# Patient Record
Sex: FEMALE | Race: WHITE | NOT HISPANIC OR LATINO | Employment: OTHER | URBAN - METROPOLITAN AREA
[De-identification: names, ages, dates, MRNs, and addresses within clinical notes are randomized per-mention and may not be internally consistent; named-entity substitution may affect disease eponyms.]

---

## 2017-01-06 ENCOUNTER — TELEPHONE (OUTPATIENT)
Dept: OBSTETRICS AND GYNECOLOGY | Facility: CLINIC | Age: 62
End: 2017-01-06

## 2017-01-06 NOTE — LETTER
January 6, 2017    Radha Rivera  [address] [place] [address]             [facility] [address]  [address] [place] [address]  [address]  [place] [address]  Phone: [phone] Dear Ms. Rivera:    The results of your most recent Pap smear are normal. This means that no cancerous or precancerous cells were seen. We recommend that you come back in 1 year for your annual exam and 1 years for your next Pap smear.    If you have any questions or concerns, please don't hesitate to call.    Sincerely,        Dequan Osborn MD

## 2017-12-29 ENCOUNTER — HOSPITAL ENCOUNTER (OUTPATIENT)
Dept: RADIOLOGY | Facility: HOSPITAL | Age: 62
Discharge: HOME | End: 2017-12-29
Attending: INTERNAL MEDICINE
Payer: COMMERCIAL

## 2017-12-29 VITALS — BODY MASS INDEX: 20.32 KG/M2 | HEIGHT: 64 IN | WEIGHT: 119 LBS

## 2017-12-29 DIAGNOSIS — Z12.39 ENCOUNTER FOR SCREENING FOR MALIGNANT NEOPLASM OF BREAST: ICD-10-CM

## 2017-12-29 PROCEDURE — 77063 BREAST TOMOSYNTHESIS BI: CPT | Mod: 26,,, | Performed by: RADIOLOGY

## 2017-12-29 PROCEDURE — 77067 SCR MAMMO BI INCL CAD: CPT | Mod: TC

## 2017-12-29 PROCEDURE — 77067 SCR MAMMO BI INCL CAD: CPT | Mod: 26,,, | Performed by: RADIOLOGY

## 2018-12-13 DIAGNOSIS — R92.8 ABNORMAL MAMMOGRAM: Primary | ICD-10-CM

## 2018-12-13 DIAGNOSIS — N63.0 BREAST LUMP: ICD-10-CM

## 2018-12-31 ENCOUNTER — HOSPITAL ENCOUNTER (OUTPATIENT)
Dept: RADIOLOGY | Facility: HOSPITAL | Age: 63
Discharge: HOME | End: 2018-12-31
Attending: INTERNAL MEDICINE
Payer: MEDICARE

## 2018-12-31 DIAGNOSIS — R92.8 ABNORMAL MAMMOGRAM: ICD-10-CM

## 2018-12-31 DIAGNOSIS — N63.20 LEFT BREAST LUMP: ICD-10-CM

## 2018-12-31 PROCEDURE — 76642 ULTRASOUND BREAST LIMITED: CPT | Mod: TC,PO,LT

## 2018-12-31 PROCEDURE — 77066 DX MAMMO INCL CAD BI: CPT | Mod: 26,,, | Performed by: RADIOLOGY

## 2018-12-31 PROCEDURE — 76642 ULTRASOUND BREAST LIMITED: CPT | Mod: 26,LT,, | Performed by: RADIOLOGY

## 2018-12-31 PROCEDURE — 77066 MAMMO DIGITAL DIAGNOSTIC BILAT WITH TOMOSYNTHESIS_CAD: ICD-10-PCS | Mod: 26,,, | Performed by: RADIOLOGY

## 2018-12-31 PROCEDURE — 76642 US BREAST LEFT LIMITED: ICD-10-PCS | Mod: 26,LT,, | Performed by: RADIOLOGY

## 2018-12-31 PROCEDURE — 77062 MAMMO DIGITAL DIAGNOSTIC BILAT WITH TOMOSYNTHESIS_CAD: ICD-10-PCS | Mod: 26,,, | Performed by: RADIOLOGY

## 2018-12-31 PROCEDURE — 77062 BREAST TOMOSYNTHESIS BI: CPT | Mod: 26,,, | Performed by: RADIOLOGY

## 2018-12-31 PROCEDURE — 77062 BREAST TOMOSYNTHESIS BI: CPT | Mod: TC,PO

## 2019-01-02 ENCOUNTER — TELEPHONE (OUTPATIENT)
Dept: RADIOLOGY | Facility: HOSPITAL | Age: 64
End: 2019-01-02

## 2019-01-02 NOTE — TELEPHONE ENCOUNTER
Spoke with patient. Reviewed breast biopsy procedure and reviewed instructions for breast biopsy. Patient expressed understanding and all questions were answered. Provided patient with my phone number to call for any further concerns or questions.   Patient scheduled breast biopsy at the [facility] on 1/10/2019

## 2019-01-03 ENCOUNTER — TELEPHONE (OUTPATIENT)
Dept: RADIOLOGY | Facility: HOSPITAL | Age: 64
End: 2019-01-03

## 2019-01-11 ENCOUNTER — HOSPITAL ENCOUNTER (OUTPATIENT)
Dept: RADIOLOGY | Facility: HOSPITAL | Age: 64
Discharge: HOME | End: 2019-01-11
Attending: INTERNAL MEDICINE
Payer: MEDICARE

## 2019-01-11 DIAGNOSIS — N63.0 BREAST MASS: ICD-10-CM

## 2019-01-11 DIAGNOSIS — R92.8 ABNORMAL MAMMOGRAM: ICD-10-CM

## 2019-01-11 PROCEDURE — 77065 MAMMO DIGITAL DIAGNOSTIC LEFT WITH CAD: ICD-10-PCS | Mod: 26,LT,, | Performed by: RADIOLOGY

## 2019-01-11 PROCEDURE — A4648 IMPLANTABLE TISSUE MARKER: HCPCS | Mod: PO

## 2019-01-11 PROCEDURE — 88305 TISSUE EXAM BY PATHOLOGIST: CPT | Mod: 26,,, | Performed by: PATHOLOGY

## 2019-01-11 PROCEDURE — 19083 US BREAST BIOPSY WITH IMAGING 1ST SITE LEFT: ICD-10-PCS | Mod: 26,LT,, | Performed by: RADIOLOGY

## 2019-01-11 PROCEDURE — 88305 TISSUE SPECIMEN TO PATHOLOGY, RADIOLOGY: ICD-10-PCS | Mod: 26,,, | Performed by: PATHOLOGY

## 2019-01-11 PROCEDURE — 88360 TUMOR IMMUNOHISTOCHEM/MANUAL: CPT | Performed by: PATHOLOGY

## 2019-01-11 PROCEDURE — 77065 DX MAMMO INCL CAD UNI: CPT | Mod: 26,LT,, | Performed by: RADIOLOGY

## 2019-01-11 PROCEDURE — 77065 DX MAMMO INCL CAD UNI: CPT | Mod: TC,PO,LT

## 2019-01-11 PROCEDURE — 27201044 US BREAST BIOPSY WITH IMAGING 1ST SITE LEFT: Mod: PO

## 2019-01-11 PROCEDURE — 88305 TISSUE EXAM BY PATHOLOGIST: CPT | Performed by: PATHOLOGY

## 2019-01-11 PROCEDURE — 19083 BX BREAST 1ST LESION US IMAG: CPT | Mod: 26,LT,, | Performed by: RADIOLOGY

## 2019-01-11 PROCEDURE — 88360 TUMOR IMMUNOHISTOCHEM/MANUAL: CPT | Mod: 26,,, | Performed by: PATHOLOGY

## 2019-01-11 PROCEDURE — 88360 TISSUE SPECIMEN TO PATHOLOGY, RADIOLOGY: ICD-10-PCS | Mod: 26,,, | Performed by: PATHOLOGY

## 2019-01-14 ENCOUNTER — TELEPHONE (OUTPATIENT)
Dept: SURGERY | Facility: CLINIC | Age: 64
End: 2019-01-14

## 2019-01-14 NOTE — TELEPHONE ENCOUNTER
Called patient with the results of her breast biopsy from 1/11/19. Left voicemail with my callback number requesting return call at earliest convenience

## 2019-01-15 ENCOUNTER — TELEPHONE (OUTPATIENT)
Dept: SURGERY | Facility: CLINIC | Age: 64
End: 2019-01-15

## 2019-01-15 NOTE — TELEPHONE ENCOUNTER
Called patient to discuss biopsy results from 1/11/19. Explained to patient that biopsy showed invasive ductal carcinoma. We discussed what this means and that the next step is to meet with a breast surgeon.    Patient was very overwhelmed to receive this news, requests that I give her some time to process and speak to her family, then she will call me back to schedule. Provided my direct number and encouraged her to call with any questions or when she's ready to schedule. Verbalized understanding     Also notified Dr. Vera's office at [phone] of patient's results and her notification of results. Spoke to Judy, who states they did get the path report and she will let Dr. Vera know that patient was informed of results.

## 2019-01-16 ENCOUNTER — TELEPHONE (OUTPATIENT)
Dept: SURGERY | Facility: CLINIC | Age: 64
End: 2019-01-16

## 2019-01-16 NOTE — TELEPHONE ENCOUNTER
Received return call from patient's daughter Thalia, who is in her chart as an emergency contact. Daughter and I discussed patient's path report and the need for surgical consult. Provided the names of our two available surgeons, Dr. Valdes and Dr. Olivier. Thalia will talk to her mother and call me back when they are ready to schedule.

## 2019-01-16 NOTE — TELEPHONE ENCOUNTER
Had voicemail from patient's daughter requesting callback regarding mother's biopsy results. Left voicemail on phone number requested [phone] with my callback number requesting return call at earliest convenience

## 2019-01-17 ENCOUNTER — TELEPHONE (OUTPATIENT)
Dept: SURGERY | Facility: CLINIC | Age: 64
End: 2019-01-17

## 2019-01-17 NOTE — TELEPHONE ENCOUNTER
Patient called me back, states that she would like to see Dr. Valdes for her consult. Scheduled for Tuesday 1/22, reviewed the location of the breast center. Patient verbalized understanding of all information

## 2019-01-22 ENCOUNTER — OFFICE VISIT (OUTPATIENT)
Dept: SURGERY | Facility: CLINIC | Age: 64
End: 2019-01-22
Payer: MEDICARE

## 2019-01-22 VITALS
RESPIRATION RATE: 18 BRPM | BODY MASS INDEX: 20.51 KG/M2 | DIASTOLIC BLOOD PRESSURE: 63 MMHG | SYSTOLIC BLOOD PRESSURE: 144 MMHG | HEART RATE: 68 BPM | WEIGHT: 120.13 LBS | HEIGHT: 64 IN | TEMPERATURE: 98 F

## 2019-01-22 DIAGNOSIS — C50.912 MALIGNANT NEOPLASM OF LEFT FEMALE BREAST, UNSPECIFIED ESTROGEN RECEPTOR STATUS, UNSPECIFIED SITE OF BREAST: Primary | ICD-10-CM

## 2019-01-22 DIAGNOSIS — C50.512 PRIMARY CANCER OF LOWER OUTER QUADRANT OF LEFT FEMALE BREAST: ICD-10-CM

## 2019-01-22 DIAGNOSIS — Z01.818 PRE-OP TESTING: ICD-10-CM

## 2019-01-22 PROCEDURE — 3008F BODY MASS INDEX DOCD: CPT | Mod: HCNC,CPTII,S$GLB, | Performed by: SURGERY

## 2019-01-22 PROCEDURE — 99205 PR OFFICE/OUTPT VISIT, NEW, LEVL V, 60-74 MIN: ICD-10-PCS | Mod: HCNC,S$GLB,, | Performed by: SURGERY

## 2019-01-22 PROCEDURE — 99999 PR PBB SHADOW E&M-EST. PATIENT-LVL IV: CPT | Mod: PBBFAC,HCNC,, | Performed by: SURGERY

## 2019-01-22 PROCEDURE — 3008F PR BODY MASS INDEX (BMI) DOCUMENTED: ICD-10-PCS | Mod: HCNC,CPTII,S$GLB, | Performed by: SURGERY

## 2019-01-22 PROCEDURE — 99205 OFFICE O/P NEW HI 60 MIN: CPT | Mod: HCNC,S$GLB,, | Performed by: SURGERY

## 2019-01-22 PROCEDURE — 1101F PT FALLS ASSESS-DOCD LE1/YR: CPT | Mod: HCNC,CPTII,S$GLB, | Performed by: SURGERY

## 2019-01-22 PROCEDURE — 99999 PR PBB SHADOW E&M-EST. PATIENT-LVL IV: ICD-10-PCS | Mod: PBBFAC,HCNC,, | Performed by: SURGERY

## 2019-01-22 PROCEDURE — 1101F PR PT FALLS ASSESS DOC 0-1 FALLS W/OUT INJ PAST YR: ICD-10-PCS | Mod: HCNC,CPTII,S$GLB, | Performed by: SURGERY

## 2019-01-22 NOTE — LETTER
[facility]  [address]  [facility] [address]  Phone: [phone]  Fax: [phone] 2019      Salo Vera MD  [address]  [address]  [place] [address]    Patient: Radha Rivera   MR Number: [id number]   YOB: 1955   Date of Visit: 2019     Dear Dr. Vera:    Thank you for referring Radha Rivera to me for evaluation. Attached you will find relevant portions of my assessment and plan of care.    The patient is a very pleasant 63-year-old  female who presents with her , Criss and her daughter, Rehana for initial consultation.  The patient noted a mass in her left lateral breast on self-breast examination best noted and palpated when she was lying on her left side feeling this 1 cm density in the left lateral breast.  She underwent diagnostic mammogram and ultrasound, which revealed a 9 x 6 x 5 mm mass in the left lateral breast.  She underwent ultrasound-guided core needle biopsy on 2019, which revealed grade 2 invasive infiltrating ductal carcinoma measuring at least 3 mm in size.  This was estrogen receptor negative.  It was progesterone receptor weakly positive and only 5% of the cells staining and it was HER-2/rachelle 2+ equivocal with FISH currently pending.  Her family history reveals that her sister was diagnosed with breast cancer at age 68 in  and subsequently dying of breast cancer in .  She had a maternal aunt with a history of breast cancer who  at age 92. There is also a history of breast cancer in a paternal grandmother at an uncertain age as well as a paternal aunt.  The patient currently has four daughters who are otherwise healthy with no history of breast cancer in her daughters.    Clinical breast exam reveals small A cup size breasts bilaterally.  I can feel approximately a 1 cm/subcentimeter density in the lateral left breast when the patient is lying towards the left side.  There is no  palpable axillary, cervical or supraclavicular lymphadenopathy.  The mass in the left lateral breast is not fixed to the chest wall or skin and it is quite distant from the nipple areolar complex with no clinical suspicious findings of the nipple areolar complex.    The patient was counseled on options of breast conservation surgery and radiotherapy versus mastectomy.  Given the size of the tumor, she would typically be a good candidate for breast conservation surgery, but given the very relatively small size breast, there would be very little residual breast tissue with mastectomy and she would subsequently need to undergo radiotherapy.     We counseled the patient on mastectomy with or without reconstruction.  The patient is comfortable with proceeding with mastectomy at this point without reconstruction and favors mastectomy because of the relatively small size breast and potential ability to avoid post-mastectomy radiotherapy given the approximately 1 cm size tumor.    We also discussed the fact that it is essentially behaving as an estrogen and progesterone receptor negative tumor with a FISH pending that this will likely either be HER-2 positive in 20% of the cases or if it is HER-2 rachelle negative, it may be essentially behaving like a triple negative cancer and therefore, she may end up receiving adjuvant chemotherapy in one form or another; no matter what the HER-2 FISH result comes back at.      The patient's  and daughter understand this process and we will also obtain an MRI of the breasts to fully assess the breast parenchyma bilaterally.  We will also refer her to [facility] for genetic counseling and possible genetic testing.  She has been tentatively scheduled for surgery on Friday, 02/08/2019 for a left breast total complete therapeutic mastectomy with left axillary sentinel lymph node biopsy, possible axillary lymph node dissection, possible contralateral right-sided Port-A-Cath placement  pending FISH results.    Approximate time spent with the patient and her family today was 60 minutes with greater than 50% of time in counseling.  Consent was obtained for surgery for left mastectomy with sentinel lymph node biopsy, possible axillary dissection for either a positive node or non-mapping on Friday 02/08/2019, the patient will be considered for Port-A-Cath placement on the contralateral right side, pending the FISH result on the HER-2/rachelle status.    If you have questions, please do not hesitate to call me. I look forward to following Radha Rivera along with you.    Sincerely,    Twan Valdes MD  Medical Director, [facility]  Staff Attending Surgeon - Department of Surgery  [facility]  Associate Professor of Surgery  [employer]  [facility] [employer]    RLC/hcr

## 2019-01-22 NOTE — LETTER
January 26, 2019      Salo Vera MD  [address]  [address]  [place] [address]           [facility]  [address]  [facility] [address]  Phone: [phone]  Fax: [phone]          Patient: Radha Rivera   MR Number: [id number]   YOB: 1955   Date of Visit: 1/22/2019       Dear Dr. Salo Vera:    Thank you for referring Radha Rivera to me for evaluation. Attached you will find relevant portions of my assessment and plan of care.    If you have questions, please do not hesitate to call me. I look forward to following Radha Rivera along with you.    Sincerely,    Twan Valdes MD    Enclosure  CC:  No Recipients    If you would like to receive this communication electronically, please contact [email or web address] or [phone] to request more information on [employer] Link access.    For providers and/or their staff who would like to refer a patient to [facility], please contact us through our one-stop-shop provider referral line, [facility], at [phone].    If you feel you have received this communication in error or would no longer like to receive these types of communications, please e-mail [email or web address]

## 2019-01-22 NOTE — MEDICAL/APP STUDENT
New Breast Cancer  History and Physical  [facility]  Department of Surgery    REFERRING PROVIDER: Salo Vera MD  [address]  [address]  [place] [address]    CHIEF COMPLAINT: left breast cancer    Subjective:      Radha Rivera is a 63 y.o. postmenopausal female referred for evaluation of recently diagnosed carcinoma of the left breast. The patient was initially referred for surgical evaluation of a breast lump on exam first noted in late . Follow-up imaging showed mass at 4 o'clock in the left breast. A ultrasound guided biopsy was performed on 18 with pathology revealing infiltrating ductal carcinoma of the breast.     Patient does routinely do self breast exams.  Patient has noted a change on breast exam.  Patient denies nipple discharge. Patient denies to previous breast biopsy. Patient denies a personal history of breast cancer.    Findings at that time were the following:   Tumor size: .9 cm   Tumor stgstrstastdstest:st st1st Estrogen Receptor: Negative   Progesterone Receptor: Weak positive 5%   Her-2 rachelle: Equivocal, awaiting FISH          GYN History:  Age of menarche was 11. Age of menopause was 53.  Patient denies hormonal therapy. Patient is .    FAMILY History:  Sister Diagnosed w/ Breast cancer age 60  at 68  Maternal aunt w/breast cancer (lived to 92)  Paternal grandmother w/ breast cancer  Paternal aunt w/breast cancer    Past Medical History:   Diagnosis Date    Mitral valve prolapse     Thyroid disease      Past Surgical History:   Procedure Laterality Date    Ear drum surgery      INNER EAR SURGERY Right     tonsillectomy      TONSILLECTOMY       Current Outpatient Medications on File Prior to Visit   Medication Sig Dispense Refill    levothyroxine (SYNTHROID) 25 MCG tablet 50 mcg.       levothyroxine (SYNTHROID) 50 MCG tablet       vitamin D 1000 units Tab Take 185 mg by mouth once daily.      aspirin 81 MG Chew Take 81 mg by mouth once daily.       "meloxicam (MOBIC) 15 MG tablet       naproxen (NAPROSYN) 500 MG tablet       tramadol (ULTRAM) 50 mg tablet        No current facility-administered medications on file prior to visit.      Social History     Socioeconomic History    Marital status:      Spouse name: Not on file    Number of children: Not on file    Years of education: Not on file    Highest education level: Not on file   Social Needs    Financial resource strain: Not on file    Food insecurity - worry: Not on file    Food insecurity - inability: Not on file    Transportation needs - medical: Not on file    Transportation needs - non-medical: Not on file   Occupational History    Not on file   Tobacco Use    Smoking status: Never Smoker   Substance and Sexual Activity    Alcohol use: Yes     Comment: Seldom    Drug use: No    Sexual activity: Yes     Partners: Male     Birth control/protection: Post-menopausal   Other Topics Concern    Not on file   Social History Narrative    Not on file     Family History   Problem Relation Age of Onset    Breast cancer Sister 68    Breast cancer Paternal Aunt     Cancer Father         prostate cancer    Thyroid disease Daughter     Breast cancer Maternal Aunt     Colon cancer Neg Hx     Ovarian cancer Neg Hx     Stroke Neg Hx     Diabetes Neg Hx         Review of Systems  Review of Systems     Objective:   PHYSICAL EXAM:  BP (!) 144/63 (BP Location: Left arm, Patient Position: Sitting, BP Method: Medium (Automatic))   Pulse 68   Temp 97.7 °F (36.5 °C) (Oral)   Resp 18   Ht 5' 4" (1.626 m)   Wt 54.5 kg (120 lb 2.4 oz)   BMI 20.62 kg/m²     Physical Exam      Radiology review: Images personally reviewed by me in the clinic.   Mammogram: 12/13/18     Impression:  Left  Mass: Left breast 9 mm x 6 mm x 5 mm mass at the 4 o'clock position. Assessment: 4 - Suspicious finding. Biopsy is recommended.     Complicated cyst versus mass in the left breast at 4 o'clock, 5 cm from the " nipple. Given its close proximity to the dominant and more suspicious mass, further recommendations will be provided following pathology results of above lesion.    Right  There is no mammographic or sonographic evidence of malignancy.    BI-RADS Category:   Overall: 4 - Suspicious

## 2019-01-23 ENCOUNTER — TELEPHONE (OUTPATIENT)
Dept: REHABILITATION | Facility: HOSPITAL | Age: 64
End: 2019-01-23

## 2019-01-23 DIAGNOSIS — C50.912 MALIGNANT NEOPLASM OF LEFT BREAST IN FEMALE, ESTROGEN RECEPTOR NEGATIVE, UNSPECIFIED SITE OF BREAST: ICD-10-CM

## 2019-01-23 DIAGNOSIS — Z17.1 MALIGNANT NEOPLASM OF LEFT BREAST IN FEMALE, ESTROGEN RECEPTOR NEGATIVE, UNSPECIFIED SITE OF BREAST: ICD-10-CM

## 2019-01-23 DIAGNOSIS — Z91.89 AT RISK FOR LYMPHEDEMA: Primary | ICD-10-CM

## 2019-01-23 NOTE — TELEPHONE ENCOUNTER
Patient was called to offer a pre-op physical therapy visit. Patient was interested in having this appointment and has been scheduled for pre-op evaluation on 1/28/19.      Radha Anaya PT

## 2019-01-25 ENCOUNTER — TELEPHONE (OUTPATIENT)
Dept: SURGERY | Facility: CLINIC | Age: 64
End: 2019-01-25

## 2019-01-25 DIAGNOSIS — C50.512 MALIGNANT NEOPLASM OF LOWER-OUTER QUADRANT OF LEFT BREAST OF FEMALE, ESTROGEN RECEPTOR NEGATIVE: ICD-10-CM

## 2019-01-25 DIAGNOSIS — M25.559 ACUTE HIP PAIN, UNSPECIFIED LATERALITY: Primary | ICD-10-CM

## 2019-01-25 DIAGNOSIS — M89.8X9 BONE PAIN: ICD-10-CM

## 2019-01-25 DIAGNOSIS — C80.1 MALIGNANT NEOPLASM: ICD-10-CM

## 2019-01-25 DIAGNOSIS — Z17.1 MALIGNANT NEOPLASM OF LOWER-OUTER QUADRANT OF LEFT BREAST OF FEMALE, ESTROGEN RECEPTOR NEGATIVE: ICD-10-CM

## 2019-01-25 NOTE — TELEPHONE ENCOUNTER
Spoke to pt and her daughter, harshad. Pt c/o bone pain in hip and leg. Dr. Valdes ordered PET scan. Scheduled for 2/1/19. Instructions given. She verbalizes understanding.

## 2019-01-26 PROBLEM — C50.512: Status: ACTIVE | Noted: 2019-01-26

## 2019-01-26 NOTE — H&P (VIEW-ONLY)
REFERRING PROVIDER: Salo Vera MD  [address]  [address]  [place] [address]     CHIEF COMPLAINT: left breast cancer     Subjective:       Radha Rivera is a 63 y.o. postmenopausal female referred for evaluation of recently diagnosed carcinoma of the left breast. The patient was initially referred for surgical evaluation of a breast lump on exam first noted in late . Follow-up imaging showed mass at 4 o'clock in the left breast. A ultrasound guided biopsy was performed on 18 with pathology revealing infiltrating ductal carcinoma of the breast.      Patient does routinely do self breast exams.  Patient has noted a change on breast exam.  Patient denies nipple discharge. Patient denies to previous breast biopsy. Patient denies a personal history of breast cancer.     Findings at that time were the following:   Invasive infiltrating ductal carcinoma, overall grade 2 (3,3,1)  Tumor stgstrstastdstest:st st1st Estrogen Receptor: Negative   Progesterone Receptor: Weak positive, 5% with weak nuclear staining in 5% of tumor cells.  Her-2 rachelle: Equivocal, awaiting FISH   Ki67: 5% positive staining           GYN History:  Age of menarche was 11. Age of menopause was 53.  Patient denies hormonal therapy. Patient is .     FAMILY History:  Sister Diagnosed w/ Breast cancer age 60  at 68  Maternal aunt w/breast cancer        (lived to 92)  Paternal grandmother w/ breast cancer  Paternal aunt w/breast cancer          Past Medical History:   Diagnosis Date    Mitral valve prolapse      Thyroid disease              Past Surgical History:   Procedure Laterality Date    Ear drum surgery        INNER EAR SURGERY Right      tonsillectomy        TONSILLECTOMY                 Current Outpatient Medications on File Prior to Visit   Medication Sig Dispense Refill    levothyroxine (SYNTHROID) 25 MCG tablet 50 mcg.         levothyroxine (SYNTHROID) 50 MCG tablet          vitamin D 1000 units Tab Take 185 mg by  "mouth once daily.        aspirin 81 MG Chew Take 81 mg by mouth once daily.        meloxicam (MOBIC) 15 MG tablet          naproxen (NAPROSYN) 500 MG tablet          tramadol (ULTRAM) 50 mg tablet            No current facility-administered medications on file prior to visit.       Social History               Socioeconomic History    Marital status:        Spouse name: Not on file    Number of children: Not on file    Years of education: Not on file    Highest education level: Not on file   Social Needs    Financial resource strain: Not on file    Food insecurity - worry: Not on file    Food insecurity - inability: Not on file    Transportation needs - medical: Not on file    Transportation needs - non-medical: Not on file   Occupational History    Not on file   Tobacco Use    Smoking status: Never Smoker   Substance and Sexual Activity    Alcohol use: Yes       Comment: Seldom    Drug use: No    Sexual activity: Yes       Partners: Male       Birth control/protection: Post-menopausal   Other Topics Concern    Not on file   Social History Narrative    Not on file               Family History   Problem Relation Age of Onset    Breast cancer Sister 68    Breast cancer Paternal Aunt      Cancer Father           prostate cancer    Thyroid disease Daughter      Breast cancer Maternal Aunt      Colon cancer Neg Hx      Ovarian cancer Neg Hx      Stroke Neg Hx      Diabetes Neg Hx           Review of Systems  Review of Systems     Objective:   PHYSICAL EXAM:  BP (!) 144/63 (BP Location: Left arm, Patient Position: Sitting, BP Method: Medium (Automatic))   Pulse 68   Temp 97.7 °F (36.5 °C) (Oral)   Resp 18   Ht 5' 4" (1.626 m)   Wt 54.5 kg (120 lb 2.4 oz)   BMI 20.62 kg/m²      Physical Exam        Radiology review: Images personally reviewed by me in the clinic.   Mammogram: 12/13/18     Impression:  Left  Mass: Left breast 9 mm x 6 mm x 5 mm mass at the 4 o'clock position. " Assessment: 4 - Suspicious finding. Biopsy is recommended.     Complicated cyst versus mass in the left breast at 4 o'clock, 5 cm from the nipple. Given its close proximity to the dominant and more suspicious mass, further recommendations will be provided following pathology results of above lesion.    Right  There is no mammographic or sonographic evidence of malignancy.    BI-RADS Category:   Overall: 4 - Suspicious     I have personally taken the history and examined this patient and agree with the resident's note as stated above.    HISTORY OF PRESENT ILLNESS:  The patient is a very pleasant 63-year-old    female who presents with her , Criss and her daughter, Rehana   for initial consultation.  The patient noted a mass in her left lateral breast   on self-breast examination best noted and palpated when she was lying on her   left side feeling this 1 cm density in the left lateral breast.  She underwent   diagnostic mammogram and ultrasound, which revealed a 9 x 6 x 5 mm mass in the   left lateral breast.  She underwent ultrasound-guided core needle biopsy on   2019, which revealed grade 2 invasive infiltrating ductal carcinoma   measuring at least 3 mm in size.  This was estrogen receptor negative.  It was   progesterone receptor weakly positive and only 5% of the cells staining and it   was HER-2/rachelle 2+ equivocal with FISH currently pending.  Her family history   reveals that her sister was diagnosed with breast cancer at age 68 in  and   subsequently dying of breast cancer in .  She had a maternal aunt with a   history of breast cancer who  at age 92.  There is also a history of breast   cancer in a paternal grandmother at an uncertain age as well as a paternal aunt.    The patient currently has four daughters who are otherwise healthy with no   history of breast cancer in her daughters.    Clinical breast exam reveals small A cup size breasts bilaterally.  I can feel    approximately a 1 cm/subcentimeter density in the lateral left breast when the   patient is lying towards the left side.  There is no palpable axillary, cervical   or supraclavicular lymphadenopathy.  The mass in the left lateral breast is not   fixed to the chest wall or skin and it is quite distant from the nipple areolar   complex with no clinical suspicious findings of the nipple areolar complex.    The patient was counseled on options of breast conservation surgery and   radiotherapy versus mastectomy.  Given the size of the tumor, she would   typically be a good candidate for breast conservation surgery, but given the   very relatively small size breast, there would be very little residual breast   tissue with mastectomy and she would subsequently need to undergo radiotherapy.    We counseled the patient on mastectomy with or without reconstruction.  The   patient is comfortable with proceeding with mastectomy at this point without   reconstruction and favors mastectomy because of the relatively small size breast   and potential ability to avoid post-mastectomy radiotherapy given the   approximately 1 cm size tumor.    We also discussed the fact that it is essentially behaving as an estrogen and   progesterone receptor negative tumor with a FISH pending that this will likely   either be HER-2 positive in 20% of the cases or if it is HER-2 rachelle negative, it   may be essentially behaving like a triple negative cancer and therefore, she may   end up receiving adjuvant chemotherapy in one form or another; no matter what   the HER-2 FISH result comes back at.  The patient's  and daughter   understand this process and we will also obtain an MRI of the breasts to fully   assess the breast parenchyma bilaterally.  We will also refer her to [facility] for genetic counseling and possible genetic testing.  She has been   tentatively scheduled for surgery on Friday, 02/08/2019 for a left breast total    complete therapeutic mastectomy with left axillary sentinel lymph node biopsy,   possible axillary lymph node dissection, possible contralateral right-sided   Port-A-Cath placement pending FISH results.    Approximate time spent with the patient and her family today was 60 minutes with   greater than 50% of time in counseling.  Consent was obtained for surgery for   left mastectomy with sentinel lymph node biopsy, possible axillary dissection   for either a positive node or non-mapping on Friday 02/08/2019, the patient will   be considered for Port-A-Cath placement on the contralateral right side,   pending the FISH result on the HER-2/rachelle status.          /juanita [id number] review            RL/HN  dd: 01/26/2019 18:02:00 (CST)  td: 01/27/2019 06:03:40 (CST)  Doc ID   #[id number]  Job ID #[id number]    CC:     Job # [id number].

## 2019-01-26 NOTE — PROGRESS NOTES
REFERRING PROVIDER: Salo Vera MD  [address] [facility] [address]  [address]  [place] [address]     CHIEF COMPLAINT: left breast cancer     Subjective:       Radha Rivera is a 63 y.o. postmenopausal female referred for evaluation of recently diagnosed carcinoma of the left breast. The patient was initially referred for surgical evaluation of a breast lump on exam first noted in late . Follow-up imaging showed mass at 4 o'clock in the left breast. A ultrasound guided biopsy was performed on 18 with pathology revealing infiltrating ductal carcinoma of the breast.      Patient does routinely do self breast exams.  Patient has noted a change on breast exam.  Patient denies nipple discharge. Patient denies to previous breast biopsy. Patient denies a personal history of breast cancer.     Findings at that time were the following:   Invasive infiltrating ductal carcinoma, overall grade 2 (3,3,1)  Tumor stgstrstastdstest:st st1st Estrogen Receptor: Negative   Progesterone Receptor: Weak positive, 5% with weak nuclear staining in 5% of tumor cells.  Her-2 rachelle: Equivocal, awaiting FISH   Ki67: 5% positive staining           GYN History:  Age of menarche was 11. Age of menopause was 53.  Patient denies hormonal therapy. Patient is .     FAMILY History:  Sister Diagnosed w/ Breast cancer age 60  at 68  Maternal aunt w/breast cancer        (lived to 92)  Paternal grandmother w/ breast cancer  Paternal aunt w/breast cancer          Past Medical History:   Diagnosis Date    Mitral valve prolapse      Thyroid disease              Past Surgical History:   Procedure Laterality Date    Ear drum surgery        INNER EAR SURGERY Right      tonsillectomy        TONSILLECTOMY                 Current Outpatient Medications on File Prior to Visit   Medication Sig Dispense Refill    levothyroxine (SYNTHROID) 25 MCG tablet 50 mcg.         levothyroxine (SYNTHROID) 50 MCG tablet          vitamin D 1000 units Tab Take 185 mg by  "mouth once daily.        aspirin 81 MG Chew Take 81 mg by mouth once daily.        meloxicam (MOBIC) 15 MG tablet          naproxen (NAPROSYN) 500 MG tablet          tramadol (ULTRAM) 50 mg tablet            No current facility-administered medications on file prior to visit.       Social History               Socioeconomic History    Marital status:        Spouse name: Not on file    Number of children: Not on file    Years of education: Not on file    Highest education level: Not on file   Social Needs    Financial resource strain: Not on file    Food insecurity - worry: Not on file    Food insecurity - inability: Not on file    Transportation needs - medical: Not on file    Transportation needs - non-medical: Not on file   Occupational History    Not on file   Tobacco Use    Smoking status: Never Smoker   Substance and Sexual Activity    Alcohol use: Yes       Comment: Seldom    Drug use: No    Sexual activity: Yes       Partners: Male       Birth control/protection: Post-menopausal   Other Topics Concern    Not on file   Social History Narrative    Not on file               Family History   Problem Relation Age of Onset    Breast cancer Sister 68    Breast cancer Paternal Aunt      Cancer Father           prostate cancer    Thyroid disease Daughter      Breast cancer Maternal Aunt      Colon cancer Neg Hx      Ovarian cancer Neg Hx      Stroke Neg Hx      Diabetes Neg Hx           Review of Systems  Review of Systems     Objective:   PHYSICAL EXAM:  BP (!) 144/63 (BP Location: Left arm, Patient Position: Sitting, BP Method: Medium (Automatic))   Pulse 68   Temp 97.7 °F (36.5 °C) (Oral)   Resp 18   Ht 5' 4" (1.626 m)   Wt 54.5 kg (120 lb 2.4 oz)   BMI 20.62 kg/m²      Physical Exam        Radiology review: Images personally reviewed by me in the clinic.   Mammogram: 12/13/18     Impression:  Left  Mass: Left breast 9 mm x 6 mm x 5 mm mass at the 4 o'clock position. " Assessment: 4 - Suspicious finding. Biopsy is recommended.     Complicated cyst versus mass in the left breast at 4 o'clock, 5 cm from the nipple. Given its close proximity to the dominant and more suspicious mass, further recommendations will be provided following pathology results of above lesion.    Right  There is no mammographic or sonographic evidence of malignancy.    BI-RADS Category:   Overall: 4 - Suspicious     I have personally taken the history and examined this patient and agree with the resident's note as stated above.    HISTORY OF PRESENT ILLNESS:  The patient is a very pleasant 63-year-old    female who presents with her , Criss and her daughter, Rehana   for initial consultation.  The patient noted a mass in her left lateral breast   on self-breast examination best noted and palpated when she was lying on her   left side feeling this 1 cm density in the left lateral breast.  She underwent   diagnostic mammogram and ultrasound, which revealed a 9 x 6 x 5 mm mass in the   left lateral breast.  She underwent ultrasound-guided core needle biopsy on   2019, which revealed grade 2 invasive infiltrating ductal carcinoma   measuring at least 3 mm in size.  This was estrogen receptor negative.  It was   progesterone receptor weakly positive and only 5% of the cells staining and it   was HER-2/rachelle 2+ equivocal with FISH currently pending.  Her family history   reveals that her sister was diagnosed with breast cancer at age 68 in  and   subsequently dying of breast cancer in .  She had a maternal aunt with a   history of breast cancer who  at age 92.  There is also a history of breast   cancer in a paternal grandmother at an uncertain age as well as a paternal aunt.    The patient currently has four daughters who are otherwise healthy with no   history of breast cancer in her daughters.    Clinical breast exam reveals small A cup size breasts bilaterally.  I can feel    approximately a 1 cm/subcentimeter density in the lateral left breast when the   patient is lying towards the left side.  There is no palpable axillary, cervical   or supraclavicular lymphadenopathy.  The mass in the left lateral breast is not   fixed to the chest wall or skin and it is quite distant from the nipple areolar   complex with no clinical suspicious findings of the nipple areolar complex.    The patient was counseled on options of breast conservation surgery and   radiotherapy versus mastectomy.  Given the size of the tumor, she would   typically be a good candidate for breast conservation surgery, but given the   very relatively small size breast, there would be very little residual breast   tissue with mastectomy and she would subsequently need to undergo radiotherapy.    We counseled the patient on mastectomy with or without reconstruction.  The   patient is comfortable with proceeding with mastectomy at this point without   reconstruction and favors mastectomy because of the relatively small size breast   and potential ability to avoid post-mastectomy radiotherapy given the   approximately 1 cm size tumor.    We also discussed the fact that it is essentially behaving as an estrogen and   progesterone receptor negative tumor with a FISH pending that this will likely   either be HER-2 positive in 20% of the cases or if it is HER-2 rachelle negative, it   may be essentially behaving like a triple negative cancer and therefore, she may   end up receiving adjuvant chemotherapy in one form or another; no matter what   the HER-2 FISH result comes back at.  The patient's  and daughter   understand this process and we will also obtain an MRI of the breasts to fully   assess the breast parenchyma bilaterally.  We will also refer her to [facility] for genetic counseling and possible genetic testing.  She has been   tentatively scheduled for surgery on Friday, 02/08/2019 for a left breast total    complete therapeutic mastectomy with left axillary sentinel lymph node biopsy,   possible axillary lymph node dissection, possible contralateral right-sided   Port-A-Cath placement pending FISH results.    Approximate time spent with the patient and her family today was 60 minutes with   greater than 50% of time in counseling.  Consent was obtained for surgery for   left mastectomy with sentinel lymph node biopsy, possible axillary dissection   for either a positive node or non-mapping on Friday 02/08/2019, the patient will   be considered for Port-A-Cath placement on the contralateral right side,   pending the FISH result on the HER-2/rachelle status.          /juanita [id number] review            RL/HN  dd: 01/26/2019 18:02:00 (CST)  td: 01/27/2019 06:03:40 (CST)  Doc ID   #[id number]  Job ID #[id number]    CC:     Job # [id number].

## 2019-01-28 ENCOUNTER — HOSPITAL ENCOUNTER (OUTPATIENT)
Dept: CARDIOLOGY | Facility: OTHER | Age: 64
Discharge: HOME | End: 2019-01-28
Attending: SURGERY
Payer: MEDICARE

## 2019-01-28 ENCOUNTER — CLINICAL SUPPORT (OUTPATIENT)
Dept: REHABILITATION | Facility: HOSPITAL | Age: 64
End: 2019-01-28
Attending: SURGERY
Payer: MEDICARE

## 2019-01-28 ENCOUNTER — HOSPITAL ENCOUNTER (OUTPATIENT)
Dept: RADIOLOGY | Facility: OTHER | Age: 64
Discharge: HOME | End: 2019-01-28
Attending: SURGERY
Payer: MEDICARE

## 2019-01-28 DIAGNOSIS — C50.912 MALIGNANT NEOPLASM OF LEFT FEMALE BREAST, UNSPECIFIED ESTROGEN RECEPTOR STATUS, UNSPECIFIED SITE OF BREAST: ICD-10-CM

## 2019-01-28 DIAGNOSIS — Z01.818 PRE-OP TESTING: ICD-10-CM

## 2019-01-28 DIAGNOSIS — C50.512 PRIMARY CANCER OF LOWER OUTER QUADRANT OF LEFT FEMALE BREAST: Primary | ICD-10-CM

## 2019-01-28 DIAGNOSIS — Z91.89 AT RISK FOR LYMPHEDEMA: ICD-10-CM

## 2019-01-28 PROCEDURE — 25500020 PHARM REV CODE 255: Mod: HCNC | Performed by: SURGERY

## 2019-01-28 PROCEDURE — 77049 MRI BREAST C-+ W/CAD BI: CPT | Mod: 26,HCNC,, | Performed by: RADIOLOGY

## 2019-01-28 PROCEDURE — A9577 INJ MULTIHANCE: HCPCS | Mod: HCNC | Performed by: SURGERY

## 2019-01-28 PROCEDURE — 77049 MRI BREAST C-+ W/CAD BI: CPT | Mod: TC,HCNC

## 2019-01-28 PROCEDURE — G8985 CARRY GOAL STATUS: HCPCS | Mod: CH,HCNC,PO | Performed by: PHYSICAL MEDICINE & REHABILITATION

## 2019-01-28 PROCEDURE — G8984 CARRY CURRENT STATUS: HCPCS | Mod: CH,HCNC,PO | Performed by: PHYSICAL MEDICINE & REHABILITATION

## 2019-01-28 PROCEDURE — 77049 MRI BREAST W/WO CONTRAST, W/CAD, BILATERAL: ICD-10-PCS | Mod: 26,HCNC,, | Performed by: RADIOLOGY

## 2019-01-28 PROCEDURE — G8986 CARRY D/C STATUS: HCPCS | Mod: CH,HCNC,PO | Performed by: PHYSICAL MEDICINE & REHABILITATION

## 2019-01-28 PROCEDURE — 97161 PT EVAL LOW COMPLEX 20 MIN: CPT | Mod: HCNC,PO | Performed by: PHYSICAL MEDICINE & REHABILITATION

## 2019-01-28 RX ORDER — GADOBUTROL 604.72 MG/ML
11 INJECTION INTRAVENOUS
Status: DISCONTINUED | OUTPATIENT
Start: 2019-01-28 | End: 2019-01-28

## 2019-01-28 RX ADMIN — GADOBENATE DIMEGLUMINE 10 ML: 529 INJECTION, SOLUTION INTRAVENOUS at 11:01

## 2019-01-28 NOTE — PATIENT INSTRUCTIONS
PRE/POST OP PATIENT EDUCATION    Post Operative Instructions     Range of Motion/lifting Precautions post surgery  The following activities should be avoided until your drain(s) have been removed  - do not lift affected arm above 90 degrees of shoulder flexion  - do not lift over 5 lbs  - do not pull or push heavy objects  - do not sleep on your stomach or surgery side       After surgery, you may begin self-care tasks including grooming, dressing, feeding and simple hygiene as soon as you feel up to it.    Schedule your post-op therapy follow-up after your drains have been removed     When to call your doctor   - if any part of your affected arm or axilla feels hot, is reddened or has increased swelling   - if you develop a temperature over 101 degrees Fahrenheit      Lymphedema - Identification and Prevention     Lymphedema - is the swelling of a body area or extremity caused by the accumulation of lymphatic fluid.  There is a risk for lymphedema with the removal of lymph nodes, trauma or radiation therapy.  Treatment of breast cancer often involves surgery: mastectomy or lumpectomy. Some of the lymph nodes in the underarm (called axillary lymph nodes) may be removed and checked to see if they contain cancer cells.     During breast surgery when axillary lymph nodes are removed (with sentinel node biopsy or axillary dissection) or are treated with radiation therapy, the lymphatic system may become impaired. This may prevent lymphatic fluid from leaving the area therefore, causing lymphedema.     Lymphatic fluid is a normal part of the circulatory system. Its function is to remove waste products and to produce cells vital to fighting infection. Swelling occurs when the vessels become restricted and the lymphatic fluid is unable to freely flow through them.  If lymphedema is left untreated, the affected limb could progressively become more swollen, which could lead to hardening  of the skin, bulkiness in the limb, infection and impaired wound healing.         There are things you can do to decrease the chance of developing lymphedema.                                          [email or web address]                                                                                                                                                  The information presented is intended for general information and educational purposes. It is not intended to replace the  advice of your health care provider. Contact your health care provider if you believe you have a health problem.                                                    POST OP EXERCISES - SAFE TO DO THE FIRST 2 WEEKS AFTER SURGERY UNTIL YOUR FOLLOW UP APPOINTMENT WITH PHYSICAL/OCCUPATIONAL THERAPY    Scapular Retraction (Standing)    With arms at sides, squeeze shoulder blades together. Do not shrug shoulders and do not hold your breath. Hold 5 seconds. Repeat 10 times 1 sessions 1-2 x day.       Exaggerated Breathing and Relaxation      Practice deep breathing frequently in the first few days following surgery even before you begin exercising. This exercise helps with tissue extensibility in the chest wall.  Inhale slowly and deeply through the nose and exhale through pursed lips. Concentrate on relaxing as you let the air out of your lungs. Repeat three (3) to four (4) times, remembering to breath in deeply and then relaxing. This exercise helps to ease the sensation of pulling and discomfort that may be experienced while exercising.      Ball Squeeze OR Hand pumps       Perform this exercise three (3) times a day for 1-3 minutes each time.    The ball squeeze or hand pumps helps to prevent or reduce temporary swelling that may occur in the affected arm. This exercise may be performed standing, sitting or while lying in bed. During this exercise the affected arm should be slightly bent and held upward. Support your arm with a  pillow if you are uncomfortable holding it up.    a. Hold a rubber ball in your hand on the affected side and lift your arm upward.  b. Alternate squeezing and relaxing the ball.              AROM: Elbow Flexion / Extension        With left hand palm up, gently bend elbow as far as possible. Then straighten arm as far as possible. Do this in standing.   Repeat 10  times per set. Do 1 sets per session. Do 1-3 sessions per day.

## 2019-01-28 NOTE — PROGRESS NOTES
OUTPATIENT PHYSICAL THERAPY  Pre-Op  EVALUATION    Name: Radha Rivera  Clinic Number: [id number]    Therapy Diagnosis:   Encounter Diagnoses   Name Primary?    Primary cancer of lower outer quadrant of left female breast Yes    At risk for lymphedema      Physician: Twan Valdes MD    Physician Orders: PT Eval and Treat   Medical Diagnosis: Left Breast Cancer  Evaluation Date: 1/28/2019  Authorization period Expiration: 1/23/20  Plan of Care Certification Period: 1/28/19  Insurance: [employer]    Visit #: 1/ Visits authorized: 1  Time In:11:30 AM  Time Out: 12:15 PM  Total Billable Time: 45 minutes    Precautions: Standard and cancer    History   History of Present Illness: Radha is a 63 y.o. female that presents to  [facility] at the [facility] secondary to dx of Left breast cancer.    Dx: Left breast IDC, ER (-), [facility] (+) HER2 Equivocal  Surgery date: 2/8/19 left mastectomy  Chemotherapy: pending post-op      Pt presents today for baseline measurements to aid in the early detection of lymphedema, UE muscle testing, postural and ROM assessment along with education of risk of lymphedema and surgical precautions post surgery. Circumferential measurements will be taken today of BL UEs for early detection of lymphedema post surgery. Pt will also be instructed in exercises to perform pre and post-surgery to insure best outcomes.     Past Medical History:   Past Medical History:   Diagnosis Date    Mitral valve prolapse     Thyroid disease        Past Surgical History:   Radha Rivera  has a past surgical history that includes tonsillectomy; Ear drum surgery; Tonsillectomy; and Inner ear surgery (Right).    Medications:  Radha has a current medication list which includes the following prescription(s): aspirin, levothyroxine, levothyroxine, meloxicam, naproxen, tramadol, and vitamin d.    Allergies:  Review of patient's allergies indicates:  No Known  "Allergies       Hand dominance: Right handed  Prior Therapy: left knee 2016  Nutrition:  Normal  Social History: Lives with   Place of Residence (Steps/Adaptations): one story home  Current functional status:  Independent with all ADL's  Exercise routine prior to onset : None  DME owned: None  Work:  Does not work                         Subjective   Pt states: Not having any pain  Pain: 0/10 on VAS.     Objective   Mental status :oriented    Posture/Alignment   Postural examination/scapula alignment: Forward head   Joint integrity: WFLs  Skin integrity: intact  Edema: none noted    Sensation: Light Touch: Intact           Proprioception: Intact  - appearance: well groomed     ROM:   UPPER EXTREMITY--AROM/PROM  (R) UE: WNLs  (L) UE: WNLs     Shoulder Range of Motion:   ACTIVE ROM LEFT RIGHT   Flexion 170 170   Abduction 180 180   Horizontal Abd 50 45   Extension 55 55   IR/90deg 80 80   ER/90deg 90 90     Strength: manual muscle test grades below   Upper Extremity Strength   (L) UE (R) UE   Shoulder flexion: 5/5 5/5   Shoulder Abduction: 5/5 5/5   Shoulder IR 5/5 5/5   Shoulder ER 5/5 5/5   Elbow flexion: 5/5 5/5   Elbow extension: 5/5 5/5   Lower Trap: 5/5 5/5   Middle Trap: 5/5 5/5    5/5 5/5       Baseline Measurements of BL UE's for early detection of Lymphedema:     LANDMARK RIGHT UE LEFT UE DIFFERENCE   E + 8" 29.5 cm 29 cm 0.5 cm   E + 6" 27 cm 27 cm 0 cm   E + 4" 25 cm 26 cm 1 cm   E + 2" 23 cm 24 cm 1 cm   Elbow 21 cm 22 cm 1 cm   W+ 8" 21 cm 21.5 cm 0.5 cm   W +  6" 19.5 cm 20.5 cm 1 cm   W + 4" 16 cm 16.5 cm 0.5 cm   Wrist 14 cm 14.5 cm 0.5 cm   DPC 18 cm 17.5 cm 0.5 cm   IP Thumb 6.5 cm 6.0 cm o.5 cm         Coordination:   - fine motor: WFL  - UE coordination: intact     - LE coordination:  Not tested     Functional Mobility (Bed mobility, transfers)  Bed mobility: I =  independent   Roll to left: I  Roll to right: I  Supine to prone: I  Scooting to edge of bed: I  Supine to sit: I  Sit to " supine: I  Transfers to bed: I  Transfers to toilet: I  Sit to stand:  I  Stand pivot:  I  Car transfers: I      ADL's:  Feeding: I = independent   Grooming: I  Hygiene: I  UB Dressing: I  LB Dressing: I  Toileting: I  Bathing: I    Gait Assessment:   - AD used: none  - Assistance: independent  - Distance: community distances       Endurance Deficit: none      Patient Education   - role of PT in multi - disciplinary team, goals for PT  - Pt was educated in lymphedema etiology and management plans.    - Pt was provided with written risk reductions and precautions for managing lymphedema.   - Reviewed PAIGE drain care instructions.     ROM/lifting Precautions post surgery discussed -  until drains have been removed:  - do not lift affected arm above 90 degrees of shoulder flexion  - do not lift over 5 lbs  - do not pull or push heavy objects  - do not sleep on your stomach or surgery side     Written Home Exercises Provided and Patient Education: Handouts given   Pt was instructed in and performed therapeutic exercise for postural correction and alignment, stretching and soft tissue mobility, and strengthening.     Exercises included: handout given    - exaggerated deep breathing and relaxation  - scapular retractions  - wrist circles  - elbow flexion/extension    Pt was able to demonstrate and report understanding and performance    Pt has no cultural, educational or language barriers to learning provided.    Functional Limitations Reporting     Category: mobility   0/% Limitation   Current/: CH = 0% limited, restricted or impaired  Goal/:      CH = 0% limited, restricted or impaired  Discharge/:  CH + 0% limited, restricted or impaired        Assessment   This is a 63 y.o. female referred to outpatient physical therapy and presents with a medical diagnosis of left breast cancer and was seen today pre-operatively to assess strength and ROM of BL UEs, to take baseline circumferential measurements of BL UEs  to aid in the early detection of lymphedema and provide pt education on exercises/precations post breast surgery. Pt does not exhibit any ROM impairments  Pt educated in lymphedema risks/precautions as well as ROM/lifting precautions post surgery - pt demonstrated/verbalized understanding. No goals established this visit as goals for PT will be established post surgery at follow up.      Anticipated barriers to physical therapy: None     Pt's spiritual, cultural and educational needs considered and pt agreeable to plan of care and goals as stated below:     Medical necessity is demonstrated by the following IMPAIRMENTS/PROMBLEM LIST:  History  Co-morbidities and personal factors that may impact the plan of care Examination  Body Structures and Functions, activity limitations and participation restrictions that may impact the plan of care    Clinical Presentation   Co-morbidities:   history of cancer   Left breast cancer        Personal Factors:   no deficits Body Regions:   upper extremities    Body Systems:   No Deficits        Participation Restrictions:   No Deficits     Activity limitations:   Mobility  no deficits    Self care  no deficits    Domestic Life  no deficits    Interactions/Relationships  no deficits    Life Areas  no deficits    Community and Social Life  no deficits         stable and uncomplicated                      low   Moderate  low Decision Making/ Complexity Score:  low       Plan   Schedule patient for follow up with Physical therapy post surgery. Goals for therapy post surgery will be established at that time.     Therapist: Radha Anaya, PT  1/28/2019

## 2019-01-30 ENCOUNTER — TELEPHONE (OUTPATIENT)
Dept: SURGERY | Facility: CLINIC | Age: 64
End: 2019-01-30

## 2019-01-30 NOTE — TELEPHONE ENCOUNTER
Called patient to let her know her FISH was negative. Reviewed what this means, verbalized understanding of all information

## 2019-01-30 NOTE — TELEPHONE ENCOUNTER
Returned patients call. She had specific questions regarding results. Transferred to nurse navigator, [place].

## 2019-01-30 NOTE — TELEPHONE ENCOUNTER
----- Message from Yamile Ray sent at 1/30/2019 11:06 AM CST -----  Patient Returning Call from [facility]    Who Left Message for Patient:  Communication Preference:[phone]  Additional Information:Pt states she missed a call from Dr. Valdes.

## 2019-02-01 ENCOUNTER — HOSPITAL ENCOUNTER (OUTPATIENT)
Dept: RADIOLOGY | Facility: HOSPITAL | Age: 64
Discharge: HOME | End: 2019-02-01
Attending: SURGERY
Payer: MEDICARE

## 2019-02-01 VITALS — BODY MASS INDEX: 20.49 KG/M2 | HEIGHT: 64 IN | WEIGHT: 120 LBS

## 2019-02-01 DIAGNOSIS — Z85.3 HISTORY OF CANCER OF LEFT BREAST: ICD-10-CM

## 2019-02-01 DIAGNOSIS — R59.1 GENERALIZED ENLARGED LYMPH NODES: ICD-10-CM

## 2019-02-01 DIAGNOSIS — N63.0 BREAST MASS: ICD-10-CM

## 2019-02-01 DIAGNOSIS — Z17.1 MALIGNANT NEOPLASM OF LOWER-OUTER QUADRANT OF LEFT BREAST OF FEMALE, ESTROGEN RECEPTOR NEGATIVE: ICD-10-CM

## 2019-02-01 DIAGNOSIS — M89.8X9 BONE PAIN: ICD-10-CM

## 2019-02-01 DIAGNOSIS — M25.559 ACUTE HIP PAIN, UNSPECIFIED LATERALITY: ICD-10-CM

## 2019-02-01 DIAGNOSIS — C50.512 MALIGNANT NEOPLASM OF LOWER-OUTER QUADRANT OF LEFT BREAST OF FEMALE, ESTROGEN RECEPTOR NEGATIVE: ICD-10-CM

## 2019-02-01 PROCEDURE — 76882 US LMTD JT/FCL EVL NVASC XTR: CPT | Mod: TC,HCNC,PO

## 2019-02-01 PROCEDURE — 78815 PET IMAGE W/CT SKULL-THIGH: CPT | Mod: 26,HCNC,PI, | Performed by: RADIOLOGY

## 2019-02-01 PROCEDURE — A9552 F18 FDG: HCPCS | Mod: HCNC

## 2019-02-01 PROCEDURE — 38505 US BIOPSY LYMPH NODE AXILLA: ICD-10-PCS | Mod: HCNC,LT,, | Performed by: RADIOLOGY

## 2019-02-01 PROCEDURE — 88305 TISSUE EXAM BY PATHOLOGIST: CPT | Mod: 26,HCNC,, | Performed by: PATHOLOGY

## 2019-02-01 PROCEDURE — 88305 TISSUE SPECIMEN TO PATHOLOGY, RADIOLOGY: ICD-10-PCS | Mod: 26,HCNC,, | Performed by: PATHOLOGY

## 2019-02-01 PROCEDURE — 38505 NEEDLE BIOPSY LYMPH NODES: CPT | Mod: HCNC,LT,, | Performed by: RADIOLOGY

## 2019-02-01 PROCEDURE — 78815 PET IMAGE W/CT SKULL-THIGH: CPT | Mod: TC,HCNC

## 2019-02-01 PROCEDURE — 76882 US SOFT TISSUE AXILLA: ICD-10-PCS | Mod: 26,HCNC,, | Performed by: RADIOLOGY

## 2019-02-01 PROCEDURE — 88305 TISSUE EXAM BY PATHOLOGIST: CPT | Mod: HCNC | Performed by: PATHOLOGY

## 2019-02-01 PROCEDURE — 78815 NM PET CT ROUTINE: ICD-10-PCS | Mod: 26,HCNC,PI, | Performed by: RADIOLOGY

## 2019-02-01 PROCEDURE — 27201068 US BIOPSY LYMPH NODE AXILLA: Mod: HCNC,PO

## 2019-02-01 PROCEDURE — 76882 US LMTD JT/FCL EVL NVASC XTR: CPT | Mod: 26,HCNC,, | Performed by: RADIOLOGY

## 2019-02-04 LAB — POCT GLUCOSE: 89 MG/DL (ref 70–110)

## 2019-02-06 ENCOUNTER — TELEPHONE (OUTPATIENT)
Dept: SURGERY | Facility: CLINIC | Age: 64
End: 2019-02-06

## 2019-02-06 DIAGNOSIS — C50.019 MALIGNANT NEOPLASM OF AREOLA OF BREAST IN FEMALE, UNSPECIFIED ESTROGEN RECEPTOR STATUS, UNSPECIFIED LATERALITY: ICD-10-CM

## 2019-02-06 DIAGNOSIS — Z51.11 ENCOUNTER FOR ANTINEOPLASTIC CHEMOTHERAPY: Primary | ICD-10-CM

## 2019-02-06 DIAGNOSIS — C50.512 PRIMARY CANCER OF LOWER OUTER QUADRANT OF LEFT FEMALE BREAST: Primary | ICD-10-CM

## 2019-02-06 NOTE — TELEPHONE ENCOUNTER
Dr. Valdes reviewed patient's biopsy results and called patient to discuss. Per Dr. Valdes, due to avni involvement and essentially triple negative status of tumor, patient will now be recommended for neoadjuvant chemo. Spoke to Dr. Paula about case, who is willing to see patient next week to start chemo planning.     Scheduled patient with Dr. Paula for Wednesday 2/13. Called patient and , discussed new plan of care. Friday surgery will consist of port placement only, and patient will come for consult with Dr. Paula on following Wednesday. Per patient's request also spoke to daughter Thalia and reviewed location of the [facility], who verbalized understanding of all information.

## 2019-02-06 NOTE — TELEPHONE ENCOUNTER
Called patient with the results of her breast biopsy from 2/1/19. Explained that biopsy of the left axillary lymph node came back positive for breast cancer. Patient and I discussed that I will try to get in touch with Dr. Valdes today to discuss if this changes anything related to her plan of care. Will call back after I have discussed with him. Verbalized understanding of all information

## 2019-02-07 ENCOUNTER — TELEPHONE (OUTPATIENT)
Dept: SURGERY | Facility: CLINIC | Age: 64
End: 2019-02-07

## 2019-02-07 RX ORDER — LEVOTHYROXINE SODIUM 75 UG/1
75 TABLET ORAL EVERY MORNING
COMMUNITY
End: 2020-02-26 | Stop reason: SDUPTHER

## 2019-02-07 NOTE — PRE-PROCEDURE INSTRUCTIONS
Preop instructions: NPO solids/ milk products after midnight or clears up to 2 hours before arrival (clear liquids are: water, apple juice, Gatorade & Jell-O, black coffee/no milk, cream or creamer), shower instructions, directions, leave all valuables at home, medication instructions for PM prior & am of procedure explained. Patient stated an understanding.   Patient denies any side effects or issues with anesthesia or sedation.

## 2019-02-07 NOTE — TELEPHONE ENCOUNTER
Spoke with Patient.  NPO, medication, and pre-op instructions reviewed.  Verbalized understanding of instructions.

## 2019-02-08 ENCOUNTER — ANESTHESIA (OUTPATIENT)
Dept: SURGERY | Facility: HOSPITAL | Age: 64
End: 2019-02-08
Payer: MEDICARE

## 2019-02-08 ENCOUNTER — HOSPITAL ENCOUNTER (OUTPATIENT)
Facility: HOSPITAL | Age: 64
Discharge: HOME | End: 2019-02-08
Attending: SURGERY | Admitting: SURGERY
Payer: MEDICARE

## 2019-02-08 ENCOUNTER — ANESTHESIA EVENT (OUTPATIENT)
Dept: SURGERY | Facility: HOSPITAL | Age: 64
End: 2019-02-08
Payer: MEDICARE

## 2019-02-08 VITALS
WEIGHT: 119 LBS | OXYGEN SATURATION: 98 % | HEART RATE: 60 BPM | HEIGHT: 64 IN | TEMPERATURE: 98 F | BODY MASS INDEX: 20.32 KG/M2 | DIASTOLIC BLOOD PRESSURE: 64 MMHG | SYSTOLIC BLOOD PRESSURE: 120 MMHG | RESPIRATION RATE: 16 BRPM

## 2019-02-08 DIAGNOSIS — C50.512 PRIMARY CANCER OF LOWER OUTER QUADRANT OF LEFT FEMALE BREAST: Primary | ICD-10-CM

## 2019-02-08 DIAGNOSIS — C50.919 BREAST CANCER: ICD-10-CM

## 2019-02-08 PROCEDURE — 25000003 PHARM REV CODE 250: Mod: HCNC | Performed by: NURSE ANESTHETIST, CERTIFIED REGISTERED

## 2019-02-08 PROCEDURE — 63600175 PHARM REV CODE 636 W HCPCS: Mod: HCNC | Performed by: SURGERY

## 2019-02-08 PROCEDURE — 36000706: Mod: HCNC | Performed by: SURGERY

## 2019-02-08 PROCEDURE — S0020 INJECTION, BUPIVICAINE HYDRO: HCPCS | Mod: HCNC | Performed by: SURGERY

## 2019-02-08 PROCEDURE — D9220A PRA ANESTHESIA: Mod: HCNC,ANES,, | Performed by: ANESTHESIOLOGY

## 2019-02-08 PROCEDURE — 71000016 HC POSTOP RECOV ADDL HR: Mod: HCNC | Performed by: SURGERY

## 2019-02-08 PROCEDURE — 77001 CHG FLUOROGUIDE CNTRL VEN ACCESS,PLACE,REPLACE,REMOVE: ICD-10-PCS | Mod: 26,HCNC,, | Performed by: SURGERY

## 2019-02-08 PROCEDURE — 37000008 HC ANESTHESIA 1ST 15 MINUTES: Mod: HCNC | Performed by: SURGERY

## 2019-02-08 PROCEDURE — 63600175 PHARM REV CODE 636 W HCPCS: Mod: HCNC | Performed by: STUDENT IN AN ORGANIZED HEALTH CARE EDUCATION/TRAINING PROGRAM

## 2019-02-08 PROCEDURE — C1788 PORT, INDWELLING, IMP: HCPCS | Mod: HCNC | Performed by: SURGERY

## 2019-02-08 PROCEDURE — 37000009 HC ANESTHESIA EA ADD 15 MINS: Mod: HCNC | Performed by: SURGERY

## 2019-02-08 PROCEDURE — 36561 INSERT TUNNELED CV CATH: CPT | Mod: HCNC,RT,, | Performed by: SURGERY

## 2019-02-08 PROCEDURE — 25000003 PHARM REV CODE 250: Mod: HCNC | Performed by: SURGERY

## 2019-02-08 PROCEDURE — 77001 FLUOROGUIDE FOR VEIN DEVICE: CPT | Mod: 26,HCNC,, | Performed by: SURGERY

## 2019-02-08 PROCEDURE — 63600175 PHARM REV CODE 636 W HCPCS: Mod: HCNC | Performed by: NURSE ANESTHETIST, CERTIFIED REGISTERED

## 2019-02-08 PROCEDURE — D9220A PRA ANESTHESIA: ICD-10-PCS | Mod: HCNC,ANES,, | Performed by: ANESTHESIOLOGY

## 2019-02-08 PROCEDURE — 63600175 PHARM REV CODE 636 W HCPCS: Mod: HCNC | Performed by: ANESTHESIOLOGY

## 2019-02-08 PROCEDURE — 36561 PR INSERT TUNNELED CV CATH WITH PORT: ICD-10-PCS | Mod: HCNC,RT,, | Performed by: SURGERY

## 2019-02-08 PROCEDURE — 71000044 HC DOSC ROUTINE RECOVERY FIRST HOUR: Mod: HCNC | Performed by: SURGERY

## 2019-02-08 PROCEDURE — 71000015 HC POSTOP RECOV 1ST HR: Mod: HCNC | Performed by: SURGERY

## 2019-02-08 PROCEDURE — 36000707: Mod: HCNC | Performed by: SURGERY

## 2019-02-08 DEVICE — KIT POWERPORT SINGLE 8FR: Type: IMPLANTABLE DEVICE | Site: CHEST | Status: FUNCTIONAL

## 2019-02-08 RX ORDER — CEFAZOLIN SODIUM 1 G/3ML
2 INJECTION, POWDER, FOR SOLUTION INTRAMUSCULAR; INTRAVENOUS
Status: COMPLETED | OUTPATIENT
Start: 2019-02-08 | End: 2019-02-08

## 2019-02-08 RX ORDER — HEPARIN SODIUM (PORCINE) LOCK FLUSH IV SOLN 100 UNIT/ML 100 UNIT/ML
SOLUTION INTRAVENOUS
Status: DISCONTINUED | OUTPATIENT
Start: 2019-02-08 | End: 2019-02-08 | Stop reason: HOSPADM

## 2019-02-08 RX ORDER — PROPOFOL 10 MG/ML
VIAL (ML) INTRAVENOUS
Status: DISCONTINUED | OUTPATIENT
Start: 2019-02-08 | End: 2019-02-08

## 2019-02-08 RX ORDER — MIDAZOLAM HYDROCHLORIDE 1 MG/ML
INJECTION, SOLUTION INTRAMUSCULAR; INTRAVENOUS
Status: DISCONTINUED | OUTPATIENT
Start: 2019-02-08 | End: 2019-02-08

## 2019-02-08 RX ORDER — HYDROCODONE BITARTRATE AND ACETAMINOPHEN 5; 325 MG/1; MG/1
1 TABLET ORAL EVERY 6 HOURS PRN
Qty: 10 TABLET | Refills: 0 | Status: SHIPPED | OUTPATIENT
Start: 2019-02-08 | End: 2019-02-11

## 2019-02-08 RX ORDER — BUPIVACAINE HYDROCHLORIDE 5 MG/ML
INJECTION, SOLUTION EPIDURAL; INTRACAUDAL
Status: DISCONTINUED | OUTPATIENT
Start: 2019-02-08 | End: 2019-02-08 | Stop reason: HOSPADM

## 2019-02-08 RX ORDER — DIPHENHYDRAMINE HYDROCHLORIDE 50 MG/ML
25 INJECTION INTRAMUSCULAR; INTRAVENOUS ONCE
Status: COMPLETED | OUTPATIENT
Start: 2019-02-08 | End: 2019-02-08

## 2019-02-08 RX ORDER — HYDROMORPHONE HYDROCHLORIDE 1 MG/ML
0.2 INJECTION, SOLUTION INTRAMUSCULAR; INTRAVENOUS; SUBCUTANEOUS EVERY 5 MIN PRN
Status: DISCONTINUED | OUTPATIENT
Start: 2019-02-08 | End: 2019-02-08 | Stop reason: HOSPADM

## 2019-02-08 RX ORDER — ONDANSETRON 2 MG/ML
INJECTION INTRAMUSCULAR; INTRAVENOUS
Status: DISCONTINUED | OUTPATIENT
Start: 2019-02-08 | End: 2019-02-08

## 2019-02-08 RX ORDER — LIDOCAINE HYDROCHLORIDE 10 MG/ML
1 INJECTION, SOLUTION EPIDURAL; INFILTRATION; INTRACAUDAL; PERINEURAL ONCE
Status: DISCONTINUED | OUTPATIENT
Start: 2019-02-08 | End: 2019-02-08 | Stop reason: HOSPADM

## 2019-02-08 RX ORDER — SODIUM CHLORIDE 9 MG/ML
INJECTION, SOLUTION INTRAVENOUS CONTINUOUS PRN
Status: DISCONTINUED | OUTPATIENT
Start: 2019-02-08 | End: 2019-02-08

## 2019-02-08 RX ORDER — SODIUM CHLORIDE 9 MG/ML
INJECTION, SOLUTION INTRAVENOUS CONTINUOUS
Status: DISCONTINUED | OUTPATIENT
Start: 2019-02-08 | End: 2019-02-08

## 2019-02-08 RX ORDER — PROPOFOL 10 MG/ML
VIAL (ML) INTRAVENOUS CONTINUOUS PRN
Status: DISCONTINUED | OUTPATIENT
Start: 2019-02-08 | End: 2019-02-08

## 2019-02-08 RX ORDER — LIDOCAINE HCL/PF 100 MG/5ML
SYRINGE (ML) INTRAVENOUS
Status: DISCONTINUED | OUTPATIENT
Start: 2019-02-08 | End: 2019-02-08

## 2019-02-08 RX ORDER — ONDANSETRON 4 MG/1
4 TABLET, ORALLY DISINTEGRATING ORAL EVERY 8 HOURS PRN
Qty: 8 TABLET | Refills: 0 | Status: SHIPPED | OUTPATIENT
Start: 2019-02-08 | End: 2019-02-11

## 2019-02-08 RX ORDER — FENTANYL CITRATE 50 UG/ML
INJECTION, SOLUTION INTRAMUSCULAR; INTRAVENOUS
Status: DISCONTINUED | OUTPATIENT
Start: 2019-02-08 | End: 2019-02-08

## 2019-02-08 RX ORDER — ACETAMINOPHEN 10 MG/ML
INJECTION, SOLUTION INTRAVENOUS
Status: DISCONTINUED | OUTPATIENT
Start: 2019-02-08 | End: 2019-02-08

## 2019-02-08 RX ORDER — DEXAMETHASONE SODIUM PHOSPHATE 4 MG/ML
4 INJECTION, SOLUTION INTRA-ARTICULAR; INTRALESIONAL; INTRAMUSCULAR; INTRAVENOUS; SOFT TISSUE ONCE
Status: COMPLETED | OUTPATIENT
Start: 2019-02-08 | End: 2019-02-08

## 2019-02-08 RX ORDER — SODIUM CHLORIDE 0.9 % (FLUSH) 0.9 %
3 SYRINGE (ML) INJECTION
Status: DISCONTINUED | OUTPATIENT
Start: 2019-02-08 | End: 2019-02-08 | Stop reason: HOSPADM

## 2019-02-08 RX ADMIN — PROPOFOL 30 MG: 10 INJECTION, EMULSION INTRAVENOUS at 07:02

## 2019-02-08 RX ADMIN — HYDROMORPHONE HYDROCHLORIDE 0.2 MG: 1 INJECTION, SOLUTION INTRAMUSCULAR; INTRAVENOUS; SUBCUTANEOUS at 09:02

## 2019-02-08 RX ADMIN — CEFAZOLIN 2 G: 330 INJECTION, POWDER, FOR SOLUTION INTRAMUSCULAR; INTRAVENOUS at 07:02

## 2019-02-08 RX ADMIN — SODIUM CHLORIDE, SODIUM GLUCONATE, SODIUM ACETATE, POTASSIUM CHLORIDE, MAGNESIUM CHLORIDE, SODIUM PHOSPHATE, DIBASIC, AND POTASSIUM PHOSPHATE: .53; .5; .37; .037; .03; .012; .00082 INJECTION, SOLUTION INTRAVENOUS at 07:02

## 2019-02-08 RX ADMIN — SODIUM CHLORIDE: 0.9 INJECTION, SOLUTION INTRAVENOUS at 06:02

## 2019-02-08 RX ADMIN — ONDANSETRON 4 MG: 2 INJECTION INTRAMUSCULAR; INTRAVENOUS at 07:02

## 2019-02-08 RX ADMIN — LIDOCAINE HYDROCHLORIDE 80 MG: 20 INJECTION, SOLUTION INTRAVENOUS at 07:02

## 2019-02-08 RX ADMIN — PROPOFOL 50 MCG/KG/MIN: 10 INJECTION, EMULSION INTRAVENOUS at 07:02

## 2019-02-08 RX ADMIN — ACETAMINOPHEN 1000 MG: 10 INJECTION, SOLUTION INTRAVENOUS at 07:02

## 2019-02-08 RX ADMIN — DEXAMETHASONE SODIUM PHOSPHATE 4 MG: 4 INJECTION, SOLUTION INTRA-ARTICULAR; INTRALESIONAL; INTRAMUSCULAR; INTRAVENOUS; SOFT TISSUE at 10:02

## 2019-02-08 RX ADMIN — DIPHENHYDRAMINE HYDROCHLORIDE 25 MG: 50 INJECTION, SOLUTION INTRAMUSCULAR; INTRAVENOUS at 10:02

## 2019-02-08 RX ADMIN — HYDROMORPHONE HYDROCHLORIDE 0.2 MG: 1 INJECTION, SOLUTION INTRAMUSCULAR; INTRAVENOUS; SUBCUTANEOUS at 08:02

## 2019-02-08 RX ADMIN — FENTANYL CITRATE 50 MCG: 50 INJECTION, SOLUTION INTRAMUSCULAR; INTRAVENOUS at 07:02

## 2019-02-08 RX ADMIN — MIDAZOLAM HYDROCHLORIDE 2 MG: 1 INJECTION, SOLUTION INTRAMUSCULAR; INTRAVENOUS at 06:02

## 2019-02-08 NOTE — INTERVAL H&P NOTE
The patient has been examined and the H&P has been reviewed:    In the interim, will proceed with neoadjuvant chemo rather than mastectomy as previously planned in clinic, thus consented for a RIGHT possible left port placement at bedside with patient marked on the RIGHT      Anesthesia/Surgery risks, benefits and alternative options discussed and understood by patient/family.          There are no hospital problems to display for this patient.

## 2019-02-08 NOTE — OP NOTE
DATE OF PROCEDURE:  02/08/2019.    PRIMARY SURGEON:  Twan Valdes M.D.    ASSISTANT SURGEON:  Damián Reza M.D. (RES).    PREOPERATIVE DIAGNOSIS:  Estrogen receptor negative, progesterone receptor   weakly 5% positive, HER-2/rachelle negative lymph node positive left breast cancer,   essentially clinically behaving as a triple-negative breast cancer with need for   central venous access for preoperative neoadjuvant chemotherapy.    POSTOPERATIVE DIAGNOSIS:  Estrogen receptor negative, progesterone receptor   weakly 5% positive, HER-2/rachelle negative lymph node positive left breast cancer,   essentially clinically behaving as a triple-negative breast cancer with need for   central venous access for preoperative neoadjuvant chemotherapy.    PROCEDURE PERFORMED:  Insertion of right internal jugular vein 8-Danish   MRI-compatible PowerPort Port-A-Cath with intraoperative fluoroscopy and   intraoperative ultrasound by Seldinger technique.    PROCEDURE IN DETAIL:  The patient underwent informed consent.  The history and   physical examination was reviewed and updated.  The right chest and neck were   marked preoperatively with the patient.  She was brought to the Operating Room.    She was in the supine position.  A vertical roll was placed parallel to her   spine between her shoulder blades.  Both arms were tucked.  The right anterior   neck and chest were prepped and draped in a sterile fashion.  Ioban drape was   placed.  We initially attempted a right subclavian vein access.  We initially   achieved good venous blood return, but we could not cannulate the wire.  A   subsequent attempted re-aspiration revealed arterial stick and the subclavian   approach was then aborted.  Pressure was held.  We then turned our attention to   the right internal jugular vein.  Using standard landmarks and ultrasound, we   identified the right internal jugular vein.  The right internal jugular vein was   cannulated percutaneously with  good venous blood return.  The percutaneous   access needle was advanced into the right internal jugular vein to allow   placement of the guidewire under fluoroscopic guidance.  The guidewire was   advanced into the central venous system without difficulty.  We then created a   subcutaneous pocket at the infraclavicular site on the right side at the   midclavicular line.  The subcutaneous pocket was created.  The port was anchored   to the catheter with the locking hub and flushed with injectable saline.  It   was tunneled from the pocket site superficial and anterior to the clavicle up to   the percutaneous access site over the right internal jugular vein, which had   been enlarged with an 11-blade stab incision.  The port was anchored into the   pocket with interrupted 2-0 Vicryl sutures x2.  The catheter tip was cut to the   appropriate length at approximately 20 cm so that its tip would be in the   superior vena cava near the right atrial junction.  The dilator and peel-away   sheath were then advanced over the guidewire without difficulty under   fluoroscopic guidance.  The dilator and guidewire were removed.  The catheter   was advanced through the peel-away sheath without difficulty.  The peel-away   sheath was withdrawn.  Fluoroscopy confirmed the catheter tip in the appropriate   position without kinks in the port catheter system.  The port was accessed with   the Unger needle.  It flushed and aspirated easily.  It was flushed with a   final solution of 100 units of heparin per milliliter of saline.  The deep   dermal and subcutaneous layers were closed with 3-0 Vicryl suture at the port   pocket site.  The skin was closed with a running 4-0 Monocryl subcuticular skin   closure.  The percutaneous access site was closed with a 4-0 Monocryl horizontal   mattress U-stitch.  A sterile skin dressing with Dermabond was placed.  She   tolerated the procedure well without complication.  Estimated blood loss was    minimal.  All needle, instrument and sponge counts were correct.  Postop chest   x-ray would be pending.  She was turned over to Anesthesia for transport to the   Recovery area in a satisfactory condition.  The procedure was performed under   monitored anesthesia care, IV sedation, and local anesthesia.      ALYCIA/REVA  dd: 02/08/2019 08:37:17 (CST)  td: 02/08/2019 10:40:55 (CST)  Doc ID   #[id number]  Job ID #[id number]    CC:

## 2019-02-08 NOTE — DISCHARGE INSTRUCTIONS
Vascular Access Port Implantation   Port implantation is surgery to place (implant) a port under the skin. For vascular access, it is placed into a vein. The port allows medicines or nutrition to be sent right into your bloodstream. Blood can also be taken or given through the port. During the procedure, a long, thin tube called a catheter is threaded into one of your large veins. The tube is then attached to the port. This usually sits under the skin of your chest and causes a small bump. To use the port, a special needle is passed through your skin and into the port. The needle can stay in your skin for up to 7 days, if needed. A port can stay in place for weeks or months or longer.    Why is a vascular access port needed?  A vascular access port may allow healthcare providers to give you:  · Chemotherapy or other cancer-fighting drugs  · IV treatments, such as antibiotics or nutrition  · Hemodialysis (for kidney failure)  The port may also be used to draw blood.  Before the procedure  Follow any instructions you are given on how to prepare.  Tell your provider about any medicines you are taking. This includes:  · All prescription medicines  · Over-the-counter medicines such as aspirin or ibuprofen  · Herbs, vitamins, and other supplements  Also be sure your provider knows:  · If you are pregnant or think you may be pregnant  · If you are allergic to any medicines or substances, especially local anesthetics or iodine  · Your full medical history, including why you will need the port  · If you plan on doing any contact sports  During the procedure  · Before the procedure, an IV may be put into a vein in your arm or hand. This gives you fluids and medicines. You may be given medicine through the IV to help you relax during the procedure. This is called sedation. But some surgeons place ports using general anesthesia.  · The chest is used most often for the port. In some cases, your belly (abdomen) or arm will be  used instead.  · The skin over the insertion area is numbed with local anesthetic.  · Ultrasound or X-rays are used to help the healthcare provider guide the catheter into the proper location during the procedure.  · A cut (incision) is made in the skin where the port will be placed. A small pocket for the port is formed under the skin.  · A second small incision is made in the skin near the first incision. A tunnel under the skin is created. The catheter is put through the tunnel and into the blood vessel.  · The skin is closed over the port. It is held shut with stitches (sutures) or surgical glue or tape. The second small incision is also closed.  · A chest X-ray may be done to make sure the port is placed properly.  After the procedure  You may be taken to a recovery room where youll recover from the sedation. Nurses will check on you as you rest. If you have pain, nurses can give you medicine. If you are not staying in the hospital overnight, you will be sent home a few hours after the procedure is done. A healthcare provider will tell you when you can go home. An adult family member or friend will need to drive you home.  Recovering at home  · Take pain medicine as directed by your healthcare provider.  · Take it easy for 24 hours after the procedure. Avoid physical activity and heavy lifting until your healthcare provider says its OK.  · Keep the port clean and dry. Ask when you can shower again. You will need to keep the port dry by covering it when you shower.  · Care for the insertion site as you are directed.  · Dont swim, bathe, or do other activities that cause water to cover the insertion site.  · To keep the port from getting blocked with blood clots, flush it as often as directed. You should be shown the proper way to flush the port before you go home. It is important to follow these directions.     Risks and possible complications of implantation  · Bleeding  · Infection of the insertion  site  · Damage to a blood vessel  · Nerve injury or irritation  · Collapsed lung (for chest port placements)  · Skin breakdown over the port  Risks and possible complications of having a port  · Blocked  port or catheter  · Leakage or breakage of the port or catheter  · The port moves out of position  · Blood clot  · Skin or bloodstream infection  · Skin breakdown over the port      When to seek medical care  Call your healthcare provider right away if you have any of the following:  · A fever of 100.4°F (38.0°C) or higher  · You can't access or use the port properly  · You can't flush the port or get a blood return  · The skin near the port is red, warm, swollen, or broken  · You have shoulder pain on the side where the port is located  · You feel a heart flutter or racing heart   · Swollen arm, if the port is placed in your arm         Anesthesia: General Anesthesia     You are watched continuously during your procedure by your anesthesia provider.     Youre due to have surgery. During surgery, youll be given medicine called anesthesia or anesthetic. This will keep you comfortable and pain-free. Your anesthesia provider will use general anesthesia.  What is general anesthesia?  General anesthesia puts you into a state like deep sleep. It goes into the bloodstream (IV anesthetics), into the lungs (gas anesthetics), or both. You feel nothing during the procedure. You will not remember it. During the procedure, the anesthesia provider monitors you continuously. He or she checks your heart rate and rhythm, blood pressure, breathing, and blood oxygen.  · IV anesthetics. IV anesthetics are given through an IV line in your arm. Theyre often given first. This is so you are asleep before a gas anesthetic is started. Some kinds of IV anesthetics relieve pain. Others relax you. Your doctor will decide which kind is best in your case.  · Gas anesthetics. Gas anesthetics are breathed into the lungs. They are often used to  keep you asleep. They can be given through a facemask or a tube placed in your larynx or trachea (breathing tube).  ¨ If you have a facemask, your anesthesia provider will most likely place it over your nose and mouth while youre still awake. Youll breathe oxygen through the mask as your IV anesthetic is started. Gas anesthetic may be added through the mask.  ¨ If you have a tube in the larynx or trachea, it will be inserted into your throat after youre asleep.  Anesthesia tools and medicines  You will likely have:  · IV anesthetics. These are put into an IV line into your bloodstream.  · Gas anesthetics. You breathe these anesthetics into your lungs, where they pass into your bloodstream.  · Pulse oximeter. This is a small clip that is attached to the end of your finger. This measures your blood oxygen level.  · Electrocardiography leads (electrodes). These are small sticky pads that are placed on your chest. They record your heart rate and rhythm.  · Blood pressure cuff. This reads your blood pressure.  Risks and possible complications  General anesthesia has some risks. These include:  · Breathing problems  · Nausea and vomiting  · Sore throat or hoarseness (usually temporary)  · Allergic reaction to the anesthetic  · Irregular heartbeat (rare)  · Cardiac arrest (rare)   Anesthesia safety  · Follow all instructions you are given for how long not to eat or drink before your procedure.  · Be sure your doctor knows what medicines and drugs you take. This includes over-the-counter medicines, herbs, supplements, alcohol or other drugs. You will be asked when those were last taken.  · Have an adult family member or friend drive you home after the procedure.  · For the first 24 hours after your surgery:  ¨ Do not drive or use heavy equipment.  ¨ Do not make important decisions or sign legal documents. If important decisions or signing legal documents is necessary during the first 24 hours after surgery, have a  trusted family member or spouse act on your behalf.  ¨ Avoid alcohol.  ¨ Have a responsible adult stay with you. He or she can watch for problems and help keep you safe.

## 2019-02-08 NOTE — BRIEF OP NOTE
[facility]  Brief Operative Note     SUMMARY     Surgery Date: 2/8/2019     Surgeon(s) and Role:     * Twan Valdes MD - Primary     * Damián Reza MD - Resident - Assisting      Pre-op Diagnosis:  Malignant neoplasm of left female breast, unspecified estrogen receptor status, unspecified site of breast [C50.912]    Post-op Diagnosis:  Post-Op Diagnosis Codes:     * Malignant neoplasm of left female breast, unspecified estrogen receptor status, unspecified site of breast [C50.912]    Procedure(s) (LRB):  INSERTION, PORT-A-CATH (Right)    Anesthesia: General    Description of the findings of the procedure: Right IJ port placement     Findings/Key Components: Successful right IJ port placement; attempted right SCV placement     Estimated Blood Loss: * No values recorded between 2/8/2019  7:34 AM and 2/8/2019  8:22 AM *         Specimens:   Specimen (12h ago, onward)    None          Discharge Note    SUMMARY     Admit Date: 2/8/2019    Discharge Date and Time:  02/08/2019 8:23 AM    Hospital Course (synopsis of major diagnoses, care, treatment, and services provided during the course of the hospital stay): Successful right IJ port placement      Final Diagnosis: Post-Op Diagnosis Codes:     * Malignant neoplasm of left female breast, unspecified estrogen receptor status, unspecified site of breast [C50.912]    Disposition: Home or Self Care    Follow Up/Patient Instructions:     Medications:  Reconciled Home Medications:      Medication List      START taking these medications    HYDROcodone-acetaminophen 5-325 mg per tablet  Commonly known as:  NORCO  Take 1 tablet by mouth every 6 (six) hours as needed.        CONTINUE taking these medications    aspirin 81 MG Chew  Take 81 mg by mouth once daily.     levothyroxine 75 MCG tablet  Commonly known as:  SYNTHROID  Take 75 mcg by mouth every morning.     vitamin D 1000 units Tab  Commonly known as:  VITAMIN D3  Take 185 mg by mouth once  daily.          Discharge Procedure Orders   Diet Adult Regular     Notify your health care provider if you experience any of the following:  temperature >100.4     Notify your health care provider if you experience any of the following:  persistent nausea and vomiting or diarrhea     Notify your health care provider if you experience any of the following:  severe uncontrolled pain     Notify your health care provider if you experience any of the following:  redness, tenderness, or signs of infection (pain, swelling, redness, odor or green/yellow discharge around incision site)     Notify your health care provider if you experience any of the following:  difficulty breathing or increased cough     Notify your health care provider if you experience any of the following:  worsening rash     No dressing needed     Activity as tolerated     Follow-up Information     Aneesh Baker MD.    Specialties:  Hematology and Oncology, Hematology  Why:  for initiation of chemo tx  Contact information:  [address]  [facility] [address]  [phone]

## 2019-02-08 NOTE — ANESTHESIA POSTPROCEDURE EVALUATION
"Anesthesia Post Evaluation    Patient: Radha Rivera    Procedure(s) Performed: Procedure(s) (LRB):  INSERTION, PORT-A-CATH (Right)    Final Anesthesia Type: MAC  Patient location during evaluation: [facility]  Patient participation: Yes- Able to Participate  Level of consciousness: awake  Post-procedure vital signs: reviewed and stable  Pain management: adequate  Airway patency: patent  PONV status at discharge: No PONV  Anesthetic complications: no      Cardiovascular status: stable  Respiratory status: unassisted  Hydration status: euvolemic  Follow-up not needed.        Visit Vitals  /62   Pulse 65   Temp (!) 35.4 °C (95.7 °F) (Temporal)   Resp 17   Ht 5' 4" (1.626 m)   Wt 54 kg (119 lb)   SpO2 98%   Breastfeeding? No   BMI 20.43 kg/m²       Pain/Romy Score: No Data Recorded      "

## 2019-02-08 NOTE — ANESTHESIA PREPROCEDURE EVALUATION
02/08/2019  Radha Rivera is a 63 y.o., female with breast cancer.  Here for port placement.    Anesthesia Evaluation    I have reviewed the Patient Summary Reports.    I have reviewed the Nursing Notes.   I have reviewed the Medications.     Review of Systems  Cardiovascular:  Cardiovascular Normal  Echo ordered but not done as of today   Pulmonary:  Pulmonary Normal    Neurological:  Neurology Normal        Physical Exam  General:  Well nourished    Airway/Jaw/Neck:  Airway Findings: Mouth Opening: Normal Tongue: Normal  General Airway Assessment: Adult  Mallampati: II  Improves to II with phonation.  TM Distance: Normal, at least 6 cm      Dental:  Dental Findings: In tact   Chest/Lungs:  Chest/Lungs Findings: Clear to auscultation     Heart/Vascular:  Heart Findings: Rate: Normal  Rhythm: Regular Rhythm  Sounds: Normal        Mental Status:  Mental Status Findings:  Cooperative, Alert and Oriented         Anesthesia Plan  Type of Anesthesia, risks & benefits discussed:  Anesthesia Type:  general, MAC  Patient's Preference:   Intra-op Monitoring Plan: standard ASA monitors  Intra-op Monitoring Plan Comments:   Post Op Pain Control Plan: multimodal analgesia  Post Op Pain Control Plan Comments:   Induction:   IV  Beta Blocker:  Patient is not currently on a Beta-Blocker (No further documentation required).       Informed Consent: Patient understands risks and agrees with Anesthesia plan.  Questions answered. Anesthesia consent signed with patient.  ASA Score: 3     Day of Surgery Review of History & Physical:    H&P update referred to the surgeon.         Ready For Surgery From Anesthesia Perspective.

## 2019-02-08 NOTE — TRANSFER OF CARE
"Anesthesia Transfer of Care Note    Patient: Radha Rivera    Procedure(s) Performed: Procedure(s) (LRB):  INSERTION, PORT-A-CATH (Right)    Patient location: [facility]    Anesthesia Type: general    Transport from OR: Transported from OR on 6-10 L/min O2 by face mask with adequate spontaneous ventilation    Post pain: adequate analgesia    Post assessment: no apparent anesthetic complications and tolerated procedure well    Post vital signs: stable    Level of consciousness: awake    Nausea/Vomiting: no nausea/vomiting    Complications: none    Transfer of care protocol was followed      Last vitals:   Visit Vitals  /78 (BP Location: Left arm, Patient Position: Lying)   Pulse 72   Temp 36.6 °C (97.9 °F) (Oral)   Resp 18   Ht 5' 4" (1.626 m)   Wt 54 kg (119 lb)   SpO2 99%   Breastfeeding? No   BMI 20.43 kg/m²     "

## 2019-02-11 ENCOUNTER — OFFICE VISIT (OUTPATIENT)
Dept: HEMATOLOGY/ONCOLOGY | Facility: CLINIC | Age: 64
End: 2019-02-11
Payer: MEDICARE

## 2019-02-11 ENCOUNTER — HOSPITAL ENCOUNTER (OUTPATIENT)
Dept: CARDIOLOGY | Facility: CLINIC | Age: 64
Discharge: HOME | End: 2019-02-11
Attending: INTERNAL MEDICINE
Payer: MEDICARE

## 2019-02-11 VITALS
OXYGEN SATURATION: 97 % | SYSTOLIC BLOOD PRESSURE: 135 MMHG | BODY MASS INDEX: 20.4 KG/M2 | TEMPERATURE: 99 F | RESPIRATION RATE: 18 BRPM | HEIGHT: 64 IN | WEIGHT: 119.5 LBS | DIASTOLIC BLOOD PRESSURE: 64 MMHG | HEART RATE: 78 BPM

## 2019-02-11 VITALS
WEIGHT: 119 LBS | SYSTOLIC BLOOD PRESSURE: 118 MMHG | BODY MASS INDEX: 20.32 KG/M2 | HEART RATE: 60 BPM | DIASTOLIC BLOOD PRESSURE: 70 MMHG | HEIGHT: 64 IN

## 2019-02-11 DIAGNOSIS — C50.512 PRIMARY CANCER OF LOWER OUTER QUADRANT OF LEFT FEMALE BREAST: Primary | ICD-10-CM

## 2019-02-11 DIAGNOSIS — Z51.11 ENCOUNTER FOR ANTINEOPLASTIC CHEMOTHERAPY: ICD-10-CM

## 2019-02-11 LAB
AV INDEX (PROSTH): 0.74
AV MEAN GRADIENT: 3.31 MMHG
AV PEAK GRADIENT: 5.57 MMHG
AV VALVE AREA: 1.88 CM2
AV VELOCITY RATIO: 0.72
BSA FOR ECHO PROCEDURE: 1.56 M2
CV ECHO LV RWT: 0.39 CM
DOP CALC AO PEAK VEL: 1.18 M/S
DOP CALC AO VTI: 25.34 CM
DOP CALC LVOT AREA: 2.54 CM2
DOP CALC LVOT DIAMETER: 1.8 CM
DOP CALC LVOT PEAK VEL: 0.85 M/S
DOP CALC LVOT STROKE VOLUME: 47.66 CM3
DOP CALCLVOT PEAK VEL VTI: 18.74 CM
E WAVE DECELERATION TIME: 233.66 MSEC
E/A RATIO: 1.08
E/E' RATIO: 9.4
ECHO LV POSTERIOR WALL: 0.77 CM (ref 0.6–1.1)
FRACTIONAL SHORTENING: 36 % (ref 28–44)
INTERVENTRICULAR SEPTUM: 0.79 CM (ref 0.6–1.1)
LA MAJOR: 3.27 CM
LA MINOR: 3.99 CM
LA WIDTH: 2.79 CM
LEFT ATRIUM SIZE: 2.74 CM
LEFT ATRIUM VOLUME INDEX: 14.9 ML/M2
LEFT ATRIUM VOLUME: 23.36 CM3
LEFT INTERNAL DIMENSION IN SYSTOLE: 2.54 CM (ref 2.1–4)
LEFT VENTRICLE DIASTOLIC VOLUME INDEX: 44.18 ML/M2
LEFT VENTRICLE DIASTOLIC VOLUME: 69.3 ML
LEFT VENTRICLE MASS INDEX: 57.1 G/M2
LEFT VENTRICLE SYSTOLIC VOLUME INDEX: 14.8 ML/M2
LEFT VENTRICLE SYSTOLIC VOLUME: 23.27 ML
LEFT VENTRICULAR INTERNAL DIMENSION IN DIASTOLE: 3.98 CM (ref 3.5–6)
LEFT VENTRICULAR MASS: 89.61 G
LV LATERAL E/E' RATIO: 10.44
LV SEPTAL E/E' RATIO: 8.55
MV PEAK A VEL: 0.87 M/S
MV PEAK E VEL: 0.94 M/S
PISA TR MAX VEL: 2.54 M/S
PULM VEIN S/D RATIO: 1.23
PV PEAK D VEL: 0.3 M/S
PV PEAK S VEL: 0.37 M/S
RA MAJOR: 3.62 CM
RA PRESSURE: 8 MMHG
RA WIDTH: 2.37 CM
RIGHT VENTRICULAR END-DIASTOLIC DIMENSION: 2.47 CM
RV TISSUE DOPPLER FREE WALL SYSTOLIC VELOCITY 1 (APICAL 4 CHAMBER VIEW): 10.49 M/S
SINUS: 2.35 CM
STJ: 2.47 CM
TDI LATERAL: 0.09
TDI SEPTAL: 0.11
TDI: 0.1
TR MAX PG: 25.81 MMHG
TRICUSPID ANNULAR PLANE SYSTOLIC EXCURSION: 1.99 CM
TV REST PULMONARY ARTERY PRESSURE: 34 MMHG

## 2019-02-11 PROCEDURE — 3008F BODY MASS INDEX DOCD: CPT | Mod: HCNC,CPTII,S$GLB, | Performed by: INTERNAL MEDICINE

## 2019-02-11 PROCEDURE — 1101F PR PT FALLS ASSESS DOC 0-1 FALLS W/OUT INJ PAST YR: ICD-10-PCS | Mod: HCNC,CPTII,S$GLB, | Performed by: INTERNAL MEDICINE

## 2019-02-11 PROCEDURE — 3008F PR BODY MASS INDEX (BMI) DOCUMENTED: ICD-10-PCS | Mod: HCNC,CPTII,S$GLB, | Performed by: INTERNAL MEDICINE

## 2019-02-11 PROCEDURE — 99999 PR PBB SHADOW E&M-EST. PATIENT-LVL III: ICD-10-PCS | Mod: PBBFAC,HCNC,, | Performed by: INTERNAL MEDICINE

## 2019-02-11 PROCEDURE — 99205 OFFICE O/P NEW HI 60 MIN: CPT | Mod: HCNC,S$GLB,, | Performed by: INTERNAL MEDICINE

## 2019-02-11 PROCEDURE — 99205 PR OFFICE/OUTPT VISIT, NEW, LEVL V, 60-74 MIN: ICD-10-PCS | Mod: HCNC,S$GLB,, | Performed by: INTERNAL MEDICINE

## 2019-02-11 PROCEDURE — 1101F PT FALLS ASSESS-DOCD LE1/YR: CPT | Mod: HCNC,CPTII,S$GLB, | Performed by: INTERNAL MEDICINE

## 2019-02-11 PROCEDURE — 93306 TTE W/DOPPLER COMPLETE: CPT | Mod: HCNC,S$GLB,, | Performed by: INTERNAL MEDICINE

## 2019-02-11 PROCEDURE — 99999 PR PBB SHADOW E&M-EST. PATIENT-LVL III: CPT | Mod: PBBFAC,HCNC,, | Performed by: INTERNAL MEDICINE

## 2019-02-11 PROCEDURE — 93306 TRANSTHORACIC ECHO (TTE) COMPLETE (CUPID ONLY): ICD-10-PCS | Mod: HCNC,S$GLB,, | Performed by: INTERNAL MEDICINE

## 2019-02-11 NOTE — LETTER
February 12, 2019      Twan Valdes MD  [address]  [facility] [address]           [facility] Hematology Oncology  [address]  [facility] [address]  Phone: [phone]          Patient: Radha Rivera   MR Number: [id number]   YOB: 1955   Date of Visit: 2/11/2019       Dear Dr. Twan Valdes:    Thank you for referring Radha Rivera to me for evaluation. Attached you will find relevant portions of my assessment and plan of care.    If you have questions, please do not hesitate to call me. I look forward to following Radha Rivera along with you.    Sincerely,    Aneesh Baker MD    Enclosure  CC:  No Recipients    If you would like to receive this communication electronically, please contact [email or web address] or [phone] to request more information on [facility] access.    For providers and/or their staff who would like to refer a patient to [facility], please contact us through our one-stop-shop provider referral line, [facility], at [phone].    If you feel you have received this communication in error or would no longer like to receive these types of communications, please e-mail [email or web address]

## 2019-02-11 NOTE — PROGRESS NOTES
Subjective:       Patient ID: Radha Rivera is a 63 y.o. female.    Chief Complaint: No chief complaint on file.    HPI   REFERRING PHYSICIAN:  Twan Valdes M.D.    REASON FOR REFERRAL:  Recent diagnosis of breast cancer consideration for   neoadjuvant chemotherapy.    HISTORY OF PRESENT ILLNESS:  Ms. Rivera is a 63-year-old  female from   [place] who was recently diagnosed with breast cancer.  Apparently, she   initially felt a mass in late .  A mammogram at that time was read as   BI-RADS category 1.  A repeat mammogram in 2018 was read as BI-RADS   category 4 and she underwent a biopsy that showed a carcinoma that was ER   negative, SD weakly positive in 5% of the cells and HER-2 2+ by   immunohistochemistry, but negative by FISH.  Ki-67 was 5%.    The patient subsequently underwent a biopsy of a palpable lymph node on   2019, that showed evidence of lymph node metastasis.  She is now referred   for evaluation for chemotherapy.  Of note, she also had a PET scan on 2019   that showed an 8 mm right lower lobe pulmonary nodule that was not FDG avid.    PAST MEDICAL HISTORY:  As above.  She has a history of hypothyroidism.    PAST SURGICAL HISTORY:  Significant for tonsillectomy and repair of the tympanic   membrane.    SOCIAL HISTORY:  She is a homemaker and lives with her .  She does not   smoke and does not drink alcohol.    FAMILY HISTORY:  Her sister  of breast cancer.  A paternal grandmother and a   maternal aunt have also been diagnosed with breast cancer.    GYNECOLOGICAL HISTORY:  Menarche was at 11, first live birth was at 20, and   menopause was in .  She is .        Review of Systems    Overall she feels well. She is very anxious with the recent developments and somewhat sore from her port placement, but she denies any depression, easy bruising, fevers, chills, night  sweats, weight loss, nausea, vomiting, diarrhea, constipation,  diplopia,     blurred vision, headache, chest pain, palpitations, shortness of breath, breast pain, abdominal pain, extremity pain, or difficulty ambulating.  The remainder of the ten-point ROS, including general, skin, lymph, H/N, cardiorespiratory, GI, , Neuro, Endocrine, and psychiatric is negative.     Objective:      Physical Exam      She is alert, oriented to time, place, person, pleasant, well      nourished, in no acute physical distress.    She is accompanied by her daughter and her .                               VITAL SIGNS:  Reviewed                                      HEENT:  Normal.  There are no nasal, oral, lip, gingival, auricular, lid,    or conjunctival lesions.  Mucosae are moist and pink, and there is no        thrush.  Pupils are equal, reactive to light and accommodation.              Extraocular muscle movements are intact.  Dentition is good.  There is no frontal or maxillary tenderness.                                     NECK:  Supple without JVD, adenopathy, or thyromegaly.                       LUNGS:  Clear to auscultation without wheezing, rales, or rhonchi.           CARDIOVASCULAR:  Reveals an S1, S2, no murmurs, no rubs, no gallops.         ABDOMEN:  Soft, nontender, without organomegaly.  Bowel sounds are    present.                                                                     EXTREMITIES:  No cyanosis, clubbing, or edema.                               BREASTS:  there is a pea sized nodule at the 3 o' clock position in the left breast.  She does have palpable adenopathy in the left axilla.   There are no masses in the right breast.                                       LYMPHATIC:  There is no cervical, axillary, or supraclavicular adenopathy.   SKIN:  Warm and moist, without petechiae, rashes, induration, but she does have an ecchymosis from her recent port placement in the right subclavian area. .           NEUROLOGIC:  DTRs are 0-1+ bilaterally, symmetrical,  motor function is 5/5,  and cranial nerves are  within normal limits.    Assessment:       1. Primary cancer of lower outer quadrant of left female breast      2.    Lung nodule  Plan:        I had a long discussion with her.  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  I think it would be reasonable to proceed with neoadjuvant chemotherapy and restage upon completion to reevaluate the nodule.  The pros and cons of such an approach were explained to her.  She will need labs, an echocardiogram, and can probably begin chemotherapy later this week or early next week..  Her multiple questions were answered to her satisfaction.  I spent approximately 60 minutes reviewing the available records and evaluating the patient, out of which over 50% of the time was spent face to face with the patient in counseling and coordinating this patient's care.

## 2019-02-13 ENCOUNTER — TELEPHONE (OUTPATIENT)
Dept: HEMATOLOGY/ONCOLOGY | Facility: CLINIC | Age: 64
End: 2019-02-13

## 2019-02-13 NOTE — TELEPHONE ENCOUNTER
----- Message from Lisa Singh sent at 2/13/2019 12:41 PM CST -----  Contact: Pt  Pt has a question in regards to rather or not she should have a bone density test done before her chemo on 2/18, Please contact her directly at [phone]

## 2019-02-13 NOTE — TELEPHONE ENCOUNTER
Returned call to patient to let her know that bone density test is not necessary at this time, though should be completed and managed per her PCP given the chemotherapy we are given should not effect her bone density. In addition also spoke to patient about several questions she had regarding her upcoming appointments. Discussed and answered all questions. Pt spouse requesting that everything discussed be sent via a patient portal message. Will send.

## 2019-02-14 ENCOUNTER — TELEPHONE (OUTPATIENT)
Dept: HEMATOLOGY/ONCOLOGY | Facility: CLINIC | Age: 64
End: 2019-02-14

## 2019-02-14 ENCOUNTER — TELEPHONE (OUTPATIENT)
Dept: SURGERY | Facility: CLINIC | Age: 64
End: 2019-02-14

## 2019-02-14 ENCOUNTER — OFFICE VISIT (OUTPATIENT)
Dept: SURGERY | Facility: CLINIC | Age: 64
End: 2019-02-14
Payer: MEDICARE

## 2019-02-14 ENCOUNTER — PATIENT MESSAGE (OUTPATIENT)
Dept: HEMATOLOGY/ONCOLOGY | Facility: CLINIC | Age: 64
End: 2019-02-14

## 2019-02-14 VITALS
WEIGHT: 119 LBS | HEART RATE: 71 BPM | TEMPERATURE: 99 F | HEIGHT: 64 IN | SYSTOLIC BLOOD PRESSURE: 123 MMHG | BODY MASS INDEX: 20.32 KG/M2 | DIASTOLIC BLOOD PRESSURE: 59 MMHG

## 2019-02-14 DIAGNOSIS — C50.512 PRIMARY CANCER OF LOWER OUTER QUADRANT OF LEFT FEMALE BREAST: Primary | ICD-10-CM

## 2019-02-14 DIAGNOSIS — Z51.11 ENCOUNTER FOR ANTINEOPLASTIC CHEMOTHERAPY: Primary | ICD-10-CM

## 2019-02-14 PROCEDURE — 99999 PR PBB SHADOW E&M-EST. PATIENT-LVL I: ICD-10-PCS | Mod: PBBFAC,HCNC,, | Performed by: SURGERY

## 2019-02-14 PROCEDURE — 99024 POSTOP FOLLOW-UP VISIT: CPT | Mod: HCNC,S$GLB,, | Performed by: SURGERY

## 2019-02-14 PROCEDURE — 99999 PR PBB SHADOW E&M-EST. PATIENT-LVL III: ICD-10-PCS | Mod: PBBFAC,HCNC,, | Performed by: SURGERY

## 2019-02-14 PROCEDURE — 99999 PR PBB SHADOW E&M-EST. PATIENT-LVL I: CPT | Mod: PBBFAC,HCNC,, | Performed by: SURGERY

## 2019-02-14 PROCEDURE — 99024 PR POST-OP FOLLOW-UP VISIT: ICD-10-PCS | Mod: HCNC,S$GLB,, | Performed by: SURGERY

## 2019-02-14 PROCEDURE — 99999 PR PBB SHADOW E&M-EST. PATIENT-LVL III: CPT | Mod: PBBFAC,HCNC,, | Performed by: SURGERY

## 2019-02-14 NOTE — TELEPHONE ENCOUNTER
----- Message from Arianne Landon RN sent at 2/14/2019  8:25 AM CST -----  Contact: Patient  Would you mind calling this patient to tell her it's okay to have a dental cleaning prior starting her chemo?    ----- Message -----  From: Aneesh Baker MD  Sent: 2/13/2019   5:17 PM  To: Arianne Landon RN    Yes, that would be fine....    ----- Message -----  From: Arianne Landon RN  Sent: 2/13/2019   3:26 PM  To: Aneesh Baker MD    What would you like me to tell this patient?  Thanks    ----- Message -----  From: Martin Urban  Sent: 2/13/2019   3:17 PM  To: Samuel Melendrez Staff    Pt would like a call in regards to starting chemo on 02/18, wants to know if it would be okay to have a dental cleaning prior to appt.    Contact:: [phone]

## 2019-02-14 NOTE — TELEPHONE ENCOUNTER
Contacted patient to let her know it is okay for her to proceed with dental cleaning prior to chemo. Patient spouse answered and was notified. He also stated that there is concern of infection at port site, which is to be evaluated this afternoon, they made an appointment with Dr. Valdes's office. Explained that they would let us know if safe to use site for first round of chemo. He voiced understanding.

## 2019-02-14 NOTE — PROGRESS NOTES
"S: Patient is s/p port placement on 2/8. Returns prior to starting chemo 2/2 concern for port infection. Claims area is red and has some "dried pus". Denies fever, chills, or significant pain.    O:  Gen: WDWN, NAD  Right neck/chest: Port in place with no signs of infection; incision c/d/i with overlying Dermabond; catheter palpable coursing up to right IJ, there is some surrounding ecchymosis that is resolving, no redness, induration, fluctuance, or purulence.     A/P:  - Doing well s/p Right IJ vein port placement; no signs of associated infection  - OK to start Chemo   - Will give patient information regarding [place]/B51  - F/u in 4 months, prior to completion of chemo to set up pre-op MRI and surgery.  I have personally taken the history and examined this patient and agree with the resident's note as stated above.  R chest wall portacath placement site in healing appropriately without signs of infection.  No drainage or erythema. No fluid collection.  Dermabond is intact.  Pt is OK to start neoadjuvant chemotherapy on 2-18-19.  Will put pt in contact with Michael Menard, our research coordinator, to further discuss enrollment criteria for the [facility]AB[facility] B51 and [place] trials.  F/u with me in 4 months prior to last dose of neoadjuvant chemotherapy.      "

## 2019-02-18 ENCOUNTER — TELEPHONE (OUTPATIENT)
Dept: HEMATOLOGY/ONCOLOGY | Facility: CLINIC | Age: 64
End: 2019-02-18

## 2019-02-18 ENCOUNTER — INFUSION (OUTPATIENT)
Dept: INFUSION THERAPY | Facility: HOSPITAL | Age: 64
End: 2019-02-18
Attending: INTERNAL MEDICINE
Payer: MEDICARE

## 2019-02-18 ENCOUNTER — OFFICE VISIT (OUTPATIENT)
Dept: HEMATOLOGY/ONCOLOGY | Facility: CLINIC | Age: 64
End: 2019-02-18
Payer: MEDICARE

## 2019-02-18 VITALS
DIASTOLIC BLOOD PRESSURE: 65 MMHG | BODY MASS INDEX: 20.55 KG/M2 | OXYGEN SATURATION: 98 % | HEIGHT: 64 IN | HEART RATE: 76 BPM | RESPIRATION RATE: 18 BRPM | SYSTOLIC BLOOD PRESSURE: 141 MMHG | WEIGHT: 120.38 LBS | TEMPERATURE: 98 F

## 2019-02-18 VITALS
RESPIRATION RATE: 18 BRPM | SYSTOLIC BLOOD PRESSURE: 126 MMHG | BODY MASS INDEX: 20.49 KG/M2 | TEMPERATURE: 98 F | DIASTOLIC BLOOD PRESSURE: 63 MMHG | HEIGHT: 64 IN | HEART RATE: 69 BPM | WEIGHT: 120 LBS

## 2019-02-18 DIAGNOSIS — Z51.11 ENCOUNTER FOR ANTINEOPLASTIC CHEMOTHERAPY: Primary | ICD-10-CM

## 2019-02-18 DIAGNOSIS — Z51.11 ENCOUNTER FOR ANTINEOPLASTIC CHEMOTHERAPY: ICD-10-CM

## 2019-02-18 DIAGNOSIS — C50.512 PRIMARY CANCER OF LOWER OUTER QUADRANT OF LEFT FEMALE BREAST: ICD-10-CM

## 2019-02-18 DIAGNOSIS — C50.512 PRIMARY CANCER OF LOWER OUTER QUADRANT OF LEFT FEMALE BREAST: Primary | ICD-10-CM

## 2019-02-18 DIAGNOSIS — C50.919 BREAST CANCER: ICD-10-CM

## 2019-02-18 LAB
ALBUMIN SERPL BCP-MCNC: 3.8 G/DL
ALP SERPL-CCNC: 64 U/L
ALT SERPL W/O P-5'-P-CCNC: 14 U/L
ANION GAP SERPL CALC-SCNC: 6 MMOL/L
AST SERPL-CCNC: 16 U/L
BILIRUB SERPL-MCNC: 0.3 MG/DL
BUN SERPL-MCNC: 14 MG/DL
CALCIUM SERPL-MCNC: 9.8 MG/DL
CHLORIDE SERPL-SCNC: 107 MMOL/L
CO2 SERPL-SCNC: 29 MMOL/L
CREAT SERPL-MCNC: 0.7 MG/DL
ERYTHROCYTE [DISTWIDTH] IN BLOOD BY AUTOMATED COUNT: 13 %
EST. GFR  (AFRICAN AMERICAN): >60 ML/MIN/1.73 M^2
EST. GFR  (NON AFRICAN AMERICAN): >60 ML/MIN/1.73 M^2
GLUCOSE SERPL-MCNC: 117 MG/DL
HCT VFR BLD AUTO: 37.7 %
HGB BLD-MCNC: 12.3 G/DL
IMM GRANULOCYTES # BLD AUTO: 0.02 K/UL
MCH RBC QN AUTO: 30.1 PG
MCHC RBC AUTO-ENTMCNC: 32.6 G/DL
MCV RBC AUTO: 92 FL
NEUTROPHILS # BLD AUTO: 3.7 K/UL
PLATELET # BLD AUTO: 247 K/UL
PMV BLD AUTO: 9.7 FL
POTASSIUM SERPL-SCNC: 4.1 MMOL/L
PROT SERPL-MCNC: 6.9 G/DL
RBC # BLD AUTO: 4.09 M/UL
SODIUM SERPL-SCNC: 142 MMOL/L
WBC # BLD AUTO: 6.55 K/UL

## 2019-02-18 PROCEDURE — 25000003 PHARM REV CODE 250: Mod: HCNC | Performed by: INTERNAL MEDICINE

## 2019-02-18 PROCEDURE — 1101F PR PT FALLS ASSESS DOC 0-1 FALLS W/OUT INJ PAST YR: ICD-10-PCS | Mod: HCNC,CPTII,S$GLB, | Performed by: INTERNAL MEDICINE

## 2019-02-18 PROCEDURE — 1101F PT FALLS ASSESS-DOCD LE1/YR: CPT | Mod: HCNC,CPTII,S$GLB, | Performed by: INTERNAL MEDICINE

## 2019-02-18 PROCEDURE — 85027 COMPLETE CBC AUTOMATED: CPT | Mod: HCNC

## 2019-02-18 PROCEDURE — 63600175 PHARM REV CODE 636 W HCPCS: Mod: HCNC,TB | Performed by: INTERNAL MEDICINE

## 2019-02-18 PROCEDURE — 99999 PR PBB SHADOW E&M-EST. PATIENT-LVL III: CPT | Mod: PBBFAC,HCNC,, | Performed by: INTERNAL MEDICINE

## 2019-02-18 PROCEDURE — 99215 OFFICE O/P EST HI 40 MIN: CPT | Mod: HCNC,S$GLB,, | Performed by: INTERNAL MEDICINE

## 2019-02-18 PROCEDURE — 99215 PR OFFICE/OUTPT VISIT, EST, LEVL V, 40-54 MIN: ICD-10-PCS | Mod: HCNC,S$GLB,, | Performed by: INTERNAL MEDICINE

## 2019-02-18 PROCEDURE — 96413 CHEMO IV INFUSION 1 HR: CPT | Mod: HCNC

## 2019-02-18 PROCEDURE — 80053 COMPREHEN METABOLIC PANEL: CPT | Mod: HCNC

## 2019-02-18 PROCEDURE — 63600175 PHARM REV CODE 636 W HCPCS: Mod: HCNC | Performed by: INTERNAL MEDICINE

## 2019-02-18 PROCEDURE — A4216 STERILE WATER/SALINE, 10 ML: HCPCS | Mod: HCNC | Performed by: INTERNAL MEDICINE

## 2019-02-18 PROCEDURE — 96411 CHEMO IV PUSH ADDL DRUG: CPT | Mod: HCNC

## 2019-02-18 PROCEDURE — 3008F BODY MASS INDEX DOCD: CPT | Mod: HCNC,CPTII,S$GLB, | Performed by: INTERNAL MEDICINE

## 2019-02-18 PROCEDURE — 99999 PR PBB SHADOW E&M-EST. PATIENT-LVL III: ICD-10-PCS | Mod: PBBFAC,HCNC,, | Performed by: INTERNAL MEDICINE

## 2019-02-18 PROCEDURE — 96367 TX/PROPH/DG ADDL SEQ IV INF: CPT | Mod: HCNC

## 2019-02-18 PROCEDURE — 3008F PR BODY MASS INDEX (BMI) DOCUMENTED: ICD-10-PCS | Mod: HCNC,CPTII,S$GLB, | Performed by: INTERNAL MEDICINE

## 2019-02-18 RX ORDER — ONDANSETRON HYDROCHLORIDE 8 MG/1
TABLET, FILM COATED ORAL
Qty: 30 TABLET | Refills: 2 | Status: SHIPPED | OUTPATIENT
Start: 2019-02-18 | End: 2019-03-11 | Stop reason: SDUPTHER

## 2019-02-18 RX ORDER — DEXAMETHASONE 4 MG/1
TABLET ORAL
Qty: 12 TABLET | Refills: 3 | Status: SHIPPED | OUTPATIENT
Start: 2019-02-18 | End: 2019-03-11 | Stop reason: SDUPTHER

## 2019-02-18 RX ORDER — SODIUM CHLORIDE 0.9 % (FLUSH) 0.9 %
10 SYRINGE (ML) INJECTION
Status: CANCELLED | OUTPATIENT
Start: 2019-02-18

## 2019-02-18 RX ORDER — SODIUM CHLORIDE 0.9 % (FLUSH) 0.9 %
10 SYRINGE (ML) INJECTION
Status: DISCONTINUED | OUTPATIENT
Start: 2019-02-18 | End: 2019-02-18 | Stop reason: HOSPADM

## 2019-02-18 RX ORDER — HEPARIN 100 UNIT/ML
500 SYRINGE INTRAVENOUS
Status: COMPLETED | OUTPATIENT
Start: 2019-02-18 | End: 2019-02-18

## 2019-02-18 RX ORDER — DOXORUBICIN HYDROCHLORIDE 2 MG/ML
60 INJECTION, SOLUTION INTRAVENOUS
Status: COMPLETED | OUTPATIENT
Start: 2019-02-18 | End: 2019-02-18

## 2019-02-18 RX ORDER — SODIUM CHLORIDE 0.9 % (FLUSH) 0.9 %
10 SYRINGE (ML) INJECTION
Status: COMPLETED | OUTPATIENT
Start: 2019-02-18 | End: 2019-02-18

## 2019-02-18 RX ORDER — HEPARIN 100 UNIT/ML
500 SYRINGE INTRAVENOUS
Status: CANCELLED | OUTPATIENT
Start: 2019-02-18

## 2019-02-18 RX ORDER — LIDOCAINE AND PRILOCAINE 25; 25 MG/G; MG/G
CREAM TOPICAL
Qty: 5 G | Refills: 0 | Status: ON HOLD | OUTPATIENT
Start: 2019-02-18 | End: 2020-07-15

## 2019-02-18 RX ORDER — HEPARIN 100 UNIT/ML
500 SYRINGE INTRAVENOUS
Status: DISCONTINUED | OUTPATIENT
Start: 2019-02-18 | End: 2019-02-18 | Stop reason: HOSPADM

## 2019-02-18 RX ORDER — DOXORUBICIN HYDROCHLORIDE 2 MG/ML
60 INJECTION, SOLUTION INTRAVENOUS
Status: CANCELLED | OUTPATIENT
Start: 2019-02-18

## 2019-02-18 RX ORDER — DEXAMETHASONE 4 MG/1
4 TABLET ORAL EVERY 6 HOURS
Qty: 120 TABLET | Refills: 0 | Status: SHIPPED | OUTPATIENT
Start: 2019-02-18 | End: 2019-02-18

## 2019-02-18 RX ADMIN — HEPARIN SODIUM (PORCINE) LOCK FLUSH IV SOLN 100 UNIT/ML 500 UNITS: 100 SOLUTION at 12:02

## 2019-02-18 RX ADMIN — Medication 10 ML: at 04:02

## 2019-02-18 RX ADMIN — DOXORUBICIN HYDROCHLORIDE 94 MG: 2 INJECTION, SOLUTION INTRAVENOUS at 03:02

## 2019-02-18 RX ADMIN — APREPITANT 130 MG: 130 INJECTION, EMULSION INTRAVENOUS at 03:02

## 2019-02-18 RX ADMIN — SODIUM CHLORIDE: 9 INJECTION, SOLUTION INTRAVENOUS at 02:02

## 2019-02-18 RX ADMIN — DEXAMETHASONE SODIUM PHOSPHATE: 4 INJECTION, SOLUTION INTRA-ARTICULAR; INTRALESIONAL; INTRAMUSCULAR; INTRAVENOUS; SOFT TISSUE at 03:02

## 2019-02-18 RX ADMIN — CYCLOPHOSPHAMIDE 940 MG: 1 INJECTION, POWDER, FOR SOLUTION INTRAVENOUS; ORAL at 03:02

## 2019-02-18 RX ADMIN — HEPARIN SODIUM (PORCINE) LOCK FLUSH IV SOLN 100 UNIT/ML 500 UNITS: 100 SOLUTION at 04:02

## 2019-02-18 RX ADMIN — Medication 10 ML: at 12:02

## 2019-02-18 NOTE — TELEPHONE ENCOUNTER
----- Message from Martin Urban sent at 2/18/2019  1:45 PM CST -----  Contact: [facility] pharmacy  Pharmacy needs to clarify instruction on Rx dexamethasone (DECADRON) 4 MG Tab.    Contact:: [phone]

## 2019-02-18 NOTE — PLAN OF CARE
Problem: Adult Inpatient Plan of Care  Goal: Plan of Care Review  Outcome: Ongoing (interventions implemented as appropriate)  Pt here for A/C D1C1, labs, hx, meds, allergies reviewed, pt signed consents with dr. watson today, chemo binder given to pt(see education), pt reclined in chair, warm blanket provided, continue to monitor

## 2019-02-18 NOTE — PROGRESS NOTES
Subjective:       Patient ID: Radha Rivera is a 63 y.o. female.    Chief Complaint: No chief complaint on file.    HPI   Ms. Rivera presents today to initiate her neoadjuvant chemotherapy.    Briefly, she is a 63-year-old  female from [place] who was recently diagnosed with breast cancer.  Apparently, she initially felt a mass in late 2017.  A mammogram at that time was read as BI-RADS category 1.  A repeat mammogram in December 2018 was read as BI-RADS category 4 and she underwent a biopsy that showed a carcinoma that was ER negative, VA weakly positive in 5% of the cells and HER-2 2+ by immunohistochemistry, but negative by FISH.  Ki-67 was 5%.    The patient subsequently underwent a biopsy of a palpable lymph node on 02/01/2019, that showed evidence of lymph node metastasis.  She was referred for evaluation for chemotherapy.  Of note, she also had a PET scan on 02/01/2019 that showed an 8 mm right lower lobe pulmonary nodule that was not FDG avid.    Her EF is normal at 68 %.  Her CBC, LFts and renal function are all normal.      Review of Systems    Overall she feels well. She is very anxious with the recent developments and the upcoming chemotherapy, but she denies any depression, easy bruising, fevers, chills, night  sweats, weight loss, nausea, vomiting, diarrhea, constipation, diplopia, blurred vision, headache, chest pain, palpitations, shortness of breath, breast pain, abdominal pain, extremity pain, or difficulty ambulating.  The remainder of the ten-point ROS, including general, skin, lymph, H/N, cardiorespiratory, GI, , Neuro, Endocrine, and psychiatric is negative.     Objective:      Physical Exam      She is alert, oriented to time, place, person, pleasant, well      nourished, in no acute physical distress.    She is accompanied by her daughter and her .                               VITAL SIGNS:  Reviewed                                      HEENT:  Normal.  There are  no nasal, oral, lip, gingival, auricular, lid,    or conjunctival lesions.  Mucosae are moist and pink, and there is no        thrush.  Pupils are equal, reactive to light and accommodation.              Extraocular muscle movements are intact.  Dentition is good.  There is no frontal or maxillary tenderness.                                     NECK:  Supple without JVD, adenopathy, or thyromegaly.                       LUNGS:  Clear to auscultation without wheezing, rales, or rhonchi.           CARDIOVASCULAR:  Reveals an S1, S2, no murmurs, no rubs, no gallops.         ABDOMEN:  Soft, nontender, without organomegaly.  Bowel sounds are    present.                                                                     EXTREMITIES:  No cyanosis, clubbing, or edema.                               BREASTS:  there is a pea sized nodule at the 3 o' clock position in the left breast.  She does have palpable adenopathy in the left axilla.   There are no masses in the right breast.                                    LYMPHATIC:  There is no cervical, axillary, or supraclavicular adenopathy.   SKIN:  Warm and moist, without petechiae, rashes, induration, but she does have an ecchymosis from her recent port placement in the right subclavian area. .           NEUROLOGIC:  DTRs are 0-1+ bilaterally, symmetrical, motor function is 5/5,  and cranial nerves are  within normal limits.    Assessment:       1. Primary cancer of lower outer quadrant of left female breast    2. Encounter for antineoplastic chemotherapy      2.    Lung nodule  Plan:        I had a long discussion with her.  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  I think it would be reasonable to proceed with neoadjuvant chemotherapy and restage her upon completion of chemotherapy to reevaluate the nodule.    The pros and cons of such an approach were explained to her.  Consent  form was signed and neutropenic precautions were explained to her.  Her multiple questions were answered to her satisfaction.  I will see her again in three weeks.

## 2019-02-18 NOTE — PLAN OF CARE
Problem: Adult Inpatient Plan of Care  Goal: Plan of Care Review  Outcome: Ongoing (interventions implemented as appropriate)  Pt toolerated A/C infusion without issue, pt to rtc 3/11/19, no distress noted upon d/c to home

## 2019-02-18 NOTE — TELEPHONE ENCOUNTER
Contacted pharmacy to clarify dexamethasone prescription details. Explained to them that the 120 tablet script was cancelled and should only be 12 tablets dispensed with directions to take 3 days post chemotherapy every 12 hours.     Pharmacist voiced understanding. And modified prescriptions.

## 2019-02-18 NOTE — NURSING
1240-Accessed pt's right chest port using sterile technique and collected blood specimen, pt tolerated well. Blood sent to lab. Port flushed with normal saline and heparin and secured at time of discharge. Port left accessed, as pt is getting chemotherapy this afternoon. Pt discharged with no distress noted, ambulating independently, accompanied by family.

## 2019-02-19 ENCOUNTER — TELEPHONE (OUTPATIENT)
Dept: HEMATOLOGY/ONCOLOGY | Facility: CLINIC | Age: 64
End: 2019-02-19

## 2019-02-19 NOTE — TELEPHONE ENCOUNTER
----- Message from Shannon Shell sent at 2/19/2019  2:32 PM CST -----  Contact: pt   Pt called to speak with nurse Ly have some questions   Callback#[phone]  Thank You  STEVIE Shell

## 2019-02-19 NOTE — TELEPHONE ENCOUNTER
Returned call to patient who had several questions regarding her chemotherapy. Patient needed further instructions on her medications. She did not understand instructions. Reiterated instructions for her medications. Explained that Dr. Paula suggested she continue the zofran and dexamathasone for three days post chemo to prevent nausea, though pt reports that she is not feeling any right now, explained that sometimes it takes a while for symptoms to onset.   Patient reports that she only took one dexamethasone tablet, explained that this is okay, however, instructed patient to take 2 this evening at 10. Patient voiced that she did not take the zofran at all, explained that she should take this at 10 as well tonight. Explained that she should eat a small snack tonight with the steroids. Patient questioned on what foods to avoid, explained that there is no specific food she should avoid, however, she should take neutropenic precautions so that she avoid raw foods, make sure everything is cooked. Explained that she should avoid buffets, open areas and fast food to prevent food contamination. She expressed understanding. Reiterated that all the medications she takes are okay. Will consider referring patient to dietician to discuss safe options for her to eat during her chemotherapy. Plan to contact patient tomorrow morning to get her back on track with her post chemo medication regimen, she voiced understanding. Reminder set inbasket.   No further questions/concerns at this time, she expressed gratitude.

## 2019-02-20 ENCOUNTER — TELEPHONE (OUTPATIENT)
Dept: HEMATOLOGY/ONCOLOGY | Facility: CLINIC | Age: 64
End: 2019-02-20

## 2019-02-20 NOTE — TELEPHONE ENCOUNTER
Contacted patient to check on her and assist with an appropriate medication schedule. Patient already took the zofran at 8 am this morning, she last took the steroids at 1130 pm. Advised patient to take her next dose of steroids at 10 am, that way her nightly dose will be given at 10 and she could avoid having to stay awake so early. Instructed her to take the zofran tablet again at 3 pm and 10 pm. She voiced understanding. Explained that she will continue the same schedule for tomorrow.   After tomorrow, she will have completed her post chemo medications, however advised, that she can take zofran every 8 hours if nausea noted.   She expressed understanding. No further complaints at this time.

## 2019-02-22 ENCOUNTER — TELEPHONE (OUTPATIENT)
Dept: HEMATOLOGY/ONCOLOGY | Facility: CLINIC | Age: 64
End: 2019-02-22

## 2019-02-22 NOTE — TELEPHONE ENCOUNTER
----- Message from Martin Urban sent at 2/22/2019  8:30 AM CST -----  Contact: Patient  Pt has been experiencing constipation for the past 4days, used Miralax last night but it isn't helping. Wants to know if something can be called in.    Contact:: [phone]

## 2019-02-22 NOTE — TELEPHONE ENCOUNTER
Returned call to patient due to her complaints of constipation. Per patient she has been having issues for X4 days. Explained that chemo can cause constipation, but also can eventually lead to diarrhea therefore did not want to add or call in anything more aggressive at this time. Patient having mild abdominal discomfort nothing severe. Patient reports that she had one small BM yesterday and is able to pass gas, but stool is firm and small.   Explained patient that she should continue another dose of miralax (though it may not work immediately). Explained to patient that she should take sennakot tablet (stool softener) x2 a day and should take another miralax dose today. Explained that she should maintain adequate hydration, move as tolerated and increase fiber intake in diet. Patient voiced understanding. Patient advised to take miralax with apple/prune juice.     Will follow up with patient on Monday.   Explained to patient that if abdominal pain becomes severe in nature, or abdomen distended/firm without ability to pass gas or stool, nausea/vomiting occurs she should see further medical evaluation.     No further questions/concerns at this time.

## 2019-02-22 NOTE — TELEPHONE ENCOUNTER
"Returned call to patient to discuss that she has still not had a bowel movement. Patient reports that she is "typically" a constipated person. Her baseline bowel regimen is bowel movement every 2-3 days. She reported have a very small BM last night, otherwise, it's been about 4 days without a normal Bowel movement. Patient does report pain in middle abdomen, "mild in nature" and having a lot of burping. Explained to patient that the acid reflux is probably due to esophagitis and acid reflux and could be maintained with an acid reducing medication such as prilosec otc. Reiterated to patient that stool softener and laxatives may take up to 2 days to officially work and differs in individuals. Advised that she maintain adequate hydration, at least 8 cups of water daily. Last dose of miralax was yesterday.  Patient has only taken one senna dung. Advised patient to take another sennakot tablet and an additional dose of miralax this evening. If this does not work she could try milk of magnesia over the counter tomorrow, though would like to avoid overdoing to prevent diarrhea. Explained to patient that if further assistance needed over the weekend should could call our number to speak to a doctor on call.    Advised patient to report to ED if unable to pass gas, blood in stool noted, severe abdominal pain occurs, chest pain experienced, N/V, fever. She voiced understanding.   "

## 2019-02-22 NOTE — TELEPHONE ENCOUNTER
----- Message from Martin Urban sent at 2/22/2019  2:38 PM CST -----  Contact: Patient  Pt call in regards to recommendation to relieve constipation, she needs clarification on how much and when to take the medication.      Contact:: [phone]

## 2019-02-26 ENCOUNTER — TELEPHONE (OUTPATIENT)
Dept: HEMATOLOGY/ONCOLOGY | Facility: CLINIC | Age: 64
End: 2019-02-26

## 2019-02-26 NOTE — TELEPHONE ENCOUNTER
"Contacted patient to discuss her symptoms/concerns. Patient reports that she is having above the navel abdomen pain, "burning like" that is worse with eating. Patient states that this pain is about 3-4/10. Followed up on bowels and patient states that she has been about 3-4 times since stool softeners and laxatives had been taking. Patient denies using any antiacid medications OTC such as pepcid, zantac, tums. Was going to recommend to patient. Patient denies fevers/chills, n/v, abd distention, abnormal stools or SEVERE pain. Will plan to bring patient in for evaluation and examination to make sure. Mentioned patient seeing PCP, however, patient sounded uneasy. Plan to bring in tomorrow after discussing with Ira Lee NP.     Will follow up with patient first thing tomorrow morning. She expressed understanding.     "

## 2019-02-26 NOTE — TELEPHONE ENCOUNTER
Returned call to patient to discuss this issue. No answer. Voicemail left to return call to clinic at pts earliest convenience to see if she needs to be seen by a provider.     ----- Message from Jacque Mendoza sent at 2/26/2019  4:23 PM CST -----  Contact: Pt  Patient called in regards to pain right above navel. Patient stated that when she eats she experiences pain.  Patient would like to speak with Dr. Baker's nurse.    Patient can be reached at [phone]

## 2019-02-28 ENCOUNTER — TELEPHONE (OUTPATIENT)
Dept: HEMATOLOGY/ONCOLOGY | Facility: CLINIC | Age: 64
End: 2019-02-28

## 2019-02-28 ENCOUNTER — OFFICE VISIT (OUTPATIENT)
Dept: HEMATOLOGY/ONCOLOGY | Facility: CLINIC | Age: 64
End: 2019-02-28
Payer: MEDICARE

## 2019-02-28 VITALS
OXYGEN SATURATION: 99 % | HEIGHT: 64 IN | SYSTOLIC BLOOD PRESSURE: 125 MMHG | HEART RATE: 76 BPM | TEMPERATURE: 97 F | WEIGHT: 116.88 LBS | DIASTOLIC BLOOD PRESSURE: 58 MMHG | BODY MASS INDEX: 19.96 KG/M2 | RESPIRATION RATE: 18 BRPM

## 2019-02-28 DIAGNOSIS — K29.00 OTHER ACUTE GASTRITIS, PRESENCE OF BLEEDING UNSPECIFIED: ICD-10-CM

## 2019-02-28 DIAGNOSIS — C50.512 PRIMARY CANCER OF LOWER OUTER QUADRANT OF LEFT FEMALE BREAST: Primary | ICD-10-CM

## 2019-02-28 DIAGNOSIS — Z51.11 ENCOUNTER FOR ANTINEOPLASTIC CHEMOTHERAPY: ICD-10-CM

## 2019-02-28 PROCEDURE — 3008F BODY MASS INDEX DOCD: CPT | Mod: HCNC,CPTII,S$GLB, | Performed by: INTERNAL MEDICINE

## 2019-02-28 PROCEDURE — 3008F PR BODY MASS INDEX (BMI) DOCUMENTED: ICD-10-PCS | Mod: HCNC,CPTII,S$GLB, | Performed by: INTERNAL MEDICINE

## 2019-02-28 PROCEDURE — 1101F PR PT FALLS ASSESS DOC 0-1 FALLS W/OUT INJ PAST YR: ICD-10-PCS | Mod: HCNC,CPTII,S$GLB, | Performed by: INTERNAL MEDICINE

## 2019-02-28 PROCEDURE — 99999 PR PBB SHADOW E&M-EST. PATIENT-LVL III: ICD-10-PCS | Mod: PBBFAC,HCNC,, | Performed by: INTERNAL MEDICINE

## 2019-02-28 PROCEDURE — 1101F PT FALLS ASSESS-DOCD LE1/YR: CPT | Mod: HCNC,CPTII,S$GLB, | Performed by: INTERNAL MEDICINE

## 2019-02-28 PROCEDURE — 99214 PR OFFICE/OUTPT VISIT, EST, LEVL IV, 30-39 MIN: ICD-10-PCS | Mod: HCNC,S$GLB,, | Performed by: INTERNAL MEDICINE

## 2019-02-28 PROCEDURE — 99999 PR PBB SHADOW E&M-EST. PATIENT-LVL III: CPT | Mod: PBBFAC,HCNC,, | Performed by: INTERNAL MEDICINE

## 2019-02-28 PROCEDURE — 99214 OFFICE O/P EST MOD 30 MIN: CPT | Mod: HCNC,S$GLB,, | Performed by: INTERNAL MEDICINE

## 2019-02-28 RX ORDER — HYDROGEN PEROXIDE 3 %
20 SOLUTION, NON-ORAL MISCELLANEOUS DAILY
Qty: 30 CAPSULE | Refills: 1 | Status: SHIPPED | OUTPATIENT
Start: 2019-02-28 | End: 2019-03-30

## 2019-02-28 NOTE — PROGRESS NOTES
Subjective:       Patient ID: Radha Rivera is a 63 y.o. female.    Chief Complaint: Primary cancer of lower outer quadrant of left female breast    HPI   Ms. Rivera presents today requesting to be seen.  Two days after her first cycle of AC she started experiencing intense epigastric pain.  She also experienced constipation.  Her appetite has been poor but she did not experience any nausea.    She states that over the last 24 hours her symptoms have improved somewhat, but she is still experiencing some epigastric pain.    Briefly, she is a 63-year-old  female from [place] who was recently diagnosed with breast cancer.  Apparently, she initially felt a mass in late 2017.  A mammogram at that time was read as BI-RADS category 1.  A repeat mammogram in December 2018 was read as BI-RADS category 4 and she underwent a biopsy that showed a carcinoma that was ER negative, AZ weakly positive in 5% of the cells and HER-2 2+ by immunohistochemistry, but negative by FISH.  Ki-67 was 5%.    The patient subsequently underwent a biopsy of a palpable lymph node on 02/01/2019, that showed evidence of lymph node metastasis.  She was referred for evaluation for chemotherapy.  Of note, she also had a PET scan on 02/01/2019 that showed an 8 mm right lower lobe pulmonary nodule that was not FDG avid.  She stated AC last week.  This was followed by dexamethasone for three days for emesis prevention.     Review of Systems    Overall she feels fair.  She is still experiencing some abdominal pain but states that her symptoms have improved somewhat.  She is very anxious with the recent developments but she denies any depression, easy bruising, fevers, chills, night  sweats, weight loss, nausea, vomiting, diarrhea, diplopia, blurred vision, headache, chest pain, palpitations, shortness of breath, breast pain,  extremity pain, or difficulty ambulating.  The remainder of the ten-point ROS, including general, skin, lymph,  H/N, cardiorespiratory, GI, , Neuro, Endocrine, and psychiatric is negative.     Objective:      Physical Exam      She is alert, oriented to time, place, person, pleasant, well      nourished, in no acute physical distress.    She is accompanied by her .                               VITAL SIGNS:  Reviewed                                      HEENT:  Normal.  There are no nasal, oral, lip, gingival, auricular, lid,    or conjunctival lesions.  Mucosae are moist and pink, and there is no        thrush.  Pupils are equal, reactive to light and accommodation.              Extraocular muscle movements are intact.  Dentition is good.  There is no frontal or maxillary tenderness.                                     NECK:  Supple without JVD, adenopathy, or thyromegaly.                       LUNGS:  Clear to auscultation without wheezing, rales, or rhonchi.           CARDIOVASCULAR:  Reveals an S1, S2, no murmurs, no rubs, no gallops.         ABDOMEN:  Soft, with epigastric tenderness on palpation.  No rebound.  She has no organomegaly.  Bowel sounds are    present.                                                                     EXTREMITIES:  No cyanosis, clubbing, or edema.                               BREASTS:  there is a pea sized nodule at the 3 o' clock position in the left breast.  She does have palpable adenopathy in the left axilla.   There are no masses in the right breast.                                    LYMPHATIC:  There is no cervical, axillary, or supraclavicular adenopathy.   SKIN:  Warm and moist, without petechiae, rashes, induration, but she does have an ecchymosis from her recent port placement in the right subclavian area. .           NEUROLOGIC:  DTRs are 0-1+ bilaterally, symmetrical, motor function is 5/5,  and cranial nerves are  within normal limits.    Assessment:       1. Primary cancer of lower outer quadrant of left female breast    2. Encounter for antineoplastic chemotherapy       2.    Lung nodule  4.      Probable steroid induced gastritis.  Plan:        I had a long discussion with her.  I recommended she start nexium, and during cycle 2 we will most likely withhold the dexamethasone.  She will call me in 6 days for a follow up or sooner if symptoms increase.  Her multiple questions were answered to her satisfaction.  I will see her again in three weeks.

## 2019-02-28 NOTE — TELEPHONE ENCOUNTER
Contacted patient to discuss her appointment and need for fasting. Explained that at this time labs held off, will determine if labs are needed after examination. If labs necessary, explained to patient most labs we receive do not require fasting.

## 2019-02-28 NOTE — TELEPHONE ENCOUNTER
----- Message from Lisa Singh sent at 2/28/2019  8:14 AM CST -----  Contact: Pt   Has questions about the appt today if she will have lab work done and rather she should eat or not. Please call [phone]

## 2019-03-11 ENCOUNTER — INFUSION (OUTPATIENT)
Dept: INFUSION THERAPY | Facility: HOSPITAL | Age: 64
End: 2019-03-11
Attending: INTERNAL MEDICINE
Payer: MEDICARE

## 2019-03-11 ENCOUNTER — OFFICE VISIT (OUTPATIENT)
Dept: HEMATOLOGY/ONCOLOGY | Facility: CLINIC | Age: 64
End: 2019-03-11
Payer: MEDICARE

## 2019-03-11 VITALS
OXYGEN SATURATION: 99 % | HEART RATE: 79 BPM | DIASTOLIC BLOOD PRESSURE: 64 MMHG | SYSTOLIC BLOOD PRESSURE: 134 MMHG | WEIGHT: 119.94 LBS | BODY MASS INDEX: 20.47 KG/M2 | TEMPERATURE: 99 F | RESPIRATION RATE: 18 BRPM | HEIGHT: 64 IN

## 2019-03-11 VITALS — DIASTOLIC BLOOD PRESSURE: 58 MMHG | HEART RATE: 70 BPM | SYSTOLIC BLOOD PRESSURE: 105 MMHG

## 2019-03-11 DIAGNOSIS — Z51.11 ENCOUNTER FOR ANTINEOPLASTIC CHEMOTHERAPY: Primary | ICD-10-CM

## 2019-03-11 DIAGNOSIS — C50.919 BREAST CANCER: ICD-10-CM

## 2019-03-11 DIAGNOSIS — D64.81 ANTINEOPLASTIC CHEMOTHERAPY INDUCED ANEMIA: ICD-10-CM

## 2019-03-11 DIAGNOSIS — T45.1X5A ANTINEOPLASTIC CHEMOTHERAPY INDUCED ANEMIA: ICD-10-CM

## 2019-03-11 DIAGNOSIS — Z51.11 ENCOUNTER FOR ANTINEOPLASTIC CHEMOTHERAPY: ICD-10-CM

## 2019-03-11 DIAGNOSIS — C50.512 PRIMARY CANCER OF LOWER OUTER QUADRANT OF LEFT FEMALE BREAST: Primary | ICD-10-CM

## 2019-03-11 DIAGNOSIS — C50.512 PRIMARY CANCER OF LOWER OUTER QUADRANT OF LEFT FEMALE BREAST: ICD-10-CM

## 2019-03-11 LAB
ALBUMIN SERPL BCP-MCNC: 3.9 G/DL
ALP SERPL-CCNC: 56 U/L
ALT SERPL W/O P-5'-P-CCNC: 16 U/L
ANION GAP SERPL CALC-SCNC: 8 MMOL/L
AST SERPL-CCNC: 17 U/L
BILIRUB SERPL-MCNC: 0.2 MG/DL
BUN SERPL-MCNC: 11 MG/DL
CALCIUM SERPL-MCNC: 9.7 MG/DL
CHLORIDE SERPL-SCNC: 106 MMOL/L
CO2 SERPL-SCNC: 28 MMOL/L
CREAT SERPL-MCNC: 0.7 MG/DL
ERYTHROCYTE [DISTWIDTH] IN BLOOD BY AUTOMATED COUNT: 13.1 %
EST. GFR  (AFRICAN AMERICAN): >60 ML/MIN/1.73 M^2
EST. GFR  (NON AFRICAN AMERICAN): >60 ML/MIN/1.73 M^2
GLUCOSE SERPL-MCNC: 91 MG/DL
HCT VFR BLD AUTO: 35.7 %
HGB BLD-MCNC: 11.3 G/DL
IMM GRANULOCYTES # BLD AUTO: 0.07 K/UL
MCH RBC QN AUTO: 29.7 PG
MCHC RBC AUTO-ENTMCNC: 31.7 G/DL
MCV RBC AUTO: 94 FL
NEUTROPHILS # BLD AUTO: 2.4 K/UL
PLATELET # BLD AUTO: 413 K/UL
PMV BLD AUTO: 9.2 FL
POTASSIUM SERPL-SCNC: 4 MMOL/L
PROT SERPL-MCNC: 7 G/DL
RBC # BLD AUTO: 3.8 M/UL
SODIUM SERPL-SCNC: 142 MMOL/L
WBC # BLD AUTO: 4.69 K/UL

## 2019-03-11 PROCEDURE — 3008F PR BODY MASS INDEX (BMI) DOCUMENTED: ICD-10-PCS | Mod: HCNC,CPTII,S$GLB, | Performed by: INTERNAL MEDICINE

## 2019-03-11 PROCEDURE — 80053 COMPREHEN METABOLIC PANEL: CPT | Mod: HCNC

## 2019-03-11 PROCEDURE — 63600175 PHARM REV CODE 636 W HCPCS: Mod: HCNC | Performed by: INTERNAL MEDICINE

## 2019-03-11 PROCEDURE — 25000003 PHARM REV CODE 250: Mod: HCNC | Performed by: INTERNAL MEDICINE

## 2019-03-11 PROCEDURE — 1101F PR PT FALLS ASSESS DOC 0-1 FALLS W/OUT INJ PAST YR: ICD-10-PCS | Mod: HCNC,CPTII,S$GLB, | Performed by: INTERNAL MEDICINE

## 2019-03-11 PROCEDURE — 99999 PR PBB SHADOW E&M-EST. PATIENT-LVL III: CPT | Mod: PBBFAC,HCNC,, | Performed by: INTERNAL MEDICINE

## 2019-03-11 PROCEDURE — A4216 STERILE WATER/SALINE, 10 ML: HCPCS | Mod: HCNC | Performed by: INTERNAL MEDICINE

## 2019-03-11 PROCEDURE — 96367 TX/PROPH/DG ADDL SEQ IV INF: CPT | Mod: HCNC

## 2019-03-11 PROCEDURE — 99999 PR PBB SHADOW E&M-EST. PATIENT-LVL III: ICD-10-PCS | Mod: PBBFAC,HCNC,, | Performed by: INTERNAL MEDICINE

## 2019-03-11 PROCEDURE — 85027 COMPLETE CBC AUTOMATED: CPT | Mod: HCNC

## 2019-03-11 PROCEDURE — 3008F BODY MASS INDEX DOCD: CPT | Mod: HCNC,CPTII,S$GLB, | Performed by: INTERNAL MEDICINE

## 2019-03-11 PROCEDURE — 96411 CHEMO IV PUSH ADDL DRUG: CPT | Mod: HCNC

## 2019-03-11 PROCEDURE — 99215 PR OFFICE/OUTPT VISIT, EST, LEVL V, 40-54 MIN: ICD-10-PCS | Mod: HCNC,S$GLB,, | Performed by: INTERNAL MEDICINE

## 2019-03-11 PROCEDURE — 1101F PT FALLS ASSESS-DOCD LE1/YR: CPT | Mod: HCNC,CPTII,S$GLB, | Performed by: INTERNAL MEDICINE

## 2019-03-11 PROCEDURE — 96413 CHEMO IV INFUSION 1 HR: CPT | Mod: HCNC

## 2019-03-11 PROCEDURE — 99215 OFFICE O/P EST HI 40 MIN: CPT | Mod: HCNC,S$GLB,, | Performed by: INTERNAL MEDICINE

## 2019-03-11 RX ORDER — SODIUM CHLORIDE 0.9 % (FLUSH) 0.9 %
10 SYRINGE (ML) INJECTION
Status: CANCELLED | OUTPATIENT
Start: 2019-03-11

## 2019-03-11 RX ORDER — DEXAMETHASONE 4 MG/1
TABLET ORAL
Qty: 12 TABLET | Refills: 2 | Status: SHIPPED | OUTPATIENT
Start: 2019-03-11 | End: 2019-04-25

## 2019-03-11 RX ORDER — MELOXICAM 15 MG/1
TABLET ORAL
COMMUNITY
Start: 2014-07-08 | End: 2019-03-30

## 2019-03-11 RX ORDER — SODIUM CHLORIDE 0.9 % (FLUSH) 0.9 %
10 SYRINGE (ML) INJECTION
Status: DISCONTINUED | OUTPATIENT
Start: 2019-03-11 | End: 2019-03-11 | Stop reason: HOSPADM

## 2019-03-11 RX ORDER — HEPARIN 100 UNIT/ML
500 SYRINGE INTRAVENOUS
Status: CANCELLED | OUTPATIENT
Start: 2019-03-11

## 2019-03-11 RX ORDER — HEPARIN 100 UNIT/ML
500 SYRINGE INTRAVENOUS
Status: DISCONTINUED | OUTPATIENT
Start: 2019-03-11 | End: 2019-03-11 | Stop reason: HOSPADM

## 2019-03-11 RX ORDER — DOXORUBICIN HYDROCHLORIDE 2 MG/ML
60 INJECTION, SOLUTION INTRAVENOUS
Status: CANCELLED | OUTPATIENT
Start: 2019-03-11

## 2019-03-11 RX ORDER — DOXORUBICIN HYDROCHLORIDE 2 MG/ML
60 INJECTION, SOLUTION INTRAVENOUS
Status: COMPLETED | OUTPATIENT
Start: 2019-03-11 | End: 2019-03-11

## 2019-03-11 RX ORDER — HEPARIN 100 UNIT/ML
500 SYRINGE INTRAVENOUS
Status: COMPLETED | OUTPATIENT
Start: 2019-03-11 | End: 2019-03-11

## 2019-03-11 RX ORDER — SODIUM CHLORIDE 0.9 % (FLUSH) 0.9 %
10 SYRINGE (ML) INJECTION
Status: COMPLETED | OUTPATIENT
Start: 2019-03-11 | End: 2019-03-11

## 2019-03-11 RX ORDER — ONDANSETRON HYDROCHLORIDE 8 MG/1
TABLET, FILM COATED ORAL
Qty: 30 TABLET | Refills: 2 | Status: SHIPPED | OUTPATIENT
Start: 2019-03-11 | End: 2019-04-05

## 2019-03-11 RX ADMIN — HEPARIN SODIUM (PORCINE) LOCK FLUSH IV SOLN 100 UNIT/ML 500 UNITS: 100 SOLUTION at 05:03

## 2019-03-11 RX ADMIN — DOXORUBICIN HYDROCHLORIDE 94 MG: 2 INJECTION, SOLUTION INTRAVENOUS at 04:03

## 2019-03-11 RX ADMIN — DEXAMETHASONE SODIUM PHOSPHATE: 4 INJECTION, SOLUTION INTRA-ARTICULAR; INTRALESIONAL; INTRAMUSCULAR; INTRAVENOUS; SOFT TISSUE at 02:03

## 2019-03-11 RX ADMIN — APREPITANT 130 MG: 130 INJECTION, EMULSION INTRAVENOUS at 03:03

## 2019-03-11 RX ADMIN — CYCLOPHOSPHAMIDE 940 MG: 1 INJECTION, POWDER, FOR SOLUTION INTRAVENOUS; ORAL at 04:03

## 2019-03-11 RX ADMIN — Medication 10 ML: at 12:03

## 2019-03-11 RX ADMIN — HEPARIN SODIUM (PORCINE) LOCK FLUSH IV SOLN 100 UNIT/ML 500 UNITS: 100 SOLUTION at 12:03

## 2019-03-11 RX ADMIN — SODIUM CHLORIDE: 0.9 INJECTION, SOLUTION INTRAVENOUS at 02:03

## 2019-03-11 NOTE — PLAN OF CARE
Problem: Adult Inpatient Plan of Care  Goal: Plan of Care Review  Outcome: Ongoing (interventions implemented as appropriate)  Pt received A/C; tolerated well. VSS and NAD. Pt instructed to call MD with any concerns. Pt discharged home independently.

## 2019-03-11 NOTE — PROGRESS NOTES
Subjective:       Patient ID: Radha Rivera is a 63 y.o. female.    Chief Complaint: Primary cancer of lower outer quadrant of left female breast    HPI   Ms. Rivera presents today.   She is due for her second cycle of AC today.       Briefly, she is a 63-year-old  female from [place] who was recently diagnosed with breast cancer.  Apparently, she initially felt a mass in late 2017.  A mammogram at that time was read as BI-RADS category 1.  A repeat mammogram in December 2018 was read as BI-RADS category 4 and she underwent a biopsy that showed a carcinoma that was ER negative, AZ weakly positive in 5% of the cells and HER-2 2+ by immunohistochemistry, but negative by FISH.  Ki-67 was 5%.    The patient subsequently underwent a biopsy of a palpable lymph node on 02/01/2019, that showed evidence of lymph node metastasis.  She was referred for evaluation for chemotherapy.  Of note, she also had a PET scan on 02/01/2019 that showed an 8 mm right lower lobe pulmonary nodule that was not FDG avid.  She stated AC three weeks ago, and she is due for cycle 2.      Her CBC today shows a WBC count of 4,600 /mm3,. Hg 11.3 gr/dl, Ht 35.7 % and platelets 413,000 /mm3.  HErLFts are WNL.    Review of Systems    Overall she feels OK.  She denies any depression, easy bruising, fevers, chills, night  sweats, weight loss, nausea, vomiting, diarrhea, diplopia, blurred vision, headache, chest pain, palpitations, shortness of breath, breast pain,  extremity pain, or difficulty ambulating.  The remainder of the ten-point ROS, including general, skin, lymph, H/N, cardiorespiratory, GI, , Neuro, Endocrine, and psychiatric is negative.     Objective:      Physical Exam      She is alert, oriented to time, place, person, pleasant, well      nourished, in no acute physical distress.    She is accompanied by her  and her daughter.                               VITAL SIGNS:  Reviewed                                       HEENT:  Moderate alopecia is now noted.  There are no nasal, oral, lip, gingival, auricular, lid,    or conjunctival lesions.  Mucosae are moist and pink, and there is no        thrush.  Pupils are equal, reactive to light and accommodation.              Extraocular muscle movements are intact.  Dentition is good. There are no oral ulcerations.                                     NECK:  Supple without JVD, adenopathy, or thyromegaly.                       LUNGS:  Clear to auscultation without wheezing, rales, or rhonchi.           CARDIOVASCULAR:  Reveals an S1, S2, no murmurs, no rubs, no gallops.         ABDOMEN:  Soft, with epigastric tenderness on palpation.  No rebound.  She has no organomegaly.  Bowel sounds are    present.                                                                     EXTREMITIES:  No cyanosis, clubbing, or edema.                               BREASTS:  there is a pea sized nodule at the 3 o' clock position in the left breast.  She does have palpable adenopathy in the left axilla.   There are no masses in the right breast.                                    LYMPHATIC:  There is no cervical, axillary, or supraclavicular adenopathy.   SKIN:  Warm and moist, without petechiae, rashes, induration, or ecchymoses.           NEUROLOGIC:  DTRs are 0-1+ bilaterally, symmetrical, motor function is 5/5,  and cranial nerves are  within normal limits.    Assessment:       1. Primary cancer of lower outer quadrant of left female breast    2. Encounter for antineoplastic chemotherapy    3. Antineoplastic chemotherapy induced anemia      4.    Lung nodule    Plan:        I had a long discussion with her.  I recommended she remain on nexium, and proceed with cycle 2.  If she again experiences any gastritis type symptoms, we may attempt to hold the dexamethasone during cycle 3.  Her multiple questions were answered to her satisfaction.  RTC with labs in 3 weeks.

## 2019-03-15 ENCOUNTER — TELEPHONE (OUTPATIENT)
Dept: HEMATOLOGY/ONCOLOGY | Facility: CLINIC | Age: 64
End: 2019-03-15

## 2019-03-15 NOTE — TELEPHONE ENCOUNTER
Returned call to patient to discuss her concerns. Patient states that she incidentally had 2 steroid tablets remaining. Unsure how her cycle is messed up. Advised that she can hold off on taking as recently plan to decrease her dose of steroids due to her side effects. Patient states that she is feeling fine.  Will inform Dr. watson of incompletion. Also patient asking if she should continue the nexium if steroids no longer taken, explained to patient that okay to resume the nexium to prevent return of acid reflux/gastritis symptoms

## 2019-03-15 NOTE — TELEPHONE ENCOUNTER
----- Message from Shannon Shell sent at 3/15/2019  8:59 AM CDT -----  Contact: pt   Pt called to speak with nurse zulema have some questions about medication  Callback#[phone]  Thank You  STEVIE Sehll

## 2019-03-18 ENCOUNTER — TELEPHONE (OUTPATIENT)
Dept: HEMATOLOGY/ONCOLOGY | Facility: CLINIC | Age: 64
End: 2019-03-18

## 2019-03-18 NOTE — TELEPHONE ENCOUNTER
----- Message from Shannon Shell sent at 3/18/2019 11:52 AM CDT -----  Contact: pt   Pt called to speak with nurse zulema states that she has some questions about her blood pressure it's on the high side   Callback [phone]  Thank You  STEVIE Shell

## 2019-03-18 NOTE — TELEPHONE ENCOUNTER
----- Message from Martin Urban sent at 3/18/2019  4:00 PM CDT -----  Contact: Patient  Pt would like a call regarding blood pressure being high, would like to discuss.    Contact:: [phone]

## 2019-03-18 NOTE — TELEPHONE ENCOUNTER
Attempted to contact patient back at home number regarding her concerns. No answer. Voicemail left with clinic information provided requesting return call at earliest convenience.

## 2019-03-18 NOTE — TELEPHONE ENCOUNTER
"Contacted patient to discuss her concerns. Patient voiced that she is worried about her "high blood pressure." She has also voiced that she has been feeling weak and jittery. Her  took her blood pressure twice yesterday both readings 130s/70s. Patient also reporting occasional short duration headaches. She also stressed some leg/foot swelling. Concerned that most symptoms she is experiencing is steroid related. Advised patient at this time to monitor blood pressure, restart has aspirin (as she has been holding with all the procedures she recently underwent), restrict sodium in her diet, elevate her legs above her heart, and we will discuss her steroid dose at her follow up visit.   Patient advised to continue nexium at this time. Patient states hydration and diet have been okay.  No further questions/concerns at this time. Patient to reach out to us for any other concerns. Advised that now that the steroids post chemo have been completed, would expect her symptoms to resolve gradually over time. She expressed understanding.     "

## 2019-03-22 ENCOUNTER — TELEPHONE (OUTPATIENT)
Dept: HEMATOLOGY/ONCOLOGY | Facility: CLINIC | Age: 64
End: 2019-03-22

## 2019-03-22 NOTE — TELEPHONE ENCOUNTER
Returned call to patient after letter completed. Will fax to 's email address per request and also mail copy.   She expressed gratitude.     No further questions/concerns at this time.

## 2019-03-22 NOTE — TELEPHONE ENCOUNTER
----- Message from Martin Urban sent at 3/22/2019 11:42 AM CDT -----  Contact: Criss Rivera (Spouse)  Pt's  needs a certificate from the doctor that states that he is the primary care taker of the pt so that he can be excuse for jury duty to make her appts.      Contact:: [phone]

## 2019-03-29 ENCOUNTER — TELEPHONE (OUTPATIENT)
Dept: HEMATOLOGY/ONCOLOGY | Facility: CLINIC | Age: 64
End: 2019-03-29

## 2019-03-29 ENCOUNTER — OFFICE VISIT (OUTPATIENT)
Dept: HEMATOLOGY/ONCOLOGY | Facility: CLINIC | Age: 64
End: 2019-03-29
Payer: MEDICARE

## 2019-03-29 ENCOUNTER — INFUSION (OUTPATIENT)
Dept: INFUSION THERAPY | Facility: HOSPITAL | Age: 64
End: 2019-03-29
Attending: INTERNAL MEDICINE
Payer: MEDICARE

## 2019-03-29 VITALS
HEIGHT: 64 IN | HEART RATE: 74 BPM | DIASTOLIC BLOOD PRESSURE: 76 MMHG | SYSTOLIC BLOOD PRESSURE: 143 MMHG | RESPIRATION RATE: 16 BRPM | OXYGEN SATURATION: 100 % | TEMPERATURE: 99 F | WEIGHT: 115.94 LBS | BODY MASS INDEX: 19.79 KG/M2

## 2019-03-29 DIAGNOSIS — L03.011 CELLULITIS OF FINGER OF RIGHT HAND: Primary | ICD-10-CM

## 2019-03-29 DIAGNOSIS — L03.011 CELLULITIS OF FINGER OF RIGHT HAND: ICD-10-CM

## 2019-03-29 DIAGNOSIS — L03.019 CELLULITIS OF FINGER, UNSPECIFIED LATERALITY: ICD-10-CM

## 2019-03-29 DIAGNOSIS — C50.512 PRIMARY CANCER OF LOWER OUTER QUADRANT OF LEFT FEMALE BREAST: Primary | ICD-10-CM

## 2019-03-29 PROBLEM — L03.90 CELLULITIS: Status: ACTIVE | Noted: 2019-03-29

## 2019-03-29 PROCEDURE — 99999 PR PBB SHADOW E&M-EST. PATIENT-LVL III: CPT | Mod: PBBFAC,HCNC,, | Performed by: INTERNAL MEDICINE

## 2019-03-29 PROCEDURE — 3008F PR BODY MASS INDEX (BMI) DOCUMENTED: ICD-10-PCS | Mod: HCNC,CPTII,S$GLB, | Performed by: INTERNAL MEDICINE

## 2019-03-29 PROCEDURE — 36591 DRAW BLOOD OFF VENOUS DEVICE: CPT | Mod: HCNC

## 2019-03-29 PROCEDURE — 1101F PR PT FALLS ASSESS DOC 0-1 FALLS W/OUT INJ PAST YR: ICD-10-PCS | Mod: HCNC,CPTII,S$GLB, | Performed by: INTERNAL MEDICINE

## 2019-03-29 PROCEDURE — 1101F PT FALLS ASSESS-DOCD LE1/YR: CPT | Mod: HCNC,CPTII,S$GLB, | Performed by: INTERNAL MEDICINE

## 2019-03-29 PROCEDURE — 99214 OFFICE O/P EST MOD 30 MIN: CPT | Mod: HCNC,S$GLB,, | Performed by: INTERNAL MEDICINE

## 2019-03-29 PROCEDURE — 87040 BLOOD CULTURE FOR BACTERIA: CPT | Mod: HCNC

## 2019-03-29 PROCEDURE — 99214 PR OFFICE/OUTPT VISIT, EST, LEVL IV, 30-39 MIN: ICD-10-PCS | Mod: HCNC,S$GLB,, | Performed by: INTERNAL MEDICINE

## 2019-03-29 PROCEDURE — 3008F BODY MASS INDEX DOCD: CPT | Mod: HCNC,CPTII,S$GLB, | Performed by: INTERNAL MEDICINE

## 2019-03-29 PROCEDURE — 99999 PR PBB SHADOW E&M-EST. PATIENT-LVL III: ICD-10-PCS | Mod: PBBFAC,HCNC,, | Performed by: INTERNAL MEDICINE

## 2019-03-29 RX ORDER — AMOXICILLIN AND CLAVULANATE POTASSIUM 875; 125 MG/1; MG/1
1 TABLET, FILM COATED ORAL 2 TIMES DAILY
Qty: 14 TABLET | Refills: 0 | Status: SHIPPED | OUTPATIENT
Start: 2019-03-29 | End: 2019-03-30

## 2019-03-29 NOTE — TELEPHONE ENCOUNTER
Contacted patient at her mobile number. Patient states that her fingernail had broke on her right thumb a couple days back. She then made hamburgers and used her bare hands to knead the meat. She is now reporting her finger is swollen, pink,  tender, warm to touch. Denies drainage. Concerned for infection. Will notify Dr. Paula of symptoms in case he would like to start on abx as precautionary. Advised patient to send a picture via portal, however, she is unable to do so until her  is home due to not knowing how to use the computer. She will do when he arrives home.   Will contact patient back on further instructions. She voiced understanding.

## 2019-03-29 NOTE — TELEPHONE ENCOUNTER
Contacted patient to discuss her coming in today for Urgent Care with labs. Advised patient to head here as soon as possible. She is working on contacting her daughters to be brought in now.

## 2019-03-29 NOTE — TELEPHONE ENCOUNTER
Contacted patient to check on status of position and she was coming to her appointment. Noted that had not checked in. She is on her way... She will be here shortly.

## 2019-03-29 NOTE — TELEPHONE ENCOUNTER
----- Message from Shannon Shell sent at 3/29/2019 12:19 PM CDT -----  Contact: pt   Pt called to speak with nurse zulema have some questions about her finger in pain   Callback#[phone]  Thank You  STEVIE Shell

## 2019-03-29 NOTE — TELEPHONE ENCOUNTER
Attempted to reach patient to discuss her symptoms and gather more information. No answer. Phone rang >10 times, no answer, and no voicemail available.

## 2019-03-29 NOTE — PROGRESS NOTES
Subjective:       Patient ID: Radha Rivera is a 63 y.o. female.    Chief Complaint: No chief complaint on file.    HPI   Ms. Rivera presents today at my request to be seen.  She had called earlier today that she had injured one finger and thought it may be infected.  She is due for her third cycle of AC trhee days from now       Briefly, she is a 63-year-old  female from [place] who was recently diagnosed with breast cancer.  Apparently, she initially felt a mass in late 2017.  A mammogram at that time was read as BI-RADS category 1.  A repeat mammogram in December 2018 was read as BI-RADS category 4 and she underwent a biopsy that showed a carcinoma that was ER negative, AZ weakly positive in 5% of the cells and HER-2 2+ by immunohistochemistry, but negative by FISH.  Ki-67 was 5%.    The patient subsequently underwent a biopsy of a palpable lymph node on 02/01/2019, that showed evidence of lymph node metastasis.  She was referred for evaluation for chemotherapy.  Of note, she also had a PET scan on 02/01/2019 that showed an 8 mm right lower lobe pulmonary nodule that was not FDG avid.  She stated AC chemotherapy 5 weeks ago.     Her CBC today shows a WBC count of 2,800 /mm3,. Hg 12.2 gr/dl, Ht 38.4 % and platelets 375,000 /mm3.  Her ANC is 800 /mm3.    Review of Systems    Overall she feels OK, however, she complains of mild pain of her right thumb.  She states that the nail broke off a few days ago, and since yesterday she has noticed pain and erythema of the tip of her thumb.  She denies any fever, depression, easy bruising, chills, night  sweats, weight loss, nausea, vomiting, diarrhea, diplopia, blurred vision, headache, chest pain, palpitations, shortness of breath, breast pain,  extremity pain, or difficulty ambulating.  The remainder of the ten-point ROS, including general, skin, lymph, H/N, cardiorespiratory, GI, , Neuro, Endocrine, and psychiatric is negative.     Objective:       Physical Exam      She is alert, oriented to time, place, person, pleasant, well      nourished, in no acute physical distress.    She is accompanied by her .                               VITAL SIGNS:  Reviewed                                      HEENT:  Alopecia is again noted.  There are no nasal, oral, lip, gingival, auricular, lid,    or conjunctival lesions.  Mucosae are moist and pink, and there is no        thrush.  Pupils are equal, reactive to light and accommodation.              Extraocular muscle movements are intact.  Dentition is good. There are no oral ulcerations.                                     NECK:  Supple without JVD, adenopathy, or thyromegaly.                       LUNGS:  Clear to auscultation without wheezing, rales, or rhonchi.           CARDIOVASCULAR:  Reveals an S1, S2, no murmurs, no rubs, no gallops.         ABDOMEN:  Soft, with epigastric tenderness on palpation.  No rebound.  She has no organomegaly.  Bowel sounds are    present.                                                                     EXTREMITIES:  No cyanosis, clubbing, or edema. There is erythema on the distal phalanx of the right thumb.  There is no skin breakdown and no fluctuance.  There is no discharge.  The area is slightly tender to palpation.   There is no lympyhangitis.                            BREASTS:  Deferred.                                    LYMPHATIC:  There is no cervical, axillary, or supraclavicular adenopathy.   SKIN:  Warm and moist, without petechiae, rashes, induration, or ecchymoses.           NEUROLOGIC:  DTRs are 0-1+ bilaterally, symmetrical, motor function is 5/5,  and cranial nerves are  within normal limits.    Assessment:       1. Primary cancer of lower outer quadrant of left female breast, s.p 2 cycles of neoadjuvant AC chemotherapy    2. Cellulitis of finger, unspecified laterality      4.    Mild leukopenia, secondary to chemotherapy.  Her ANc is 800 /mm3.    Plan:         I had a long discussion with her.  I see no reason to admit her.  She will be started on Augmentin 875 mg BID;  blood cultures have already been drawn.  She will text me a picture of her finger tomorrow.  She knows to come to the ED should she develop any fever, increasing pain, swelling or discharge, or if she develops any lymphangitis.    RTC with labs in 3 days for her third cycle of neoadjuvant chemotherapy..

## 2019-03-30 ENCOUNTER — HOSPITAL ENCOUNTER (EMERGENCY)
Facility: HOSPITAL | Age: 64
Discharge: HOME | End: 2019-03-30
Attending: EMERGENCY MEDICINE
Payer: MEDICARE

## 2019-03-30 VITALS
DIASTOLIC BLOOD PRESSURE: 63 MMHG | SYSTOLIC BLOOD PRESSURE: 138 MMHG | HEART RATE: 68 BPM | BODY MASS INDEX: 19.81 KG/M2 | OXYGEN SATURATION: 98 % | TEMPERATURE: 99 F | RESPIRATION RATE: 18 BRPM | WEIGHT: 116 LBS | HEIGHT: 64 IN

## 2019-03-30 DIAGNOSIS — L02.511 ABSCESS OF FINGER OF RIGHT HAND: Primary | ICD-10-CM

## 2019-03-30 LAB
BASOPHILS # BLD AUTO: 0.11 K/UL (ref 0–0.2)
BASOPHILS NFR BLD: 3 % (ref 0–1.9)
DIFFERENTIAL METHOD: ABNORMAL
EOSINOPHIL # BLD AUTO: 0 K/UL (ref 0–0.5)
EOSINOPHIL NFR BLD: 0.5 % (ref 0–8)
ERYTHROCYTE [DISTWIDTH] IN BLOOD BY AUTOMATED COUNT: 14 % (ref 11.5–14.5)
HCT VFR BLD AUTO: 37.2 % (ref 37–48.5)
HGB BLD-MCNC: 11.9 G/DL (ref 12–16)
IMM GRANULOCYTES # BLD AUTO: 0.16 K/UL (ref 0–0.04)
IMM GRANULOCYTES NFR BLD AUTO: 4.4 % (ref 0–0.5)
LYMPHOCYTES # BLD AUTO: 0.9 K/UL (ref 1–4.8)
LYMPHOCYTES NFR BLD: 25.3 % (ref 18–48)
MCH RBC QN AUTO: 29.8 PG (ref 27–31)
MCHC RBC AUTO-ENTMCNC: 32 G/DL (ref 32–36)
MCV RBC AUTO: 93 FL (ref 82–98)
MONOCYTES # BLD AUTO: 0.9 K/UL (ref 0.3–1)
MONOCYTES NFR BLD: 23.6 % (ref 4–15)
NEUTROPHILS # BLD AUTO: 1.6 K/UL (ref 1.8–7.7)
NEUTROPHILS NFR BLD: 43.2 % (ref 38–73)
NRBC BLD-RTO: 0 /100 WBC
PLATELET # BLD AUTO: 440 K/UL (ref 150–350)
PMV BLD AUTO: 9.9 FL (ref 9.2–12.9)
RBC # BLD AUTO: 3.99 M/UL (ref 4–5.4)
WBC # BLD AUTO: 3.64 K/UL (ref 3.9–12.7)

## 2019-03-30 PROCEDURE — 10060 PR DRAIN SKIN ABSCESS SIMPLE: ICD-10-PCS | Mod: F5,,, | Performed by: PHYSICIAN ASSISTANT

## 2019-03-30 PROCEDURE — 99284 PR EMERGENCY DEPT VISIT,LEVEL IV: ICD-10-PCS | Mod: 25,HCNC,, | Performed by: PHYSICIAN ASSISTANT

## 2019-03-30 PROCEDURE — 99283 EMERGENCY DEPT VISIT LOW MDM: CPT | Mod: 25,HCNC

## 2019-03-30 PROCEDURE — 10060 I&D ABSCESS SIMPLE/SINGLE: CPT | Mod: HCNC

## 2019-03-30 PROCEDURE — 10060 I&D ABSCESS SIMPLE/SINGLE: CPT | Mod: F5,,, | Performed by: PHYSICIAN ASSISTANT

## 2019-03-30 PROCEDURE — 85025 COMPLETE CBC W/AUTO DIFF WBC: CPT | Mod: HCNC

## 2019-03-30 PROCEDURE — 25000003 PHARM REV CODE 250: Mod: HCNC | Performed by: PHYSICIAN ASSISTANT

## 2019-03-30 PROCEDURE — 99284 EMERGENCY DEPT VISIT MOD MDM: CPT | Mod: 25,HCNC,, | Performed by: PHYSICIAN ASSISTANT

## 2019-03-30 RX ORDER — ASPIRIN 81 MG/1
81 TABLET ORAL DAILY
Status: ON HOLD | COMMUNITY
End: 2020-07-15 | Stop reason: HOSPADM

## 2019-03-30 RX ORDER — LIDOCAINE HYDROCHLORIDE 10 MG/ML
10 INJECTION INFILTRATION; PERINEURAL
Status: COMPLETED | OUTPATIENT
Start: 2019-03-30 | End: 2019-03-30

## 2019-03-30 RX ORDER — SULFAMETHOXAZOLE AND TRIMETHOPRIM 800; 160 MG/1; MG/1
2 TABLET ORAL
Status: COMPLETED | OUTPATIENT
Start: 2019-03-30 | End: 2019-03-30

## 2019-03-30 RX ORDER — SULFAMETHOXAZOLE AND TRIMETHOPRIM 800; 160 MG/1; MG/1
2 TABLET ORAL 2 TIMES DAILY
Qty: 28 TABLET | Refills: 0 | Status: SHIPPED | OUTPATIENT
Start: 2019-03-30 | End: 2019-04-06

## 2019-03-30 RX ADMIN — LIDOCAINE HYDROCHLORIDE 10 ML: 10 INJECTION, SOLUTION INFILTRATION; PERINEURAL at 02:03

## 2019-03-30 RX ADMIN — SULFAMETHOXAZOLE AND TRIMETHOPRIM 2 TABLET: 800; 160 TABLET ORAL at 02:03

## 2019-03-30 NOTE — ED PROVIDER NOTES
Encounter Date: 3/30/2019    SCRIBE #1 NOTE: I, Quyen Gamboa, am scribing for, and in the presence of,  Dr. Novoa. I have scribed the following portions of the note - the APC attestation.       History     Chief Complaint   Patient presents with    Wound Infection     r thumb infection, on chemo hemonc told me to come in     63-year-old female with breast cancer on chemotherapy presents for pain and swelling to the right thumb x several days.  Patient was seen by her oncologist who placed her on Augmentin, she has taken 2 doses but reports that swelling has increased.  Denies any known trauma to the finger.  Denies spreading erythema, pain with flexion, numbness/tingling/weakness, fevers or chills.    The history is provided by the patient and medical records.     Review of patient's allergies indicates:  No Known Allergies  Past Medical History:   Diagnosis Date    Breast cancer 01/2019    left    Mitral valve prolapse     Thyroid disease      Past Surgical History:   Procedure Laterality Date    BREAST BIOPSY Left 01/2019    Ear drum surgery      INNER EAR SURGERY Right     INSERTION, PORT-A-CATH Right 2/8/2019    Performed by Twan Valdes MD at [facility] OR [address] [place] [address]    tonsillectomy      TONSILLECTOMY       Family History   Problem Relation Age of Onset    Breast cancer Sister 68    Breast cancer Paternal Aunt     Cancer Father         prostate cancer    Thyroid disease Daughter     Breast cancer Maternal Aunt     Colon cancer Neg Hx     Ovarian cancer Neg Hx     Stroke Neg Hx     Diabetes Neg Hx      Social History     Tobacco Use    Smoking status: Never Smoker    Smokeless tobacco: Never Used   Substance Use Topics    Alcohol use: Yes     Comment: Seldom    Drug use: No     Review of Systems   Constitutional: Negative for chills, fatigue and fever.   Respiratory: Negative for cough and shortness of breath.    Cardiovascular: Negative for chest pain and palpitations.   Gastrointestinal:  Negative for abdominal pain, diarrhea, nausea and vomiting.   Genitourinary: Negative for dysuria and hematuria.   Musculoskeletal: Negative for arthralgias and joint swelling.   Skin: Positive for color change. Negative for wound.   Allergic/Immunologic: Positive for immunocompromised state. Negative for environmental allergies.   Neurological: Negative for weakness and numbness.   Hematological: Negative for adenopathy. Does not bruise/bleed easily.       Physical Exam     Initial Vitals [03/30/19 1314]   BP Pulse Resp Temp SpO2   (!) 196/78 77 18 98.8 °F (37.1 °C) 98 %      MAP       --         Vitals:    03/30/19 1509   BP: 138/63   Pulse: 68   Resp: 18   Temp:        Physical Exam    Nursing note and vitals reviewed.  Constitutional: She appears well-developed and well-nourished. She is not diaphoretic. No distress.   Family member at bedside   HENT:   Head: Normocephalic and atraumatic.   Eyes: EOM are normal. Pupils are equal, round, and reactive to light.   Neck: Normal range of motion. Neck supple.   Cardiovascular: Normal rate, regular rhythm, normal heart sounds and intact distal pulses. Exam reveals no gallop and no friction rub.    No murmur heard.  Pulmonary/Chest: Breath sounds normal.   Musculoskeletal: Normal range of motion.        Right hand: Right thumb: Right thumb - injuries: Erythema of the thumb tip with abscess.  No discharge or bleeding.  Pain is not increased with extension against pressure.        Hands:  Neurological: She is alert and oriented to person, place, and time.   Skin: Skin is warm.   Psychiatric: She has a normal mood and affect.         ED Course   I & D - Incision and Drainage  Date/Time: 3/30/2019 5:09 PM  Performed by: Josefina Nolasco PA-C  Authorized by: Salo Novoa MD   Consent Done: Not Needed  Type: abscess  Body area: upper extremity  Location details: right thumb  Anesthesia: digital block    Anesthesia:  Local Anesthetic: lidocaine 1% without  epinephrine  Anesthetic total: 7 mL  Patient sedated: no  Scalpel size: 11  Incision type: single straight  Complexity: simple  Drainage: pus  Drainage amount: moderate  Wound treatment: incision,  wound left open and  expression of material  Packing material: none        Labs Reviewed   CBC W/ AUTO DIFFERENTIAL - Abnormal; Notable for the following components:       Result Value    WBC 3.64 (*)     RBC 3.99 (*)     Hemoglobin 11.9 (*)     Platelets 440 (*)     Immature Granulocytes 4.4 (*)     Gran # (ANC) 1.6 (*)     Immature Grans (Abs) 0.16 (*)     Lymph # 0.9 (*)     Mono% 23.6 (*)     Basophil% 3.0 (*)     All other components within normal limits          Imaging Results    None          Medical Decision Making:   History:   Old Medical Records: I decided to obtain old medical records.  Clinical Tests:   Lab Tests: Ordered and Reviewed       APC / Resident Notes:   63-year-old female presenting for pain and swelling of the tip of the right thumb.  Swelling has not improved after 2 doses Augmentin.  On ED arrival, the patient is hypertensive with otherwise normal vitals.  The tip thumb is cellulitic with an abscess at the finger tip.  Will check a CBC given immunosuppression and mild leukopenia yesterday.  Will give prophylactic Bactrim and I&D abscess.    Abscess lanced with depression of purulent material.  Patient's leukopenia has improved somewhat since yesterday WBC count of 3.64 and ANC of 1.6k.  Given reassuring workup, will discharge with Bactrim and instruct patient strength the ED for any worsening symptoms. Stressed the importance of follow-up, strict ED return precautions given.  Patient voiced understanding and is comfortable with discharge. I discussed this patient with my supervising physician.    GURINDER Hager Attestation:   Scribe #1: I performed the above scribed service and the documentation accurately describes the services I performed. I attest to the accuracy of  the note.    Attending Attestation:     Physician Attestation Statement for NP/PA:   I have conducted a face to face encounter with this patient in addition to the NP/PA, due to Medical Complexity    Other NP/PA Attestation Additions:     Physical Exam: Patient with small thumb abscess at the tip.     Medical Decision Making: We will do I&D, check CBC given the patient is on chemotherapy.  Will discharge with bactrim and have her follow up with her oncologist.                      Clinical Impression:       ICD-10-CM ICD-9-CM   1. Abscess of finger of right hand L02.511 681.00         Disposition:   Disposition: Discharged  Condition: Stable                        Josefina Nolasco PA-C  03/30/19 2679

## 2019-03-30 NOTE — ED TRIAGE NOTES
Pt with what appears to be a skin infection to the right thumb.  Pt states the nail of that thumb broken a few days ago and states she notice pain, swelling, and redness to that thumb two days ago.  Pt states she was seen by her Oncologist yesterday who examine the thumb and started her on Augmentin.  Pt states thumb appears to be getting worse and was directed to the ER by her oncologist.

## 2019-03-30 NOTE — DISCHARGE INSTRUCTIONS
Please schedule an appointment with your primary care doctor for follow-up.  You should keep the finger covered for at least 2 days and make sure to keep it dry and clean after that.  It is okay to removed the bandages while taking a shower, washing hands except for a.  Make sure to change the bandages at least once a day.  If the pain becomes worse, the finger becomes more red or swollen, starts to drain pus, or you start to have fever or chills, please return to the emergency department.

## 2019-04-01 ENCOUNTER — INFUSION (OUTPATIENT)
Dept: INFUSION THERAPY | Facility: HOSPITAL | Age: 64
End: 2019-04-01
Attending: INTERNAL MEDICINE
Payer: MEDICARE

## 2019-04-01 ENCOUNTER — OFFICE VISIT (OUTPATIENT)
Dept: HEMATOLOGY/ONCOLOGY | Facility: CLINIC | Age: 64
End: 2019-04-01
Payer: MEDICARE

## 2019-04-01 VITALS
SYSTOLIC BLOOD PRESSURE: 135 MMHG | OXYGEN SATURATION: 100 % | TEMPERATURE: 98 F | HEART RATE: 84 BPM | BODY MASS INDEX: 19.96 KG/M2 | WEIGHT: 116.88 LBS | DIASTOLIC BLOOD PRESSURE: 64 MMHG | RESPIRATION RATE: 18 BRPM | HEIGHT: 64 IN

## 2019-04-01 DIAGNOSIS — C50.512 PRIMARY CANCER OF LOWER OUTER QUADRANT OF LEFT FEMALE BREAST: Primary | ICD-10-CM

## 2019-04-01 DIAGNOSIS — C50.919 BREAST CANCER: ICD-10-CM

## 2019-04-01 DIAGNOSIS — Z51.11 ENCOUNTER FOR ANTINEOPLASTIC CHEMOTHERAPY: Primary | ICD-10-CM

## 2019-04-01 DIAGNOSIS — Z51.11 ENCOUNTER FOR ANTINEOPLASTIC CHEMOTHERAPY: ICD-10-CM

## 2019-04-01 PROBLEM — L03.90 CELLULITIS: Status: ACTIVE | Noted: 2019-04-01

## 2019-04-01 LAB
ALBUMIN SERPL BCP-MCNC: 4 G/DL (ref 3.5–5.2)
ALP SERPL-CCNC: 61 U/L (ref 55–135)
ALT SERPL W/O P-5'-P-CCNC: 19 U/L (ref 10–44)
ANION GAP SERPL CALC-SCNC: 10 MMOL/L (ref 8–16)
AST SERPL-CCNC: 19 U/L (ref 10–40)
BILIRUB SERPL-MCNC: 0.2 MG/DL (ref 0.1–1)
BUN SERPL-MCNC: 10 MG/DL (ref 8–23)
CALCIUM SERPL-MCNC: 9.8 MG/DL (ref 8.7–10.5)
CHLORIDE SERPL-SCNC: 105 MMOL/L (ref 95–110)
CO2 SERPL-SCNC: 24 MMOL/L (ref 23–29)
CREAT SERPL-MCNC: 0.9 MG/DL (ref 0.5–1.4)
ERYTHROCYTE [DISTWIDTH] IN BLOOD BY AUTOMATED COUNT: 14.3 % (ref 11.5–14.5)
EST. GFR  (AFRICAN AMERICAN): >60 ML/MIN/1.73 M^2
EST. GFR  (NON AFRICAN AMERICAN): >60 ML/MIN/1.73 M^2
GLUCOSE SERPL-MCNC: 105 MG/DL (ref 70–110)
HCT VFR BLD AUTO: 34 % (ref 37–48.5)
HGB BLD-MCNC: 10.9 G/DL (ref 12–16)
IMM GRANULOCYTES # BLD AUTO: 0.22 K/UL (ref 0–0.04)
MCH RBC QN AUTO: 29.8 PG (ref 27–31)
MCHC RBC AUTO-ENTMCNC: 32.1 G/DL (ref 32–36)
MCV RBC AUTO: 93 FL (ref 82–98)
NEUTROPHILS # BLD AUTO: 2.5 K/UL (ref 1.8–7.7)
PLATELET # BLD AUTO: 429 K/UL (ref 150–350)
PMV BLD AUTO: 9.4 FL (ref 9.2–12.9)
POTASSIUM SERPL-SCNC: 4.2 MMOL/L (ref 3.5–5.1)
PROT SERPL-MCNC: 7.1 G/DL (ref 6–8.4)
RBC # BLD AUTO: 3.66 M/UL (ref 4–5.4)
SODIUM SERPL-SCNC: 139 MMOL/L (ref 136–145)
WBC # BLD AUTO: 5.19 K/UL (ref 3.9–12.7)

## 2019-04-01 PROCEDURE — 99214 PR OFFICE/OUTPT VISIT, EST, LEVL IV, 30-39 MIN: ICD-10-PCS | Mod: HCNC,S$GLB,, | Performed by: INTERNAL MEDICINE

## 2019-04-01 PROCEDURE — 3008F BODY MASS INDEX DOCD: CPT | Mod: HCNC,CPTII,S$GLB, | Performed by: INTERNAL MEDICINE

## 2019-04-01 PROCEDURE — 1101F PT FALLS ASSESS-DOCD LE1/YR: CPT | Mod: HCNC,CPTII,S$GLB, | Performed by: INTERNAL MEDICINE

## 2019-04-01 PROCEDURE — 85027 COMPLETE CBC AUTOMATED: CPT | Mod: HCNC

## 2019-04-01 PROCEDURE — 25000003 PHARM REV CODE 250: Mod: HCNC | Performed by: INTERNAL MEDICINE

## 2019-04-01 PROCEDURE — 99214 OFFICE O/P EST MOD 30 MIN: CPT | Mod: HCNC,S$GLB,, | Performed by: INTERNAL MEDICINE

## 2019-04-01 PROCEDURE — 36591 DRAW BLOOD OFF VENOUS DEVICE: CPT | Mod: HCNC

## 2019-04-01 PROCEDURE — A4216 STERILE WATER/SALINE, 10 ML: HCPCS | Mod: HCNC | Performed by: INTERNAL MEDICINE

## 2019-04-01 PROCEDURE — 63600175 PHARM REV CODE 636 W HCPCS: Mod: HCNC | Performed by: INTERNAL MEDICINE

## 2019-04-01 PROCEDURE — 99999 PR PBB SHADOW E&M-EST. PATIENT-LVL III: CPT | Mod: PBBFAC,HCNC,, | Performed by: INTERNAL MEDICINE

## 2019-04-01 PROCEDURE — 3008F PR BODY MASS INDEX (BMI) DOCUMENTED: ICD-10-PCS | Mod: HCNC,CPTII,S$GLB, | Performed by: INTERNAL MEDICINE

## 2019-04-01 PROCEDURE — 80053 COMPREHEN METABOLIC PANEL: CPT | Mod: HCNC

## 2019-04-01 PROCEDURE — 99999 PR PBB SHADOW E&M-EST. PATIENT-LVL III: ICD-10-PCS | Mod: PBBFAC,HCNC,, | Performed by: INTERNAL MEDICINE

## 2019-04-01 PROCEDURE — 1101F PR PT FALLS ASSESS DOC 0-1 FALLS W/OUT INJ PAST YR: ICD-10-PCS | Mod: HCNC,CPTII,S$GLB, | Performed by: INTERNAL MEDICINE

## 2019-04-01 RX ORDER — HEPARIN 100 UNIT/ML
500 SYRINGE INTRAVENOUS
Status: CANCELLED | OUTPATIENT
Start: 2019-04-01

## 2019-04-01 RX ORDER — HEPARIN 100 UNIT/ML
500 SYRINGE INTRAVENOUS
Status: COMPLETED | OUTPATIENT
Start: 2019-04-01 | End: 2019-04-01

## 2019-04-01 RX ORDER — SODIUM CHLORIDE 0.9 % (FLUSH) 0.9 %
10 SYRINGE (ML) INJECTION
Status: CANCELLED | OUTPATIENT
Start: 2019-04-01

## 2019-04-01 RX ORDER — SODIUM CHLORIDE 0.9 % (FLUSH) 0.9 %
10 SYRINGE (ML) INJECTION
Status: COMPLETED | OUTPATIENT
Start: 2019-04-01 | End: 2019-04-01

## 2019-04-01 RX ADMIN — HEPARIN 500 UNITS: 100 SYRINGE at 09:04

## 2019-04-01 RX ADMIN — Medication 10 ML: at 09:04

## 2019-04-01 NOTE — PROGRESS NOTES
Subjective:       Patient ID: Radha Rivera is a 63 y.o. female.    Chief Complaint: No chief complaint on file.    HPI   Ms. Rivera presents today at my request to be seen. twod ays ago she ahd sent me a picture of her finger, which clearly looked worse compared to ehr alst visit.  I advised her to go to the ED. Where she underwent an I7D, and was started on Bactrim instead of augmentin.    She is due for her third cycle of AC today       Briefly, she is a 63-year-old  female from [place] who was recently diagnosed with breast cancer.  Apparently, she initially felt a mass in late 2017.  A mammogram at that time was read as BI-RADS category 1.  A repeat mammogram in December 2018 was read as BI-RADS category 4 and she underwent a biopsy that showed a carcinoma that was ER negative, AL weakly positive in 5% of the cells and HER-2 2+ by immunohistochemistry, but negative by FISH.  Ki-67 was 5%.    The patient subsequently underwent a biopsy of a palpable lymph node on 02/01/2019, that showed evidence of lymph node metastasis.  She was referred for evaluation for chemotherapy.  Of note, she also had a PET scan on 02/01/2019 that showed an 8 mm right lower lobe pulmonary nodule that was not FDG avid.  She stated AC chemotherapy 5 weeks ago.     Her CBC today shows a WBC count of 5,100 /mm3,. Hg 10.9 gr/dl, Ht 34.0 % and platelets 429,000 /mm3.  Her ANC is 2,500 /mm3.  LFTs are normal.    Review of Systems    Overall she feels OK, however, she again complains of mild pain of her right thumb.  She denies any fever, depression, easy bruising, chills, night  sweats, weight loss, nausea, vomiting, diarrhea, diplopia, blurred vision, headache, chest pain, palpitations, shortness of breath, breast pain,  extremity pain, or difficulty ambulating.  The remainder of the ten-point ROS, including general, skin, lymph, H/N, cardiorespiratory, GI, , Neuro, Endocrine, and psychiatric is negative.     Objective:       Physical Exam      She is alert, oriented to time, place, person, pleasant, well      nourished, in no acute physical distress.    She is accompanied by her  and her daughter.                               VITAL SIGNS:  Reviewed                                      HEENT:  Alopecia is again noted.  There are no nasal, oral, lip, gingival, auricular, lid,    or conjunctival lesions.  Mucosae are moist and pink, and there is no        thrush.  Pupils are equal, reactive to light and accommodation.              Extraocular muscle movements are intact.  Dentition is good. There are no oral ulcerations.                                     NECK:  Supple without JVD, adenopathy, or thyromegaly.                       LUNGS:  Clear to auscultation without wheezing, rales, or rhonchi.           CARDIOVASCULAR:  Reveals an S1, S2, no murmurs, no rubs, no gallops.         ABDOMEN:  Soft, with epigastric tenderness on palpation.  No rebound.  She has no organomegaly.  Bowel sounds are    present.                                                                     EXTREMITIES:  No cyanosis, clubbing, or edema. There right thumb is no longer erythematous, and her incision is healing nicely.  There is no lympyhangitis on her right arm.                            BREASTS:  there is a pea sized nodule at the 3 o' clock position in the left breast.  She does have palpable adenopathy in the left axilla.   There are no masses in the right breast..                                    LYMPHATIC:  There is no cervical, right axillary, or supraclavicular adenopathy.   SKIN:  Warm and moist, without petechiae, rashes, induration, or ecchymoses.           NEUROLOGIC:  DTRs are 0-1+ bilaterally, symmetrical, motor function is 5/5,  and cranial nerves are  within normal limits.    Assessment:       1. Primary cancer of lower outer quadrant of left female breast, s.p 2 cycles of neoadjuvant AC chemotherapy    2. Cellulitis of finger,  s/p I&D, clinically improving     4.    Mild anemia, secondary to chemotherapy.     Plan:        I had a long discussion with her. Out of abundance of caution we will hold her chemotherapy for 4 days; I will see her on 4/5/2019 and hopefully proceed with her next cycle of AC on that day.  She will remain on bacrtrim for now.  RTC with labs in 3 days for her third cycle of neoadjuvant chemotherapy..

## 2019-04-02 ENCOUNTER — TELEPHONE (OUTPATIENT)
Dept: HEMATOLOGY/ONCOLOGY | Facility: CLINIC | Age: 64
End: 2019-04-02

## 2019-04-02 NOTE — TELEPHONE ENCOUNTER
----- Message from Martin Urban sent at 4/2/2019  2:48 PM CDT -----  Contact: Patient  Pt has pain between chest and navel, and she's been burping a lot. States that she's been feeling anxious, would like a call to advise.       Contact:: [phone]

## 2019-04-02 NOTE — TELEPHONE ENCOUNTER
Contacted patient at home number, no answer, no voicemail. Then attempted to reach cell, no answer, no voicemail.    Attempted to reach patient home phone x1 more time and successfully reached patient. Discussed her symptoms. Patient reports that she is having pain extending from her navel region to her chest. She is also belching a lot. Patient has had complaints of this several occasions in the past. Assessed patient. She denies radiation with pain, left arm weakness, jaw pain, diaphoresis, lightheadedness/dizziness and or chest heaviness.   She states that the burping resolves some of the pain. She previously was taking nexium as recommended per us, but discontinued about 2 weeks ago due to concern that it was causing leg swelling, though today she feels some of the leg swelling is still there. Explained to patient that this is likely from her chemotherapy and educated on was to prevent leg edema such as elevation, compression socks and decreasing sodium intake.   Patient did take her blood pressure today and reports systolic of 135, though she can not recall the diastolic or her HR. Patient denies palpitations. Patient also reports that she feels constipated. Patient last BM was yesterday though it was hard and small. Advised patient to implement a daily tablet for bowel regimen such as senna dung and take miralax as needed for laxative. Also advised increase in water take. Discussed diet modifications such as avoiding citrus foods, red meats, heavy cream/red based sauces and caffeine (she reports drinking one cup of coffee today.) advised patient to take 1 nexium tablet now to see if that helps. Encouraged patient to make sure she reports her symptoms to Dr. Paula upon return to clinic in few days this upcoming Friday.     Advised patient that if any of her symptoms progress and the pain worsens, she becomes dizzy/lightheaded, chest heaviness, pain in left arm, jaw pain to report to the ED for cardiac  workup.    Will also request to see if patient could benefit from GI Cocktail.     No further questions/concerns at this time.

## 2019-04-03 ENCOUNTER — TELEPHONE (OUTPATIENT)
Dept: HEMATOLOGY/ONCOLOGY | Facility: CLINIC | Age: 64
End: 2019-04-03

## 2019-04-03 LAB — BACTERIA BLD CULT: NORMAL

## 2019-04-03 NOTE — TELEPHONE ENCOUNTER
"Returned call to patient to discuss her symptoms. Patient is still experiencing the navel/chest pain with belching. She stated that she slept comfortably and woke up okay. She stated that later in the day the pain is now returning.   Patient concerned that it could be the antibiotic. Asked patient if she took the medication with food. She expressed that she ate a small meal and then a little bit later took the antibiotic. Asked if what she was feeling could be considered nausea or queasiness. Patient replied, "possibly." advised patient that she could take zofran with the antibiotic to help decrease the nausea. Once again reiterated foods to avoid. Discussed other symptoms to watch out for such as: radiating pain, chest heaviness, dizziness, faint feeling, diaphoresis, radiating to back, left arm, or jaw. Explained that if any of these to be noted to report immediately to the ED for cardiac work up. Also offered patient a visit to the clinic tomorrow for evaluation rather than waiting until Friday. Appointment expedited to tomorrow. Advised patient to come in for 1120, she voiced understanding. Discussed her finger situation. She reported a burning pain earlier that has since resolved. Asked if the swelling, discoloration or pain is worse than before antibiotic initiation, she denied. All concerns to be addressed in clinic visit tomorrow.   She expressed understanding.   No further questions/concerns at this time.     "

## 2019-04-03 NOTE — PROGRESS NOTES
Subjective:       Patient ID: Radha Rivera is a 63 y.o. female.    Chief Complaint: No chief complaint on file.    HPI   Ms. Rivera presents today at my request to be seen.  She was due for her third cycle of AC three days ago, however, her treatment was held due to an infection of her right thumb requiring I&D.  She is currently on bactrim.     Briefly, she is a 63-year-old  female from [place] who was recently diagnosed with breast cancer.  Apparently, she initially felt a mass in late 2017.  A mammogram at that time was read as BI-RADS category 1.  A repeat mammogram in December 2018 was read as BI-RADS category 4 and she underwent a biopsy that showed a carcinoma that was ER negative, IA weakly positive in 5% of the cells and HER-2 2+ by immunohistochemistry, but negative by FISH.  Ki-67 was 5%. She subsequently underwent a biopsy of a palpable lymph node on 02/01/2019, that showed evidence of lymph node metastasis.  She was referred for evaluation for chemotherapy.  Of note, she also had a PET scan on 02/01/2019 that showed an 8 mm right lower lobe pulmonary nodule that was not FDG avid.  She stated AC chemotherapy 5 weeks ago. She is due for cycle 3 of AC tomorrow.     Her CBC three days ago had shown a WBC count of 5,100 /mm3,. Hg 10.9 gr/dl, Ht 34.0 % and platelets 429,000 /mm3.  Her ANC was 2,500 /mm3.      Review of Systems    Overall she feels fair, and has several complaints.  She complains of epigastric crampy intermittent pain, and poor appetite.   She started taking nexium two days ago.  She is also complaining of vaginal itching, erythema, and discharge for the last 3 days. The pain of her right thumb has improved.  She denies any fever, depression, easy bruising, chills, night  sweats, weight loss, nausea, vomiting, diarrhea, diplopia, blurred vision, headache, chest pain, palpitations, shortness of breath, breast pain, extremity pain, or difficulty ambulating.  The remainder  of the ten-point ROS, including general, skin, lymph, H/N, cardiorespiratory, GI, , Neuro, Endocrine, and psychiatric is negative.     Objective:      Physical Exam      She is alert, oriented to time, place, person, pleasant, well      nourished, in no acute physical distress.    She is accompanied by her  and her daughter.                               VITAL SIGNS:  Reviewed                                      HEENT:  Alopecia is again noted.  There are no nasal, oral, lip, gingival, auricular, lid,    or conjunctival lesions.  Mucosae are moist and pink, and there is no        thrush.  Pupils are equal, reactive to light and accommodation.              Extraocular muscle movements are intact.  Dentition is good. There are no oral ulcerations.                                     NECK:  Supple without JVD, adenopathy, or thyromegaly.                       LUNGS:  Clear to auscultation without wheezing, rales, or rhonchi.           CARDIOVASCULAR:  Reveals an S1, S2, no murmurs, no rubs, no gallops.         ABDOMEN:  Soft, with minimal epigastric tenderness on palpation.  No rebound.  She has no organomegaly.  Bowel sounds are    present.                                                                     EXTREMITIES:  No cyanosis, clubbing, or edema. There right thumb is no longer erythematous, and her incision has healed nicely.  There is no lympyhangitis on her right arm.                            BREASTS:  there is a pea sized nodule at the 3 o' clock position in the left breast.  She does have palpable adenopathy in the left axilla.   There are no masses in the right breast..                                    LYMPHATIC:  There is no cervical, right axillary, or supraclavicular adenopathy.   SKIN:  Warm and moist, without petechiae, rashes, induration, or ecchymoses.           NEUROLOGIC:  DTRs are 0-1+ bilaterally, symmetrical, motor function is 5/5,  and cranial nerves are  within normal  limits.    Assessment:       1. Primary cancer of lower outer quadrant of left female breast, s.p 2 cycles of neoadjuvant AC chemotherapy    2. Cellulitis of finger, s/p I&D, clinically improving     3.    Mild anemia, secondary to chemotherapy.   4.      Encounter for chemotherapy  5.      Probable vaginal infection  Plan:        I had a long discussion with her. I think it is safe for her to be treated tomorrow.  She will continue the bactrim to complete her 7 day course, and I asked her to increase her nexium to 40 mg daily.  In regards to her vaginal itching and discharge, we will empirically treat with fluconazole and I will ask Dr. Bermudez to evaluate her at [facility] tomorrow,when she goes there for chemotherapy.  She will call me in three days for follow up and see me in three weeks with labs for her fourth cycle of AC.  Her multiple questions were answered to her satisfaction.

## 2019-04-03 NOTE — TELEPHONE ENCOUNTER
----- Message from Martin Urban sent at 4/3/2019  2:05 PM CDT -----  Contact: Patient  Pt is still experiencing pain in her stomach She took acid reflux medication like advised, but it isn't doing anything for the pain.      Contact:: [phone]

## 2019-04-04 ENCOUNTER — PATIENT MESSAGE (OUTPATIENT)
Dept: OBSTETRICS AND GYNECOLOGY | Facility: CLINIC | Age: 64
End: 2019-04-04

## 2019-04-04 ENCOUNTER — OFFICE VISIT (OUTPATIENT)
Dept: HEMATOLOGY/ONCOLOGY | Facility: CLINIC | Age: 64
End: 2019-04-04
Payer: MEDICARE

## 2019-04-04 ENCOUNTER — TELEPHONE (OUTPATIENT)
Dept: OBSTETRICS AND GYNECOLOGY | Facility: CLINIC | Age: 64
End: 2019-04-04

## 2019-04-04 VITALS
DIASTOLIC BLOOD PRESSURE: 68 MMHG | HEIGHT: 64 IN | HEART RATE: 83 BPM | OXYGEN SATURATION: 97 % | TEMPERATURE: 98 F | BODY MASS INDEX: 19.87 KG/M2 | SYSTOLIC BLOOD PRESSURE: 150 MMHG | WEIGHT: 116.38 LBS | RESPIRATION RATE: 18 BRPM

## 2019-04-04 DIAGNOSIS — N76.0 ACUTE VAGINITIS: ICD-10-CM

## 2019-04-04 DIAGNOSIS — Z51.11 ENCOUNTER FOR ANTINEOPLASTIC CHEMOTHERAPY: ICD-10-CM

## 2019-04-04 DIAGNOSIS — C50.512 PRIMARY CANCER OF LOWER OUTER QUADRANT OF LEFT FEMALE BREAST: Primary | ICD-10-CM

## 2019-04-04 DIAGNOSIS — T45.1X5A ANTINEOPLASTIC CHEMOTHERAPY INDUCED ANEMIA: ICD-10-CM

## 2019-04-04 DIAGNOSIS — D64.81 ANTINEOPLASTIC CHEMOTHERAPY INDUCED ANEMIA: ICD-10-CM

## 2019-04-04 PROCEDURE — 3008F BODY MASS INDEX DOCD: CPT | Mod: HCNC,CPTII,S$GLB, | Performed by: INTERNAL MEDICINE

## 2019-04-04 PROCEDURE — 1101F PT FALLS ASSESS-DOCD LE1/YR: CPT | Mod: HCNC,CPTII,S$GLB, | Performed by: INTERNAL MEDICINE

## 2019-04-04 PROCEDURE — 99215 OFFICE O/P EST HI 40 MIN: CPT | Mod: HCNC,S$GLB,, | Performed by: INTERNAL MEDICINE

## 2019-04-04 PROCEDURE — 99999 PR PBB SHADOW E&M-EST. PATIENT-LVL III: CPT | Mod: PBBFAC,HCNC,, | Performed by: INTERNAL MEDICINE

## 2019-04-04 PROCEDURE — 99999 PR PBB SHADOW E&M-EST. PATIENT-LVL III: ICD-10-PCS | Mod: PBBFAC,HCNC,, | Performed by: INTERNAL MEDICINE

## 2019-04-04 PROCEDURE — 1101F PR PT FALLS ASSESS DOC 0-1 FALLS W/OUT INJ PAST YR: ICD-10-PCS | Mod: HCNC,CPTII,S$GLB, | Performed by: INTERNAL MEDICINE

## 2019-04-04 PROCEDURE — 3008F PR BODY MASS INDEX (BMI) DOCUMENTED: ICD-10-PCS | Mod: HCNC,CPTII,S$GLB, | Performed by: INTERNAL MEDICINE

## 2019-04-04 PROCEDURE — 99215 PR OFFICE/OUTPT VISIT, EST, LEVL V, 40-54 MIN: ICD-10-PCS | Mod: HCNC,S$GLB,, | Performed by: INTERNAL MEDICINE

## 2019-04-04 RX ORDER — ESOMEPRAZOLE MAGNESIUM 40 MG/1
40 CAPSULE, DELAYED RELEASE ORAL DAILY
Qty: 30 CAPSULE | Refills: 1 | Status: SHIPPED | OUTPATIENT
Start: 2019-04-04 | End: 2019-06-05 | Stop reason: SDUPTHER

## 2019-04-04 RX ORDER — FLUCONAZOLE 150 MG/1
150 TABLET ORAL ONCE
Qty: 1 TABLET | Refills: 0 | Status: SHIPPED | OUTPATIENT
Start: 2019-04-04 | End: 2019-04-04

## 2019-04-04 RX ORDER — SODIUM CHLORIDE 0.9 % (FLUSH) 0.9 %
10 SYRINGE (ML) INJECTION
Status: CANCELLED | OUTPATIENT
Start: 2019-04-05

## 2019-04-04 RX ORDER — HEPARIN 100 UNIT/ML
500 SYRINGE INTRAVENOUS
Status: CANCELLED | OUTPATIENT
Start: 2019-04-05

## 2019-04-04 RX ORDER — DOXORUBICIN HYDROCHLORIDE 2 MG/ML
60 INJECTION, SOLUTION INTRAVENOUS
Status: CANCELLED | OUTPATIENT
Start: 2019-04-05

## 2019-04-04 NOTE — TELEPHONE ENCOUNTER
----- Message from Aneesh Baker MD sent at 4/4/2019  1:25 PM CDT -----  Mary,  This is a patient of mine who is receiving neoadjuvant chemotherapy.  Today she saw me at the Clinic prior to tomorrow's chemotherapy and she is complaining of vaginal itching, erythema, and discharge.  I empiricaly gave her fluconazole 150 mg to take x 1 but I was wondering if there is any way she can be seen tomorrow when she comes to [place] for her chemo...  Please let me know.  Thanks,    Aneesh

## 2019-04-05 ENCOUNTER — OFFICE VISIT (OUTPATIENT)
Dept: OBSTETRICS AND GYNECOLOGY | Facility: CLINIC | Age: 64
End: 2019-04-05
Payer: MEDICARE

## 2019-04-05 ENCOUNTER — INFUSION (OUTPATIENT)
Dept: INFUSION THERAPY | Facility: OTHER | Age: 64
End: 2019-04-05
Attending: INTERNAL MEDICINE
Payer: MEDICARE

## 2019-04-05 ENCOUNTER — DOCUMENTATION ONLY (OUTPATIENT)
Dept: HEMATOLOGY/ONCOLOGY | Facility: CLINIC | Age: 64
End: 2019-04-05

## 2019-04-05 ENCOUNTER — TELEPHONE (OUTPATIENT)
Dept: HEMATOLOGY/ONCOLOGY | Facility: CLINIC | Age: 64
End: 2019-04-05

## 2019-04-05 VITALS
BODY MASS INDEX: 19.79 KG/M2 | HEIGHT: 64 IN | WEIGHT: 115.94 LBS | DIASTOLIC BLOOD PRESSURE: 70 MMHG | SYSTOLIC BLOOD PRESSURE: 120 MMHG

## 2019-04-05 VITALS
DIASTOLIC BLOOD PRESSURE: 59 MMHG | RESPIRATION RATE: 18 BRPM | HEART RATE: 69 BPM | SYSTOLIC BLOOD PRESSURE: 120 MMHG | WEIGHT: 115.94 LBS | HEIGHT: 64 IN | TEMPERATURE: 98 F | BODY MASS INDEX: 19.79 KG/M2 | OXYGEN SATURATION: 98 %

## 2019-04-05 DIAGNOSIS — F06.4 ANXIETY DISORDER DUE TO GENERAL MEDICAL CONDITION: ICD-10-CM

## 2019-04-05 DIAGNOSIS — Z51.11 ENCOUNTER FOR ANTINEOPLASTIC CHEMOTHERAPY: Primary | ICD-10-CM

## 2019-04-05 DIAGNOSIS — N76.1 SUBACUTE VAGINITIS: Primary | ICD-10-CM

## 2019-04-05 DIAGNOSIS — Z91.138 PATIENT'S UNINTENTIONAL UNDERDOSING OF MEDICATION REGIMEN FOR OTHER REASON: Primary | ICD-10-CM

## 2019-04-05 DIAGNOSIS — C50.512 PRIMARY CANCER OF LOWER OUTER QUADRANT OF LEFT FEMALE BREAST: ICD-10-CM

## 2019-04-05 PROCEDURE — 25000003 PHARM REV CODE 250: Mod: HCNC | Performed by: INTERNAL MEDICINE

## 2019-04-05 PROCEDURE — 3008F PR BODY MASS INDEX (BMI) DOCUMENTED: ICD-10-PCS | Mod: HCNC,CPTII,S$GLB, | Performed by: ADVANCED PRACTICE MIDWIFE

## 2019-04-05 PROCEDURE — 99999 PR PBB SHADOW E&M-EST. PATIENT-LVL III: CPT | Mod: PBBFAC,HCNC,, | Performed by: ADVANCED PRACTICE MIDWIFE

## 2019-04-05 PROCEDURE — 63600175 PHARM REV CODE 636 W HCPCS: Mod: HCNC,JG | Performed by: INTERNAL MEDICINE

## 2019-04-05 PROCEDURE — 87510 GARDNER VAG DNA DIR PROBE: CPT | Mod: HCNC

## 2019-04-05 PROCEDURE — 1101F PR PT FALLS ASSESS DOC 0-1 FALLS W/OUT INJ PAST YR: ICD-10-PCS | Mod: HCNC,CPTII,S$GLB, | Performed by: ADVANCED PRACTICE MIDWIFE

## 2019-04-05 PROCEDURE — 99999 PR PBB SHADOW E&M-EST. PATIENT-LVL III: ICD-10-PCS | Mod: PBBFAC,HCNC,, | Performed by: ADVANCED PRACTICE MIDWIFE

## 2019-04-05 PROCEDURE — 96367 TX/PROPH/DG ADDL SEQ IV INF: CPT | Mod: HCNC

## 2019-04-05 PROCEDURE — 96413 CHEMO IV INFUSION 1 HR: CPT | Mod: HCNC

## 2019-04-05 PROCEDURE — 87480 CANDIDA DNA DIR PROBE: CPT | Mod: HCNC

## 2019-04-05 PROCEDURE — 99213 OFFICE O/P EST LOW 20 MIN: CPT | Mod: HCNC,S$GLB,, | Performed by: ADVANCED PRACTICE MIDWIFE

## 2019-04-05 PROCEDURE — 96411 CHEMO IV PUSH ADDL DRUG: CPT | Mod: HCNC

## 2019-04-05 PROCEDURE — 3008F BODY MASS INDEX DOCD: CPT | Mod: HCNC,CPTII,S$GLB, | Performed by: ADVANCED PRACTICE MIDWIFE

## 2019-04-05 PROCEDURE — 99213 PR OFFICE/OUTPT VISIT, EST, LEVL III, 20-29 MIN: ICD-10-PCS | Mod: HCNC,S$GLB,, | Performed by: ADVANCED PRACTICE MIDWIFE

## 2019-04-05 PROCEDURE — 1101F PT FALLS ASSESS-DOCD LE1/YR: CPT | Mod: HCNC,CPTII,S$GLB, | Performed by: ADVANCED PRACTICE MIDWIFE

## 2019-04-05 RX ORDER — SODIUM CHLORIDE 0.9 % (FLUSH) 0.9 %
10 SYRINGE (ML) INJECTION
Status: DISCONTINUED | OUTPATIENT
Start: 2019-04-05 | End: 2019-04-05 | Stop reason: HOSPADM

## 2019-04-05 RX ORDER — HEPARIN 100 UNIT/ML
500 SYRINGE INTRAVENOUS
Status: DISCONTINUED | OUTPATIENT
Start: 2019-04-05 | End: 2019-04-05 | Stop reason: HOSPADM

## 2019-04-05 RX ORDER — DOXORUBICIN HYDROCHLORIDE 2 MG/ML
60 INJECTION, SOLUTION INTRAVENOUS
Status: COMPLETED | OUTPATIENT
Start: 2019-04-05 | End: 2019-04-05

## 2019-04-05 RX ADMIN — PALONOSETRON: 0.25 INJECTION, SOLUTION INTRAVENOUS at 01:04

## 2019-04-05 RX ADMIN — DOXORUBICIN HYDROCHLORIDE 94 MG: 2 INJECTION, SOLUTION INTRAVENOUS at 02:04

## 2019-04-05 RX ADMIN — APREPITANT 130 MG: 130 INJECTION, EMULSION INTRAVENOUS at 01:04

## 2019-04-05 RX ADMIN — CYCLOPHOSPHAMIDE 940 MG: 1 INJECTION, POWDER, FOR SOLUTION INTRAVENOUS; ORAL at 02:04

## 2019-04-05 RX ADMIN — SODIUM CHLORIDE: 0.9 INJECTION, SOLUTION INTRAVENOUS at 01:04

## 2019-04-05 RX ADMIN — HEPARIN SODIUM (PORCINE) LOCK FLUSH IV SOLN 100 UNIT/ML 500 UNITS: 100 SOLUTION at 03:04

## 2019-04-05 NOTE — PLAN OF CARE
Problem: Adult Inpatient Plan of Care  Goal: Patient-Specific Goal (Individualization)  Outcome: Ongoing (interventions implemented as appropriate)  Cytoxan and Doxorubicin infusion  to chest port complete. Pt tolerated well. VSS. NAD. Port to chest de-accessed after heparinized per protocol.  AVS provided. Pt verbalized understanding of discharge instructions before leaving with family.

## 2019-04-05 NOTE — PROGRESS NOTES
Received referral from the clinic that patient is in need of a home health referral as well as expressed financial concerns. Attempted to contact the patient to discuss preferences in home [facility]anes providers and address financial concerns. There was no answer so I left a detailed message and my direct contact information.

## 2019-04-05 NOTE — TELEPHONE ENCOUNTER
Contacted patient to discuss her concerns.   Patient once again concerned of her medications and voices confusion on what to take and how to take.   Discussed each medicine in detail.  Explained to her to hold off on diflucan per gynecology recommendation. Discussed monistat if needed alternatively if symptoms continued. Explained this could be purchased OTC.   Discussed the continuing of Bactrim which should be taken twice a day (q12 hours) two tablets at a time. Explained that with this medication she could take zofran for nausea prevention with the medication.   Reiterated esmemprazole should be taken on an empty stomach one hour prior to eating with 8 oz water.   She did not have her medication bottles handy with her. Explained that this is a difficult conversation to have over phone and highly advised that she bring her medication bottles with her to chemo infusion this afternoon and see if the nurse could go over everything in further detail with her.     Also discussed arranging home health for patient to go out and reconcile meds and evaluate patient throughout the week.   She expressed concern of finances. Explained to her would work up with  to see what her insurance qualifies for and if this would be an option. She expressed understanding.   Discussed updated plans with MD Paula.

## 2019-04-05 NOTE — PROGRESS NOTES
Radha Rivera is a 63 y.o. female  presents [facility] with complaint of small amount of vaginal discharge..  She denies itching, and denies odor.  She states the discharge is white.  Pt reports noticing a small amount of redness on the mons pubis 2 days ago- no longer present. Pt was prescribed Diflucan prophylactically but does not desire to take any meds if she does not need them. Scheduled for chemotherapy today and usually with GI symptoms p chemo and would prefer to limit po meds if possible.    Pt sees Dr. Bermudez for her OB/GYN care.    ROS:  GENERAL: No fever, chills, fatigability or weight loss.  VULVAR: No pain, no lesions and no itching.Possible swelling 2 days ago.  VAGINAL: No relaxation, no abnormal bleeding and no lesions.Reports small amount of discharge  ABDOMEN: No abdominal pain. Denies nausea. Denies vomiting. No diarrhea. No constipation  BREAST: Denies pain. No lumps. No discharge.  URINARY: No incontinence, no nocturia, no frequency and no dysuria.  CARDIOVASCULAR: No chest pain. No shortness of breath. No leg cramps.  NEUROLOGICAL: No headaches. No vision changes.      Review of patient's allergies indicates:  No Known Allergies    Current Outpatient Medications:     aspirin (ECOTRIN) 81 MG EC tablet, Take 81 mg by mouth once daily., Disp: , Rfl:     aspirin-calcium carbonate 81 mg-300 mg calcium(777 mg) Tab, Take by mouth., Disp: , Rfl:     dexamethasone (DECADRON) 4 MG Tab, Take 2 every 12 hours for trhee days post chemotherapy, Disp: 12 tablet, Rfl: 2    esomeprazole (NEXIUM) 40 MG capsule, Take 1 capsule (40 mg total) by mouth once daily. (Patient taking differently: Take 40 mg by mouth once daily. Take one 20 mg capsule twice a day or every 12 hours), Disp: 30 capsule, Rfl: 1    levothyroxine (SYNTHROID) 75 MCG tablet, Take 75 mcg by mouth every morning., Disp: , Rfl:     lidocaine-prilocaine (EMLA) cream, Apply to affected area once, Disp: 5 g, Rfl: 0     "ondansetron (ZOFRAN) 8 MG tablet, Take 1 every 8 hours x 3 days and then 1 every 8 hours as needed., Disp: 30 tablet, Rfl: 2    sulfamethoxazole-trimethoprim 800-160mg (BACTRIM DS) 800-160 mg Tab, Take 2 tablets by mouth 2 (two) times daily. for 7 days, Disp: 28 tablet, Rfl: 0    vitamin D 1000 units Tab, Take 185 mg by mouth once daily., Disp: , Rfl:     Past Medical History:   Diagnosis Date    Breast cancer 2019    left    Mitral valve prolapse     Thyroid disease      Past Surgical History:   Procedure Laterality Date    BREAST BIOPSY Left 2019    Ear drum surgery      INNER EAR SURGERY Right     INSERTION, PORT-A-CATH Right 2019    Performed by Twan Valdes MD at [facility] OR [address] [place] [address]    tonsillectomy      TONSILLECTOMY       Social History     Tobacco Use    Smoking status: Never Smoker    Smokeless tobacco: Never Used   Substance Use Topics    Alcohol use: Yes     Comment: Seldom    Drug use: No     OB History    Para Term  AB Living   8 8 4     4   SAB TAB Ectopic Multiple Live Births           4      # Outcome Date GA Lbr Ramsey/2nd Weight Sex Delivery Anes PTL Lv   8 Term            7 Term            6 Term            5 Term            4 Para      Vag-Spont   FERNIE   3 Para      Vag-Spont   FERNIE   2 Para      Vag-Spont   FERNIE   1 Para      Vag-Spont   FERNIE       /70   Ht 5' 4" (1.626 m)   Wt 52.6 kg (115 lb 15.4 oz)   BMI 19.90 kg/m²     PHYSICAL EXAM:  GENERAL: Calm and appropriate affect, alert, oriented x4  SKIN: Color appropriate for race, warm and dry, clean and intact with no rashes.  RESP: Even, unlabored breathing  ABDOMEN: Soft, nontender, no masses.  :   Normal external female genitalia without lesions. Normal hair distribution. Adequate perineal body, normal urethral meatus.No areas of concern on mons- no redness  Vagina pink and well rugated, no lesions,vulva, labia normal, no redness, no erythema. Vaginal discharge - minimal, no odor  No significant " cystocele or rectocele.      ASSESSMENT / PLAN:  Vaginitis    Discussed etiology of BV and candida- discussed pending culture, discussed in absence of symptoms feel it is OK to haold off on the diflucan. Discussed if develops any symptoms over the wknd can use OTC 7 day Monistat as an alternative to PO meds. Pt and  agree.    Patient was counseled today on vaginitis prevention including :  a. avoiding feminine products such as deoderant soaps, body wash, bubble bath, douches, scented toilet paper, deoderant tampons or pads, feminine wipes, chronic pad use, etc.  b. avoiding other vulvovaginal irritants such as long hot baths, humidity, tight, synthetic clothing, chlorine and sitting around in wet bathing suits  c. wearing cotton underwear, avoiding thong underwear and no underwear to bed  d. taking showers instead of baths and use a hair dryer on cool setting afterwards to dry  e. wearing cotton to exercise and shower immediately after exercise and change clothes      FOLLOW UP:   Pending lab results, PRN lack of improvement.

## 2019-04-05 NOTE — TELEPHONE ENCOUNTER
----- Message from Shannon Shell sent at 4/5/2019 11:30 AM CDT -----  Contact: pt   Pt called to speak with nurse zulema have some questions about her medication   Callback#[phone]  Thank You  STEVIE Shell

## 2019-04-06 ENCOUNTER — PATIENT MESSAGE (OUTPATIENT)
Dept: HEMATOLOGY/ONCOLOGY | Facility: CLINIC | Age: 64
End: 2019-04-06

## 2019-04-07 LAB
BACTERIAL VAGINOSIS DNA: NEGATIVE
CANDIDA GLABRATA DNA: NEGATIVE
CANDIDA KRUSEI DNA: NEGATIVE
CANDIDA RRNA VAG QL PROBE: NEGATIVE
T VAGINALIS RRNA GENITAL QL PROBE: NEGATIVE

## 2019-04-08 ENCOUNTER — DOCUMENTATION ONLY (OUTPATIENT)
Dept: HEMATOLOGY/ONCOLOGY | Facility: CLINIC | Age: 64
End: 2019-04-08

## 2019-04-08 ENCOUNTER — TELEPHONE (OUTPATIENT)
Dept: HEMATOLOGY/ONCOLOGY | Facility: CLINIC | Age: 64
End: 2019-04-08

## 2019-04-08 NOTE — TELEPHONE ENCOUNTER
----- Message from Cathryn Campuzano sent at 4/8/2019 10:03 AM CDT -----  Contact: self  Type:  Patient has questions regarding Medication    Who Called: Patient   Symptoms (please be specific): Patient has question regarding medication Dexamethasone, Ondansetron need to speak with nurse.  How long has patient had these symptoms:  NA  Pharmacy name and phone  Would the patient rather a call back or a response via [facility]? call  Best Call Back Number: [phone]  Additional Information:

## 2019-04-08 NOTE — PROGRESS NOTES
Reached out to patient again about home health services. Spoke with her and her  about this. They currently would like to decline this service as of now. She stated that her daughters came to help with her medications. Dicussed role of oncology social worker. Provided them with my direct number for future assistance.

## 2019-04-08 NOTE — TELEPHONE ENCOUNTER
Received return call from MsBrian Radha who had questions about her medications. She wanted to know if dexamethasone and zofran could be taken together.   Explained to her that if she preferred to hold off on the zofran and wait until after her breakfast if needed.   She has already taken her acid reflux medicine. Patient was instructed to take every 8 hours scheduled for three days post chemo. Explained to her to prevent nausea from occurring she could just take them both now.   She expressed understanding.

## 2019-04-08 NOTE — TELEPHONE ENCOUNTER
Attempted to return call to patient on home and mobile number, however, no answer and voicemail not available. Will re-attempt at later time.

## 2019-04-09 ENCOUNTER — PATIENT MESSAGE (OUTPATIENT)
Dept: OBSTETRICS AND GYNECOLOGY | Facility: CLINIC | Age: 64
End: 2019-04-09

## 2019-04-25 ENCOUNTER — INFUSION (OUTPATIENT)
Dept: INFUSION THERAPY | Facility: HOSPITAL | Age: 64
End: 2019-04-25
Attending: INTERNAL MEDICINE
Payer: MEDICARE

## 2019-04-25 ENCOUNTER — OFFICE VISIT (OUTPATIENT)
Dept: HEMATOLOGY/ONCOLOGY | Facility: CLINIC | Age: 64
End: 2019-04-25
Payer: MEDICARE

## 2019-04-25 VITALS
OXYGEN SATURATION: 99 % | RESPIRATION RATE: 18 BRPM | BODY MASS INDEX: 19.68 KG/M2 | WEIGHT: 115.31 LBS | HEART RATE: 80 BPM | DIASTOLIC BLOOD PRESSURE: 31 MMHG | TEMPERATURE: 98 F | SYSTOLIC BLOOD PRESSURE: 130 MMHG | HEIGHT: 64 IN

## 2019-04-25 VITALS
HEART RATE: 84 BPM | BODY MASS INDEX: 19.68 KG/M2 | WEIGHT: 115.31 LBS | TEMPERATURE: 98 F | HEIGHT: 64 IN | RESPIRATION RATE: 18 BRPM | SYSTOLIC BLOOD PRESSURE: 141 MMHG | DIASTOLIC BLOOD PRESSURE: 72 MMHG

## 2019-04-25 DIAGNOSIS — C50.919 BREAST CANCER: ICD-10-CM

## 2019-04-25 DIAGNOSIS — Z51.11 ENCOUNTER FOR ANTINEOPLASTIC CHEMOTHERAPY: Primary | ICD-10-CM

## 2019-04-25 DIAGNOSIS — Z51.11 ENCOUNTER FOR ANTINEOPLASTIC CHEMOTHERAPY: ICD-10-CM

## 2019-04-25 DIAGNOSIS — C50.512 PRIMARY CANCER OF LOWER OUTER QUADRANT OF LEFT FEMALE BREAST: ICD-10-CM

## 2019-04-25 LAB
ALBUMIN SERPL BCP-MCNC: 3.5 G/DL (ref 3.5–5.2)
ALP SERPL-CCNC: 53 U/L (ref 55–135)
ALT SERPL W/O P-5'-P-CCNC: 13 U/L (ref 10–44)
ANION GAP SERPL CALC-SCNC: 8 MMOL/L (ref 8–16)
AST SERPL-CCNC: 15 U/L (ref 10–40)
BILIRUB SERPL-MCNC: 0.2 MG/DL (ref 0.1–1)
BUN SERPL-MCNC: 12 MG/DL (ref 8–23)
CALCIUM SERPL-MCNC: 9.4 MG/DL (ref 8.7–10.5)
CHLORIDE SERPL-SCNC: 109 MMOL/L (ref 95–110)
CO2 SERPL-SCNC: 26 MMOL/L (ref 23–29)
CREAT SERPL-MCNC: 0.9 MG/DL (ref 0.5–1.4)
ERYTHROCYTE [DISTWIDTH] IN BLOOD BY AUTOMATED COUNT: 15.6 % (ref 11.5–14.5)
EST. GFR  (AFRICAN AMERICAN): >60 ML/MIN/1.73 M^2
EST. GFR  (NON AFRICAN AMERICAN): >60 ML/MIN/1.73 M^2
GLUCOSE SERPL-MCNC: 86 MG/DL (ref 70–110)
HCT VFR BLD AUTO: 29.2 % (ref 37–48.5)
HGB BLD-MCNC: 9.2 G/DL (ref 12–16)
IMM GRANULOCYTES # BLD AUTO: 0.08 K/UL (ref 0–0.04)
MCH RBC QN AUTO: 29.6 PG (ref 27–31)
MCHC RBC AUTO-ENTMCNC: 31.5 G/DL (ref 32–36)
MCV RBC AUTO: 94 FL (ref 82–98)
NEUTROPHILS # BLD AUTO: 2.3 K/UL (ref 1.8–7.7)
PLATELET # BLD AUTO: 379 K/UL (ref 150–350)
PMV BLD AUTO: 9.2 FL (ref 9.2–12.9)
POTASSIUM SERPL-SCNC: 4 MMOL/L (ref 3.5–5.1)
PROT SERPL-MCNC: 6.6 G/DL (ref 6–8.4)
RBC # BLD AUTO: 3.11 M/UL (ref 4–5.4)
SODIUM SERPL-SCNC: 143 MMOL/L (ref 136–145)
WBC # BLD AUTO: 4.03 K/UL (ref 3.9–12.7)

## 2019-04-25 PROCEDURE — 25000003 PHARM REV CODE 250: Mod: HCNC | Performed by: INTERNAL MEDICINE

## 2019-04-25 PROCEDURE — 63600175 PHARM REV CODE 636 W HCPCS: Mod: HCNC,TB | Performed by: INTERNAL MEDICINE

## 2019-04-25 PROCEDURE — 1101F PT FALLS ASSESS-DOCD LE1/YR: CPT | Mod: HCNC,CPTII,S$GLB, | Performed by: INTERNAL MEDICINE

## 2019-04-25 PROCEDURE — 1101F PR PT FALLS ASSESS DOC 0-1 FALLS W/OUT INJ PAST YR: ICD-10-PCS | Mod: HCNC,CPTII,S$GLB, | Performed by: INTERNAL MEDICINE

## 2019-04-25 PROCEDURE — 80053 COMPREHEN METABOLIC PANEL: CPT | Mod: HCNC

## 2019-04-25 PROCEDURE — 99215 OFFICE O/P EST HI 40 MIN: CPT | Mod: HCNC,S$GLB,, | Performed by: INTERNAL MEDICINE

## 2019-04-25 PROCEDURE — 96411 CHEMO IV PUSH ADDL DRUG: CPT | Mod: HCNC

## 2019-04-25 PROCEDURE — 99999 PR PBB SHADOW E&M-EST. PATIENT-LVL III: CPT | Mod: PBBFAC,HCNC,, | Performed by: INTERNAL MEDICINE

## 2019-04-25 PROCEDURE — 99215 PR OFFICE/OUTPT VISIT, EST, LEVL V, 40-54 MIN: ICD-10-PCS | Mod: HCNC,S$GLB,, | Performed by: INTERNAL MEDICINE

## 2019-04-25 PROCEDURE — 85027 COMPLETE CBC AUTOMATED: CPT | Mod: HCNC

## 2019-04-25 PROCEDURE — A4216 STERILE WATER/SALINE, 10 ML: HCPCS | Mod: HCNC | Performed by: INTERNAL MEDICINE

## 2019-04-25 PROCEDURE — 99999 PR PBB SHADOW E&M-EST. PATIENT-LVL III: ICD-10-PCS | Mod: PBBFAC,HCNC,, | Performed by: INTERNAL MEDICINE

## 2019-04-25 PROCEDURE — 63600175 PHARM REV CODE 636 W HCPCS: Mod: HCNC | Performed by: INTERNAL MEDICINE

## 2019-04-25 PROCEDURE — 96367 TX/PROPH/DG ADDL SEQ IV INF: CPT | Mod: HCNC

## 2019-04-25 PROCEDURE — 96413 CHEMO IV INFUSION 1 HR: CPT | Mod: HCNC

## 2019-04-25 PROCEDURE — 3008F PR BODY MASS INDEX (BMI) DOCUMENTED: ICD-10-PCS | Mod: HCNC,CPTII,S$GLB, | Performed by: INTERNAL MEDICINE

## 2019-04-25 PROCEDURE — 3008F BODY MASS INDEX DOCD: CPT | Mod: HCNC,CPTII,S$GLB, | Performed by: INTERNAL MEDICINE

## 2019-04-25 RX ORDER — SODIUM CHLORIDE 0.9 % (FLUSH) 0.9 %
10 SYRINGE (ML) INJECTION
Status: CANCELLED | OUTPATIENT
Start: 2019-04-25

## 2019-04-25 RX ORDER — HEPARIN 100 UNIT/ML
500 SYRINGE INTRAVENOUS
Status: DISCONTINUED | OUTPATIENT
Start: 2019-04-25 | End: 2019-04-25 | Stop reason: HOSPADM

## 2019-04-25 RX ORDER — SODIUM CHLORIDE 0.9 % (FLUSH) 0.9 %
10 SYRINGE (ML) INJECTION
Status: COMPLETED | OUTPATIENT
Start: 2019-04-25 | End: 2019-04-25

## 2019-04-25 RX ORDER — DOXORUBICIN HYDROCHLORIDE 2 MG/ML
60 INJECTION, SOLUTION INTRAVENOUS
Status: COMPLETED | OUTPATIENT
Start: 2019-04-25 | End: 2019-04-25

## 2019-04-25 RX ORDER — DOXORUBICIN HYDROCHLORIDE 2 MG/ML
60 INJECTION, SOLUTION INTRAVENOUS
Status: CANCELLED | OUTPATIENT
Start: 2019-04-25

## 2019-04-25 RX ORDER — HEPARIN 100 UNIT/ML
500 SYRINGE INTRAVENOUS
Status: CANCELLED | OUTPATIENT
Start: 2019-04-25

## 2019-04-25 RX ORDER — SODIUM CHLORIDE 0.9 % (FLUSH) 0.9 %
10 SYRINGE (ML) INJECTION
Status: DISCONTINUED | OUTPATIENT
Start: 2019-04-25 | End: 2019-04-25 | Stop reason: HOSPADM

## 2019-04-25 RX ORDER — FLUCONAZOLE 150 MG/1
TABLET ORAL
Refills: 0 | COMMUNITY
Start: 2019-04-04 | End: 2019-06-25 | Stop reason: ALTCHOICE

## 2019-04-25 RX ORDER — DEXAMETHASONE 4 MG/1
TABLET ORAL
Qty: 40 TABLET | Refills: 2 | Status: SHIPPED | OUTPATIENT
Start: 2019-04-25 | End: 2019-09-19 | Stop reason: ALTCHOICE

## 2019-04-25 RX ORDER — HEPARIN 100 UNIT/ML
500 SYRINGE INTRAVENOUS
Status: COMPLETED | OUTPATIENT
Start: 2019-04-25 | End: 2019-04-25

## 2019-04-25 RX ADMIN — Medication 10 ML: at 04:04

## 2019-04-25 RX ADMIN — HEPARIN 500 UNITS: 100 SYRINGE at 12:04

## 2019-04-25 RX ADMIN — APREPITANT 130 MG: 130 INJECTION, EMULSION INTRAVENOUS at 02:04

## 2019-04-25 RX ADMIN — HEPARIN 500 UNITS: 100 SYRINGE at 04:04

## 2019-04-25 RX ADMIN — DOXORUBICIN HYDROCHLORIDE 94 MG: 2 INJECTION, SOLUTION INTRAVENOUS at 03:04

## 2019-04-25 RX ADMIN — Medication 10 ML: at 12:04

## 2019-04-25 RX ADMIN — DEXAMETHASONE SODIUM PHOSPHATE: 4 INJECTION, SOLUTION INTRAMUSCULAR; INTRAVENOUS at 03:04

## 2019-04-25 RX ADMIN — CYCLOPHOSPHAMIDE 940 MG: 1 INJECTION, POWDER, FOR SOLUTION INTRAVENOUS; ORAL at 03:04

## 2019-04-25 RX ADMIN — SODIUM CHLORIDE: 0.9 INJECTION, SOLUTION INTRAVENOUS at 02:04

## 2019-04-25 NOTE — PROGRESS NOTES
Chief Complaint: No chief complaint on file.     HPI   Ms. Rivera presents today for follow up.  She is due for her fourth cycle of AC .      Briefly, she is a 63-year-old  female from [place] who was recently diagnosed with breast cancer.  Apparently, she initially felt a mass in late 2017.  A mammogram at that time was read as BI-RADS category 1.  A repeat mammogram in December 2018 was read as BI-RADS category 4 and she underwent a biopsy that showed a carcinoma that was ER negative, [place] weakly positive in 5% of the cells and HER-2 2+ by immunohistochemistry, but negative by FISH.  Ki-67 was 5%. She subsequently underwent a biopsy of a palpable lymph node on 02/01/2019, that showed evidence of lymph node metastasis.  She was referred for evaluation for chemotherapy.  Of note, she also had a PET scan on 02/01/2019 that showed an 8 mm right lower lobe pulmonary nodule that was not FDG avid.  She stated AC chemotherapy 5 weeks ago. She is due for cycle 3 of AC tomorrow.      Her CBC today shows a WBC count of 4,000 /mm3,. Hg 9.2 gr/dl, Ht 29.2 % and platelets 379,000 /mm3.  Her ANC was 2,300 /mm3.  LFTs are normal.        Review of Systems    Overall she feels fair, and has several complaints.  She complains of epigastric crampy intermittent pain, and poor appetite.   She started taking nexium two days ago.  She is also complaining of vaginal itching, erythema, and discharge for the last 3 days. The pain of her right thumb has improved.  She denies any fever, depression, easy bruising, chills, night  sweats, weight loss, nausea, vomiting, diarrhea, diplopia, blurred vision, headache, chest pain, palpitations, shortness of breath, breast pain, extremity pain, or difficulty ambulating.  The remainder of the ten-point ROS, including general, skin, lymph, H/N, cardiorespiratory, GI, , Neuro, Endocrine, and psychiatric is negative.      Objective:   Physical Exam       She is alert, oriented to time,  place, person, pleasant, well      nourished, in no acute physical distress.    She is accompanied by her  and her daughter.                               VITAL SIGNS:  Reviewed                                      HEENT:  Alopecia is again noted.  There are no nasal, oral, lip, gingival, auricular, lid,    or conjunctival lesions.  Mucosae are moist and pink, and there is no        thrush.  Pupils are equal, reactive to light and accommodation.              Extraocular muscle movements are intact.  Dentition is good. There are no oral ulcerations.                                     NECK:  Supple without JVD, adenopathy, or thyromegaly.                       LUNGS:  Clear to auscultation without wheezing, rales, or rhonchi.           CARDIOVASCULAR:  Reveals an S1, S2, no murmurs, no rubs, no gallops.         ABDOMEN:  Soft, with minimal epigastric tenderness on palpation.  No rebound.  She has no organomegaly.  Bowel sounds are    present.                                                                     EXTREMITIES:  No cyanosis, clubbing, or edema.                          BREASTS:  there are no masses in the left breast.  She does have a barely palpable in the left axilla.   There are no masses in the right breast..                                    LYMPHATIC:  There is no cervical, right axillary, or supraclavicular adenopathy.   SKIN:  Warm and moist, without petechiae, rashes, induration, or ecchymoses.           NEUROLOGIC:  DTRs are 0-1+ bilaterally, symmetrical, motor function is 5/5,  and cranial nerves are  within normal limits.     Assessment:       1. Primary cancer of lower outer quadrant of left female breast, s.p 2 cycles of neoadjuvant AC chemotherapy    2. Encounter for chemotherapy     3.    Mild anemia, secondary to chemotherapy.             Plan:        I had a long discussion with her.  She will proceed with the fourth cycle of AC and will return in 3 weeks with labs for the first  cycle of neoadjuvant taxol.  She will be premedicated with dexamethasone.

## 2019-04-25 NOTE — NURSING
1240- Patient arrived ambulatory to the unit to have labs drawn from port.  Patient has no complaints and shows no signs of distress. Port accessed, samples collected and sent to lab.  Patients port left accessed for treatment, she was discharged to next appointments.

## 2019-04-25 NOTE — PLAN OF CARE
Problem: Adult Inpatient Plan of Care  Goal: Plan of Care Review  Outcome: Ongoing (interventions implemented as appropriate)  Talked to walter about being on asa.Pt does have memory problems.Did well with last a/c today. Went over taxol and how to take dex for next time 6 and 12 hours before.

## 2019-04-25 NOTE — PROGRESS NOTES
Subjective:       Patient ID: Radha Rivera is a 63 y.o. female.    Chief Complaint: No chief complaint on file.    HPI   Ms. Rivera presents today for follow up.  She is due for her fourth cycle of AC .     Briefly, she is a 63-year-old  female from [place] who was recently diagnosed with breast cancer.  Apparently, she initially felt a mass in late 2017.  A mammogram at that time was read as BI-RADS category 1.  A repeat mammogram in December 2018 was read as BI-RADS category 4 and she underwent a biopsy that showed a carcinoma that was ER negative, [place] weakly positive in 5% of the cells and HER-2 2+ by immunohistochemistry, but negative by FISH.  Ki-67 was 5%. She subsequently underwent a biopsy of a palpable lymph node on 02/01/2019, that showed evidence of lymph node metastasis.  She was referred for evaluation for chemotherapy.  Of note, she also had a PET scan on 02/01/2019 that showed an 8 mm right lower lobe pulmonary nodule that was not FDG avid.  She stated AC chemotherapy 5 weeks ago. She is due for cycle 3 of AC tomorrow.     Her CBC today shows a WBC count of 4,000 /mm3,. Hg 9.2 gr/dl, Ht 29.2 % and platelets 379,000 /mm3.  Her ANC was 2,300 /mm3.  LFTs are normal.      Review of Systems    Overall she feels fair, and has several complaints.  She complains of epigastric crampy intermittent pain, and poor appetite.   She started taking nexium two days ago.  She is also complaining of vaginal itching, erythema, and discharge for the last 3 days. The pain of her right thumb has improved.  She denies any fever, depression, easy bruising, chills, night  sweats, weight loss, nausea, vomiting, diarrhea, diplopia, blurred vision, headache, chest pain, palpitations, shortness of breath, breast pain, extremity pain, or difficulty ambulating.  The remainder of the ten-point ROS, including general, skin, lymph, H/N, cardiorespiratory, GI, , Neuro, Endocrine, and psychiatric is negative.      Objective:      Physical Exam      She is alert, oriented to time, place, person, pleasant, well      nourished, in no acute physical distress.    She is accompanied by her  and her daughter.                               VITAL SIGNS:  Reviewed                                      HEENT:  Alopecia is again noted.  There are no nasal, oral, lip, gingival, auricular, lid,    or conjunctival lesions.  Mucosae are moist and pink, and there is no        thrush.  Pupils are equal, reactive to light and accommodation.              Extraocular muscle movements are intact.  Dentition is good. There are no oral ulcerations.                                     NECK:  Supple without JVD, adenopathy, or thyromegaly.                       LUNGS:  Clear to auscultation without wheezing, rales, or rhonchi.           CARDIOVASCULAR:  Reveals an S1, S2, no murmurs, no rubs, no gallops.         ABDOMEN:  Soft, with minimal epigastric tenderness on palpation.  No rebound.  She has no organomegaly.  Bowel sounds are    present.                                                                     EXTREMITIES:  No cyanosis, clubbing, or edema.                          BREASTS:  there are no masses in the left breast.  She does have a barely palpable in the left axilla.   There are no masses in the right breast..                                    LYMPHATIC:  There is no cervical, right axillary, or supraclavicular adenopathy.   SKIN:  Warm and moist, without petechiae, rashes, induration, or ecchymoses.           NEUROLOGIC:  DTRs are 0-1+ bilaterally, symmetrical, motor function is 5/5,  and cranial nerves are  within normal limits.    Assessment:       1. Primary cancer of lower outer quadrant of left female breast, s.p 2 cycles of neoadjuvant AC chemotherapy    2. Encounter for chemotherapy     3.    Mild anemia, secondary to chemotherapy.            Plan:        I had a long discussion with her.  She will proceed with the  fourth cycle of AC and will return in 3 weeks with labs for the first cycle of neoadjuvant taxol.  She will be premedicated with dexamethasone.  Her multiple questions were answered to her satisfaction.

## 2019-04-29 ENCOUNTER — TELEPHONE (OUTPATIENT)
Dept: HEMATOLOGY/ONCOLOGY | Facility: CLINIC | Age: 64
End: 2019-04-29

## 2019-04-29 NOTE — TELEPHONE ENCOUNTER
----- Message from Ly Pena RN sent at 4/29/2019  1:53 PM CDT -----  Contact: Pt   Double book at 1    ----- Message -----  From: Elizabeth Quintero  Sent: 4/29/2019  11:55 AM  To: Ly Pena RN    's apts 5/17 are before 10am. That is the only problem    ----- Message -----  From: Ly Pena RN  Sent: 4/29/2019  10:57 AM  To: Elizabeth Quintero    Is this something we can probably adjust for her/    ----- Message -----  From: Lisa Singh  Sent: 4/29/2019  10:52 AM  To: Samuel Melendrez Staff    Will need to reschedule appts:   Instead of coming in on 5/16, she would prefer fri 5/17, If possible, 5/17 would be better for transportation concerns driving in from out of town and the earliest is 10a.m   Please contact her at [phone].     Thank you

## 2019-04-29 NOTE — TELEPHONE ENCOUNTER
Returned call, spoke with patient and gave her the new apt times on 5/17 per her request to change them.

## 2019-04-29 NOTE — TELEPHONE ENCOUNTER
Contacted patient to discuss her questions and concerns. Patient wanted to know if it would be best to move her chemo back a day or up a couple of days. She wanted to delay so that her daughter could attend her chemotherapy appointment with her. Patient daughter works Wednesday and Thursdays and unable to come. Patient hoping to keep as scheduled on Friday, but if preferred to move up would like to expedite to Tuesday, the 14th. Will clarify with Dr. Paula tomorrow when he returns to clinic and follow up with them tomorrow.

## 2019-05-10 ENCOUNTER — TELEPHONE (OUTPATIENT)
Dept: HEMATOLOGY/ONCOLOGY | Facility: CLINIC | Age: 64
End: 2019-05-10

## 2019-05-10 NOTE — TELEPHONE ENCOUNTER
Called to speak with patient about a sore on the inside of her knee. No answer. Voicemail left to please return call back to clinic at her earliest convenience to discuss this matter.     ----- Message from Thea Prabhakar sent at 5/10/2019  9:44 AM CDT -----  Contact: pt  Pt would like to be called back regarding sore inside her knee    Pt can be reached at [phone]

## 2019-05-17 ENCOUNTER — INFUSION (OUTPATIENT)
Dept: INFUSION THERAPY | Facility: HOSPITAL | Age: 64
End: 2019-05-17
Attending: INTERNAL MEDICINE
Payer: MEDICARE

## 2019-05-17 ENCOUNTER — OFFICE VISIT (OUTPATIENT)
Dept: HEMATOLOGY/ONCOLOGY | Facility: CLINIC | Age: 64
End: 2019-05-17
Payer: MEDICARE

## 2019-05-17 VITALS
SYSTOLIC BLOOD PRESSURE: 142 MMHG | HEART RATE: 97 BPM | DIASTOLIC BLOOD PRESSURE: 68 MMHG | TEMPERATURE: 98 F | RESPIRATION RATE: 18 BRPM

## 2019-05-17 VITALS
OXYGEN SATURATION: 95 % | SYSTOLIC BLOOD PRESSURE: 149 MMHG | WEIGHT: 112.63 LBS | TEMPERATURE: 99 F | RESPIRATION RATE: 16 BRPM | HEART RATE: 100 BPM | BODY MASS INDEX: 19.23 KG/M2 | DIASTOLIC BLOOD PRESSURE: 68 MMHG | HEIGHT: 64 IN

## 2019-05-17 DIAGNOSIS — D64.81 ANTINEOPLASTIC CHEMOTHERAPY INDUCED ANEMIA: ICD-10-CM

## 2019-05-17 DIAGNOSIS — Z51.11 ENCOUNTER FOR ANTINEOPLASTIC CHEMOTHERAPY: Primary | ICD-10-CM

## 2019-05-17 DIAGNOSIS — T45.1X5A ANTINEOPLASTIC CHEMOTHERAPY INDUCED ANEMIA: ICD-10-CM

## 2019-05-17 DIAGNOSIS — C50.512 PRIMARY CANCER OF LOWER OUTER QUADRANT OF LEFT FEMALE BREAST: Primary | ICD-10-CM

## 2019-05-17 DIAGNOSIS — C50.919 BREAST CANCER: ICD-10-CM

## 2019-05-17 DIAGNOSIS — C50.512 PRIMARY CANCER OF LOWER OUTER QUADRANT OF LEFT FEMALE BREAST: ICD-10-CM

## 2019-05-17 DIAGNOSIS — Z51.11 ENCOUNTER FOR ANTINEOPLASTIC CHEMOTHERAPY: ICD-10-CM

## 2019-05-17 LAB
ALBUMIN SERPL BCP-MCNC: 4.2 G/DL (ref 3.5–5.2)
ALP SERPL-CCNC: 61 U/L (ref 55–135)
ALT SERPL W/O P-5'-P-CCNC: 18 U/L (ref 10–44)
ANION GAP SERPL CALC-SCNC: 11 MMOL/L (ref 8–16)
AST SERPL-CCNC: 18 U/L (ref 10–40)
BILIRUB SERPL-MCNC: 0.2 MG/DL (ref 0.1–1)
BUN SERPL-MCNC: 10 MG/DL (ref 8–23)
CALCIUM SERPL-MCNC: 10.2 MG/DL (ref 8.7–10.5)
CHLORIDE SERPL-SCNC: 105 MMOL/L (ref 95–110)
CO2 SERPL-SCNC: 24 MMOL/L (ref 23–29)
CREAT SERPL-MCNC: 0.7 MG/DL (ref 0.5–1.4)
ERYTHROCYTE [DISTWIDTH] IN BLOOD BY AUTOMATED COUNT: 17.2 % (ref 11.5–14.5)
EST. GFR  (AFRICAN AMERICAN): >60 ML/MIN/1.73 M^2
EST. GFR  (NON AFRICAN AMERICAN): >60 ML/MIN/1.73 M^2
GLUCOSE SERPL-MCNC: 166 MG/DL (ref 70–110)
HCT VFR BLD AUTO: 30.2 % (ref 37–48.5)
HGB BLD-MCNC: 9.6 G/DL (ref 12–16)
IMM GRANULOCYTES # BLD AUTO: 0.05 K/UL (ref 0–0.04)
MCH RBC QN AUTO: 30 PG (ref 27–31)
MCHC RBC AUTO-ENTMCNC: 31.8 G/DL (ref 32–36)
MCV RBC AUTO: 94 FL (ref 82–98)
NEUTROPHILS # BLD AUTO: 3.4 K/UL (ref 1.8–7.7)
PLATELET # BLD AUTO: 397 K/UL (ref 150–350)
PMV BLD AUTO: 9.4 FL (ref 9.2–12.9)
POTASSIUM SERPL-SCNC: 3.9 MMOL/L (ref 3.5–5.1)
PROT SERPL-MCNC: 7.4 G/DL (ref 6–8.4)
RBC # BLD AUTO: 3.2 M/UL (ref 4–5.4)
SODIUM SERPL-SCNC: 140 MMOL/L (ref 136–145)
WBC # BLD AUTO: 3.84 K/UL (ref 3.9–12.7)

## 2019-05-17 PROCEDURE — 80053 COMPREHEN METABOLIC PANEL: CPT | Mod: HCNC

## 2019-05-17 PROCEDURE — 63600175 PHARM REV CODE 636 W HCPCS: Mod: HCNC | Performed by: INTERNAL MEDICINE

## 2019-05-17 PROCEDURE — 96413 CHEMO IV INFUSION 1 HR: CPT | Mod: HCNC

## 2019-05-17 PROCEDURE — 1101F PR PT FALLS ASSESS DOC 0-1 FALLS W/OUT INJ PAST YR: ICD-10-PCS | Mod: HCNC,CPTII,S$GLB, | Performed by: INTERNAL MEDICINE

## 2019-05-17 PROCEDURE — A4216 STERILE WATER/SALINE, 10 ML: HCPCS | Mod: HCNC | Performed by: INTERNAL MEDICINE

## 2019-05-17 PROCEDURE — 85027 COMPLETE CBC AUTOMATED: CPT | Mod: HCNC

## 2019-05-17 PROCEDURE — 96375 TX/PRO/DX INJ NEW DRUG ADDON: CPT | Mod: HCNC

## 2019-05-17 PROCEDURE — 25000003 PHARM REV CODE 250: Mod: HCNC | Performed by: INTERNAL MEDICINE

## 2019-05-17 PROCEDURE — S0028 INJECTION, FAMOTIDINE, 20 MG: HCPCS | Mod: HCNC | Performed by: INTERNAL MEDICINE

## 2019-05-17 PROCEDURE — 1101F PT FALLS ASSESS-DOCD LE1/YR: CPT | Mod: HCNC,CPTII,S$GLB, | Performed by: INTERNAL MEDICINE

## 2019-05-17 PROCEDURE — 96367 TX/PROPH/DG ADDL SEQ IV INF: CPT | Mod: HCNC

## 2019-05-17 PROCEDURE — 99999 PR PBB SHADOW E&M-EST. PATIENT-LVL III: ICD-10-PCS | Mod: PBBFAC,HCNC,, | Performed by: INTERNAL MEDICINE

## 2019-05-17 PROCEDURE — 99999 PR PBB SHADOW E&M-EST. PATIENT-LVL III: CPT | Mod: PBBFAC,HCNC,, | Performed by: INTERNAL MEDICINE

## 2019-05-17 PROCEDURE — 99215 PR OFFICE/OUTPT VISIT, EST, LEVL V, 40-54 MIN: ICD-10-PCS | Mod: HCNC,S$GLB,, | Performed by: INTERNAL MEDICINE

## 2019-05-17 PROCEDURE — 96415 CHEMO IV INFUSION ADDL HR: CPT | Mod: HCNC

## 2019-05-17 PROCEDURE — 3008F BODY MASS INDEX DOCD: CPT | Mod: HCNC,CPTII,S$GLB, | Performed by: INTERNAL MEDICINE

## 2019-05-17 PROCEDURE — 99215 OFFICE O/P EST HI 40 MIN: CPT | Mod: HCNC,S$GLB,, | Performed by: INTERNAL MEDICINE

## 2019-05-17 PROCEDURE — 3008F PR BODY MASS INDEX (BMI) DOCUMENTED: ICD-10-PCS | Mod: HCNC,CPTII,S$GLB, | Performed by: INTERNAL MEDICINE

## 2019-05-17 RX ORDER — HEPARIN 100 UNIT/ML
500 SYRINGE INTRAVENOUS
Status: CANCELLED | OUTPATIENT
Start: 2019-05-17

## 2019-05-17 RX ORDER — EPINEPHRINE 0.3 MG/.3ML
0.3 INJECTION SUBCUTANEOUS ONCE AS NEEDED
Status: DISCONTINUED | OUTPATIENT
Start: 2019-05-17 | End: 2019-05-17 | Stop reason: HOSPADM

## 2019-05-17 RX ORDER — HEPARIN 100 UNIT/ML
500 SYRINGE INTRAVENOUS
Status: COMPLETED | OUTPATIENT
Start: 2019-05-17 | End: 2019-05-17

## 2019-05-17 RX ORDER — SODIUM CHLORIDE 0.9 % (FLUSH) 0.9 %
10 SYRINGE (ML) INJECTION
Status: COMPLETED | OUTPATIENT
Start: 2019-05-17 | End: 2019-05-17

## 2019-05-17 RX ORDER — DIPHENHYDRAMINE HYDROCHLORIDE 50 MG/ML
50 INJECTION INTRAMUSCULAR; INTRAVENOUS ONCE AS NEEDED
Status: DISCONTINUED | OUTPATIENT
Start: 2019-05-17 | End: 2019-05-17 | Stop reason: HOSPADM

## 2019-05-17 RX ORDER — HEPARIN 100 UNIT/ML
500 SYRINGE INTRAVENOUS
Status: DISCONTINUED | OUTPATIENT
Start: 2019-05-17 | End: 2019-05-17 | Stop reason: HOSPADM

## 2019-05-17 RX ORDER — SODIUM CHLORIDE 0.9 % (FLUSH) 0.9 %
10 SYRINGE (ML) INJECTION
Status: CANCELLED | OUTPATIENT
Start: 2019-05-24

## 2019-05-17 RX ORDER — FAMOTIDINE 10 MG/ML
20 INJECTION INTRAVENOUS
Status: COMPLETED | OUTPATIENT
Start: 2019-05-17 | End: 2019-05-17

## 2019-05-17 RX ORDER — HEPARIN 100 UNIT/ML
500 SYRINGE INTRAVENOUS
Status: CANCELLED | OUTPATIENT
Start: 2019-05-24

## 2019-05-17 RX ORDER — FAMOTIDINE 10 MG/ML
20 INJECTION INTRAVENOUS
Status: CANCELLED | OUTPATIENT
Start: 2019-05-24

## 2019-05-17 RX ORDER — SODIUM CHLORIDE 0.9 % (FLUSH) 0.9 %
10 SYRINGE (ML) INJECTION
Status: DISCONTINUED | OUTPATIENT
Start: 2019-05-17 | End: 2019-05-17 | Stop reason: HOSPADM

## 2019-05-17 RX ORDER — DIPHENHYDRAMINE HYDROCHLORIDE 50 MG/ML
50 INJECTION INTRAMUSCULAR; INTRAVENOUS ONCE AS NEEDED
Status: CANCELLED | OUTPATIENT
Start: 2019-05-24

## 2019-05-17 RX ORDER — EPINEPHRINE 0.3 MG/.3ML
0.3 INJECTION SUBCUTANEOUS ONCE AS NEEDED
Status: CANCELLED | OUTPATIENT
Start: 2019-05-24

## 2019-05-17 RX ORDER — SODIUM CHLORIDE 0.9 % (FLUSH) 0.9 %
10 SYRINGE (ML) INJECTION
Status: CANCELLED | OUTPATIENT
Start: 2019-05-17

## 2019-05-17 RX ADMIN — SODIUM CHLORIDE: 0.9 INJECTION, SOLUTION INTRAVENOUS at 02:05

## 2019-05-17 RX ADMIN — Medication 10 ML: at 12:05

## 2019-05-17 RX ADMIN — HEPARIN 500 UNITS: 100 SYRINGE at 12:05

## 2019-05-17 RX ADMIN — PACLITAXEL 276 MG: 6 INJECTION, SOLUTION INTRAVENOUS at 02:05

## 2019-05-17 RX ADMIN — HEPARIN 500 UNITS: 100 SYRINGE at 06:05

## 2019-05-17 RX ADMIN — FAMOTIDINE 20 MG: 10 INJECTION, SOLUTION INTRAVENOUS at 02:05

## 2019-05-17 RX ADMIN — DIPHENHYDRAMINE HYDROCHLORIDE 50 MG: 50 INJECTION, SOLUTION INTRAMUSCULAR; INTRAVENOUS at 02:05

## 2019-05-17 RX ADMIN — Medication 10 ML: at 06:05

## 2019-05-17 NOTE — PLAN OF CARE
Problem: Adult Inpatient Plan of Care  Goal: Patient-Specific Goal (Individualization)  Outcome: Ongoing (interventions implemented as appropriate)  1439-Labs , hx, and medications reviewed, patient verbalized she took PO steroid 12 hours and 6 hours prior to treatment. Per Dr. Paula, no IV steroid premed needed if patient took PO as instructed.  Assessment completed. Discussed plan of care with patient. Patient in agreement. Chair reclined and warm blanket and snack offered.

## 2019-05-17 NOTE — PLAN OF CARE
Problem: Adult Inpatient Plan of Care  Goal: Plan of Care Review  Outcome: Ongoing (interventions implemented as appropriate)  1800-Patient tolerated treatment well. Discharged without complaints or S/S of adverse event. AVS given.  Instructed to call provider for any questions or concerns.

## 2019-05-17 NOTE — NURSING
1200 pt here for lab draw, port accessed , good blood return, specimen sent to lab, port flushed and remained accessed for possible chemo, no distress noted upon d/c to appt

## 2019-05-21 ENCOUNTER — TELEPHONE (OUTPATIENT)
Dept: HEMATOLOGY/ONCOLOGY | Facility: CLINIC | Age: 64
End: 2019-05-21

## 2019-05-21 NOTE — TELEPHONE ENCOUNTER
----- Message from Martin Urban sent at 5/21/2019 11:32 AM CDT -----  Contact: Patient  Pt wants to know if it's safe to take ibuprofen in conjunction with Rx Taxol. Please contact to advise.     Contact:: [phone]

## 2019-05-21 NOTE — TELEPHONE ENCOUNTER
Returned call to patient to discuss her pains.   Patient reports bone aches and joint pains that started yesterday. 1st cycle of taxol administered 5/17. Patient to take ibuprofen 600 mg every 6-8 hours as needed. Will follow up with patient in about 2 days. Patient instructed to notify us if stomach irritation becomes worse with the irritation. She voiced understanding. No further questions/concerns at this time.

## 2019-05-24 ENCOUNTER — TELEPHONE (OUTPATIENT)
Dept: HEMATOLOGY/ONCOLOGY | Facility: CLINIC | Age: 64
End: 2019-05-24

## 2019-05-24 DIAGNOSIS — R10.13 DYSPEPSIA: ICD-10-CM

## 2019-05-24 DIAGNOSIS — R10.13 CHRONIC EPIGASTRIC PAIN: Primary | ICD-10-CM

## 2019-05-24 DIAGNOSIS — Z51.11 ENCOUNTER FOR ANTINEOPLASTIC CHEMOTHERAPY: Primary | ICD-10-CM

## 2019-05-24 DIAGNOSIS — R10.13 DRUG-INDUCED DYSPEPSIA: ICD-10-CM

## 2019-05-24 DIAGNOSIS — G89.29 CHRONIC EPIGASTRIC PAIN: Primary | ICD-10-CM

## 2019-05-24 DIAGNOSIS — T50.905A DRUG-INDUCED DYSPEPSIA: ICD-10-CM

## 2019-05-24 DIAGNOSIS — K21.9 GASTROESOPHAGEAL REFLUX DISEASE WITHOUT ESOPHAGITIS: ICD-10-CM

## 2019-05-24 NOTE — TELEPHONE ENCOUNTER
----- Message from Shannon Shell sent at 5/24/2019  9:29 AM CDT -----  Contact: Pt   Pt called to speak with nurse zulema states that she is having some stomach pain and have some questions   Callback#[phone]  Thank You  STEVIE Shell

## 2019-05-24 NOTE — TELEPHONE ENCOUNTER
Contacted patient to discuss her symptoms.   Patient reports that she is having stomach pain.   Pain described as a generalized abdominal burning pain.   Patient states that it kept her up through the night.   Patient able to tolerate P.O. Intake and hydrating well.  She denies n/v, diarrhea, bloating, abdominal distention, or excessive burping. She stated her last BM was today and wasn't quite diarrhea but more soft loose and light brown in color with no evidence of tarry appearance or blood. She also stated that she was able to tolerate eggs/toast for breakfast, though before the burning was noted she did try some gumbo last night (3-4 table spoons0 Patient completed 1st cycle of taxol on 5/17 and requires steroid management. Patient taking nexium 40 mg daily. Also recommended taking a short acting antacid such as maalox or Tums to also aid her symptoms. Offered patient urgent care visit if she preferred, though unsure if anything would be done differently for now. GI refferal to be placed so that appointment could be coordinated for her to be evaluated in case future further testing necessary. Patient stated that she had Equate chewable tablets from [employer] and wanted to see if this was okay to take. Explained to patient that this is similar to Tums and okay to take. Advised her to try this to see if symptoms respond. Discussed continuing a bland diet to prevent further irritation: explained that her breakfast this morning was a good example in addition to non citric fruits, oatmeals, brans, yogurt, crackers. She expressed understanding. REITERATED TO HER THAT IF HER SYMPTOMS WORSENED OR PERSISTED WITH ANY FEVER SPIKES, N/V, UNCONTROLLABLE DIARRHEA, INABILITY TO TOLERATE FOOD, STOOL COLOR CHANGES, THAT SHE SHOULD SEEK EMERGENCY ROOM VISIT. Also provided our clinic number to her, and explained that if further questions MD on call would be available over the weekend.   Will follow up with her on Monday.

## 2019-05-29 ENCOUNTER — OFFICE VISIT (OUTPATIENT)
Dept: GASTROENTEROLOGY | Facility: CLINIC | Age: 64
End: 2019-05-29
Payer: MEDICARE

## 2019-05-29 ENCOUNTER — TELEPHONE (OUTPATIENT)
Dept: HEMATOLOGY/ONCOLOGY | Facility: CLINIC | Age: 64
End: 2019-05-29

## 2019-05-29 VITALS
HEIGHT: 64 IN | SYSTOLIC BLOOD PRESSURE: 123 MMHG | WEIGHT: 112.44 LBS | DIASTOLIC BLOOD PRESSURE: 70 MMHG | BODY MASS INDEX: 19.2 KG/M2 | HEART RATE: 80 BPM

## 2019-05-29 DIAGNOSIS — R10.13 EPIGASTRIC PAIN: Primary | ICD-10-CM

## 2019-05-29 DIAGNOSIS — K21.9 GASTROESOPHAGEAL REFLUX DISEASE, ESOPHAGITIS PRESENCE NOT SPECIFIED: ICD-10-CM

## 2019-05-29 DIAGNOSIS — D84.9 IMMUNOSUPPRESSED STATUS: ICD-10-CM

## 2019-05-29 DIAGNOSIS — Z91.89 AT RISK FOR INFECTION DUE TO CHEMOTHERAPY: ICD-10-CM

## 2019-05-29 PROBLEM — L03.90 CELLULITIS: Status: RESOLVED | Noted: 2019-04-01 | Resolved: 2019-05-29

## 2019-05-29 PROBLEM — L03.90 CELLULITIS: Status: RESOLVED | Noted: 2019-03-29 | Resolved: 2019-05-29

## 2019-05-29 PROCEDURE — 1101F PR PT FALLS ASSESS DOC 0-1 FALLS W/OUT INJ PAST YR: ICD-10-PCS | Mod: HCNC,CPTII,S$GLB, | Performed by: INTERNAL MEDICINE

## 2019-05-29 PROCEDURE — 99999 PR PBB SHADOW E&M-EST. PATIENT-LVL III: ICD-10-PCS | Mod: PBBFAC,HCNC,, | Performed by: INTERNAL MEDICINE

## 2019-05-29 PROCEDURE — 99205 PR OFFICE/OUTPT VISIT, NEW, LEVL V, 60-74 MIN: ICD-10-PCS | Mod: HCNC,S$GLB,, | Performed by: INTERNAL MEDICINE

## 2019-05-29 PROCEDURE — 99999 PR PBB SHADOW E&M-EST. PATIENT-LVL III: CPT | Mod: PBBFAC,HCNC,, | Performed by: INTERNAL MEDICINE

## 2019-05-29 PROCEDURE — 3008F BODY MASS INDEX DOCD: CPT | Mod: HCNC,CPTII,S$GLB, | Performed by: INTERNAL MEDICINE

## 2019-05-29 PROCEDURE — 99205 OFFICE O/P NEW HI 60 MIN: CPT | Mod: HCNC,S$GLB,, | Performed by: INTERNAL MEDICINE

## 2019-05-29 PROCEDURE — 3008F PR BODY MASS INDEX (BMI) DOCUMENTED: ICD-10-PCS | Mod: HCNC,CPTII,S$GLB, | Performed by: INTERNAL MEDICINE

## 2019-05-29 PROCEDURE — 1101F PT FALLS ASSESS-DOCD LE1/YR: CPT | Mod: HCNC,CPTII,S$GLB, | Performed by: INTERNAL MEDICINE

## 2019-05-29 NOTE — TELEPHONE ENCOUNTER
----- Message from Lisa Singh sent at 5/29/2019 12:59 PM CDT -----  Contact: Pt   Pt requesting to be called in regards to a ongoing concern , undisclosed. She can be reached at [phone]    Thank you

## 2019-05-29 NOTE — LETTER
June 4, 2019      Aneesh Baker MD  [address]  [facility] [address]           [facility] Gastroenter[facility]  [address]  [facility] [address]  Phone: [phone]  Fax: [phone]          Patient: Radha Rivera   MR Number: [id number]   YOB: 1955   Date of Visit: 5/29/2019       Dear Dr. Aneesh Baker:    Thank you for referring Radha Rivera to me for evaluation. Attached you will find relevant portions of my assessment and plan of care.    If you have questions, please do not hesitate to call me. I look forward to following Radha Rivera along with you.    Sincerely,    Boris Serna MD    Enclosure  CC:  No Recipients    If you would like to receive this communication electronically, please contact [email or web address] or [phone] to request more information on [employer] access.    For providers and/or their staff who would like to refer a patient to [facility], please contact us through our one-stop-shop provider referral line, [facility], at [phone].    If you feel you have received this communication in error or would no longer like to receive these types of communications, please e-mail [email or web address]

## 2019-05-29 NOTE — PROGRESS NOTES
[facility] Consultation Note    Reason for Consult:    Chief Complaint   Patient presents with    Gastroesophageal Reflux    Abdominal Pain       PCP:   Salo Vera    Referring MD:  Aneesh Baker Md  [id number] [place] [address]    HPI:  Radha Rivera is a 63 y.o. female here for evaluation of epigastric abdominal pain. She is new to our clinic.  She has been referred by our hematology and oncology department.  They are following her for breast cancer and is undergoing chemotherapy.  She describes epigastric burning that is 5/10 after starting on Taxol therapy.  She has received 1 treatment on May 17, 2019.  The pain started a few days after it has been worsening ever since.  She has never experienced this before.  There is no radiation.  Pain is intermittent and has awakened her at night.  She has been on Nexium since February due to heartburn which resolved the symptoms.  She denies nausea or vomiting. Antacids have helped some.  Certain foods have caused her more symptoms such as recent gumbo that she ate.  She has had some constipation following chemotherapy but did note some recent loose stools after starting Taxol.  Her stools are now back to normal. She denies blood in the stool.  She denies NSAID use other than 81 mg aspirin.    Her last colonoscopy was around 10 years ago done by Dr. Delcid at [facility].  She reports that this exam was normal with a 10 year follow-up recommended.          ROS:  Constitutional: No fevers, chills, No weight loss, normal appetite, she reports food has no taste.  ENT: No congestion, rhinorrhea, or chronic sinus problems  CV: No chest pain or palpitations  Pulm: No cough, No shortness of breath  Ophtho: No vision changes or pain  GI: see HPI  Derm: No rash or lesions  Heme: No lymphadenopathy, No bruising  MSK: No arthritis or joint swelling  : No dysuria, No frequent urination  Endo: No hot or cold  intolerance        Medical History:  has a past medical history of Breast cancer (01/2019), Mitral valve prolapse, and Thyroid disease.    Surgical History:  has a past surgical history that includes tonsillectomy; Ear drum surgery; Tonsillectomy; Inner ear surgery (Right); Breast biopsy (Left, 01/2019); and Insertion of tunneled central venous catheter (CVC) with subcutaneous port (Right, 2/8/2019).    Family History: family history includes Breast cancer in her maternal aunt and paternal aunt; Breast cancer (age of onset: 68) in her sister; Cancer in her father; Thyroid disease in her daughter.    Social History:  reports that she has never smoked. She has never used smokeless tobacco. She reports that she drinks alcohol. She reports that she does not use drugs.    Review of patient's allergies indicates:  No Known Allergies    Prior to Admission medications    Medication Sig Start Date End Date Taking? Authorizing Provider   dexamethasone (DECADRON) 4 MG Tab Take 5 tablets 12 hours prior to chemotherapy and another 5 tablets 6 hours prior to each cycle of taxol chemotherapy 4/25/19  Yes Aneesh Baker MD   esomeprazole (NEXIUM) 40 MG capsule Take 1 capsule (40 mg total) by mouth once daily.  Patient taking differently: Take 40 mg by mouth once daily. Take one 20 mg capsule twice a day or every 12 hours 4/4/19 4/3/20 Yes Aneesh Baker MD   levothyroxine (SYNTHROID) 75 MCG tablet Take 75 mcg by mouth every morning.   Yes Historical Provider, MD   lidocaine-prilocaine (EMLA) cream Apply to affected area once 2/18/19  Yes Aneesh Baker MD   vitamin D 1000 units Tab Take 185 mg by mouth once daily.   Yes Historical Provider, MD   aspirin (ECOTRIN) 81 MG EC tablet Take 81 mg by mouth once daily.    Historical Provider, MD   aspirin-calcium carbonate 81 mg-300 mg calcium(777 mg) Tab Take by mouth. 7/3/13   Historical Provider, MD   fluconazole (DIFLUCAN) 150 MG Tab  4/4/19   Historical Provider, MD    LORazepam (ATIVAN) 1 MG tablet Take 0.5 tablets (0.5 mg total) by mouth every 6 (six) hours as needed (Nausea). 4/5/19 5/5/19  Bj Strickland MD   ondansetron (ZOFRAN-ODT) 4 MG TbDL Take 1 tablet (4 mg total) by mouth every 8 (eight) hours as needed. 4/5/19   Bj Strickland MD       Objective Findings:  Vital Signs:  There were no vitals taken for this visit.  There is no height or weight on file to calculate BMI.    Physical Exam:  General Appearance:  Well appearing in no acute distress, appears stated age  Head:  Normocephalic, atraumatic  Eyes:  No scleral icterus or pallor, EOMI  ENT:  Neck supple, moist mucosa; OP clear  Lungs:  CTA bilaterally in anterior and posterior fields, no wheezes, no crackles; no dullness  Heart:  Regular rate and rhythm, S1, S2 normal, no murmurs heard  Abdomen:  Soft, non tender, non distended with positive bowel sounds in all four quadrants. No hepatosplenomegaly, ascites, or mass  Extremities:  No clubbing, cyanosis, or edema  Skin:  No rash        Labs:  Lab Results   Component Value Date    WBC 3.84 (L) 05/17/2019    HGB 9.6 (L) 05/17/2019    HCT 30.2 (L) 05/17/2019    MCV 94 05/17/2019    RDW 17.2 (H) 05/17/2019     (H) 05/17/2019    GRAN 3.4 05/17/2019    LYMPH 0.3 (L) 04/05/2019    LYMPH 5.8 (L) 04/05/2019    MONO 0.2 (L) 04/05/2019    MONO 3.7 (L) 04/05/2019    EOS 0.0 04/05/2019    BASO 0.14 04/05/2019     Lab Results   Component Value Date     05/17/2019    K 3.9 05/17/2019     05/17/2019    CO2 24 05/17/2019     (H) 05/17/2019    BUN 10 05/17/2019    CREATININE 0.7 05/17/2019    CALCIUM 10.2 05/17/2019    PROT 7.4 05/17/2019    ALBUMIN 4.2 05/17/2019    BILITOT 0.2 05/17/2019    ALKPHOS 61 05/17/2019    AST 18 05/17/2019    ALT 18 05/17/2019                 Assessment:  Radha Rivera is a 63 y.o. female with:  1. Epigastric pain    2. Immunosuppressed status    3. Gastroesophageal reflux disease, esophagitis presence not  specified    4. At risk for infection due to chemotherapy      Her epigastric pain is a new symptom.  She has a history of heartburn however this appears unrelated.  She has not responded well to Nexium.  The symptoms started after receiving Taxol this could indicate a side effect of the medication or even mucositis.  Given that she is immunosuppressed she is also at risk for opportunistic infections.  H pylori is also a consideration as is pancreatitis.    It appears she is due for a colonoscopy for screening purposes.  We discussed that she will need a colonoscopy in the near future however she prefers to defer this at this time.        Recommendations/Plan:  1.  Labs today as noted below to check for pancreatitis and H pylori.  2.  I will schedule her for an upper endoscopy with me.  3.  She may try Gaviscon for symptom relief.  4.  We will revisit colonoscopy at a future visit.    Follow-up pending above.      Order summary:  Orders Placed This Encounter    Lipase    H. PYLORI ANTIBODY, IGG    Case request GI: EGD (ESOPHAGOGASTRODUODENOSCOPY)         Thank you so much for allowing me to participate in the care of Radha MARYSOL Serna MD

## 2019-05-29 NOTE — TELEPHONE ENCOUNTER
Returned call to patient to discuss her symptoms. Patient with ongoing dyspepsia or burning belly pain with burping. Once again she denies chest pain. She denies n/v. She is able to tolerate p.o. Intake. Her bowels are normal.   She denies blood in stool or tarry colored stool. She reports soft, light brown stools.   Patient continues to take the nexium. She has tried the calcium carb tablets three times and reports improvement in her symptoms when doing so. She seems hesitant to continue trying this therapy for further work up. GI referral was placed and feel patient should be evaluated further by GI.   Reached out to GI clinic for assistance in getting consultation arranged and GI clinic number provided to patient for her to contact and set up this appointment.   Awaiting further information from GI at present.

## 2019-05-30 ENCOUNTER — TELEPHONE (OUTPATIENT)
Dept: ENDOSCOPY | Facility: HOSPITAL | Age: 64
End: 2019-05-30

## 2019-05-30 ENCOUNTER — TELEPHONE (OUTPATIENT)
Dept: HEMATOLOGY/ONCOLOGY | Facility: CLINIC | Age: 64
End: 2019-05-30

## 2019-05-30 DIAGNOSIS — K21.9 GASTROESOPHAGEAL REFLUX DISEASE, ESOPHAGITIS PRESENCE NOT SPECIFIED: Primary | ICD-10-CM

## 2019-05-30 DIAGNOSIS — R10.9 ABDOMINAL PAIN, UNSPECIFIED ABDOMINAL LOCATION: ICD-10-CM

## 2019-05-30 NOTE — TELEPHONE ENCOUNTER
As patient was calling our office, I was also reaching her to follow up on consultation with GI yesterday.  Patient discussed what the plan of therapy was. Patient unsure if at this time she feels her pain is causing enough discomfort to proceed with scope. Patient wanted to know if we felt this was necessary.   Explained to patient that this decision would be up to her, though told her she could discuss with Ira Lee NP her advisories at her upcoming clinic visit with her.   Explained that the procedure may not be medically urgent and let her know that labs are still pending to be obtained on the 7th, which were added to her port draw with us. Explained that the results of these test may provide additional information. Explained that she didn't need to make a decision immediately as everything else appears stable, but more or less should discuss the urgency with the GI Dr. Serna who recommended the scope. Discussed that maybe if she wanted to proceed through the remainder of her chemotherapy and see if her symptoms resolve any over time after completion, this may be an option.   However, did state that if her symptoms worsen or persist that it may be something she needs to revisit at a sooner time, as this may provide answers or a source of symptoms.   Patient expressed gratitude.   No further questions/concerns at this time.

## 2019-05-30 NOTE — TELEPHONE ENCOUNTER
----- Message from Shannon Shell sent at 5/30/2019 10:50 AM CDT -----  Contact: pt   Pt called to speak with nurse zulema have some questions   Callback#[phone]  Thank You  STEVIE Shell

## 2019-06-04 RX ORDER — FAMOTIDINE 10 MG/ML
20 INJECTION INTRAVENOUS
Status: CANCELLED | OUTPATIENT
Start: 2019-06-07

## 2019-06-04 RX ORDER — HEPARIN 100 UNIT/ML
500 SYRINGE INTRAVENOUS
Status: CANCELLED | OUTPATIENT
Start: 2019-06-07

## 2019-06-04 RX ORDER — DIPHENHYDRAMINE HYDROCHLORIDE 50 MG/ML
50 INJECTION INTRAMUSCULAR; INTRAVENOUS ONCE AS NEEDED
Status: CANCELLED | OUTPATIENT
Start: 2019-06-07

## 2019-06-04 RX ORDER — EPINEPHRINE 0.3 MG/.3ML
0.3 INJECTION SUBCUTANEOUS ONCE AS NEEDED
Status: CANCELLED | OUTPATIENT
Start: 2019-06-07

## 2019-06-04 RX ORDER — SODIUM CHLORIDE 0.9 % (FLUSH) 0.9 %
10 SYRINGE (ML) INJECTION
Status: CANCELLED | OUTPATIENT
Start: 2019-06-07

## 2019-06-05 DIAGNOSIS — Z51.11 ENCOUNTER FOR ANTINEOPLASTIC CHEMOTHERAPY: ICD-10-CM

## 2019-06-05 DIAGNOSIS — T45.1X5A ANTINEOPLASTIC CHEMOTHERAPY INDUCED ANEMIA: ICD-10-CM

## 2019-06-05 DIAGNOSIS — D64.81 ANTINEOPLASTIC CHEMOTHERAPY INDUCED ANEMIA: ICD-10-CM

## 2019-06-05 DIAGNOSIS — C50.512 PRIMARY CANCER OF LOWER OUTER QUADRANT OF LEFT FEMALE BREAST: ICD-10-CM

## 2019-06-05 RX ORDER — ESOMEPRAZOLE MAGNESIUM 40 MG/1
CAPSULE, DELAYED RELEASE ORAL
Qty: 30 CAPSULE | Refills: 1 | Status: SHIPPED | OUTPATIENT
Start: 2019-06-05 | End: 2019-06-25

## 2019-06-06 NOTE — TELEPHONE ENCOUNTER
----- Message from Shannon Shell sent at 6/6/2019  4:28 PM CDT -----  Contact: pt   Pt called to speak with nurse have some questions   Callback#[phone]  Thank You  STEVIE Shell

## 2019-06-06 NOTE — TELEPHONE ENCOUNTER
Returned call to patient to discuss her concerns. Patient wanted to let me know that her stomach is getting better, though still with some discomfort. She has not required as much antacid therapy as of recent. Patient still unsure if she wants to proceed with the scope though will think about it in due time and plans to discuss with provider in detail tomorrow.   Confirmed patients appointments for tomorrow.   She expressed gratitude.

## 2019-06-07 ENCOUNTER — INFUSION (OUTPATIENT)
Dept: INFUSION THERAPY | Facility: HOSPITAL | Age: 64
End: 2019-06-07
Attending: INTERNAL MEDICINE
Payer: MEDICARE

## 2019-06-07 ENCOUNTER — OFFICE VISIT (OUTPATIENT)
Dept: HEMATOLOGY/ONCOLOGY | Facility: CLINIC | Age: 64
End: 2019-06-07
Payer: MEDICARE

## 2019-06-07 VITALS
HEART RATE: 82 BPM | DIASTOLIC BLOOD PRESSURE: 63 MMHG | RESPIRATION RATE: 16 BRPM | WEIGHT: 114.19 LBS | TEMPERATURE: 99 F | OXYGEN SATURATION: 96 % | BODY MASS INDEX: 19.5 KG/M2 | SYSTOLIC BLOOD PRESSURE: 137 MMHG | HEIGHT: 64 IN

## 2019-06-07 VITALS
SYSTOLIC BLOOD PRESSURE: 132 MMHG | RESPIRATION RATE: 18 BRPM | DIASTOLIC BLOOD PRESSURE: 70 MMHG | HEART RATE: 87 BPM | TEMPERATURE: 98 F

## 2019-06-07 DIAGNOSIS — Z51.11 ENCOUNTER FOR ANTINEOPLASTIC CHEMOTHERAPY: Primary | ICD-10-CM

## 2019-06-07 DIAGNOSIS — C50.919 BREAST CANCER: ICD-10-CM

## 2019-06-07 DIAGNOSIS — C50.512 PRIMARY CANCER OF LOWER OUTER QUADRANT OF LEFT FEMALE BREAST: Primary | ICD-10-CM

## 2019-06-07 DIAGNOSIS — K21.9 GASTROESOPHAGEAL REFLUX DISEASE, ESOPHAGITIS PRESENCE NOT SPECIFIED: ICD-10-CM

## 2019-06-07 DIAGNOSIS — T45.1X5A ANTINEOPLASTIC CHEMOTHERAPY INDUCED ANEMIA: ICD-10-CM

## 2019-06-07 DIAGNOSIS — I70.0 AORTIC ATHEROSCLEROSIS: ICD-10-CM

## 2019-06-07 DIAGNOSIS — R10.9 ABDOMINAL PAIN, UNSPECIFIED ABDOMINAL LOCATION: ICD-10-CM

## 2019-06-07 DIAGNOSIS — D64.81 ANTINEOPLASTIC CHEMOTHERAPY INDUCED ANEMIA: ICD-10-CM

## 2019-06-07 LAB
ALBUMIN SERPL BCP-MCNC: 4 G/DL (ref 3.5–5.2)
ALP SERPL-CCNC: 73 U/L (ref 55–135)
ALT SERPL W/O P-5'-P-CCNC: 25 U/L (ref 10–44)
ANION GAP SERPL CALC-SCNC: 9 MMOL/L (ref 8–16)
AST SERPL-CCNC: 20 U/L (ref 10–40)
BILIRUB SERPL-MCNC: 0.3 MG/DL (ref 0.1–1)
BUN SERPL-MCNC: 16 MG/DL (ref 8–23)
CALCIUM SERPL-MCNC: 10 MG/DL (ref 8.7–10.5)
CHLORIDE SERPL-SCNC: 106 MMOL/L (ref 95–110)
CO2 SERPL-SCNC: 25 MMOL/L (ref 23–29)
CREAT SERPL-MCNC: 0.7 MG/DL (ref 0.5–1.4)
ERYTHROCYTE [DISTWIDTH] IN BLOOD BY AUTOMATED COUNT: 16.2 % (ref 11.5–14.5)
EST. GFR  (AFRICAN AMERICAN): >60 ML/MIN/1.73 M^2
EST. GFR  (NON AFRICAN AMERICAN): >60 ML/MIN/1.73 M^2
GLUCOSE SERPL-MCNC: 156 MG/DL (ref 70–110)
HCT VFR BLD AUTO: 33.5 % (ref 37–48.5)
HGB BLD-MCNC: 10.7 G/DL (ref 12–16)
IMM GRANULOCYTES # BLD AUTO: 0.05 K/UL (ref 0–0.04)
LIPASE SERPL-CCNC: 32 U/L (ref 4–60)
MCH RBC QN AUTO: 30.6 PG (ref 27–31)
MCHC RBC AUTO-ENTMCNC: 31.9 G/DL (ref 32–36)
MCV RBC AUTO: 96 FL (ref 82–98)
NEUTROPHILS # BLD AUTO: 6.2 K/UL (ref 1.8–7.7)
PLATELET # BLD AUTO: 328 K/UL (ref 150–350)
PMV BLD AUTO: 10 FL (ref 9.2–12.9)
POTASSIUM SERPL-SCNC: 4.2 MMOL/L (ref 3.5–5.1)
PROT SERPL-MCNC: 7.3 G/DL (ref 6–8.4)
RBC # BLD AUTO: 3.5 M/UL (ref 4–5.4)
SODIUM SERPL-SCNC: 140 MMOL/L (ref 136–145)
WBC # BLD AUTO: 6.81 K/UL (ref 3.9–12.7)

## 2019-06-07 PROCEDURE — 96367 TX/PROPH/DG ADDL SEQ IV INF: CPT | Mod: HCNC

## 2019-06-07 PROCEDURE — 96375 TX/PRO/DX INJ NEW DRUG ADDON: CPT | Mod: HCNC

## 2019-06-07 PROCEDURE — 25000003 PHARM REV CODE 250: Mod: HCNC | Performed by: INTERNAL MEDICINE

## 2019-06-07 PROCEDURE — 83690 ASSAY OF LIPASE: CPT | Mod: HCNC

## 2019-06-07 PROCEDURE — 3008F PR BODY MASS INDEX (BMI) DOCUMENTED: ICD-10-PCS | Mod: HCNC,CPTII,S$GLB, | Performed by: NURSE PRACTITIONER

## 2019-06-07 PROCEDURE — 96415 CHEMO IV INFUSION ADDL HR: CPT | Mod: HCNC

## 2019-06-07 PROCEDURE — 63600175 PHARM REV CODE 636 W HCPCS: Mod: HCNC | Performed by: INTERNAL MEDICINE

## 2019-06-07 PROCEDURE — 99999 PR PBB SHADOW E&M-EST. PATIENT-LVL III: ICD-10-PCS | Mod: PBBFAC,HCNC,, | Performed by: NURSE PRACTITIONER

## 2019-06-07 PROCEDURE — 80053 COMPREHEN METABOLIC PANEL: CPT | Mod: HCNC

## 2019-06-07 PROCEDURE — 99999 PR PBB SHADOW E&M-EST. PATIENT-LVL III: CPT | Mod: PBBFAC,HCNC,, | Performed by: NURSE PRACTITIONER

## 2019-06-07 PROCEDURE — 1101F PT FALLS ASSESS-DOCD LE1/YR: CPT | Mod: HCNC,CPTII,S$GLB, | Performed by: NURSE PRACTITIONER

## 2019-06-07 PROCEDURE — A4216 STERILE WATER/SALINE, 10 ML: HCPCS | Mod: HCNC | Performed by: INTERNAL MEDICINE

## 2019-06-07 PROCEDURE — 85027 COMPLETE CBC AUTOMATED: CPT | Mod: HCNC

## 2019-06-07 PROCEDURE — 3008F BODY MASS INDEX DOCD: CPT | Mod: HCNC,CPTII,S$GLB, | Performed by: NURSE PRACTITIONER

## 2019-06-07 PROCEDURE — 96413 CHEMO IV INFUSION 1 HR: CPT | Mod: HCNC

## 2019-06-07 PROCEDURE — 86677 HELICOBACTER PYLORI ANTIBODY: CPT | Mod: HCNC

## 2019-06-07 PROCEDURE — 99214 OFFICE O/P EST MOD 30 MIN: CPT | Mod: HCNC,S$GLB,, | Performed by: NURSE PRACTITIONER

## 2019-06-07 PROCEDURE — 99214 PR OFFICE/OUTPT VISIT, EST, LEVL IV, 30-39 MIN: ICD-10-PCS | Mod: HCNC,S$GLB,, | Performed by: NURSE PRACTITIONER

## 2019-06-07 PROCEDURE — S0028 INJECTION, FAMOTIDINE, 20 MG: HCPCS | Mod: HCNC | Performed by: INTERNAL MEDICINE

## 2019-06-07 PROCEDURE — 1101F PR PT FALLS ASSESS DOC 0-1 FALLS W/OUT INJ PAST YR: ICD-10-PCS | Mod: HCNC,CPTII,S$GLB, | Performed by: NURSE PRACTITIONER

## 2019-06-07 RX ORDER — HEPARIN 100 UNIT/ML
500 SYRINGE INTRAVENOUS
Status: DISCONTINUED | OUTPATIENT
Start: 2019-06-07 | End: 2019-06-07 | Stop reason: HOSPADM

## 2019-06-07 RX ORDER — SODIUM CHLORIDE 0.9 % (FLUSH) 0.9 %
10 SYRINGE (ML) INJECTION
Status: COMPLETED | OUTPATIENT
Start: 2019-06-07 | End: 2019-06-07

## 2019-06-07 RX ORDER — DIPHENHYDRAMINE HYDROCHLORIDE 50 MG/ML
50 INJECTION INTRAMUSCULAR; INTRAVENOUS ONCE AS NEEDED
Status: DISCONTINUED | OUTPATIENT
Start: 2019-06-07 | End: 2019-06-07 | Stop reason: HOSPADM

## 2019-06-07 RX ORDER — SODIUM CHLORIDE 0.9 % (FLUSH) 0.9 %
10 SYRINGE (ML) INJECTION
Status: CANCELLED | OUTPATIENT
Start: 2019-06-07

## 2019-06-07 RX ORDER — HEPARIN 100 UNIT/ML
500 SYRINGE INTRAVENOUS
Status: COMPLETED | OUTPATIENT
Start: 2019-06-07 | End: 2019-06-07

## 2019-06-07 RX ORDER — FAMOTIDINE 10 MG/ML
20 INJECTION INTRAVENOUS
Status: COMPLETED | OUTPATIENT
Start: 2019-06-07 | End: 2019-06-07

## 2019-06-07 RX ORDER — HEPARIN 100 UNIT/ML
500 SYRINGE INTRAVENOUS
Status: CANCELLED | OUTPATIENT
Start: 2019-06-07

## 2019-06-07 RX ORDER — EPINEPHRINE 0.3 MG/.3ML
0.3 INJECTION SUBCUTANEOUS ONCE AS NEEDED
Status: DISCONTINUED | OUTPATIENT
Start: 2019-06-07 | End: 2019-06-07 | Stop reason: HOSPADM

## 2019-06-07 RX ORDER — SODIUM CHLORIDE 0.9 % (FLUSH) 0.9 %
10 SYRINGE (ML) INJECTION
Status: DISCONTINUED | OUTPATIENT
Start: 2019-06-07 | End: 2019-06-07 | Stop reason: HOSPADM

## 2019-06-07 RX ADMIN — DIPHENHYDRAMINE HYDROCHLORIDE 50 MG: 50 INJECTION, SOLUTION INTRAMUSCULAR; INTRAVENOUS at 02:06

## 2019-06-07 RX ADMIN — Medication 10 ML: at 11:06

## 2019-06-07 RX ADMIN — FAMOTIDINE 20 MG: 10 INJECTION, SOLUTION INTRAVENOUS at 02:06

## 2019-06-07 RX ADMIN — PACLITAXEL 276 MG: 6 INJECTION, SOLUTION INTRAVENOUS at 02:06

## 2019-06-07 RX ADMIN — HEPARIN 500 UNITS: 100 SYRINGE at 11:06

## 2019-06-07 NOTE — PROGRESS NOTES
Chief Complaint: No chief complaint on file.     HPI   Ms. Rivera presents today for follow up.  She is due for C2 neoadjuvant taxol .      Oncology History:  Briefly, she is a 63-year-old  female from [place] who was recently diagnosed with breast cancer.  Apparently, she initially felt a mass in late 2017.  A mammogram at that time was read as BI-RADS category 1.  A repeat mammogram in December 2018 was read as BI-RADS category 4 and she underwent a biopsy that showed a carcinoma that was ER negative, IN weakly positive in 5% of the cells and HER-2 2+ by immunohistochemistry, but negative by FISH.  Ki-67 was 5%. She subsequently underwent a biopsy of a palpable lymph node on 02/01/2019, that showed evidence of lymph node metastasis.  She was referred for evaluation for chemotherapy.  Of note, she also had a PET scan on 02/01/2019 that showed an 8 mm right lower lobe pulmonary nodule that was not FDG avid.  She stated AC chemotherapy and has completed four cycles.  She is due for cycle 2 of taxol today.  She has been premedicated with oral dexamethasone.           Review of Systems    Overall she feels OK today. Had severe abdominal burning and heartburn after last treatment. Better this past week. She saw Dr. Alcantara- he wants to do upper endoscopy. She refused to schedule bc she is not sure she wants to proceed. She also did not take Gaviscon as he suggested. She would like to know if there is anything I suggest she do. Today, feels well.   She denies any fever, depression, easy bruising, chills, night  sweats, weight loss, nausea, vomiting, diarrhea, diplopia, blurred vision, headache, chest pain, palpitations, shortness of breath, breast pain, extremity pain, or difficulty ambulating.  The remainder of the ten-point ROS, including general, skin, lymph, H/N, cardiorespiratory, GI, , Neuro, Endocrine, and psychiatric is negative.      Objective:   Physical Exam       She is alert, oriented to  time, place, person, pleasant, well      nourished, in no acute physical distress.  She is accompanied by her .                            VITAL SIGNS:  Reviewed                                      HEENT:  Alopecia is again noted.  There are no nasal, oral, lip, gingival, auricular, lid,    or conjunctival lesions.  Mucosae are moist and pink, and there is no        thrush.  Pupils are equal, reactive to light and accommodation.              Extraocular muscle movements are intact.  Dentition is good. There are no oral ulcerations.                                     NECK:  Supple without JVD, adenopathy, or thyromegaly.                       LUNGS:  Clear to auscultation without wheezing, rales, or rhonchi.           CARDIOVASCULAR:  Reveals an S1, S2, no murmurs, no rubs, no gallops.         ABDOMEN:  Soft, with minimal epigastric tenderness on palpation.  No rebound.  She has no organomegaly.  Bowel sounds are    present.                                                                     EXTREMITIES:  No cyanosis, clubbing, or edema.                          BREASTS:  There are no masses in the left breast.  She no longer has any palpable adenopathy.  There are no masses in the right breast.  A right sided portacath is identified..                                    LYMPHATIC:  There is no cervical, right axillary, or supraclavicular adenopathy.   SKIN:  Warm and moist, without petechiae, rashes, induration, or ecchymoses.           NEUROLOGIC:  DTRs are 0-1+ bilaterally, symmetrical, motor function is 5/5,  and cranial nerves are  within normal limits.     WBC   Date Value Ref Range Status   06/07/2019 6.81 3.90 - 12.70 K/uL Final     Hemoglobin   Date Value Ref Range Status   06/07/2019 10.7 (L) 12.0 - 16.0 g/dL Final     Hematocrit   Date Value Ref Range Status   06/07/2019 33.5 (L) 37.0 - 48.5 % Final     Platelets   Date Value Ref Range Status   06/07/2019 328 150 - 350 K/uL Final     Gran # (ANC)    Date Value Ref Range Status   06/07/2019 6.2 1.8 - 7.7 K/uL Final     Comment:     The ANC is based on a white cell differential from an   automated cell counter. It has not been microscopically   reviewed for the presence of abnormal cells. Clinical   correlation is required.         Chemistry        Component Value Date/Time     06/07/2019 1153    K 4.2 06/07/2019 1153     06/07/2019 1153    CO2 25 06/07/2019 1153    BUN 16 06/07/2019 1153    CREATININE 0.7 06/07/2019 1153     (H) 06/07/2019 1153        Component Value Date/Time    CALCIUM 10.0 06/07/2019 1153    ALKPHOS 73 06/07/2019 1153    AST 20 06/07/2019 1153    ALT 25 06/07/2019 1153    BILITOT 0.3 06/07/2019 1153    ESTGFRAFRICA >60.0 06/07/2019 1153    EGFRNONAA >60.0 06/07/2019 1153            Assessment:             1. Primary cancer of lower outer quadrant of left female breast  CBC Oncology    Comprehensive metabolic panel   2. Antineoplastic chemotherapy induced anemia     3. Aortic atherosclerosis             Plan:       -Doing well, clinically stable.   -Labs reviewed. Anemia parameters stable. Will monitor.   -Proceed with C2 Neoadjuvant Taxol.   -RTC in 3 weeks to see Dr. Paula with a CBC, CMP, C3 Taxol.   -Encouraged to follow up with GI - they would like ot do an upper endoscopy and start Gaviscon.    -Continue medical management for AA.    Patient is in agreement with the proposed treatment plan. All questions were answered to the patient's satisfaction. Pt knows to call clinic for any new or worsening symptoms and if anything is needed before the next clinic visit.      Chasidy Rincon, FNP-C  Hematology & Oncology  [facility] [place] [address]  ph. [phone]  Fax. [phone]     I spent 30 minutes (face to face) with the patient, more than 50% was in counseling and coordination of care as detailed above.         Distress Screening Results: Psychosocial Distress screening score of Distress Score:  2 noted and reviewed. No intervention indicated.

## 2019-06-07 NOTE — NURSING
Patient present for labs drawn from port. Port accessed without difficulty; blood return present. Labs collected and sent. Patient discharged to MD appointment.

## 2019-06-08 LAB — H PYLORI IGG SERPL QL IA: NEGATIVE

## 2019-06-21 ENCOUNTER — TELEPHONE (OUTPATIENT)
Dept: HEMATOLOGY/ONCOLOGY | Facility: CLINIC | Age: 64
End: 2019-06-21

## 2019-06-21 NOTE — TELEPHONE ENCOUNTER
"Returned call to patient. No answer. Voicemail left to please call back to clinic to discuss symptoms pt is currently experiencing.      ----- Message from Lisa Singh sent at 6/21/2019 12:23 PM CDT -----  Contact: pt  Pt calling in regards to some symptoms after chemo: stomach aches, etc., redness/patch around her nose, toenail on her right food is redish/purple, it hurts.   Please contact her at [phone]/ [phone] cell.     "Thank you for all that you do for our patients'"      "

## 2019-06-24 ENCOUNTER — TELEPHONE (OUTPATIENT)
Dept: HEMATOLOGY/ONCOLOGY | Facility: CLINIC | Age: 64
End: 2019-06-24

## 2019-06-24 NOTE — TELEPHONE ENCOUNTER
"Returned call to patient to discuss her concerns. Patient reports once again stomach complaints.   Explained to patient that most likely this is chemo triggered.   She continues to take the nexium 40 mg capsule one hour before breakfast.  Discussed taking nexium 20 mg capsules, twice a day one in the morning prior to breakfast in one in the evening prior to dinner to see if this helps. Patient states she is no longer taking the antacids and that she has not tried the gaviscon as she didn't know if it was safe to take. Explained to her that the gaviscon is calcium carb just like the chewables she has been taken, and that this should replace the use of those, however, given that it is a liquid should help coat. Explained that she should be taken that as frequent as the GI doctor had advised.   Explained that she should call their clinic to discuss arranging a follow up with them, and to also reconsider arranging the scope that was recommended.     Patient also reports new issues of numbness in her fingertips (explained that this is a common symptom associated with taxol and given the severity may require dose changes/medication management.) patient denies any major issues with fine motor skills and can tolerate at this time.   In addition, patient reports possible infection to her right big toe (as there is redness, tenderness and slight swelling noted) advised patient to take a picture.   Also patient reports, "cold sore" to left nare. Patient states that it appeared after blowing her nose and possibly battling a cold. Unsure if this is a true cold sore, also advised to send picture of this site.   Patient also reports some red raised areas to her affected breast, some on her tummy, and some diffuse areas on her back. Patient denies that these sites are itchy or painful. However, they are slightly raised to touch.   Offered patient an appointment to be evaluated tomorrow prior to chemo. Patient to check with her "  to see if he can provide transportation. Will offer urgent care slot.   She plans to follow up with me. She expressed gratitude.

## 2019-06-24 NOTE — TELEPHONE ENCOUNTER
----- Message from Apple Glez sent at 6/24/2019  1:08 PM CDT -----  Contact: PT  Needs Advice    Reason for call: Returning call to Arianne Landon from Friday would like a call back. Thanks        Communication Preference: [phone]  OR  [phone]    Additional Information:

## 2019-06-25 ENCOUNTER — OFFICE VISIT (OUTPATIENT)
Dept: HEMATOLOGY/ONCOLOGY | Facility: CLINIC | Age: 64
End: 2019-06-25
Payer: MEDICARE

## 2019-06-25 VITALS
SYSTOLIC BLOOD PRESSURE: 138 MMHG | DIASTOLIC BLOOD PRESSURE: 63 MMHG | HEART RATE: 74 BPM | BODY MASS INDEX: 19.08 KG/M2 | OXYGEN SATURATION: 100 % | WEIGHT: 111.75 LBS | HEIGHT: 64 IN | TEMPERATURE: 99 F

## 2019-06-25 DIAGNOSIS — C50.512 PRIMARY CANCER OF LOWER OUTER QUADRANT OF LEFT FEMALE BREAST: Primary | ICD-10-CM

## 2019-06-25 DIAGNOSIS — K21.00 REFLUX ESOPHAGITIS: ICD-10-CM

## 2019-06-25 DIAGNOSIS — B35.1 FUNGAL TOENAIL INFECTION: ICD-10-CM

## 2019-06-25 PROCEDURE — 99999 PR PBB SHADOW E&M-EST. PATIENT-LVL III: CPT | Mod: PBBFAC,HCNC,, | Performed by: NURSE PRACTITIONER

## 2019-06-25 PROCEDURE — 99214 PR OFFICE/OUTPT VISIT, EST, LEVL IV, 30-39 MIN: ICD-10-PCS | Mod: HCNC,S$GLB,, | Performed by: NURSE PRACTITIONER

## 2019-06-25 PROCEDURE — 99214 OFFICE O/P EST MOD 30 MIN: CPT | Mod: HCNC,S$GLB,, | Performed by: NURSE PRACTITIONER

## 2019-06-25 PROCEDURE — 99999 PR PBB SHADOW E&M-EST. PATIENT-LVL III: ICD-10-PCS | Mod: PBBFAC,HCNC,, | Performed by: NURSE PRACTITIONER

## 2019-06-25 RX ORDER — HYDROGEN PEROXIDE 3 %
20 SOLUTION, NON-ORAL MISCELLANEOUS 2 TIMES DAILY
Qty: 30 CAPSULE | Refills: 1 | Status: SHIPPED | OUTPATIENT
Start: 2019-06-25 | End: 2021-10-20

## 2019-06-25 RX ORDER — DOXYCYCLINE 100 MG/1
100 CAPSULE ORAL EVERY 12 HOURS
Qty: 14 CAPSULE | Refills: 0 | Status: SHIPPED | OUTPATIENT
Start: 2019-06-25 | End: 2019-07-02

## 2019-06-25 NOTE — PROGRESS NOTES
Chief Complaint: Urgent Care visit     HPI:   Ms. Rivera presents today for an urgent care visit. She has several complaints today.  Her left nare is red without drainage, she noted itw as running a few days ago and she had been wiping it. Advised her to put neosporin on spot, as it looks to be an abrasion.   She is complaining of pain in her right great toe. It is red, with some nail bed discoloration, could be related to her taxol. Will give her some doxycyline for this as if could be an infection of the nail bed.  She notes some small red sports (3 noted) on her left chest wall, near the rib cage. These appear to be cherry hemangiomas and are non concerning.   Lastly, she is still complaining of burning in her upper abdomen, appears to be reflux, it is worse after chemotherapy and starts to resolve the further out she gets from her chemotherapy treatment. She is on nexium 40 mg daily - will divide the dose into 20 mg BID. She was given Gaviscon by Dr. Serna, but has not used it. I have assured her this is okay to use with her current treatment regimen and she states she will start using after this cycle of chemotherapy. Given she only has two more cycles, one this Friday, I advised her that she can wait to follow up with GI and have her endoscopy once she has completed chemotherapy, which is approximately 4 weeks from now. She is agree able to this.       Oncology History:  Briefly, she is a 63-year-old  female from [place] who was recently diagnosed with breast cancer.  Apparently, she initially felt a mass in late 2017.  A mammogram at that time was read as BI-RADS category 1.  A repeat mammogram in December 2018 was read as BI-RADS category 4 and she underwent a biopsy that showed a carcinoma that was ER negative, MT weakly positive in 5% of the cells and HER-2 2+ by immunohistochemistry, but negative by FISH.  Ki-67 was 5%. She subsequently underwent a biopsy of a palpable lymph node on  02/01/2019, that showed evidence of lymph node metastasis.  She was referred for evaluation for chemotherapy.  Of note, she also had a PET scan on 02/01/2019 that showed an 8 mm right lower lobe pulmonary nodule that was not FDG avid.        Review of Systems    Overall she feels well today. Again she complained of abdominal burning after her last taxol, but that has resolved. She agreed to take the recommended Gaviscon and will follow up with GI once she completes chemotherapy. She will also start taking nexium 20 mg BID instead of 40 mg in the AM. She is concerned about cherry hemangiomas on her left chest wall, reassured her this is nothing to be concerned about. She also complains of right great toe discomfort and pain. Toe is red with some nail bed discoloration.   She denies any fever, depression, easy bruising, chills, night  sweats, weight loss, nausea, vomiting, diarrhea, diplopia, blurred vision, headache, chest pain, palpitations, shortness of breath, breast pain, extremity pain, or difficulty ambulating.  The remainder of the ten-point ROS, including general, skin, lymph, H/N, cardiorespiratory, GI, , Neuro, Endocrine, and psychiatric is negative.     Objective:   Physical Exam       She is alert, oriented to time, place, person, pleasant, well      nourished, in no acute physical distress.  She is accompanied by her .                            VITAL SIGNS:  Reviewed                                      HEENT:  Alopecia is again noted.  There are no oral, lip, gingival, auricular, lid, or conjunctival lesions.  Mucosae are moist and pink, and there is no thrush.  Pupils are equal, reactive to light and accommodation. Positive for left nare redness - no drainage noted Extraocular muscle movements are intact.Dentition is good. There are no oral ulcerations.                                     NECK:  Supple without JVD, adenopathy, or thyromegaly.                       LUNGS:  Clear to  auscultation without wheezing, rales, or rhonchi. Left chest wall near rib cage noted to have 3 small cherry hemangiomas.            CARDIOVASCULAR:  Reveals an S1, S2, no murmurs, no rubs, no gallops.         ABDOMEN:  Soft, with minimal epigastric tenderness on palpation.  No rebound.  She has no organomegaly.  Bowel sounds are present.                                                                     EXTREMITIES:  No cyanosis, clubbing, or edema.                          BREASTS:  There are no masses in the left breast.  She no longer has any palpable adenopathy.  There are no masses in the right breast.  A right sided portacath is identified.                                    LYMPHATIC:  There is no cervical, right axillary, or supraclavicular adenopathy.   SKIN:  Warm and moist, without petechiae, rashes, induration, or ecchymoses. Right great toe redness noted with some toenail discoloration noted.            NEUROLOGIC:  DTRs are 0-1+ bilaterally, symmetrical, motor function is 5/5,  and cranial nerves are  within normal limits.       Assessment:          1. Primary cancer of lower outer quadrant of left female breast     2. Reflux esophagitis     3. Fungal toenail infection          Plan:       1. RTC on Friday to see Dr. Paula with a CBC, CMP, C3 Taxol.   2. Will split nexium dose to 20 mg BID and stated she will start using Gaviscon as that was recommended by GI. Encouraged to follow up with GI for an upper endscopy after completion of chemotherapy as she only has 2 cycles left.   3. Doxycyline x 7 days for possible bacterial infection of the right great toe  -Continue medical management for AA.    Patient is in agreement with the proposed treatment plan. All questions were answered to the patient's satisfaction. Pt knows to call clinic for any new or worsening symptoms and if anything is needed before the next clinic visit.        Winifred Amaya NP  Oncology     Patient dicussed with supervising  physician, Dr. Baker.       I spent 40 minutes (face to face) with the patient, more than 50% was in counseling and coordination of care as detailed above.         Distress Screening Results: Psychosocial Distress screening score of Distress Score: 5 noted and reviewed. No intervention indicated.

## 2019-06-26 ENCOUNTER — TELEPHONE (OUTPATIENT)
Dept: HEMATOLOGY/ONCOLOGY | Facility: CLINIC | Age: 64
End: 2019-06-26

## 2019-06-26 NOTE — TELEPHONE ENCOUNTER
Returned call to patient to discuss her concerns.   Patient reports that she has not started the antibiotic therapy as she is concerned and hesitant due to uncertainty and possible side effects. Explained to patient the rationale of why prescribed by provider.   Asked patient how her toe was appearing today.   Per patient she states the toe looks better as in the redness has improved, though there is still some slight tenderness as this is on the inner of the big toe.   Advised patient that if she felt like holding off until Friday when she sees Dr. Paula for reevaluation she is welcomed to, as it appears the site is getting better.   However, reiterated that if sight appears to worsen at any point including increased pain, redness, swelling, any drainage this would be an indicator that the antibiotics should be initiated. Advised patient to topically apply some neosporin with pain relief cream to the site to see if it helps, as it is potentially an ingrown toenail, as patient admits to recently cutting her nails about a week ago prior to this starting. Reiterated using cream to nostril and or vaseline.   Patient voiced understanding.   Discussed avoiding narrow, tight shoes to prevent toe friction and to keep site open to air, though using proper protection with walking.   She voiced understanding.   Discussed this in detail with YULISSA Vitale prescribing provider and she agrees with instructions provided to patient.

## 2019-06-26 NOTE — TELEPHONE ENCOUNTER
----- Message from Martin Urban sent at 6/26/2019  9:26 AM CDT -----  Contact: Patient  Pt would like to speak with the nurse concerning side effects she's been experiencing from an antibiotic she was prescribed.      Contact:: [phone] or [phone]

## 2019-06-27 ENCOUNTER — TELEPHONE (OUTPATIENT)
Dept: HEMATOLOGY/ONCOLOGY | Facility: CLINIC | Age: 64
End: 2019-06-27

## 2019-06-27 ENCOUNTER — TELEPHONE (OUTPATIENT)
Dept: GASTROENTEROLOGY | Facility: CLINIC | Age: 64
End: 2019-06-27

## 2019-06-27 NOTE — TELEPHONE ENCOUNTER
----- Message from Boris Serna MD sent at 6/25/2019  9:50 PM CDT -----  Her H pylori antibody test was negative and her lipase was normal.  She has not yet scheduled her endoscopy.  Will have MA follow-up with the patient.

## 2019-06-27 NOTE — TELEPHONE ENCOUNTER
Results given to patient per Dr. Serna. Patient verbalized understanding.    Mrs. Rivera will speak with her oncologist provider before she scheduled her EGD.     She has an appointment to see him tomorrow and will let Dr. Serna know when she will proceed with the procedure.

## 2019-06-27 NOTE — TELEPHONE ENCOUNTER
Contacted patient to discuss plan.  Patient wanted to assure nurse practitioner agreed with plan.  Followed up again on her toe.   Patient reports toe continues to improve on redness, though , the pain is not as severe when walking.   She is avoiding tight narrow fitting shoes. She is wearing slippers upon ambulation. Patient was able to go to the mall yesterday with daughter and tolerated fine.  Patient agreed to hold off on the doxy at this time, as she comes in tomorrow for chemo last cycle and will once again have Dr. Paula review and get his suggestion.   Advised patient apply neosporin to the toe site as well for pain, suggested the neosporin with pain relief cream added not just the ointment. Patient verbalized understanding.   Patient also mentioned Dr. Serna, gastro provider did reach out to her yesterday as well, to let her know her labs were fine and that there was no suspicion of an ulcer. The plan to scope is once again to be discussed with the suggesting provider if necesarry. Patient to revisit subject tomorrow with Dr. Paula at appointment.     Patient expressed gratitude.   No further questions/concerns at this time.

## 2019-06-27 NOTE — TELEPHONE ENCOUNTER
----- Message from Martin Urban sent at 6/27/2019  2:42 PM CDT -----  Contact: Patient  Pt would like to speak with the nurse concerning side effects she's been experiencing from an antibiotic she was prescribed. Also wants to know what information did you get from the NP regarding her issue.        Contact:: [phone] or [phone]

## 2019-06-28 ENCOUNTER — OFFICE VISIT (OUTPATIENT)
Dept: HEMATOLOGY/ONCOLOGY | Facility: CLINIC | Age: 64
End: 2019-06-28
Payer: MEDICARE

## 2019-06-28 ENCOUNTER — INFUSION (OUTPATIENT)
Dept: INFUSION THERAPY | Facility: HOSPITAL | Age: 64
End: 2019-06-28
Attending: INTERNAL MEDICINE
Payer: MEDICARE

## 2019-06-28 ENCOUNTER — TELEPHONE (OUTPATIENT)
Dept: SURGERY | Facility: CLINIC | Age: 64
End: 2019-06-28

## 2019-06-28 VITALS
RESPIRATION RATE: 18 BRPM | WEIGHT: 112.63 LBS | HEART RATE: 89 BPM | OXYGEN SATURATION: 98 % | DIASTOLIC BLOOD PRESSURE: 66 MMHG | TEMPERATURE: 98 F | SYSTOLIC BLOOD PRESSURE: 139 MMHG | BODY MASS INDEX: 19.23 KG/M2 | HEIGHT: 64 IN

## 2019-06-28 VITALS
HEART RATE: 76 BPM | TEMPERATURE: 98 F | BODY MASS INDEX: 19.23 KG/M2 | HEIGHT: 64 IN | WEIGHT: 112.63 LBS | DIASTOLIC BLOOD PRESSURE: 65 MMHG | RESPIRATION RATE: 18 BRPM | SYSTOLIC BLOOD PRESSURE: 134 MMHG

## 2019-06-28 DIAGNOSIS — C50.512 PRIMARY CANCER OF LOWER OUTER QUADRANT OF LEFT FEMALE BREAST: ICD-10-CM

## 2019-06-28 DIAGNOSIS — D64.81 ANTINEOPLASTIC CHEMOTHERAPY INDUCED ANEMIA: ICD-10-CM

## 2019-06-28 DIAGNOSIS — C50.512 PRIMARY CANCER OF LOWER OUTER QUADRANT OF LEFT FEMALE BREAST: Primary | ICD-10-CM

## 2019-06-28 DIAGNOSIS — C50.919 BREAST CANCER: ICD-10-CM

## 2019-06-28 DIAGNOSIS — T45.1X5A ANTINEOPLASTIC CHEMOTHERAPY INDUCED ANEMIA: ICD-10-CM

## 2019-06-28 DIAGNOSIS — Z51.11 ENCOUNTER FOR ANTINEOPLASTIC CHEMOTHERAPY: Primary | ICD-10-CM

## 2019-06-28 LAB
ALBUMIN SERPL BCP-MCNC: 4.2 G/DL (ref 3.5–5.2)
ALP SERPL-CCNC: 73 U/L (ref 55–135)
ALT SERPL W/O P-5'-P-CCNC: 21 U/L (ref 10–44)
ANION GAP SERPL CALC-SCNC: 8 MMOL/L (ref 8–16)
AST SERPL-CCNC: 19 U/L (ref 10–40)
BILIRUB SERPL-MCNC: 0.2 MG/DL (ref 0.1–1)
BUN SERPL-MCNC: 13 MG/DL (ref 8–23)
CALCIUM SERPL-MCNC: 10.2 MG/DL (ref 8.7–10.5)
CHLORIDE SERPL-SCNC: 106 MMOL/L (ref 95–110)
CO2 SERPL-SCNC: 25 MMOL/L (ref 23–29)
CREAT SERPL-MCNC: 0.7 MG/DL (ref 0.5–1.4)
ERYTHROCYTE [DISTWIDTH] IN BLOOD BY AUTOMATED COUNT: 14.8 % (ref 11.5–14.5)
EST. GFR  (AFRICAN AMERICAN): >60 ML/MIN/1.73 M^2
EST. GFR  (NON AFRICAN AMERICAN): >60 ML/MIN/1.73 M^2
GLUCOSE SERPL-MCNC: 171 MG/DL (ref 70–110)
HCT VFR BLD AUTO: 33.4 % (ref 37–48.5)
HGB BLD-MCNC: 10.7 G/DL (ref 12–16)
IMM GRANULOCYTES # BLD AUTO: 0.05 K/UL (ref 0–0.04)
MCH RBC QN AUTO: 29.8 PG (ref 27–31)
MCHC RBC AUTO-ENTMCNC: 32 G/DL (ref 32–36)
MCV RBC AUTO: 93 FL (ref 82–98)
NEUTROPHILS # BLD AUTO: 5.2 K/UL (ref 1.8–7.7)
PLATELET # BLD AUTO: 291 K/UL (ref 150–350)
PMV BLD AUTO: 9.9 FL (ref 9.2–12.9)
POTASSIUM SERPL-SCNC: 4 MMOL/L (ref 3.5–5.1)
PROT SERPL-MCNC: 7.5 G/DL (ref 6–8.4)
RBC # BLD AUTO: 3.59 M/UL (ref 4–5.4)
SODIUM SERPL-SCNC: 139 MMOL/L (ref 136–145)
WBC # BLD AUTO: 5.81 K/UL (ref 3.9–12.7)

## 2019-06-28 PROCEDURE — 99215 OFFICE O/P EST HI 40 MIN: CPT | Mod: HCNC,S$GLB,, | Performed by: INTERNAL MEDICINE

## 2019-06-28 PROCEDURE — 1101F PR PT FALLS ASSESS DOC 0-1 FALLS W/OUT INJ PAST YR: ICD-10-PCS | Mod: HCNC,CPTII,S$GLB, | Performed by: INTERNAL MEDICINE

## 2019-06-28 PROCEDURE — 80053 COMPREHEN METABOLIC PANEL: CPT | Mod: HCNC

## 2019-06-28 PROCEDURE — 96375 TX/PRO/DX INJ NEW DRUG ADDON: CPT | Mod: HCNC

## 2019-06-28 PROCEDURE — 36591 DRAW BLOOD OFF VENOUS DEVICE: CPT | Mod: HCNC

## 2019-06-28 PROCEDURE — 99999 PR PBB SHADOW E&M-EST. PATIENT-LVL IV: ICD-10-PCS | Mod: PBBFAC,HCNC,, | Performed by: INTERNAL MEDICINE

## 2019-06-28 PROCEDURE — 96415 CHEMO IV INFUSION ADDL HR: CPT | Mod: HCNC

## 2019-06-28 PROCEDURE — 25000003 PHARM REV CODE 250: Mod: HCNC | Performed by: INTERNAL MEDICINE

## 2019-06-28 PROCEDURE — S0028 INJECTION, FAMOTIDINE, 20 MG: HCPCS | Mod: HCNC | Performed by: INTERNAL MEDICINE

## 2019-06-28 PROCEDURE — A4216 STERILE WATER/SALINE, 10 ML: HCPCS | Mod: HCNC | Performed by: INTERNAL MEDICINE

## 2019-06-28 PROCEDURE — 1101F PT FALLS ASSESS-DOCD LE1/YR: CPT | Mod: HCNC,CPTII,S$GLB, | Performed by: INTERNAL MEDICINE

## 2019-06-28 PROCEDURE — 99999 PR PBB SHADOW E&M-EST. PATIENT-LVL IV: CPT | Mod: PBBFAC,HCNC,, | Performed by: INTERNAL MEDICINE

## 2019-06-28 PROCEDURE — 63600175 PHARM REV CODE 636 W HCPCS: Mod: HCNC | Performed by: INTERNAL MEDICINE

## 2019-06-28 PROCEDURE — 96367 TX/PROPH/DG ADDL SEQ IV INF: CPT | Mod: HCNC

## 2019-06-28 PROCEDURE — 96413 CHEMO IV INFUSION 1 HR: CPT | Mod: HCNC

## 2019-06-28 PROCEDURE — 3008F BODY MASS INDEX DOCD: CPT | Mod: HCNC,CPTII,S$GLB, | Performed by: INTERNAL MEDICINE

## 2019-06-28 PROCEDURE — 99215 PR OFFICE/OUTPT VISIT, EST, LEVL V, 40-54 MIN: ICD-10-PCS | Mod: HCNC,S$GLB,, | Performed by: INTERNAL MEDICINE

## 2019-06-28 PROCEDURE — 85027 COMPLETE CBC AUTOMATED: CPT | Mod: HCNC

## 2019-06-28 PROCEDURE — 3008F PR BODY MASS INDEX (BMI) DOCUMENTED: ICD-10-PCS | Mod: HCNC,CPTII,S$GLB, | Performed by: INTERNAL MEDICINE

## 2019-06-28 RX ORDER — EPINEPHRINE 0.3 MG/.3ML
0.3 INJECTION SUBCUTANEOUS ONCE AS NEEDED
Status: CANCELLED | OUTPATIENT
Start: 2019-06-28

## 2019-06-28 RX ORDER — DIPHENHYDRAMINE HYDROCHLORIDE 50 MG/ML
50 INJECTION INTRAMUSCULAR; INTRAVENOUS ONCE AS NEEDED
Status: CANCELLED | OUTPATIENT
Start: 2019-06-28

## 2019-06-28 RX ORDER — DIPHENHYDRAMINE HYDROCHLORIDE 50 MG/ML
50 INJECTION INTRAMUSCULAR; INTRAVENOUS ONCE AS NEEDED
Status: DISCONTINUED | OUTPATIENT
Start: 2019-06-28 | End: 2019-06-28 | Stop reason: HOSPADM

## 2019-06-28 RX ORDER — FAMOTIDINE 10 MG/ML
20 INJECTION INTRAVENOUS
Status: CANCELLED | OUTPATIENT
Start: 2019-06-28

## 2019-06-28 RX ORDER — HEPARIN 100 UNIT/ML
500 SYRINGE INTRAVENOUS
Status: CANCELLED | OUTPATIENT
Start: 2019-06-28

## 2019-06-28 RX ORDER — SODIUM CHLORIDE 0.9 % (FLUSH) 0.9 %
10 SYRINGE (ML) INJECTION
Status: COMPLETED | OUTPATIENT
Start: 2019-06-28 | End: 2019-06-28

## 2019-06-28 RX ORDER — SODIUM CHLORIDE 0.9 % (FLUSH) 0.9 %
10 SYRINGE (ML) INJECTION
Status: CANCELLED | OUTPATIENT
Start: 2019-06-28

## 2019-06-28 RX ORDER — FAMOTIDINE 10 MG/ML
20 INJECTION INTRAVENOUS
Status: COMPLETED | OUTPATIENT
Start: 2019-06-28 | End: 2019-06-28

## 2019-06-28 RX ORDER — SODIUM CHLORIDE 0.9 % (FLUSH) 0.9 %
10 SYRINGE (ML) INJECTION
Status: DISCONTINUED | OUTPATIENT
Start: 2019-06-28 | End: 2019-06-28 | Stop reason: HOSPADM

## 2019-06-28 RX ORDER — HEPARIN 100 UNIT/ML
500 SYRINGE INTRAVENOUS
Status: COMPLETED | OUTPATIENT
Start: 2019-06-28 | End: 2019-06-28

## 2019-06-28 RX ORDER — HEPARIN 100 UNIT/ML
500 SYRINGE INTRAVENOUS
Status: DISCONTINUED | OUTPATIENT
Start: 2019-06-28 | End: 2019-06-28 | Stop reason: HOSPADM

## 2019-06-28 RX ORDER — EPINEPHRINE 0.3 MG/.3ML
0.3 INJECTION SUBCUTANEOUS ONCE AS NEEDED
Status: DISCONTINUED | OUTPATIENT
Start: 2019-06-28 | End: 2019-06-28 | Stop reason: HOSPADM

## 2019-06-28 RX ADMIN — FAMOTIDINE 20 MG: 10 INJECTION, SOLUTION INTRAVENOUS at 10:06

## 2019-06-28 RX ADMIN — HEPARIN 500 UNITS: 100 SYRINGE at 02:06

## 2019-06-28 RX ADMIN — SODIUM CHLORIDE: 0.9 INJECTION, SOLUTION INTRAVENOUS at 10:06

## 2019-06-28 RX ADMIN — HEPARIN 500 UNITS: 100 SYRINGE at 08:06

## 2019-06-28 RX ADMIN — PACLITAXEL 276 MG: 6 INJECTION, SOLUTION INTRAVENOUS at 11:06

## 2019-06-28 RX ADMIN — Medication 10 ML: at 08:06

## 2019-06-28 RX ADMIN — DIPHENHYDRAMINE HYDROCHLORIDE 50 MG: 50 INJECTION, SOLUTION INTRAMUSCULAR; INTRAVENOUS at 10:06

## 2019-06-28 RX ADMIN — Medication 10 ML: at 02:06

## 2019-06-28 NOTE — PLAN OF CARE
Problem: Adult Inpatient Plan of Care  Goal: Plan of Care Review  Outcome: Ongoing (interventions implemented as appropriate)  Pt tolerated C3 taxol without adverse effects. VSS. Provided AVS & verbalized understanding of RTC date. DC with family via wheelchair.

## 2019-06-28 NOTE — PROGRESS NOTES
Chief Complaint: No chief complaint on file.     HPI   Ms. Rivera presents today for follow up.  She is due for her third cycle of neoadjuvant taxol.   She had been seen earlier this week at the urgent care with a paronychia and had been started on doxyxycline.  Symptoms have improved.     Briefly, she is a 63-year-old  female from [place] who was recently diagnosed with breast cancer.  Apparently, she initially felt a mass in late 2017.  A mammogram at that time was read as BI-RADS category 1.  A repeat mammogram in December 2018 was read as BI-RADS category 4 and she underwent a biopsy that showed a carcinoma that was ER negative, MO weakly positive in 5% of the cells and HER-2 2+ by immunohistochemistry, but negative by FISH.  Ki-67 was 5%. She subsequently underwent a biopsy of a palpable lymph node on 02/01/2019, that showed evidence of lymph node metastasis.  She was referred for evaluation for chemotherapy.  Of note, she also had a PET scan on 02/01/2019 that showed an 8 mm right lower lobe pulmonary nodule that was not FDG avid.  She stated AC chemotherapy 3 months ago. She has completed four cycles so far, and is due for cycle 3 of taxol today.  She has been premedicated with oral dexamethasone.     Her CBC today shows a WBC count of 5,800 /mm3, Hg 10.7 gr/dl, Ht 33.2 % and platelets 291,000 /mm3.    LFTs are normal.        Review of Systems    Overall she feels OK.   She denies any fever, depression, easy bruising, chills, night  sweats, weight loss, nausea, vomiting, diarrhea, diplopia, blurred vision, headache, chest pain, palpitations, shortness of breath, breast pain, extremity pain, or difficulty ambulating.  The remainder of the ten-point ROS, including general, skin, lymph, H/N, cardiorespiratory, GI, , Neuro, Endocrine, and psychiatric is negative.      Objective:   Physical Exam       She is alert, oriented to time, place, person, pleasant, well      nourished, in no acute  physical distress.    She is accompanied by her .                               VITAL SIGNS:  Reviewed                                      HEENT:  Alopecia is again noted.  There are no nasal, oral, lip, gingival, auricular, lid,    or conjunctival lesions.  Mucosae are moist and pink, and there is no        thrush.  Pupils are equal, reactive to light and accommodation.              Extraocular muscle movements are intact.  Dentition is good. There are no oral ulcerations.                                     NECK:  Supple without JVD, adenopathy, or thyromegaly.                       LUNGS:  Clear to auscultation without wheezing, rales, or rhonchi.           CARDIOVASCULAR:  Reveals an S1, S2, no murmurs, no rubs, no gallops.         ABDOMEN:  Soft, with minimal epigastric tenderness on palpation.  No rebound.  She has no organomegaly.  Bowel sounds are    present.                                                                     EXTREMITIES:  No cyanosis, clubbing, or edema.                          BREASTS:  there are no masses in the left breast.  She no longer has any palpable adenopathy.  There are no masses in the right breast A right sided portacath is identified..                                    LYMPHATIC:  There is no cervical, right axillary, or supraclavicular adenopathy.   SKIN:  Warm and moist, without petechiae, rashes, induration, or ecchymoses.           NEUROLOGIC:  DTRs are 0-1+ bilaterally, symmetrical, motor function is 5/5,  and cranial nerves are  within normal limits.     Assessment:       1. Primary cancer of lower outer quadrant of left female breast, s.p 4 cycles of neoadjuvant AC chemotherapy    2. Encounter for chemotherapy     3.    Mild anemia, secondary to chemotherapy.             Plan:        I had a long discussion with her.  She will proceed with the third cycle of neoadjuvant taxol, and will return in 3 weeks with labs to see me prior to her final cycle.    She will  call me in 3 days for an update, and will observe neutropenic precautions.  RTC 3 weeks.  Her multiple questions were answered to her satisfaction.

## 2019-06-28 NOTE — NURSING
0823 pt here for port access and lab draw, port accessed without issue, good blood return, flushed per policy and remained accessed for possible chemo, specimen to lab, no distress noted upon d/c to drs appt

## 2019-06-28 NOTE — TELEPHONE ENCOUNTER
----- Message from Twan Valdes MD sent at 6/28/2019  1:33 PM CDT -----  Elizabeth, could you please bring her back into clinic after the Fourth of July.  [facility]  ----- Message -----  From: Aneesh Baker MD  Sent: 6/28/2019   9:58 AM  To: MD Twan Givens,  7th cycle of neoadjuvant chemotherapy is today.  She will finish on July 19th...    CT

## 2019-07-02 ENCOUNTER — PATIENT MESSAGE (OUTPATIENT)
Dept: SURGERY | Facility: CLINIC | Age: 64
End: 2019-07-02

## 2019-07-02 NOTE — TELEPHONE ENCOUNTER
Patient not available, unable to leave a message for patient, no voicemail set up at this time, patient portal message sent

## 2019-07-03 ENCOUNTER — TELEPHONE (OUTPATIENT)
Dept: SURGERY | Facility: CLINIC | Age: 64
End: 2019-07-03

## 2019-07-03 NOTE — TELEPHONE ENCOUNTER
Returned call to patient, per MD schedule patient for F/U appointment for surgical discussion for completion of chemo prior to last infusion on 7-19-19, appointment scheduled and confirmed

## 2019-07-08 ENCOUNTER — TELEPHONE (OUTPATIENT)
Dept: SURGERY | Facility: CLINIC | Age: 64
End: 2019-07-08

## 2019-07-08 NOTE — TELEPHONE ENCOUNTER
Spoke with the patient, she has been rescheduled for 7/23 at 10:30 with Dr. Valdes. All questions answered at this time. Appt letter sent.    ----- Message from Glendy Rankin sent at 7/8/2019  1:10 PM CDT -----  Contact: pt  Needs Advice    Reason for call: pt calling to speak with Elizabeth. Pt states she cannot make the appt on 07/18 and needs to get another appt date and time. Pt states her daughter which is her ride has to work on 07/18.        Communication Preference: [phone]     Additional Information:

## 2019-07-15 ENCOUNTER — TELEPHONE (OUTPATIENT)
Dept: HEMATOLOGY/ONCOLOGY | Facility: CLINIC | Age: 64
End: 2019-07-15

## 2019-07-15 NOTE — TELEPHONE ENCOUNTER
Returned call to patient to discuss her concerns and symptoms. Patient reports that her toe nail that was once reddened/irritated in her previous visits now appears to be black.   Unsure of what was evaluated at previous visit per note. Patient previously prescribed abx which were held. Patient advised to send provider picture for evaluation.   She plans to text the image to Dr. Paula. Explained will evaluate and contact her with the plan of care.

## 2019-07-15 NOTE — TELEPHONE ENCOUNTER
----- Message from Martin Urban sent at 7/15/2019 11:34 AM CDT -----  Contact: Patient  Pt would like to speak with the nurse to discuss side effects she's experiencing due to chemo. Please contact to assist.        Contact:: [phone]

## 2019-07-19 ENCOUNTER — INFUSION (OUTPATIENT)
Dept: INFUSION THERAPY | Facility: HOSPITAL | Age: 64
End: 2019-07-19
Attending: INTERNAL MEDICINE
Payer: MEDICARE

## 2019-07-19 ENCOUNTER — OFFICE VISIT (OUTPATIENT)
Dept: HEMATOLOGY/ONCOLOGY | Facility: CLINIC | Age: 64
End: 2019-07-19
Payer: MEDICARE

## 2019-07-19 VITALS
SYSTOLIC BLOOD PRESSURE: 156 MMHG | TEMPERATURE: 98 F | BODY MASS INDEX: 18.95 KG/M2 | WEIGHT: 111 LBS | DIASTOLIC BLOOD PRESSURE: 68 MMHG | HEIGHT: 64 IN | HEART RATE: 83 BPM | RESPIRATION RATE: 16 BRPM

## 2019-07-19 DIAGNOSIS — D64.81 ANTINEOPLASTIC CHEMOTHERAPY INDUCED ANEMIA: ICD-10-CM

## 2019-07-19 DIAGNOSIS — Z51.11 ENCOUNTER FOR ANTINEOPLASTIC CHEMOTHERAPY: ICD-10-CM

## 2019-07-19 DIAGNOSIS — C50.919 BREAST CANCER: ICD-10-CM

## 2019-07-19 DIAGNOSIS — C50.512 PRIMARY CANCER OF LOWER OUTER QUADRANT OF LEFT FEMALE BREAST: ICD-10-CM

## 2019-07-19 DIAGNOSIS — Z51.11 ENCOUNTER FOR ANTINEOPLASTIC CHEMOTHERAPY: Primary | ICD-10-CM

## 2019-07-19 DIAGNOSIS — T45.1X5A ANTINEOPLASTIC CHEMOTHERAPY INDUCED ANEMIA: ICD-10-CM

## 2019-07-19 DIAGNOSIS — C50.512 PRIMARY CANCER OF LOWER OUTER QUADRANT OF LEFT FEMALE BREAST: Primary | ICD-10-CM

## 2019-07-19 LAB
ALBUMIN SERPL BCP-MCNC: 4 G/DL (ref 3.5–5.2)
ALP SERPL-CCNC: 76 U/L (ref 55–135)
ALT SERPL W/O P-5'-P-CCNC: 21 U/L (ref 10–44)
ANION GAP SERPL CALC-SCNC: 9 MMOL/L (ref 8–16)
AST SERPL-CCNC: 18 U/L (ref 10–40)
BILIRUB SERPL-MCNC: 0.2 MG/DL (ref 0.1–1)
BUN SERPL-MCNC: 18 MG/DL (ref 8–23)
CALCIUM SERPL-MCNC: 10.1 MG/DL (ref 8.7–10.5)
CHLORIDE SERPL-SCNC: 105 MMOL/L (ref 95–110)
CO2 SERPL-SCNC: 25 MMOL/L (ref 23–29)
CREAT SERPL-MCNC: 0.7 MG/DL (ref 0.5–1.4)
ERYTHROCYTE [DISTWIDTH] IN BLOOD BY AUTOMATED COUNT: 14.7 % (ref 11.5–14.5)
EST. GFR  (AFRICAN AMERICAN): >60 ML/MIN/1.73 M^2
EST. GFR  (NON AFRICAN AMERICAN): >60 ML/MIN/1.73 M^2
GLUCOSE SERPL-MCNC: 199 MG/DL (ref 70–110)
HCT VFR BLD AUTO: 36.1 % (ref 37–48.5)
HGB BLD-MCNC: 11.4 G/DL (ref 12–16)
IMM GRANULOCYTES # BLD AUTO: 0.05 K/UL (ref 0–0.04)
MCH RBC QN AUTO: 29.2 PG (ref 27–31)
MCHC RBC AUTO-ENTMCNC: 31.6 G/DL (ref 32–36)
MCV RBC AUTO: 93 FL (ref 82–98)
NEUTROPHILS # BLD AUTO: 7.6 K/UL (ref 1.8–7.7)
PLATELET # BLD AUTO: 286 K/UL (ref 150–350)
PMV BLD AUTO: 9.5 FL (ref 9.2–12.9)
POTASSIUM SERPL-SCNC: 4 MMOL/L (ref 3.5–5.1)
PROT SERPL-MCNC: 7.4 G/DL (ref 6–8.4)
RBC # BLD AUTO: 3.9 M/UL (ref 4–5.4)
SODIUM SERPL-SCNC: 139 MMOL/L (ref 136–145)
WBC # BLD AUTO: 8.23 K/UL (ref 3.9–12.7)

## 2019-07-19 PROCEDURE — 85027 COMPLETE CBC AUTOMATED: CPT

## 2019-07-19 PROCEDURE — 63600175 PHARM REV CODE 636 W HCPCS: Performed by: INTERNAL MEDICINE

## 2019-07-19 PROCEDURE — 96375 TX/PRO/DX INJ NEW DRUG ADDON: CPT

## 2019-07-19 PROCEDURE — 96415 CHEMO IV INFUSION ADDL HR: CPT

## 2019-07-19 PROCEDURE — 99999 PR PBB SHADOW E&M-EST. PATIENT-LVL II: ICD-10-PCS | Mod: PBBFAC,,, | Performed by: INTERNAL MEDICINE

## 2019-07-19 PROCEDURE — 25000003 PHARM REV CODE 250: Performed by: INTERNAL MEDICINE

## 2019-07-19 PROCEDURE — S0028 INJECTION, FAMOTIDINE, 20 MG: HCPCS | Performed by: INTERNAL MEDICINE

## 2019-07-19 PROCEDURE — 99999 PR PBB SHADOW E&M-EST. PATIENT-LVL II: CPT | Mod: PBBFAC,,, | Performed by: INTERNAL MEDICINE

## 2019-07-19 PROCEDURE — A4216 STERILE WATER/SALINE, 10 ML: HCPCS | Performed by: INTERNAL MEDICINE

## 2019-07-19 PROCEDURE — 99215 PR OFFICE/OUTPT VISIT, EST, LEVL V, 40-54 MIN: ICD-10-PCS | Mod: S$GLB,,, | Performed by: INTERNAL MEDICINE

## 2019-07-19 PROCEDURE — 1101F PR PT FALLS ASSESS DOC 0-1 FALLS W/OUT INJ PAST YR: ICD-10-PCS | Mod: CPTII,S$GLB,, | Performed by: INTERNAL MEDICINE

## 2019-07-19 PROCEDURE — 96367 TX/PROPH/DG ADDL SEQ IV INF: CPT

## 2019-07-19 PROCEDURE — 96413 CHEMO IV INFUSION 1 HR: CPT

## 2019-07-19 PROCEDURE — 99215 OFFICE O/P EST HI 40 MIN: CPT | Mod: S$GLB,,, | Performed by: INTERNAL MEDICINE

## 2019-07-19 PROCEDURE — 1101F PT FALLS ASSESS-DOCD LE1/YR: CPT | Mod: CPTII,S$GLB,, | Performed by: INTERNAL MEDICINE

## 2019-07-19 PROCEDURE — 80053 COMPREHEN METABOLIC PANEL: CPT

## 2019-07-19 RX ORDER — SODIUM CHLORIDE 0.9 % (FLUSH) 0.9 %
10 SYRINGE (ML) INJECTION
Status: CANCELLED | OUTPATIENT
Start: 2019-07-19

## 2019-07-19 RX ORDER — SODIUM CHLORIDE 0.9 % (FLUSH) 0.9 %
10 SYRINGE (ML) INJECTION
Status: DISCONTINUED | OUTPATIENT
Start: 2019-07-19 | End: 2019-07-19 | Stop reason: HOSPADM

## 2019-07-19 RX ORDER — HEPARIN 100 UNIT/ML
500 SYRINGE INTRAVENOUS
Status: CANCELLED | OUTPATIENT
Start: 2019-07-19

## 2019-07-19 RX ORDER — HEPARIN 100 UNIT/ML
500 SYRINGE INTRAVENOUS
Status: DISCONTINUED | OUTPATIENT
Start: 2019-07-19 | End: 2019-07-19 | Stop reason: HOSPADM

## 2019-07-19 RX ORDER — FAMOTIDINE 10 MG/ML
20 INJECTION INTRAVENOUS
Status: COMPLETED | OUTPATIENT
Start: 2019-07-19 | End: 2019-07-19

## 2019-07-19 RX ORDER — DIPHENHYDRAMINE HYDROCHLORIDE 50 MG/ML
50 INJECTION INTRAMUSCULAR; INTRAVENOUS ONCE AS NEEDED
Status: CANCELLED | OUTPATIENT
Start: 2019-07-19

## 2019-07-19 RX ORDER — SODIUM CHLORIDE 0.9 % (FLUSH) 0.9 %
10 SYRINGE (ML) INJECTION
Status: COMPLETED | OUTPATIENT
Start: 2019-07-19 | End: 2019-07-19

## 2019-07-19 RX ORDER — HEPARIN 100 UNIT/ML
500 SYRINGE INTRAVENOUS
Status: COMPLETED | OUTPATIENT
Start: 2019-07-19 | End: 2019-07-19

## 2019-07-19 RX ORDER — EPINEPHRINE 0.3 MG/.3ML
0.3 INJECTION SUBCUTANEOUS ONCE AS NEEDED
Status: DISCONTINUED | OUTPATIENT
Start: 2019-07-19 | End: 2019-07-19 | Stop reason: HOSPADM

## 2019-07-19 RX ORDER — DIPHENHYDRAMINE HYDROCHLORIDE 50 MG/ML
50 INJECTION INTRAMUSCULAR; INTRAVENOUS ONCE AS NEEDED
Status: DISCONTINUED | OUTPATIENT
Start: 2019-07-19 | End: 2019-07-19 | Stop reason: HOSPADM

## 2019-07-19 RX ORDER — EPINEPHRINE 0.3 MG/.3ML
0.3 INJECTION SUBCUTANEOUS ONCE AS NEEDED
Status: CANCELLED | OUTPATIENT
Start: 2019-07-19

## 2019-07-19 RX ORDER — FAMOTIDINE 10 MG/ML
20 INJECTION INTRAVENOUS
Status: CANCELLED | OUTPATIENT
Start: 2019-07-19

## 2019-07-19 RX ADMIN — HEPARIN 500 UNITS: 100 SYRINGE at 01:07

## 2019-07-19 RX ADMIN — DIPHENHYDRAMINE HYDROCHLORIDE 50 MG: 50 INJECTION, SOLUTION INTRAMUSCULAR; INTRAVENOUS at 02:07

## 2019-07-19 RX ADMIN — SODIUM CHLORIDE: 0.9 INJECTION, SOLUTION INTRAVENOUS at 02:07

## 2019-07-19 RX ADMIN — Medication 10 ML: at 01:07

## 2019-07-19 RX ADMIN — HEPARIN 500 UNITS: 100 SYRINGE at 06:07

## 2019-07-19 RX ADMIN — FAMOTIDINE 20 MG: 10 INJECTION, SOLUTION INTRAVENOUS at 02:07

## 2019-07-19 RX ADMIN — PACLITAXEL 276 MG: 6 INJECTION, SOLUTION INTRAVENOUS at 03:07

## 2019-07-19 NOTE — NURSING
Pt here today for lab from port. Port accessed easily, blood return present, not enough to draw back labs. Flushes easily, pt denies pain, no redness or swelling. When pulled back, the waste syringe would fill quickly for about 5 cc then the flow would slow and stop. Port flushed several times. Pt repositioned. Initial blood return would come out strong and the slow to a stop. Labs obtained from R. . Port flushed, hep-locked and secured for chemo treatment.

## 2019-07-19 NOTE — PROGRESS NOTES
Chief Complaint: No chief complaint on file.     HPI   Ms. Rivera presents today for follow up.  She is due for her fourth cycle of neoadjuvant taxol.        Briefly, she is a 63-year-old  female from [place] who was recently diagnosed with breast cancer.  Apparently, she initially felt a mass in late 2017.  A mammogram at that time was read as BI-RADS category 1.  A repeat mammogram in December 2018 was read as BI-RADS category 4 and she underwent a biopsy that showed a carcinoma that was ER negative, AL weakly positive in 5% of the cells and HER-2 2+ by immunohistochemistry, but negative by FISH.  Ki-67 was 5%. She subsequently underwent a biopsy of a palpable lymph node on 02/01/2019, that showed evidence of lymph node metastasis.  She was referred for evaluation for chemotherapy.  Of note, she also had a PET scan on 02/01/2019 that showed an 8 mm right lower lobe pulmonary nodule that was not FDG avid.  She stated AC chemotherapy 3 months ago. She has completed four cycles so far, and is due for cycle 3 of taxol today.  She has been premedicated with oral dexamethasone.     Her CBC today shows a WBC count of 8,200 /mm3, Hg 11.4 gr/dl, Ht 36.4 % and platelets 286,000 /mm3.    LFTs are normal.        Review of Systems    Overall she feels OK.   She denies any fever, depression, easy bruising, chills, night  sweats, weight loss, nausea, vomiting, diarrhea, diplopia, blurred vision, headache, chest pain, palpitations, shortness of breath, breast pain, extremity pain, or difficulty ambulating.  The remainder of the ten-point ROS, including general, skin, lymph, H/N, cardiorespiratory, GI, , Neuro, Endocrine, and psychiatric is negative.      Objective:   Physical Exam       She is alert, oriented to time, place, person, pleasant, well      nourished, in no acute physical distress.    She is accompanied by her .                               VITAL SIGNS:  Reviewed                                       HEENT:  Alopecia is again noted.  There are no nasal, oral, lip, gingival, auricular, lid,    or conjunctival lesions.  Mucosae are moist and pink, and there is no        thrush.  Pupils are equal, reactive to light and accommodation.              Extraocular muscle movements are intact.  Dentition is good. There are no oral ulcerations.                                     NECK:  Supple without JVD, adenopathy, or thyromegaly.                       LUNGS:  Clear to auscultation without wheezing, rales, or rhonchi.           CARDIOVASCULAR:  Reveals an S1, S2, no murmurs, no rubs, no gallops.         ABDOMEN:  Soft, with minimal epigastric tenderness on palpation.  No rebound.  She has no organomegaly.  Bowel sounds are    present.                                                                     EXTREMITIES:  No cyanosis, clubbing, or edema.                          BREASTS:  there are no masses in the left breast.  She no longer has any palpable adenopathy.  There are no masses in the right breast A right sided portacath is identified..                                    LYMPHATIC:  There is no cervical, right axillary, or supraclavicular adenopathy.   SKIN:  Warm and moist, without petechiae, rashes, induration, or ecchymoses.           NEUROLOGIC:  DTRs are 0-1+ bilaterally, symmetrical, motor function is 5/5,  and cranial nerves are  within normal limits.     Assessment:       1. Primary cancer of lower outer quadrant of left female breast, s.p 4 cycles of neoadjuvant AC chemotherapy    2. Encounter for chemotherapy     3.    Mild anemia, secondary to chemotherapy.             Plan:        I had a long discussion with her.  She will proceed with the final cycle of neoadjuvant taxol.  Assuming she will have surgery in 4 weeks, I will see her in 6 weeks for follow up.  Her multiple questions were answered to her satisfaction.

## 2019-07-20 ENCOUNTER — HOSPITAL ENCOUNTER (EMERGENCY)
Facility: HOSPITAL | Age: 64
Discharge: HOME | End: 2019-07-22
Attending: EMERGENCY MEDICINE
Payer: MEDICARE

## 2019-07-20 ENCOUNTER — TELEPHONE (OUTPATIENT)
Dept: INFUSION THERAPY | Facility: HOSPITAL | Age: 64
End: 2019-07-20

## 2019-07-20 VITALS
HEART RATE: 79 BPM | SYSTOLIC BLOOD PRESSURE: 149 MMHG | HEIGHT: 64 IN | DIASTOLIC BLOOD PRESSURE: 74 MMHG | WEIGHT: 111 LBS | TEMPERATURE: 98 F | RESPIRATION RATE: 18 BRPM | OXYGEN SATURATION: 100 % | BODY MASS INDEX: 18.95 KG/M2

## 2019-07-20 DIAGNOSIS — Z95.828 PORT-A-CATH IN PLACE: Primary | ICD-10-CM

## 2019-07-20 DIAGNOSIS — L53.9 ERYTHEMA: ICD-10-CM

## 2019-07-20 DIAGNOSIS — C50.919 MALIGNANT NEOPLASM OF FEMALE BREAST, UNSPECIFIED ESTROGEN RECEPTOR STATUS, UNSPECIFIED LATERALITY, UNSPECIFIED SITE OF BREAST: ICD-10-CM

## 2019-07-20 PROCEDURE — 99283 EMERGENCY DEPT VISIT LOW MDM: CPT | Mod: 25

## 2019-07-20 PROCEDURE — 99284 PR EMERGENCY DEPT VISIT,LEVEL IV: ICD-10-PCS | Mod: ,,, | Performed by: EMERGENCY MEDICINE

## 2019-07-20 PROCEDURE — 99284 EMERGENCY DEPT VISIT MOD MDM: CPT | Mod: ,,, | Performed by: EMERGENCY MEDICINE

## 2019-07-20 NOTE — TELEPHONE ENCOUNTER
Received a call from Dr. Funez to contact the patient regarding c/o port redness. Patient reports redness at the site of need placement only. Patient denies swelling, tenderness, drainage, odor, or fevers. Patient's  sent picture for clarity - picture showed irritation at injection site (dark pink). Patient advised to apply cold compresses per [facility] literature. Patient advised to seek medical attention at ER if patient were to note swelling, pain, drainage at port site or if she develops a fever. Patient &  verbalized understanding of information and will contact clinic on Monday if redness not improved.

## 2019-07-21 NOTE — ED PROVIDER NOTES
Encounter Date: 7/20/2019    SCRIBE #1 NOTE: I, Blank Garcia, am scribing for, and in the presence of, Regan Khan MD. Other sections scribed: HPI, ROS.       History     Chief Complaint   Patient presents with    Vascular Access Problem     Patient has port-o-cath that is red and mildly swollen since this am. No streaking noted around site. Last chemo treatment was yesterday. Patient has hx of breast cancer. Denies fever at home.      Time patient was seen by the provider: 11:05 PM      Ms. Rivera is a 64 y.o. female with breast cancer on chemo, mitral valve prolapse, and thyroid disease, who presents to the ED with a complaint of erythema, edema, and discoloration of her port-o-cath that started prior to arrival. Patient states she was receiving a chemo treatment when she was experiencing difficulties draining. Patient's  states that the port is usually back to her natural skin tone the next day accessing the port. Patient has had this port since February 2019. Patient denies fevers, chills, chest pain, SOB, numbness, tingling, weakness, abd pain.      The history is provided by the patient.     Review of patient's allergies indicates:  No Known Allergies  Past Medical History:   Diagnosis Date    Breast cancer 01/2019    left    Mitral valve prolapse     Thyroid disease      Past Surgical History:   Procedure Laterality Date    BREAST BIOPSY Left 01/2019    Ear drum surgery      INNER EAR SURGERY Right     INSERTION, PORT-A-CATH Right 2/8/2019    Performed by Twan Valdes MD at [facility] OR [address] [place] [address]    tonsillectomy      TONSILLECTOMY       Family History   Problem Relation Age of Onset    Breast cancer Sister 68    Breast cancer Paternal Aunt     Cancer Father         prostate cancer    Thyroid disease Daughter     Breast cancer Maternal Aunt     Colon cancer Neg Hx     Ovarian cancer Neg Hx     Stroke Neg Hx     Diabetes Neg Hx      Social History     Tobacco Use     Smoking status: Never Smoker    Smokeless tobacco: Never Used   Substance Use Topics    Alcohol use: Yes     Comment: Seldom    Drug use: No     Review of Systems   Constitutional: Negative for fever.   HENT: Negative for sore throat.    Respiratory: Negative for shortness of breath.    Cardiovascular: Negative for chest pain.   Gastrointestinal: Negative for nausea.   Genitourinary: Negative for dysuria.   Musculoskeletal: Negative for back pain.        Negative for edema of her arm.    Skin: Negative for rash.        Positive for edema of her port. Positive for erythema of her port. Positive for discoloration of her port.    Neurological: Negative for weakness and numbness.        Negative for tingling.    Hematological: Does not bruise/bleed easily.       Physical Exam     Initial Vitals [07/20/19 2148]   BP Pulse Resp Temp SpO2   (!) 149/74 79 18 98.4 °F (36.9 °C) 100 %      MAP       --         Physical Exam    Nursing note and vitals reviewed.  Constitutional: She appears well-developed. She is not diaphoretic. No distress.   Cachectic in appearance.   HENT:   Head: Normocephalic and atraumatic.   Right Ear: External ear normal.   Left Ear: External ear normal.   Facemask in place.   Neck: Neck supple.   Cardiovascular: Normal rate, regular rhythm, normal heart sounds and intact distal pulses.   Pulmonary/Chest: Breath sounds normal. No respiratory distress. She has no wheezes. She has no rhonchi. She has no rales. She exhibits no tenderness.   Abdominal: Soft. She exhibits no distension. There is no tenderness.   Lymphadenopathy:     She has no cervical adenopathy.   Neurological: She is alert and oriented to person, place, and time. GCS score is 15. GCS eye subscore is 4. GCS verbal subscore is 5. GCS motor subscore is 6.   Skin: Skin is warm. Capillary refill takes less than 2 seconds. No rash noted.   Right sided port-a-cath in place. Mild erythema overlying access sight without edema, drainage or  tenderness. See image below.   Psychiatric: She has a normal mood and affect.             ED Course   Procedures  Labs Reviewed - No data to display       Imaging Results          X-Ray Chest PA And Lateral (In process)                  Medical Decision Making:   History:   Old Medical Records: I decided to obtain old medical records.  Old Records Summarized: records from clinic visits.  Independently Interpreted Test(s):   I have ordered and independently interpreted X-rays - see summary below.       <> Summary of X-Ray Reading(s): CXR viewed. No acute infiltrate, effusion or PTX on my read. Right sided port-a-cath in place without obvious discontinuity.  Clinical Tests:   Radiological Study: Ordered and Reviewed  ED Management:  Vitals normal. Afebrile. Well appearing. Here w/ concerns for erythema. Had more attempts to access port yesterday than usual, and it was slightly more painful on removal than usual. No fevers, SOB, purulent drainage. While she is immunosuppressed, clinically does not appear infected. CXR normal. Will treat w/ supportive care for now. Advised to f/u w/ PCP as need. Strict return precautions provided and discussed. Stable for discharge.            Scribe Attestation:   Scribe #1: I performed the above scribed service and the documentation accurately describes the services I performed. I attest to the accuracy of the note.               Clinical Impression:       ICD-10-CM ICD-9-CM   1. Port-A-Cath in place Z95.828 V45.89   2. Erythema L53.9 695.9   3. Malignant neoplasm of female breast, unspecified estrogen receptor status, unspecified laterality, unspecified site of breast C50.919 174.9         Disposition:   Disposition: Discharged  Condition: Stable                        Regan Khan MD  07/22/19 0211

## 2019-07-21 NOTE — DISCHARGE INSTRUCTIONS
Your chest Xray was normal today. As discussed, it appears that your erythema is likely associated with having been accessed multiple times yesterday. It does not appear to be infected currently. If you start to have fevers > 100.4, worsening redness or pain, thick white drainage, worsening swelling, etc return to ED for repeat evaluation.      Our goal in the emergency department is to always give you outstanding care and exceptional service. You may receive a survey by mail or e-mail in the next week regarding your experience in our ED. We would greatly appreciate your completing and returning the survey. Your feedback provides us with a way to recognize our staff who give very good care and it helps us learn how to improve when your experience was below our aspiration of excellence.

## 2019-07-21 NOTE — ED NOTES
"Pt presents to ED with suspected vascular access problem. Pt states that she received her last round of chemo yesterday and when she got home she noticed that the skin over her port o cath was red and appeared to be "puffy". Pt states that usually after chemo she can see a small red dot on her skin where the port o cath was accessed, but has never had inflammation to the skin after site has been accessed. Pt denied pain when I touched the skin over her port o cath.   "

## 2019-07-23 ENCOUNTER — OFFICE VISIT (OUTPATIENT)
Dept: SURGERY | Facility: CLINIC | Age: 64
End: 2019-07-23
Payer: MEDICARE

## 2019-07-23 ENCOUNTER — LAB VISIT (OUTPATIENT)
Dept: LAB | Facility: HOSPITAL | Age: 64
End: 2019-07-23
Attending: SURGERY
Payer: MEDICARE

## 2019-07-23 VITALS
SYSTOLIC BLOOD PRESSURE: 126 MMHG | HEIGHT: 64 IN | WEIGHT: 109.44 LBS | BODY MASS INDEX: 18.68 KG/M2 | DIASTOLIC BLOOD PRESSURE: 72 MMHG | TEMPERATURE: 98 F | HEART RATE: 76 BPM

## 2019-07-23 DIAGNOSIS — C50.512 MALIGNANT NEOPLASM OF LOWER-OUTER QUADRANT OF LEFT BREAST OF FEMALE, ESTROGEN RECEPTOR NEGATIVE: Primary | ICD-10-CM

## 2019-07-23 DIAGNOSIS — C50.512 MALIGNANT NEOPLASM OF LOWER-OUTER QUADRANT OF LEFT BREAST OF FEMALE, ESTROGEN RECEPTOR NEGATIVE: ICD-10-CM

## 2019-07-23 DIAGNOSIS — Z17.1 MALIGNANT NEOPLASM OF LOWER-OUTER QUADRANT OF LEFT BREAST OF FEMALE, ESTROGEN RECEPTOR NEGATIVE: ICD-10-CM

## 2019-07-23 DIAGNOSIS — C50.512 PRIMARY CANCER OF LOWER OUTER QUADRANT OF LEFT FEMALE BREAST: ICD-10-CM

## 2019-07-23 DIAGNOSIS — Z17.1 MALIGNANT NEOPLASM OF LOWER-OUTER QUADRANT OF LEFT BREAST OF FEMALE, ESTROGEN RECEPTOR NEGATIVE: Primary | ICD-10-CM

## 2019-07-23 DIAGNOSIS — Z01.818 PRE-OP TESTING: ICD-10-CM

## 2019-07-23 PROCEDURE — 1101F PR PT FALLS ASSESS DOC 0-1 FALLS W/OUT INJ PAST YR: ICD-10-PCS | Mod: CPTII,S$GLB,, | Performed by: SURGERY

## 2019-07-23 PROCEDURE — 99215 PR OFFICE/OUTPT VISIT, EST, LEVL V, 40-54 MIN: ICD-10-PCS | Mod: S$GLB,,, | Performed by: SURGERY

## 2019-07-23 PROCEDURE — 99215 OFFICE O/P EST HI 40 MIN: CPT | Mod: S$GLB,,, | Performed by: SURGERY

## 2019-07-23 PROCEDURE — 99999 PR PBB SHADOW E&M-EST. PATIENT-LVL IV: CPT | Mod: PBBFAC,,, | Performed by: SURGERY

## 2019-07-23 PROCEDURE — 1101F PT FALLS ASSESS-DOCD LE1/YR: CPT | Mod: CPTII,S$GLB,, | Performed by: SURGERY

## 2019-07-23 PROCEDURE — 36415 COLL VENOUS BLD VENIPUNCTURE: CPT

## 2019-07-23 PROCEDURE — 99999 PR PBB SHADOW E&M-EST. PATIENT-LVL IV: ICD-10-PCS | Mod: PBBFAC,,, | Performed by: SURGERY

## 2019-07-23 NOTE — PROGRESS NOTES
REFERRING PROVIDER: Salo Vera MD  [address] [facility] [address] [facility] [address]  [address]  [place] [address]     CHIEF COMPLAINT: left breast cancer     Subjective:      Radha Rivera is a 63 y.o. 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 A port a cath was placed on 02/08/2019.    Interval history  The patient has now completed neoadjuvant chemotherapy with her last treatment of Taxol on 07/19/2019. She states she has had significant side effects from Taxol including gastrointestinal cramping as well as neuropathy. She is yet to undergo genetic testing.          Past Medical History:   Diagnosis Date    Mitral valve prolapse      Thyroid disease              Past Surgical History:   Procedure Laterality Date    Ear drum surgery        INNER EAR SURGERY Right      tonsillectomy        TONSILLECTOMY                 Current Outpatient Medications on File Prior to Visit   Medication Sig Dispense Refill    levothyroxine (SYNTHROID) 25 MCG tablet 50 mcg.         levothyroxine (SYNTHROID) 50 MCG tablet          vitamin D 1000 units Tab Take 185 mg by mouth once daily.        aspirin 81 MG Chew Take 81 mg by mouth once daily.        meloxicam (MOBIC) 15 MG tablet          naproxen (NAPROSYN) 500 MG tablet          tramadol (ULTRAM) 50 mg tablet            No current facility-administered medications on file prior to visit.       Social History               Socioeconomic History    Marital status:        Spouse name: Not on file    Number of children: Not on file    Years of education: Not on file    Highest education level: Not on file   Social Needs    Financial resource strain: Not on file    Food insecurity - worry: Not on file    Food insecurity - inability: Not on file    Transportation needs - medical: Not on file    Transportation needs - non-medical: Not on file  "  Occupational History    Not on file   Tobacco Use    Smoking status: Never Smoker   Substance and Sexual Activity    Alcohol use: Yes       Comment: Seldom    Drug use: No    Sexual activity: Yes       Partners: Male       Birth control/protection: Post-menopausal   Other Topics Concern    Not on file   Social History Narrative    Not on file               Family History   Problem Relation Age of Onset    Breast cancer Sister 68    Breast cancer Paternal Aunt      Cancer Father           prostate cancer    Thyroid disease Daughter      Breast cancer Maternal Aunt      Colon cancer Neg Hx      Ovarian cancer Neg Hx      Stroke Neg Hx      Diabetes Neg Hx           Review of Systems  Review of Systems  General: no fevers or chills.  Cardio: No palpitations.  Resp: No SOB or wheezing.  GIT: Positive for cramping.  Neuro: Positive for neuropathy. Negative for lightheadedness.    Objective:   PHYSICAL EXAM:  BP (!) 144/63 (BP Location: Left arm, Patient Position: Sitting, BP Method: Medium (Automatic))   Pulse 68   Temp 97.7 °F (36.5 °C) (Oral)   Resp 18   Ht 5' 4" (1.626 m)   Wt 54.5 kg (120 lb 2.4 oz)   BMI 20.62 kg/m²      Physical Exam  General: alert and cooperative  Skin: No significant mass felt in either breast. No redness or swelling. No discharge. Skin is warm and dry.  Resp: Normal breath sounds. No wheeze or stridor.  Cardio: Normal S1/S2. No murmurs or rubs. No added sounds.  GIT: No abdominal tenderness or guarding.  MSK: normal ROM  Neuro: alert and oriented    Assessment:  63 yo female has now completed neoadjuvant chemotherapy for L invasive ductal carcinoma with axillary node involvement.    Plan:  - Genetic testing ordered. Patient will complete testing today.  - MRI to assess response to chemo in the beginning of August  - Lumpectomy with SLNB scheduled for 08/21/2019. Seed will be placed around 08/14/2019 in breast and axilla. Risks and benefits discussed. Consent signed in " the office today.    I have personally taken the history and examined this patient and agree with the resident's note as stated above.  She presents again with her  Criss and her daughter Thalia for a follow-up consultation after completing her neoadjuvant chemotherapy with Dr. Baker on 07/19/2019.  The patient was treated with 4 cycles of Adriamycin and Cytoxan followed by 4 cycles of taxane therapy every 3 weeks.  She completed her chemotherapy on 07/19/2019.  Her tumor was estrogen receptor negative, progesterone receptor weakly positive with 5% staining, and HER2 Antoinette negative, thus making this essentially a triple negative cancer behaviorally.  After initial presentation and workup she was found to have a positive left axillary node on needle biopsy of the axilla which thus prompted the neoadjuvant chemotherapy approach.    She has had an excellent response and I feel that her clinical breast exam today is within normal limits with no suspicious masses nodules or densities or lymphadenopathy palpable on exam.  She has achieved an excellent clinical response and hopefully will have achieved a complete radiographic and pathologic response as well.    I will order a breast MRI for early August and we will also order her genetic testing today.    Since she has had a complete clinical response she would certainly like to try and attempt breast conservation surgery and sentinel lymph node biopsy.  We will have a Magseed placed in both the primary tumor site in the lateral left breast as well as in the left axillary lymph node for anticipated targeted axillary dissection/sentinel lymph node biopsy.  She understands the role for postoperative radiotherapy with primary breast treatment being breast conservation.  We discussed the NSABP B51 trial,and if her sentinel node has been rendered negative with preoperative chemotherapy, he perhaps she could avoid complete axillary dissection and axillary  radiotherapy with breast conservation.    She is been tentatively scheduled for surgery on Wednesday 08/21/2019.    We will lasjamel has for 2 seeds to be placed, again 1 in the breast and 1 in the axilla, for the targeted axillary dissection/sentinel lymph node biopsy.  Hopefully she will be a candidate for the NSABP B51 trial.  The [place] trial still currently remains closed for accrual.    We will review her MRI & genetic test results prior to surgery, and depending on those results, surgical plan & recommend recommendations may be altered.    Time spent with the patient and her family today was 60 min with greater than 50% of the time counseling.

## 2019-07-23 NOTE — H&P (VIEW-ONLY)
REFERRING PROVIDER: Salo Vera MD  [address]  [address]  [place] [address]     CHIEF COMPLAINT: left breast cancer     Subjective:      Radha Rivera is a 63 y.o. 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 A port a cath was placed on 02/08/2019.    Interval history  The patient has now completed neoadjuvant chemotherapy with her last treatment of Taxol on 07/19/2019. She states she has had significant side effects from Taxol including gastrointestinal cramping as well as neuropathy. She is yet to undergo genetic testing.          Past Medical History:   Diagnosis Date    Mitral valve prolapse      Thyroid disease              Past Surgical History:   Procedure Laterality Date    Ear drum surgery        INNER EAR SURGERY Right      tonsillectomy        TONSILLECTOMY                 Current Outpatient Medications on File Prior to Visit   Medication Sig Dispense Refill    levothyroxine (SYNTHROID) 25 MCG tablet 50 mcg.         levothyroxine (SYNTHROID) 50 MCG tablet          vitamin D 1000 units Tab Take 185 mg by mouth once daily.        aspirin 81 MG Chew Take 81 mg by mouth once daily.        meloxicam (MOBIC) 15 MG tablet          naproxen (NAPROSYN) 500 MG tablet          tramadol (ULTRAM) 50 mg tablet            No current facility-administered medications on file prior to visit.       Social History               Socioeconomic History    Marital status:        Spouse name: Not on file    Number of children: Not on file    Years of education: Not on file    Highest education level: Not on file   Social Needs    Financial resource strain: Not on file    Food insecurity - worry: Not on file    Food insecurity - inability: Not on file    Transportation needs - medical: Not on file    Transportation needs - non-medical: Not on file  "  Occupational History    Not on file   Tobacco Use    Smoking status: Never Smoker   Substance and Sexual Activity    Alcohol use: Yes       Comment: Seldom    Drug use: No    Sexual activity: Yes       Partners: Male       Birth control/protection: Post-menopausal   Other Topics Concern    Not on file   Social History Narrative    Not on file               Family History   Problem Relation Age of Onset    Breast cancer Sister 68    Breast cancer Paternal Aunt      Cancer Father           prostate cancer    Thyroid disease Daughter      Breast cancer Maternal Aunt      Colon cancer Neg Hx      Ovarian cancer Neg Hx      Stroke Neg Hx      Diabetes Neg Hx           Review of Systems  Review of Systems  General: no fevers or chills.  Cardio: No palpitations.  Resp: No SOB or wheezing.  GIT: Positive for cramping.  Neuro: Positive for neuropathy. Negative for lightheadedness.    Objective:   PHYSICAL EXAM:  BP (!) 144/63 (BP Location: Left arm, Patient Position: Sitting, BP Method: Medium (Automatic))   Pulse 68   Temp 97.7 °F (36.5 °C) (Oral)   Resp 18   Ht 5' 4" (1.626 m)   Wt 54.5 kg (120 lb 2.4 oz)   BMI 20.62 kg/m²      Physical Exam  General: alert and cooperative  Skin: No significant mass felt in either breast. No redness or swelling. No discharge. Skin is warm and dry.  Resp: Normal breath sounds. No wheeze or stridor.  Cardio: Normal S1/S2. No murmurs or rubs. No added sounds.  GIT: No abdominal tenderness or guarding.  MSK: normal ROM  Neuro: alert and oriented    Assessment:  65 yo female has now completed neoadjuvant chemotherapy for L invasive ductal carcinoma with axillary node involvement.    Plan:  - Genetic testing ordered. Patient will complete testing today.  - MRI to assess response to chemo in the beginning of August  - Lumpectomy with SLNB scheduled for 08/21/2019. Seed will be placed around 08/14/2019 in breast and axilla. Risks and benefits discussed. Consent signed in " the office today.    I have personally taken the history and examined this patient and agree with the resident's note as stated above.  She presents again with her  Criss and her daughter Thalia for a follow-up consultation after completing her neoadjuvant chemotherapy with Dr. Baker on 07/19/2019.  The patient was treated with 4 cycles of Adriamycin and Cytoxan followed by 4 cycles of taxane therapy every 3 weeks.  She completed her chemotherapy on 07/19/2019.  Her tumor was estrogen receptor negative, progesterone receptor weakly positive with 5% staining, and HER2 Antoinette negative, thus making this essentially a triple negative cancer behaviorally.  After initial presentation and workup she was found to have a positive left axillary node on needle biopsy of the axilla which thus prompted the neoadjuvant chemotherapy approach.    She has had an excellent response and I feel that her clinical breast exam today is within normal limits with no suspicious masses nodules or densities or lymphadenopathy palpable on exam.  She has achieved an excellent clinical response and hopefully will have achieved a complete radiographic and pathologic response as well.    I will order a breast MRI for early August and we will also order her genetic testing today.    Since she has had a complete clinical response she would certainly like to try and attempt breast conservation surgery and sentinel lymph node biopsy.  We will have a Magseed placed in both the primary tumor site in the lateral left breast as well as in the left axillary lymph node for anticipated targeted axillary dissection/sentinel lymph node biopsy.  She understands the role for postoperative radiotherapy with primary breast treatment being breast conservation.  We discussed the NSABP B51 trial,and if her sentinel node has been rendered negative with preoperative chemotherapy, he perhaps she could avoid complete axillary dissection and axillary  radiotherapy with breast conservation.    She is been tentatively scheduled for surgery on Wednesday 08/21/2019.    We will lasjamel has for 2 seeds to be placed, again 1 in the breast and 1 in the axilla, for the targeted axillary dissection/sentinel lymph node biopsy.  Hopefully she will be a candidate for the NSABP B51 trial.  The [place] trial still currently remains closed for accrual.    We will review her MRI & genetic test results prior to surgery, and depending on those results, surgical plan & recommend recommendations may be altered.    Time spent with the patient and her family today was 60 min with greater than 50% of the time counseling.

## 2019-08-05 ENCOUNTER — HOSPITAL ENCOUNTER (OUTPATIENT)
Dept: RADIOLOGY | Facility: HOSPITAL | Age: 64
Discharge: HOME | End: 2019-08-05
Attending: SURGERY
Payer: MEDICARE

## 2019-08-05 DIAGNOSIS — Z17.1 MALIGNANT NEOPLASM OF LOWER-OUTER QUADRANT OF LEFT BREAST OF FEMALE, ESTROGEN RECEPTOR NEGATIVE: ICD-10-CM

## 2019-08-05 DIAGNOSIS — C50.512 MALIGNANT NEOPLASM OF LOWER-OUTER QUADRANT OF LEFT BREAST OF FEMALE, ESTROGEN RECEPTOR NEGATIVE: ICD-10-CM

## 2019-08-05 PROCEDURE — 77049 MRI BREAST C-+ W/CAD BI: CPT | Mod: 26,,, | Performed by: RADIOLOGY

## 2019-08-05 PROCEDURE — 77049 MRI BREAST C-+ W/CAD BI: CPT | Mod: TC

## 2019-08-05 PROCEDURE — 25500020 PHARM REV CODE 255: Performed by: SURGERY

## 2019-08-05 PROCEDURE — A9577 INJ MULTIHANCE: HCPCS | Performed by: SURGERY

## 2019-08-05 PROCEDURE — 77049 MRI BREAST W/WO CONTRAST, W/CAD, BILATERAL: ICD-10-PCS | Mod: 26,,, | Performed by: RADIOLOGY

## 2019-08-05 RX ADMIN — GADOBENATE DIMEGLUMINE 10 ML: 529 INJECTION, SOLUTION INTRAVENOUS at 09:08

## 2019-08-13 ENCOUNTER — TELEPHONE (OUTPATIENT)
Dept: SURGERY | Facility: CLINIC | Age: 64
End: 2019-08-13

## 2019-08-13 ENCOUNTER — TELEPHONE (OUTPATIENT)
Dept: HEMATOLOGY/ONCOLOGY | Facility: CLINIC | Age: 64
End: 2019-08-13

## 2019-08-13 NOTE — TELEPHONE ENCOUNTER
Spoke with patient on phone regarding genetic testing results per Dr. Valdes's request.  Discussed that her genetic testing came back negative, meaning that no clinically significant mutation was identified in any gene tested; additionally, no variant of uncertain significance was identified.  Discussed that this does not completely rule out the possibility of a hereditary cancer.  Patient was advised to keep any appointments related to her breast cancer.  Patient was informed that a copy of her genetic testing results will be mailed to her and to contact us if not received.  Provided patient with my contact information.  Questions were encouraged and answered to patient's satisfaction, and patient verbalized understanding of all information.

## 2019-08-13 NOTE — TELEPHONE ENCOUNTER
----- Message from Shannon Shell sent at 8/13/2019  2:28 PM CDT -----  Contact: Pt  Pt called to speak with nurse about test results of MRI   Callback#[phone]  Thank You  STEVIE Shell

## 2019-08-13 NOTE — TELEPHONE ENCOUNTER
Contacted patient to discuss her imaging findings.   She wanted to be sure that the imaging studies were reviewed.   She wanted to be sure okay to proceed with the left breast mastectomy.   Explained that this is ideal to decrease the risk of recurrence. She is to come back to be examined at the end of the month with Dr. Paula s/p partial mastectomy of left breast.   Explained that we will continue to follow and order appropriate imaging and complete exams to assure no new issues.     She expressed gratitude.

## 2019-08-14 ENCOUNTER — HOSPITAL ENCOUNTER (OUTPATIENT)
Dept: CARDIOLOGY | Facility: CLINIC | Age: 64
Discharge: HOME | End: 2019-08-14
Payer: MEDICARE

## 2019-08-14 ENCOUNTER — HOSPITAL ENCOUNTER (OUTPATIENT)
Dept: RADIOLOGY | Facility: HOSPITAL | Age: 64
Discharge: HOME | End: 2019-08-14
Attending: SURGERY
Payer: MEDICARE

## 2019-08-14 DIAGNOSIS — Z17.1 MALIGNANT NEOPLASM OF LOWER-OUTER QUADRANT OF LEFT BREAST OF FEMALE, ESTROGEN RECEPTOR NEGATIVE: ICD-10-CM

## 2019-08-14 DIAGNOSIS — C50.512 MALIGNANT NEOPLASM OF LOWER-OUTER QUADRANT OF LEFT BREAST OF FEMALE, ESTROGEN RECEPTOR NEGATIVE: ICD-10-CM

## 2019-08-14 DIAGNOSIS — C50.919 INVASIVE DUCTAL CARCINOMA OF BREAST: ICD-10-CM

## 2019-08-14 DIAGNOSIS — C50.912 INVASIVE DUCTAL CARCINOMA OF BREAST, LEFT: ICD-10-CM

## 2019-08-14 DIAGNOSIS — Z01.818 PRE-OP TESTING: ICD-10-CM

## 2019-08-14 PROCEDURE — 19285 US LEFT MAGSEED LOCALIZATION: ICD-10-PCS | Mod: LT,,, | Performed by: RADIOLOGY

## 2019-08-14 PROCEDURE — 19286 PERQ DEV BREAST ADD US IMAG: CPT | Mod: LT,,, | Performed by: RADIOLOGY

## 2019-08-14 PROCEDURE — 19286 US LEFT MAGSEED EA ADDL: ICD-10-PCS | Mod: LT,,, | Performed by: RADIOLOGY

## 2019-08-14 PROCEDURE — A4648 IMPLANTABLE TISSUE MARKER: HCPCS | Mod: PO

## 2019-08-14 PROCEDURE — 93010 ELECTROCARDIOGRAM REPORT: CPT | Mod: S$GLB,,, | Performed by: INTERNAL MEDICINE

## 2019-08-14 PROCEDURE — 93005 EKG 12-LEAD: ICD-10-PCS | Mod: S$GLB,,, | Performed by: SURGERY

## 2019-08-14 PROCEDURE — 93005 ELECTROCARDIOGRAM TRACING: CPT | Mod: S$GLB,,, | Performed by: SURGERY

## 2019-08-14 PROCEDURE — 19285 PERQ DEV BREAST 1ST US IMAG: CPT | Mod: LT,,, | Performed by: RADIOLOGY

## 2019-08-14 PROCEDURE — 77065 MAMMO DIGITAL DIAGNOSTIC LEFT WITH CAD: ICD-10-PCS | Mod: 26,LT,, | Performed by: RADIOLOGY

## 2019-08-14 PROCEDURE — 77065 DX MAMMO INCL CAD UNI: CPT | Mod: 26,LT,, | Performed by: RADIOLOGY

## 2019-08-14 PROCEDURE — 25000003 PHARM REV CODE 250: Mod: PO | Performed by: SURGERY

## 2019-08-14 PROCEDURE — 77065 DX MAMMO INCL CAD UNI: CPT | Mod: TC,PO,LT

## 2019-08-14 PROCEDURE — 93010 EKG 12-LEAD: ICD-10-PCS | Mod: S$GLB,,, | Performed by: INTERNAL MEDICINE

## 2019-08-14 RX ORDER — LIDOCAINE HYDROCHLORIDE 20 MG/ML
2 INJECTION, SOLUTION INFILTRATION; PERINEURAL ONCE
Status: COMPLETED | OUTPATIENT
Start: 2019-08-14 | End: 2019-08-14

## 2019-08-14 RX ADMIN — LIDOCAINE HYDROCHLORIDE 2 ML: 20 INJECTION, SOLUTION INFILTRATION; PERINEURAL at 09:08

## 2019-08-15 ENCOUNTER — TELEPHONE (OUTPATIENT)
Dept: SURGERY | Facility: CLINIC | Age: 64
End: 2019-08-15

## 2019-08-15 ENCOUNTER — PATIENT MESSAGE (OUTPATIENT)
Dept: SURGERY | Facility: CLINIC | Age: 64
End: 2019-08-15

## 2019-08-15 NOTE — TELEPHONE ENCOUNTER
Called pt's daughter Thalia Sharp per pt's request at the number provided by pt.  Discussed pt's results of genetic testing with her daughter as well as the implications of these results.  Questions were encouraged and answered to this relative's satisfaction, and this relative verbalized understanding of information.

## 2019-08-15 NOTE — TELEPHONE ENCOUNTER
Called pt regarding below message, to see if pt is OK with her genetic results and their implications being discussed with her daughter Thalia.  No answer on pt's 2 phone numbers; unable to leave VM on either; [facility].    ----- Message from Paola Lino MA sent at 8/15/2019  9:30 AM CDT -----  Contact: Pt.'s Daughter  [phone]   Brian please call patients daughter regarding results.  ----- Message -----  From: Yessy Trujillo  Sent: 8/15/2019   8:21 AM  To: Jonathan Torres Staff    Test Results    Type of Test:  Genetic Testing     Date of Test:  07/23/19    Communication Preference:  Pt.'s Daughter  [phone]     Additional Information:   states she would like to speak with nurse to go over results with her due to mother not understanding much of when explained to her a few days ago by Ms.Alexis Castillo     Thank You

## 2019-08-20 ENCOUNTER — ANESTHESIA EVENT (OUTPATIENT)
Dept: SURGERY | Facility: HOSPITAL | Age: 64
End: 2019-08-20
Payer: MEDICARE

## 2019-08-20 ENCOUNTER — TELEPHONE (OUTPATIENT)
Dept: SURGERY | Facility: CLINIC | Age: 64
End: 2019-08-20

## 2019-08-20 LAB — INTEGRATED BRAC ANALYSIS: NORMAL

## 2019-08-20 NOTE — TELEPHONE ENCOUNTER
Spoke to Patient.  Arrival time of 1100 given to check in at the [facility] on the [facility]nd [facility] the [facility] on [facility].  Instructed that she can eat and drink until 0300, have clear liquids between 0300 - 1000, and then to remain NPO after 1000, to wash up tonight and in the morning with an antibacterial soap, not to wear anything metal or to apply any lotions, powders, or deodorant, and to wear something easy to remove.  Had many general questions - length of surgery, how many visitors she can have, can her Port be removed.... Questions answered and instructed to write down any further questions and [facility] phone number given for additional questions.  Patient verbalized understanding of instructions.

## 2019-08-21 ENCOUNTER — HOSPITAL ENCOUNTER (OUTPATIENT)
Facility: HOSPITAL | Age: 64
Discharge: HOME | End: 2019-08-21
Attending: SURGERY | Admitting: SURGERY
Payer: MEDICARE

## 2019-08-21 ENCOUNTER — HOSPITAL ENCOUNTER (OUTPATIENT)
Dept: RADIOLOGY | Facility: HOSPITAL | Age: 64
Discharge: HOME | End: 2019-08-21
Attending: SURGERY | Admitting: SURGERY
Payer: MEDICARE

## 2019-08-21 ENCOUNTER — TELEPHONE (OUTPATIENT)
Dept: SURGERY | Facility: CLINIC | Age: 64
End: 2019-08-21

## 2019-08-21 ENCOUNTER — ANESTHESIA (OUTPATIENT)
Dept: SURGERY | Facility: HOSPITAL | Age: 64
End: 2019-08-21
Payer: MEDICARE

## 2019-08-21 ENCOUNTER — HOSPITAL ENCOUNTER (OUTPATIENT)
Dept: RADIOLOGY | Facility: HOSPITAL | Age: 64
Discharge: HOME | End: 2019-08-21
Attending: SURGERY
Payer: MEDICARE

## 2019-08-21 VITALS
SYSTOLIC BLOOD PRESSURE: 131 MMHG | TEMPERATURE: 98 F | HEART RATE: 76 BPM | BODY MASS INDEX: 18.44 KG/M2 | DIASTOLIC BLOOD PRESSURE: 70 MMHG | WEIGHT: 108 LBS | RESPIRATION RATE: 18 BRPM | OXYGEN SATURATION: 95 % | HEIGHT: 64 IN

## 2019-08-21 DIAGNOSIS — C50.912 INVASIVE DUCTAL CARCINOMA OF BREAST, LEFT: ICD-10-CM

## 2019-08-21 DIAGNOSIS — C50.512 MALIGNANT NEOPLASM OF LOWER-OUTER QUADRANT OF LEFT BREAST OF FEMALE, ESTROGEN RECEPTOR NEGATIVE: ICD-10-CM

## 2019-08-21 DIAGNOSIS — C50.919 INVASIVE DUCTAL CARCINOMA OF BREAST, FEMALE: ICD-10-CM

## 2019-08-21 DIAGNOSIS — C50.919 INVASIVE DUCTAL CARCINOMA OF BREAST: ICD-10-CM

## 2019-08-21 DIAGNOSIS — Z17.1 MALIGNANT NEOPLASM OF LOWER-OUTER QUADRANT OF LEFT BREAST OF FEMALE, ESTROGEN RECEPTOR NEGATIVE: ICD-10-CM

## 2019-08-21 PROCEDURE — 76098 X-RAY EXAM SURGICAL SPECIMEN: CPT | Mod: 26,HCNC,, | Performed by: RADIOLOGY

## 2019-08-21 PROCEDURE — 88307 TISSUE EXAM BY PATHOLOGIST: CPT | Mod: HCNC | Performed by: PATHOLOGY

## 2019-08-21 PROCEDURE — 64461 PARAVERTEBRAL SINGLE SHOT: ICD-10-PCS | Mod: 59,HCNC,LT, | Performed by: ANESTHESIOLOGY

## 2019-08-21 PROCEDURE — 38900 IO MAP OF SENT LYMPH NODE: CPT | Mod: HCNC,LT,, | Performed by: SURGERY

## 2019-08-21 PROCEDURE — 64462 PARAVERTEBRAL SINGLE SHOT: ICD-10-PCS | Mod: 59,HCNC,LT, | Performed by: ANESTHESIOLOGY

## 2019-08-21 PROCEDURE — 25000003 PHARM REV CODE 250: Mod: HCNC | Performed by: STUDENT IN AN ORGANIZED HEALTH CARE EDUCATION/TRAINING PROGRAM

## 2019-08-21 PROCEDURE — 64461 PVB THORACIC SINGLE INJ SITE: CPT | Mod: HCNC | Performed by: STUDENT IN AN ORGANIZED HEALTH CARE EDUCATION/TRAINING PROGRAM

## 2019-08-21 PROCEDURE — 94761 N-INVAS EAR/PLS OXIMETRY MLT: CPT | Mod: HCNC

## 2019-08-21 PROCEDURE — 88342 IMHCHEM/IMCYTCHM 1ST ANTB: CPT | Mod: HCNC,59 | Performed by: PATHOLOGY

## 2019-08-21 PROCEDURE — 38792 RA TRACER ID OF SENTINL NODE: CPT | Mod: 59,HCNC,LT, | Performed by: SURGERY

## 2019-08-21 PROCEDURE — 63600175 PHARM REV CODE 636 W HCPCS: Mod: HCNC | Performed by: STUDENT IN AN ORGANIZED HEALTH CARE EDUCATION/TRAINING PROGRAM

## 2019-08-21 PROCEDURE — 88342 TISSUE SPECIMEN TO PATHOLOGY - SURGERY: ICD-10-PCS | Mod: 26,HCNC,, | Performed by: PATHOLOGY

## 2019-08-21 PROCEDURE — 76098 X-RAY EXAM SURGICAL SPECIMEN: CPT | Mod: TC,HCNC,PO

## 2019-08-21 PROCEDURE — 88342 IMHCHEM/IMCYTCHM 1ST ANTB: CPT | Mod: 26,HCNC,, | Performed by: PATHOLOGY

## 2019-08-21 PROCEDURE — D9220A PRA ANESTHESIA: Mod: HCNC,,, | Performed by: ANESTHESIOLOGY

## 2019-08-21 PROCEDURE — 38900 PR INTRAOPERATIVE SENTINEL LYMPH NODE ID W DYE INJECTION: ICD-10-PCS | Mod: HCNC,LT,, | Performed by: SURGERY

## 2019-08-21 PROCEDURE — 19301 PARTIAL MASTECTOMY: CPT | Mod: HCNC,LT,, | Performed by: SURGERY

## 2019-08-21 PROCEDURE — 71000033 HC RECOVERY, INTIAL HOUR: Mod: HCNC | Performed by: SURGERY

## 2019-08-21 PROCEDURE — 88331 TISSUE SPECIMEN TO PATHOLOGY - SURGERY: ICD-10-PCS | Mod: 26,HCNC,, | Performed by: PATHOLOGY

## 2019-08-21 PROCEDURE — 88341 PR IHC OR ICC EACH ADD'L SINGLE ANTIBODY  STAINPR: ICD-10-PCS | Mod: 26,HCNC,, | Performed by: PATHOLOGY

## 2019-08-21 PROCEDURE — A9520 TC99 TILMANOCEPT DIAG 0.5MCI: HCPCS | Mod: HCNC

## 2019-08-21 PROCEDURE — 88307 TISSUE SPECIMEN TO PATHOLOGY - SURGERY: ICD-10-PCS | Mod: 26,HCNC,, | Performed by: PATHOLOGY

## 2019-08-21 PROCEDURE — 88341 IMHCHEM/IMCYTCHM EA ADD ANTB: CPT | Mod: 26,HCNC,, | Performed by: PATHOLOGY

## 2019-08-21 PROCEDURE — 36000706: Mod: HCNC | Performed by: SURGERY

## 2019-08-21 PROCEDURE — 63600175 PHARM REV CODE 636 W HCPCS: Mod: JW,JG,HCNC | Performed by: SURGERY

## 2019-08-21 PROCEDURE — 25000003 PHARM REV CODE 250: Mod: HCNC | Performed by: ANESTHESIOLOGY

## 2019-08-21 PROCEDURE — 76098 MAMMO BREAST SPECIMEN: ICD-10-PCS | Mod: 26,HCNC,, | Performed by: RADIOLOGY

## 2019-08-21 PROCEDURE — D9220A PRA ANESTHESIA: ICD-10-PCS | Mod: HCNC,,, | Performed by: ANESTHESIOLOGY

## 2019-08-21 PROCEDURE — 38792 PR IDENTIFY SENTINEL 2DE: ICD-10-PCS | Mod: 59,HCNC,LT, | Performed by: SURGERY

## 2019-08-21 PROCEDURE — 36000707: Mod: HCNC | Performed by: SURGERY

## 2019-08-21 PROCEDURE — 71000015 HC POSTOP RECOV 1ST HR: Mod: HCNC | Performed by: SURGERY

## 2019-08-21 PROCEDURE — 63600175 PHARM REV CODE 636 W HCPCS: Mod: HCNC | Performed by: SURGERY

## 2019-08-21 PROCEDURE — 88307 TISSUE EXAM BY PATHOLOGIST: CPT | Mod: 26,HCNC,, | Performed by: PATHOLOGY

## 2019-08-21 PROCEDURE — 37000009 HC ANESTHESIA EA ADD 15 MINS: Mod: HCNC | Performed by: SURGERY

## 2019-08-21 PROCEDURE — 19301 PR MASTECTOMY, PARTIAL: ICD-10-PCS | Mod: HCNC,LT,, | Performed by: SURGERY

## 2019-08-21 PROCEDURE — 88331 PATH CONSLTJ SURG 1 BLK 1SPC: CPT | Mod: 26,HCNC,, | Performed by: PATHOLOGY

## 2019-08-21 PROCEDURE — 38525 BIOPSY/REMOVAL LYMPH NODES: CPT | Mod: 51,HCNC,LT, | Performed by: SURGERY

## 2019-08-21 PROCEDURE — 37000008 HC ANESTHESIA 1ST 15 MINUTES: Mod: HCNC | Performed by: SURGERY

## 2019-08-21 PROCEDURE — 64462 PVB THORACIC 2ND+ INJ SITE: CPT | Mod: 59,HCNC,LT, | Performed by: ANESTHESIOLOGY

## 2019-08-21 PROCEDURE — 38525 PR BIOPSY/REM LYMPH NODES, AXILLARY: ICD-10-PCS | Mod: 51,HCNC,LT, | Performed by: SURGERY

## 2019-08-21 PROCEDURE — 64461 PVB THORACIC SINGLE INJ SITE: CPT | Mod: 59,HCNC,LT, | Performed by: ANESTHESIOLOGY

## 2019-08-21 RX ORDER — SODIUM CHLORIDE 9 MG/ML
INJECTION, SOLUTION INTRAVENOUS CONTINUOUS
Status: DISCONTINUED | OUTPATIENT
Start: 2019-08-21 | End: 2019-08-21 | Stop reason: HOSPADM

## 2019-08-21 RX ORDER — LIDOCAINE HYDROCHLORIDE 10 MG/ML
1 INJECTION, SOLUTION EPIDURAL; INFILTRATION; INTRACAUDAL; PERINEURAL ONCE
Status: COMPLETED | OUTPATIENT
Start: 2019-08-21 | End: 2019-08-21

## 2019-08-21 RX ORDER — KETOROLAC TROMETHAMINE 30 MG/ML
INJECTION, SOLUTION INTRAMUSCULAR; INTRAVENOUS
Status: DISCONTINUED | OUTPATIENT
Start: 2019-08-21 | End: 2019-08-22

## 2019-08-21 RX ORDER — LIDOCAINE HCL/PF 100 MG/5ML
SYRINGE (ML) INTRAVENOUS
Status: DISCONTINUED | OUTPATIENT
Start: 2019-08-21 | End: 2019-08-22

## 2019-08-21 RX ORDER — FENTANYL CITRATE 50 UG/ML
25 INJECTION, SOLUTION INTRAMUSCULAR; INTRAVENOUS EVERY 5 MIN PRN
Status: DISCONTINUED | OUTPATIENT
Start: 2019-08-21 | End: 2019-08-21 | Stop reason: HOSPADM

## 2019-08-21 RX ORDER — ONDANSETRON 2 MG/ML
INJECTION INTRAMUSCULAR; INTRAVENOUS
Status: DISCONTINUED | OUTPATIENT
Start: 2019-08-21 | End: 2019-08-22

## 2019-08-21 RX ORDER — DEXAMETHASONE SODIUM PHOSPHATE 4 MG/ML
INJECTION, SOLUTION INTRA-ARTICULAR; INTRALESIONAL; INTRAMUSCULAR; INTRAVENOUS; SOFT TISSUE
Status: DISCONTINUED | OUTPATIENT
Start: 2019-08-21 | End: 2019-08-22

## 2019-08-21 RX ORDER — OXYCODONE AND ACETAMINOPHEN 5; 325 MG/1; MG/1
1 TABLET ORAL EVERY 6 HOURS PRN
Qty: 20 TABLET | Refills: 0 | Status: SHIPPED | OUTPATIENT
Start: 2019-08-21 | End: 2019-09-05

## 2019-08-21 RX ORDER — ISOSULFAN BLUE 50 MG/5ML
INJECTION, SOLUTION SUBCUTANEOUS
Status: DISCONTINUED | OUTPATIENT
Start: 2019-08-21 | End: 2019-08-21 | Stop reason: HOSPADM

## 2019-08-21 RX ORDER — CEFAZOLIN SODIUM 1 G/3ML
2 INJECTION, POWDER, FOR SOLUTION INTRAMUSCULAR; INTRAVENOUS
Status: COMPLETED | OUTPATIENT
Start: 2019-08-21 | End: 2019-08-21

## 2019-08-21 RX ORDER — PROPOFOL 10 MG/ML
VIAL (ML) INTRAVENOUS
Status: DISCONTINUED | OUTPATIENT
Start: 2019-08-21 | End: 2019-08-22

## 2019-08-21 RX ORDER — OXYCODONE AND ACETAMINOPHEN 5; 325 MG/1; MG/1
1 TABLET ORAL EVERY 6 HOURS PRN
Qty: 20 TABLET | Refills: 0 | Status: SHIPPED | OUTPATIENT
Start: 2019-08-21 | End: 2019-08-21 | Stop reason: SDUPTHER

## 2019-08-21 RX ORDER — ACETAMINOPHEN 10 MG/ML
INJECTION, SOLUTION INTRAVENOUS
Status: DISCONTINUED | OUTPATIENT
Start: 2019-08-21 | End: 2019-08-22

## 2019-08-21 RX ORDER — MIDAZOLAM HYDROCHLORIDE 1 MG/ML
0.5 INJECTION INTRAMUSCULAR; INTRAVENOUS
Status: DISCONTINUED | OUTPATIENT
Start: 2019-08-21 | End: 2019-08-21 | Stop reason: HOSPADM

## 2019-08-21 RX ORDER — HYDROCODONE BITARTRATE AND ACETAMINOPHEN 5; 325 MG/1; MG/1
1 TABLET ORAL EVERY 4 HOURS PRN
Status: DISCONTINUED | OUTPATIENT
Start: 2019-08-21 | End: 2019-08-21 | Stop reason: HOSPADM

## 2019-08-21 RX ORDER — SODIUM CHLORIDE 0.9 % (FLUSH) 0.9 %
10 SYRINGE (ML) INJECTION
Status: DISCONTINUED | OUTPATIENT
Start: 2019-08-21 | End: 2019-08-21 | Stop reason: HOSPADM

## 2019-08-21 RX ORDER — KETAMINE HCL IN 0.9 % NACL 50 MG/5 ML
SYRINGE (ML) INTRAVENOUS
Status: DISCONTINUED | OUTPATIENT
Start: 2019-08-21 | End: 2019-08-22

## 2019-08-21 RX ORDER — BUPIVACAINE HYDROCHLORIDE AND EPINEPHRINE 5; 5 MG/ML; UG/ML
INJECTION, SOLUTION EPIDURAL; INTRACAUDAL; PERINEURAL
Status: COMPLETED | OUTPATIENT
Start: 2019-08-21 | End: 2019-08-21

## 2019-08-21 RX ORDER — PHENYLEPHRINE HYDROCHLORIDE 10 MG/ML
INJECTION INTRAVENOUS
Status: DISCONTINUED | OUTPATIENT
Start: 2019-08-21 | End: 2019-08-22

## 2019-08-21 RX ORDER — EPHEDRINE SULFATE 50 MG/ML
INJECTION, SOLUTION INTRAVENOUS
Status: DISCONTINUED | OUTPATIENT
Start: 2019-08-21 | End: 2019-08-22

## 2019-08-21 RX ADMIN — MIDAZOLAM HYDROCHLORIDE 1 MG: 1 INJECTION, SOLUTION INTRAMUSCULAR; INTRAVENOUS at 12:08

## 2019-08-21 RX ADMIN — PROPOFOL 150 MG: 10 INJECTION, EMULSION INTRAVENOUS at 12:08

## 2019-08-21 RX ADMIN — PHENYLEPHRINE HYDROCHLORIDE 100 MCG: 10 INJECTION INTRAVENOUS at 01:08

## 2019-08-21 RX ADMIN — LIDOCAINE HYDROCHLORIDE 40 MG: 20 INJECTION, SOLUTION INTRAVENOUS at 12:08

## 2019-08-21 RX ADMIN — SODIUM CHLORIDE, SODIUM GLUCONATE, SODIUM ACETATE, POTASSIUM CHLORIDE, MAGNESIUM CHLORIDE, SODIUM PHOSPHATE, DIBASIC, AND POTASSIUM PHOSPHATE: .53; .5; .37; .037; .03; .012; .00082 INJECTION, SOLUTION INTRAVENOUS at 01:08

## 2019-08-21 RX ADMIN — ONDANSETRON 4 MG: 2 INJECTION INTRAMUSCULAR; INTRAVENOUS at 01:08

## 2019-08-21 RX ADMIN — HYDROCODONE BITARTRATE AND ACETAMINOPHEN 1 TABLET: 5; 325 TABLET ORAL at 03:08

## 2019-08-21 RX ADMIN — PHENYLEPHRINE HYDROCHLORIDE 200 MCG: 10 INJECTION INTRAVENOUS at 12:08

## 2019-08-21 RX ADMIN — PHENYLEPHRINE HYDROCHLORIDE 100 MCG: 10 INJECTION INTRAVENOUS at 02:08

## 2019-08-21 RX ADMIN — CEFAZOLIN 2 G: 330 INJECTION, POWDER, FOR SOLUTION INTRAMUSCULAR; INTRAVENOUS at 12:08

## 2019-08-21 RX ADMIN — EPHEDRINE SULFATE 10 MG: 50 INJECTION, SOLUTION INTRAMUSCULAR; INTRAVENOUS; SUBCUTANEOUS at 01:08

## 2019-08-21 RX ADMIN — BUPIVACAINE HYDROCHLORIDE AND EPINEPHRINE BITARTRATE 25 ML: 5; .005 INJECTION, SOLUTION EPIDURAL; INTRACAUDAL; PERINEURAL at 12:08

## 2019-08-21 RX ADMIN — LIDOCAINE HYDROCHLORIDE 10 MG: 10 INJECTION, SOLUTION EPIDURAL; INFILTRATION; INTRACAUDAL; PERINEURAL at 11:08

## 2019-08-21 RX ADMIN — ACETAMINOPHEN 1000 MG: 10 INJECTION, SOLUTION INTRAVENOUS at 01:08

## 2019-08-21 RX ADMIN — PHENYLEPHRINE HYDROCHLORIDE 200 MCG: 10 INJECTION INTRAVENOUS at 01:08

## 2019-08-21 RX ADMIN — DEXAMETHASONE SODIUM PHOSPHATE 8 MG: 4 INJECTION, SOLUTION INTRAMUSCULAR; INTRAVENOUS at 12:08

## 2019-08-21 RX ADMIN — FENTANYL CITRATE 50 MCG: 50 INJECTION INTRAMUSCULAR; INTRAVENOUS at 12:08

## 2019-08-21 RX ADMIN — KETOROLAC TROMETHAMINE 30 MG: 30 INJECTION, SOLUTION INTRAMUSCULAR; INTRAVENOUS at 01:08

## 2019-08-21 RX ADMIN — Medication 20 MG: at 12:08

## 2019-08-21 RX ADMIN — SODIUM CHLORIDE: 0.9 INJECTION, SOLUTION INTRAVENOUS at 11:08

## 2019-08-21 NOTE — PROGRESS NOTES
"Patient reports "had some numbness to right arm yesterday that completely resolved. Dr. Valdes aware."  "

## 2019-08-21 NOTE — ANESTHESIA PREPROCEDURE EVALUATION
[facility]  Anesthesia Pre-Operative Evaluation         Patient Name: Radha Rivera  YOB: 1955  MRN: [id number]         Pre-operative evaluation for Procedure(s) (LRB):  MASTECTOMY, PARTIAL LEFT with SEED (Left)  BIOPSY, LYMPH NODE, SENTINEL LEFT with SEED (Left)     08/21/2019    Radha Rivera is a 64 y.o. female w/ a significant PMHx of breast cancer, possible mitral valve prolapse.    Patient now presents for the above procedure(s).      LDA       Port A Cath Single Lumen 02/08/19 0753 right internal jugular (Active)   Number of days: 193       Prev airway: None documented.    Drips: None documented.      Patient Active Problem List   Diagnosis    Primary cancer of lower outer quadrant of left female breast    At risk for lymphedema    Encounter for antineoplastic chemotherapy    Breast cancer    Antineoplastic chemotherapy induced anemia    Immunosuppressed status    Aortic atherosclerosis    Reflux esophagitis    Fungal toenail infection       Review of patient's allergies indicates:  No Known Allergies    Current Inpatient Medications:      No current facility-administered medications on file prior to encounter.      Current Outpatient Medications on File Prior to Encounter   Medication Sig Dispense Refill    esomeprazole (NEXIUM) 20 MG capsule Take 1 capsule (20 mg total) by mouth 2 (two) times daily. 30 capsule 1    levothyroxine (SYNTHROID) 75 MCG tablet Take 75 mcg by mouth every morning.      vit A/vit C/vit E/zinc/copper (OCUVITE PRESERVISION ORAL) Take 1 tablet by mouth 2 (two) times daily.      vitamin D 1000 units Tab Take 2,000 Units by mouth once daily.       aluminum hydrox-magnesium carb (GAVISCON EXTRA STRENGTH) 254-237.5 mg/5 mL Susp Take by mouth every 8 (eight) hours as needed.      aspirin (ECOTRIN) 81 MG EC tablet Take 81 mg by mouth once daily.      dexamethasone (DECADRON) 4 MG Tab Take 5 tablets 12 hours prior to chemotherapy and  another 5 tablets 6 hours prior to each cycle of taxol chemotherapy 40 tablet 2    lidocaine-prilocaine (EMLA) cream Apply to affected area once 5 g 0    ondansetron (ZOFRAN-ODT) 4 MG TbDL Take 1 tablet (4 mg total) by mouth every 8 (eight) hours as needed. 20 tablet 0       Past Surgical History:   Procedure Laterality Date    BREAST BIOPSY Left 01/2019    Ear drum surgery      INNER EAR SURGERY Right     INSERTION, PORT-A-CATH Right 2/8/2019    Performed by Twan Valdes MD [facility] OR [address] [place] [address]    tonsillectomy      TONSILLECTOMY         Social History     Socioeconomic History    Marital status:      Spouse name: Not on file    Number of children: Not on file    Years of education: Not on file    Highest education level: Not on file   Occupational History    Not on file   Social Needs    Financial resource strain: Not on file    Food insecurity:     Worry: Not on file     Inability: Not on file    Transportation needs:     Medical: Not on file     Non-medical: Not on file   Tobacco Use    Smoking status: Never Smoker    Smokeless tobacco: Never Used   Substance and Sexual Activity    Alcohol use: Yes     Comment: Seldom    Drug use: No    Sexual activity: Yes     Partners: Male     Birth control/protection: Post-menopausal   Lifestyle    Physical activity:     Days per week: Not on file     Minutes per session: Not on file    Stress: Not on file   Relationships    Social connections:     Talks on phone: Not on file     Gets together: Not on file     Attends Hindu service: Not on file     Active member of club or organization: Not on file     Attends meetings of clubs or organizations: Not on file     Relationship status: Not on file   Other Topics Concern    Not on file   Social History Narrative    Not on file       OBJECTIVE:     Vital Signs Range (Last 24H):         Significant Labs:  Lab Results   Component Value Date    WBC 6.91 08/14/2019    HGB 11.9 (L) 08/14/2019     HCT 39.3 08/14/2019     08/14/2019    ALT 20 08/14/2019    AST 19 08/14/2019     08/14/2019    K 4.1 08/14/2019     08/14/2019    CREATININE 0.7 08/14/2019    BUN 12 08/14/2019    CO2 27 08/14/2019    TSH 5.892 (H) 01/15/2013    INR 1.0 04/05/2019       Diagnostic Studies: No relevant studies.    EKG:   Results for orders placed or performed during the hospital encounter of 08/14/19   EKG 12-lead    Collection Time: 08/14/19 11:08 AM    Narrative    Test Reason : C50.512,Z17.1,Z01.818,    Vent. Rate : 077 BPM     Atrial Rate : 077 BPM     P-R Int : 150 ms          QRS Dur : 076 ms      QT Int : 380 ms       P-R-T Axes : 086 083 059 degrees     QTc Int : 430 ms    Normal sinus rhythm  Normal ECG  When compared with ECG of 28-JAN-2019 09:14,  Non-specific change in ST segment in Anterior leads  Confirmed by Natalie Girard MD ([id number]) on 8/14/2019 2:29:56 PM    Referred By: PEMA MARIA           Confirmed By:Natalie Girard MD       ECHOCARDIOGRAM:  TTE:  Results for orders placed or performed during the hospital encounter of 02/11/19   Transthoracic echo (TTE) complete (Cupid Only)   Result Value Ref Range    STJ 2.47 cm    AV mean gradient 3.31 mmHg    Ao peak timur 1.18 m/s    Ao VTI 25.34 cm    IVS 0.79 0.6 - 1.1 cm    LA size 2.74 cm    Left Atrium Major Axis 3.27 cm    Left Atrium Minor Axis 3.99 cm    LVIDD 3.98 3.5 - 6.0 cm    LVIDS 2.54 2.1 - 4.0 cm    LVOT diameter 1.80 cm    LVOT peak VTI 18.74 cm    PW 0.77 0.6 - 1.1 cm    MV Peak A Timur 0.87 m/s    E wave decelartion time 233.66 msec    MV Peak E Timur 0.94 m/s    PV Peak D Timur 0.30 m/s    PV Peak S Timur 0.37 m/s    RA Major Axis 3.62 cm    RA Width 2.37 cm    RVDD 2.47 cm    Sinus 2.35 cm    TAPSE 1.99 cm    TR Max Timur 2.54 m/s    TDI LATERAL 0.09     TDI SEPTAL 0.11     LA WIDTH 2.79 cm    LV Diastolic Volume 69.30 mL    LV Systolic Volume 23.27 mL    RV S' 10.49 m/s    LVOT peak timur 0.697518849624140 m/s    LV LATERAL E/E' RATIO 10.44      LV SEPTAL E/E' RATIO 8.55     FS 36 %    LA volume 23.36 cm3    LV mass 89.61 g    Left Ventricle Relative Wall Thickness 0.39 cm    AV valve area 1.88 cm2    AV Velocity Ratio 0.72     AV index (prosthetic) 0.74     E/A ratio 1.08     Mean e' 0.10     Pulm vein S/D ratio 1.23     LVOT area 2.54 cm2    LVOT stroke volume 47.66 cm3    AV peak gradient 5.57 mmHg    E/E' ratio 9.40     Triscuspid Valve Regurgitation Peak Gradient 25.81 mmHg    BSA 1.56 m2    LV Systolic Volume Index 14.8 mL/m2    LV Diastolic Volume Index 44.18 mL/m2    LA Volume Index 14.9 mL/m2    LV Mass Index 57.1 g/m2    Right Atrial Pressure (from IVC) 8 mmHg    TV rest pulmonary artery pressure 34 mmHg    Narrative    · Normal left ventricular systolic function. The estimated ejection   fraction is 65%  · Strain imaging performed in left ventricle. Global longitudinal strain   is -21%.  · Normal LV diastolic function.  · Normal right ventricular systolic function.  · Mild mitral regurgitation.  · Intermediate central venous pressure (8 mm Hg).  · The estimated PA systolic pressure is 34 mm Hg        No results found for this or any previous visit.    SUBHA:2/2019  · Normal left ventricular systolic function. The estimated ejection fraction is 65%  · Strain imaging performed in left ventricle. Global longitudinal strain is -21%.  · Normal LV diastolic function.  · Normal right ventricular systolic function.  · Mild mitral regurgitation.  · Intermediate central venous pressure (8 mm Hg).  · The estimated PA systolic pressure is 34 mm Hg           ASSESSMENT/PLAN:         Anesthesia Evaluation    I have reviewed the Patient Summary Reports.    I have reviewed the Nursing Notes.   I have reviewed the Medications.     Review of Systems  Hematology/Oncology:        Current/Recent Cancer. Breast   Cardiovascular:  Cardiovascular Normal     Pulmonary:  Pulmonary Normal    Hepatic/GI:   GERD, well controlled    Neurological:  Neurology Normal         Physical Exam  General:  Well nourished    Airway/Jaw/Neck:  Airway Findings: Mouth Opening: Normal Tongue: Normal  General Airway Assessment: Adult  Mallampati: II  Improves to II with phonation.  TM Distance: Normal, at least 6 cm      Dental:  Dental Findings: In tact   Chest/Lungs:  Chest/Lungs Findings: Clear to auscultation     Heart/Vascular:  Heart Findings: Rate: Normal  Rhythm: Regular Rhythm  Sounds: Normal        Mental Status:  Mental Status Findings:  Cooperative, Alert and Oriented         Anesthesia Plan  Type of Anesthesia, risks & benefits discussed:  Anesthesia Type:  general, regional  Patient's Preference:   Intra-op Monitoring Plan: standard ASA monitors  Intra-op Monitoring Plan Comments:   Post Op Pain Control Plan: multimodal analgesia  Post Op Pain Control Plan Comments:   Induction:   IV  Beta Blocker:  Patient is not currently on a Beta-Blocker (No further documentation required).       Informed Consent: Patient understands risks and agrees with Anesthesia plan.  Questions answered. Anesthesia consent signed with patient.  ASA Score: 2     Day of Surgery Review of History & Physical:    H&P update referred to the surgeon.         Ready For Surgery From Anesthesia Perspective.

## 2019-08-21 NOTE — PROGRESS NOTES
Pt discharged to home . Pt IV removed. Pt has all discharge instructions and personal belongings. Prescriptions delivered to BS. Tolerating clear liquids well. No further questions. Adequate for discharge.

## 2019-08-21 NOTE — INTERVAL H&P NOTE
The patient has been examined and the H&P has been reviewed:    No acute interval changes.    Anesthesia/Surgery risks, benefits and alternative options discussed and understood by patient/family.          Active Hospital Problems    Diagnosis  POA    Invasive ductal carcinoma of breast, female [C50.919]  Yes      Resolved Hospital Problems   No resolved problems to display.

## 2019-08-21 NOTE — BRIEF OP NOTE
[facility]  Brief Operative Note     SUMMARY     Surgery Date: 8/21/2019     Surgeon(s) and Role:     * Twan Valdes MD - Primary    Assisting Surgeon: None    Pre-op Diagnosis:  Malignant neoplasm of lower-outer quadrant of left breast of female, estrogen receptor negative [C50.512, Z17.1]    Post-op Diagnosis:  Post-Op Diagnosis Codes:     * Malignant neoplasm of lower-outer quadrant of left breast of female, estrogen receptor negative [C50.512, Z17.1]    Procedure(s) (LRB):  MASTECTOMY, PARTIAL LEFT with SEED (Left)  BIOPSY, LYMPH NODE, SENTINEL LEFT with SEED (Left)    Anesthesia: Local MAC    Description of the findings of the procedure: Left lumpectomy and SLNB, SLN was hot and blue count 200    Findings/Key Components: Lump and SLN specimens both had radiologically confirmed magseed and clips in specimens, and SLN were 0/2 negative on frozen for malignancy.    Estimated Blood Loss: 10 mL         Specimens:   Specimen (12h ago, onward)    Start     Ordered    08/21/19 1336  Specimen to Pathology - Surgery  Once     Comments:  Pre-op Diagnosis: Malignant neoplasm of lower-outer quadrant of left breast of female, estrogen receptor negative [C50.512, Z17.1]Procedure(s):MASTECTOMY, PARTIAL LEFT with SEEDBIOPSY, LYMPH NODE, SENTINEL LEFT with SEED Number of specimens: 1Name of specimens: 1-left breast seed localized partial mastectomy, short stitch superior, long stitch lateral     Start Status     08/21/19 1336 Collected (08/21/19 1342) Order ID: [id number]       08/21/19 1342    08/21/19 1331  Specimen to Pathology - Surgery  Once     Comments:  Pre-op Diagnosis: Malignant neoplasm of lower-outer quadrant of left breast of female, estrogen receptor negative [C50.512, Z17.1]Procedure(s):MASTECTOMY, PARTIAL LEFT with SEEDBIOPSY, LYMPH NODE, SENTINEL LEFT with SEED Number of specimens: 1Name of specimens: 1-left axillary sentinel lymph node hot and blue 200- to mammography then to frozen      Start Status     08/21/19 1331 Collected (08/21/19 1332) Order ID: [id number]       08/21/19 1332          Discharge Note    SUMMARY     Admit Date: 8/21/2019    Discharge Date and Time:  08/21/2019 2:45 PM    Hospital Course (synopsis of major diagnoses, care, treatment, and services provided during the course of the hospital stay): Patient arrived for scheduled procedure. Patient tolerated the procedure well. Patient was discharged after recovery.        Final Diagnosis: Post-Op Diagnosis Codes:     * Malignant neoplasm of lower-outer quadrant of left breast of female, estrogen receptor negative [C50.512, Z17.1]    Disposition: Home or Self Care    Follow Up/Patient Instructions:     Medications:  Reconciled Home Medications:      Medication List      START taking these medications    oxyCODONE-acetaminophen 5-325 mg per tablet  Commonly known as:  PERCOCET  Take 1 tablet by mouth every 6 (six) hours as needed for Pain.        CONTINUE taking these medications    aspirin 81 MG EC tablet  Commonly known as:  ECOTRIN  Take 81 mg by mouth once daily.     dexAMETHasone 4 MG Tab  Commonly known as:  DECADRON  Take 5 tablets 12 hours prior to chemotherapy and another 5 tablets 6 hours prior to each cycle of taxol chemotherapy     esomeprazole 20 MG capsule  Commonly known as:  NexIUM  Take 1 capsule (20 mg total) by mouth 2 (two) times daily.     GAVISCON EXTRA STRENGTH 254-237.5 mg/5 mL Susp  Generic drug:  aluminum hydrox-magnesium carb  Take by mouth every 8 (eight) hours as needed.     levothyroxine 75 MCG tablet  Commonly known as:  SYNTHROID  Take 75 mcg by mouth every morning.     lidocaine-prilocaine cream  Commonly known as:  EMLA  Apply to affected area once     OCUVITE PRESERVISION ORAL  Take 1 tablet by mouth 2 (two) times daily.     ondansetron 4 MG Tbdl  Commonly known as:  ZOFRAN-ODT  Take 1 tablet (4 mg total) by mouth every 8 (eight) hours as needed.     vitamin D 1000 units Tab  Commonly known as:   VITAMIN D3  Take 2,000 Units by mouth once daily.          Discharge Procedure Orders   Diet general     Lifting restrictions   Order Comments: Do not lift more than 10 lbs for next 2 weeks     Call MD for:  temperature >100.4     Call MD for:  persistent nausea and vomiting     Call MD for:  severe uncontrolled pain     Call MD for:  redness, tenderness, or signs of infection (pain, swelling, redness, odor or green/yellow discharge around incision site)     No dressing needed   Order Comments: No dressing needed  Ok to shower with warm soap and water in 2 days  Do not submerge in bath, pool, lake etc for 2 weeks  Must wear tight fitting bra at all times for 1 week, unless in shower  Ok to switch to sports bra     Follow-up Information     Twan Valdes MD In 2 weeks.    Specialty:  General Surgery  Contact information:  [address]  [facility] [address] [phone]

## 2019-08-21 NOTE — DISCHARGE INSTRUCTIONS
Mastectomy: After Surgery       Discharge Instructions for Mastectomy or Breast Lumpectomy  You are being treated for breast cancer or precancer. The cancer or precancerous tissue was removed with surgery. This may have been done with a lumpectomy. Or it may have been with a total mastectomy. A lumpectomy means that the tumor and a bit of tissue around it were removed. Lymph nodes in your armpit may also have been removed. A mastectomy means that all of the breast tissue and maybe nearby lymph nodes have been removed.  Activity  · Be sure you understand what you can and cannot do as you recover from surgery:  · Ask for help with chores and errands while you recover.  · Do not lift anything heavy until your healthcare provider says it's OK.  · Do not vacuum or do active or strenuous housework until your healthcare provider says it's OK.  · Do the range-of-motion exercises that you learned in the hospital.   Home care  Here are suggestions for taking care of yourself at home:  · Take pain medicine as directed.  · Keep your incisions clean and dry.  · Check your incisions daily for signs of infection. These include redness, swelling, and drainage. They also include the edges of an incision opening up.  · Follow your healthcare provider's instructions about bathing or showering.  · If your healthcare provider says it's OK,wash your incisions gently. Use mild soap and warm water. Pat dry.  · Don't soak in a tub, hot tub, or pool until your healthcare provider says it's OK.   · Take your temperature each day for 7 days after the surgery.  · Eat normal meals as soon as you feel able. Stick to a healthy, well-balanced diet.  Follow-up  Make a follow-up appointment as directed by your healthcare provider. If you had a mastectomy, you may have choices for reconstructive breast surgery or a prosthesis.Ask to talk to someone who can tell you more about your choices.  When to call your healthcare provider  Call your healthcare  provider right away if you have any of the following:  · Fever of 100.4°F (38°C) or higher  · Chills  · Drainage from your incisions  · Swelling around your incisions  · Increasing pain in or around your incisions  · Swelling in your arm or hand on the surgery side  Know what problems to watch for and when you need to call your healthcare providers. Also be sure you know how to get help after office hours and on weekends and holidays, too.    Date Last Reviewed: 10/31/2015  © 3557-7698 [employer]. [address] [place] [address]. All rights reserved. This information is not intended as a substitute for professional medical care. Always follow your healthcare professional's instructions.        A    PATIENT INSTRUCTIONS  POST-ANESTHESIA    IMMEDIATELY FOLLOWING SURGERY:  Do not drive or operate machinery for the first twenty four hours after surgery.  Do not make any important decisions for twenty four hours after surgery or while taking narcotic pain medications or sedatives.  If you develop intractable nausea and vomiting or a severe headache please notify your doctor immediately.    FOLLOW-UP:  Please make an appointment with your surgeon as instructed. You do not need to follow up with anesthesia unless specifically instructed to do so.    WOUND CARE INSTRUCTIONS (if applicable):  Keep a dry clean dressing on the anesthesia/puncture wound site if there is drainage.  Once the wound has quit draining you may leave it open to air.  Generally you should leave the bandage intact for twenty four hours unless there is drainage.  If the epidural site drains for more than 36-48 hours please call the anesthesia department.    QUESTIONS?:  Please feel free to call your physician or the hospital  if you have any questions, and they will be happy to assist you.       [facility] Anesthesia Department  [address] [facility]  [phone]

## 2019-08-21 NOTE — ANESTHESIA PROCEDURE NOTES
Paravertebral Single Shot    Patient location during procedure: pre-op   Block not for primary anesthetic.  Reason for block: at surgeon's request and post-op pain management   Post-op Pain Location: Left Chest  Start time: 8/21/2019 12:01 PM  Timeout: 8/21/2019 12:00 PM   End time: 8/21/2019 12:13 PM    Staffing  Authorizing Provider: Luis Eduardo Mack MD  Performing Provider: Samson Grove MD    Preanesthetic Checklist  Completed: patient identified, site marked, surgical consent, pre-op evaluation, timeout performed, IV checked, risks and benefits discussed and monitors and equipment checked  Peripheral Block  Patient position: sitting  Prep: ChloraPrep  Patient monitoring: heart rate, cardiac monitor, continuous pulse ox, continuous capnometry and frequent blood pressure checks  Block type: paravertebral - thoracic  Laterality: left  Injection technique: single shot  Location: T2-3 and T4-5  Needle  Needle type: Tuohy   Needle gauge: 17 G  Needle length: 3.5 in  Needle localization: anatomical landmarks     Assessment  Injection assessment: negative aspiration and negative parasthesia  Paresthesia pain: none  Heart rate change: no  Slow fractionated injection: yes  Additional Notes  T2 os at 3 cm  T4 os at 2 cm  VSS.  [facility] RN monitoring vitals throughout procedure.  Patient tolerated procedure well.

## 2019-08-22 NOTE — OP NOTE
DATE OF PROCEDURE:  08/21/2019    PRIMARY SURGEON:  Twan Valdes M.D.    ASSISTANT SURGEON:  Colten Arias M.D. (RES), Surgery resident.    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    PROCEDURES PERFORMED:  1.  Left breast Magseed seed localization partial mastectomy (seed localization   lumpectomy for margins).  2.  Injection of left breast with isosulfan Lymphazurin blue dye and   technetium-labeled radiocolloid for sentinel lymph node mapping and   identification.  3.  Identification and mapping of left deep axillary sentinel lymph node.  4.  Targeted axillary dissection using Magseed localization of the previously   biopsied and biopsy proven lymph node prior to chemotherapy, lymph node that had   a titanium marker within it.  5.  Left deep axillary targeted axillary dissection of the sentinel lymph node   and previously biopsied and known positive node.    PROCEDURE IN DETAIL:  The patient underwent informed consent.  The history and   physical examination was reviewed and updated.  The left breast was marked.  The   localization films were reviewed.  The MRI revealed a complete radiographic   response to the neoadjuvant chemotherapy.  Magseeds had been placed in the left   breast lower outer quadrant at the primary tumor site as well as within the left   axillary node that had been previously biopsy proven positive.  She was brought   to the Operating Room after regional anesthesia block under general anesthesia.    She was in a supine position.  The left breast was  injected in the anterior   subareolar region with both the technetium-labeled radiocolloid and isosulfan   Lymphazurin blue dye for sentinel lymph node mapping and identification.  The   left breast, chest, left arm and axilla were prepped and draped in a sterile   fashion.  A hot spot was noted in the axilla as was the hot spot with the   Magseed probe.  An axillary incision was made.  The subcutaneous tissue was   dissected with cautery through the subcutaneous tissue.  We opened the   clavipectoral fascia.  We could see a blue afferent lymphatic channel leading to   a blue radioactive lymph node that was in the vicinity of the targeted axillary   dissection node using the Magseed.  The radioactivity was present.  We placed a   silk suture around the area where the Magseed activity was and this was   circumferentially dissected with the Harmonic scalpel Focus device.  The   sentinel lymph node tissue was removed and labeled as targeted axillary   dissection with a hot blue radioactive node with an ex vivo count of 200,   adjacent or consistent with immediate proximity to the Magseed activity.    Specimen radiograph was performed, which revealed the Magseed marker and the   previous titanium biopsy marker within the specimen.  It was then sent for   frozen section, which was negative for any evidence of metastatic carcinoma.    Once this targeted axillary dissection sentinel node biopsy had been performed,   the background residual radioactivity counts were essentially absent and   negligible.  No further blue dye staining nodes were noted and no further   palpable nodes were noted in the axilla indicating adequate and appropriate   targeted axillary dissection and sentinel lymph node biopsy sampling.  The wound   was made hemostatic and temporarily packed with a lap pad.  We then turned our   attention to the left breast lower outer quadrant.  An oblique para-areolar   incision was marked in the periphery of the  left breast lower outer quadrant   over the greatest Magseed activity at the prior primary tumor biopsy site.    There had been no residual disease on post-chemotherapy MRI.  A skin ellipse was   made.  Full-thickness lumpectomy was taken all the way down to and including   the pectoralis fascia off of the pectoralis muscle, making this a full-thickness   wide local excision lumpectomy, partial mastectomy of the left lower outer   quadrant, intermittently using the Magseed detection to make sure we were   peripherally around the prior tumor site superiorly, inferiorly, medially and   laterally as well.  The specimen was oriented with a short stitch superiorly and   a long stitch laterally and submitted to pathology for permanent sectioning   after specimen radiograph confirmed the Magseed and prior biopsy marker within   the specimen.  The 2 surgical sites did not communicate.  Hemostasis was   achieved at both sites.  The deep dermal and subcutaneous layers were closed at   the lumpectomy site with Vicryl suture and the skin closed with a running 4-0   Monocryl subcuticular skin closure.  In the left axillary wound, the   clavipectoral fascia was reapproximated with Vicryl sutures followed by deep   dermal and subcutaneous Vicryl suture followed by 4-0 Monocryl subcuticular skin   closure.  Sterile skin Dermabond followed by sterile fluff gauze and   post-procedure bra were placed.  Estimated blood loss was minimal.  All needle,   instrument and sponge counts were correct.  She was turned over to Anesthesia   for extubation and transported to the Recovery Area in a satisfactory condition.    Frozen section was negative on the sentinel lymph node targeted axillary   dissection issue.      ALYCIA/IN  dd: 08/21/2019 15:26:10 (CDT)  td: 08/21/2019 20:15:46 (CDT)  Doc ID   #[id number]  Job ID #[id number]    CC:

## 2019-08-22 NOTE — TRANSFER OF CARE
"Anesthesia Transfer of Care Note    Patient: Radha Rivera    Procedure(s) Performed: Procedure(s) (LRB):  MASTECTOMY, PARTIAL LEFT with SEED (Left)  BIOPSY, LYMPH NODE, SENTINEL LEFT with SEED (Left)    Patient location: PACU    Anesthesia Type: general    Transport from OR: Transported from OR on 6-10 L/min O2 by face mask with adequate spontaneous ventilation    Post pain: adequate analgesia    Post assessment: no apparent anesthetic complications    Post vital signs: stable    Level of consciousness: awake    Nausea/Vomiting: no nausea/vomiting    Complications: none    Transfer of care protocol was followed      Last vitals:   Visit Vitals  /70   Pulse 76   Temp 36.4 °C (97.5 °F) (Temporal)   Resp 18   Ht 5' 4" (1.626 m)   Wt 49 kg (108 lb)   SpO2 95%   Breastfeeding? No   BMI 18.54 kg/m²     "

## 2019-08-23 NOTE — ANESTHESIA RELEASE NOTE
"Anesthesia Release from PACU Note    Patient: Radha Rivera    Procedure(s) Performed: Procedure(s) (LRB):  MASTECTOMY, PARTIAL LEFT with SEED (Left)  BIOPSY, LYMPH NODE, SENTINEL LEFT with SEED (Left)    Anesthesia type: general    Post pain: Adequate analgesia    Post assessment: no apparent anesthetic complications and tolerated procedure well    Last Vitals:   Visit Vitals  /70   Pulse 76   Temp 36.4 °C (97.5 °F) (Temporal)   Resp 18   Ht 5' 4" (1.626 m)   Wt 49 kg (108 lb)   SpO2 95%   Breastfeeding? No   BMI 18.54 kg/m²       Post vital signs: stable    Level of consciousness: awake and alert     Nausea/Vomiting: no nausea/no vomiting    Complications: none    Airway Patency: patent    Respiratory: unassisted, spontaneous ventilation, room air    Cardiovascular: stable and blood pressure at baseline    Hydration: euvolemic  "

## 2019-08-23 NOTE — ANESTHESIA POSTPROCEDURE EVALUATION
Anesthesia Post Evaluation    Patient: Radha Rivera    Procedure(s) Performed: Procedure(s) (LRB):  MASTECTOMY, PARTIAL LEFT with SEED (Left)  BIOPSY, LYMPH NODE, SENTINEL LEFT with SEED (Left)    Final Anesthesia Type: general  Patient location during evaluation: PACU  Patient participation: Yes- Able to Participate  Level of consciousness: awake and alert  Post-procedure vital signs: reviewed and stable  Pain management: adequate  Airway patency: patent  PONV status at discharge: No PONV  Anesthetic complications: no      Cardiovascular status: hemodynamically stable  Respiratory status: unassisted, spontaneous ventilation and room air  Hydration status: euvolemic  Follow-up not needed.          Vitals Value Taken Time   /70 8/21/2019  4:32 PM   Temp 36.4 °C (97.5 °F) 8/21/2019  4:40 PM   Pulse 74 8/21/2019  4:40 PM   Resp 18 8/21/2019  4:40 PM   SpO2 95 % 8/21/2019  4:40 PM   Vitals shown include unvalidated device data.      Event Time     Out of Recovery 15:15:00          Pain/Romy Score: No data recorded

## 2019-08-26 DIAGNOSIS — Z51.11 ENCOUNTER FOR ANTINEOPLASTIC CHEMOTHERAPY: ICD-10-CM

## 2019-08-26 DIAGNOSIS — T45.1X5A ANTINEOPLASTIC CHEMOTHERAPY INDUCED ANEMIA: ICD-10-CM

## 2019-08-26 DIAGNOSIS — D64.81 ANTINEOPLASTIC CHEMOTHERAPY INDUCED ANEMIA: ICD-10-CM

## 2019-08-26 DIAGNOSIS — K21.00 REFLUX ESOPHAGITIS: ICD-10-CM

## 2019-08-26 DIAGNOSIS — C50.512 PRIMARY CANCER OF LOWER OUTER QUADRANT OF LEFT FEMALE BREAST: ICD-10-CM

## 2019-08-26 RX ORDER — ESOMEPRAZOLE MAGNESIUM 40 MG/1
CAPSULE, DELAYED RELEASE ORAL
Qty: 60 CAPSULE | Refills: 0 | OUTPATIENT
Start: 2019-08-26

## 2019-08-26 NOTE — TELEPHONE ENCOUNTER
----- Message from Demarcus Mcdonough MD sent at 8/26/2019  2:55 PM CDT -----  She needs a refill for Nexium

## 2019-08-29 ENCOUNTER — OFFICE VISIT (OUTPATIENT)
Dept: HEMATOLOGY/ONCOLOGY | Facility: CLINIC | Age: 64
End: 2019-08-29
Payer: MEDICARE

## 2019-08-29 VITALS
TEMPERATURE: 98 F | HEIGHT: 64 IN | WEIGHT: 107.56 LBS | SYSTOLIC BLOOD PRESSURE: 140 MMHG | DIASTOLIC BLOOD PRESSURE: 62 MMHG | HEART RATE: 70 BPM | OXYGEN SATURATION: 99 % | RESPIRATION RATE: 16 BRPM | BODY MASS INDEX: 18.36 KG/M2

## 2019-08-29 DIAGNOSIS — C50.512 PRIMARY CANCER OF LOWER OUTER QUADRANT OF LEFT FEMALE BREAST: Primary | ICD-10-CM

## 2019-08-29 DIAGNOSIS — T45.1X5A ANTINEOPLASTIC CHEMOTHERAPY INDUCED ANEMIA: ICD-10-CM

## 2019-08-29 DIAGNOSIS — G62.9 NEUROPATHY: ICD-10-CM

## 2019-08-29 DIAGNOSIS — D64.81 ANTINEOPLASTIC CHEMOTHERAPY INDUCED ANEMIA: ICD-10-CM

## 2019-08-29 PROCEDURE — 99999 PR PBB SHADOW E&M-EST. PATIENT-LVL IV: ICD-10-PCS | Mod: PBBFAC,HCNC,, | Performed by: INTERNAL MEDICINE

## 2019-08-29 PROCEDURE — 99214 PR OFFICE/OUTPT VISIT, EST, LEVL IV, 30-39 MIN: ICD-10-PCS | Mod: HCNC,S$GLB,, | Performed by: INTERNAL MEDICINE

## 2019-08-29 PROCEDURE — 99214 OFFICE O/P EST MOD 30 MIN: CPT | Mod: HCNC,S$GLB,, | Performed by: INTERNAL MEDICINE

## 2019-08-29 PROCEDURE — 1101F PR PT FALLS ASSESS DOC 0-1 FALLS W/OUT INJ PAST YR: ICD-10-PCS | Mod: HCNC,CPTII,S$GLB, | Performed by: INTERNAL MEDICINE

## 2019-08-29 PROCEDURE — 99999 PR PBB SHADOW E&M-EST. PATIENT-LVL IV: CPT | Mod: PBBFAC,HCNC,, | Performed by: INTERNAL MEDICINE

## 2019-08-29 PROCEDURE — 1101F PT FALLS ASSESS-DOCD LE1/YR: CPT | Mod: HCNC,CPTII,S$GLB, | Performed by: INTERNAL MEDICINE

## 2019-08-29 NOTE — PROGRESS NOTES
Chief Complaint: No chief complaint on file.     HPI   Ms. Rivera presents today for follow up. On August 21, 2019 she underwent a left lumpectomy and sentinel node biopsy.  She tiolerated the procedure well.  Unfortunately, the path report is not available yet.     Briefly, she is a 63-year-old  female from [place] who was diagnosed with breast cancer.  Apparently, she initially felt a mass in late 2017.  A mammogram at that time was read as BI-RADS category 1.  A repeat mammogram in December 2018 was read as BI-RADS category 4 and she underwent a biopsy that showed a carcinoma that was ER negative, OH weakly positive in 5% of the cells and HER-2 2+ by immunohistochemistry, but negative by FISH.  Ki-67 was 5%. She subsequently underwent a biopsy of a palpable lymph node on 02/01/2019, that showed evidence of lymph node metastasis.  She was referred for evaluation for chemotherapy.  Of note, she also had a PET scan on 02/01/2019 that showed an 8 mm right lower lobe pulmonary nodule that was not FDG avid.  She stated AC chemotherapy, completed four cycles so far, and subsequently received 4 cycles of taxol.  She had surgery on 8/21.     Review of Systems    Overall she feels OK.   She is still sore from her surgery and she complains of mild neuropathic symptoms (tingling0 of her fingers.  She denies any fever, depression, easy bruising, chills, night  sweats, weight loss, nausea, vomiting, diarrhea, diplopia, blurred vision, headache, chest pain, palpitations, shortness of breath, breast pain, extremity pain, or difficulty ambulating.  The remainder of the ten-point ROS, including general, skin, lymph, H/N, cardiorespiratory, GI, , Neuro, Endocrine, and psychiatric is negative.      Objective:   Physical Exam       She is alert, oriented to time, place, person, pleasant, well      nourished, in no acute physical distress.    She is accompanied by her .                               VITAL  SIGNS:  Reviewed                                      HEENT:  Alopecia is again noted.  There are no nasal, oral, lip, gingival, auricular, lid,    or conjunctival lesions.  Mucosae are moist and pink, and there is no        thrush.  Pupils are equal, reactive to light and accommodation.              Extraocular muscle movements are intact.  Dentition is good. There are no oral ulcerations.                                     NECK:  Supple without JVD, adenopathy, or thyromegaly.                       LUNGS:  Clear to auscultation without wheezing, rales, or rhonchi.           CARDIOVASCULAR:  Reveals an S1, S2, no murmurs, no rubs, no gallops.         ABDOMEN:  Soft, with minimal epigastric tenderness on palpation.  No rebound.  She has no organomegaly.  Bowel sounds are    present.                                                                     EXTREMITIES:  No cyanosis, clubbing, or edema.                          BREASTS:  She is s/p l;eft leumpectomy with a healing incision laterally.  A left sentinel node excision scar is seen.  It is also healing well.  There are no masses in the right breast.  A right sided portacath is identified..                                    LYMPHATIC:  There is no cervical, right axillary, or supraclavicular adenopathy.   SKIN:  Warm and moist, without petechiae, rashes, induration, or ecchymoses.           NEUROLOGIC:  DTRs are 0-1+ bilaterally, symmetrical, motor function is 5/5,  and cranial nerves are  within normal limits.     Assessment:       1. Primary cancer of lower outer quadrant of left female breast, s/o neoadjuvant chemotherapy, s/p lumpectomy    2. Neuropathy from chemotherapy.                 Plan:        I had a long discussion with her.  She will be referred to radiation oncology for her post lumpectomy XRT.  I will see her in two weeks.  If it is felt that's he would need xeloda in the adjuvant setting, this can be administered after the XRT.  Her multiple  questions were answered to her satisfaction.  RTc 2 weeks to discuss further treatment plans.

## 2019-09-03 ENCOUNTER — TELEPHONE (OUTPATIENT)
Dept: HEMATOLOGY/ONCOLOGY | Facility: CLINIC | Age: 64
End: 2019-09-03

## 2019-09-03 NOTE — TELEPHONE ENCOUNTER
Spoke with patient to let her know why Dr Paula might still need her to come in to be seen on 9/12. Informed patient he is waiting on the pathology report before he decides if he will treat her with herceptin or kadcyla. Pt verbalized understanding of this information and looks forward to hearing from someone about this once her knows.

## 2019-09-03 NOTE — TELEPHONE ENCOUNTER
Returned call to patient. She has questions about the nature of a follow up appointment with Dr Paula on 9/12. Informed that it appears the appointment was to discuss further treatment plans. Patient informed she did just see Dr Paula and they had discussed moving forward and [facility] Informed patient I would double check with Dr Paula to see if this appointment is still necessary.     ----- Message from Shannon Shell sent at 9/3/2019 10:37 AM CDT -----  Contact: Pt   Pt called to speak with nurse about a appt 9/12 that is coming up and have some questions about what is the reason of appt   Callback#[phone]  Thank You  STEVIE Shell

## 2019-09-05 ENCOUNTER — OFFICE VISIT (OUTPATIENT)
Dept: SURGERY | Facility: CLINIC | Age: 64
End: 2019-09-05
Payer: MEDICARE

## 2019-09-05 VITALS
HEIGHT: 64 IN | DIASTOLIC BLOOD PRESSURE: 64 MMHG | WEIGHT: 108.13 LBS | BODY MASS INDEX: 18.46 KG/M2 | SYSTOLIC BLOOD PRESSURE: 128 MMHG | HEART RATE: 71 BPM

## 2019-09-05 DIAGNOSIS — C50.912 INFILTRATING DUCTAL CARCINOMA OF LEFT FEMALE BREAST: Primary | ICD-10-CM

## 2019-09-05 PROCEDURE — 99999 PR PBB SHADOW E&M-EST. PATIENT-LVL III: ICD-10-PCS | Mod: PBBFAC,HCNC,, | Performed by: SURGERY

## 2019-09-05 PROCEDURE — 99999 PR PBB SHADOW E&M-EST. PATIENT-LVL III: CPT | Mod: PBBFAC,HCNC,, | Performed by: SURGERY

## 2019-09-05 PROCEDURE — 99024 POSTOP FOLLOW-UP VISIT: CPT | Mod: HCNC,S$GLB,, | Performed by: SURGERY

## 2019-09-05 PROCEDURE — 99024 PR POST-OP FOLLOW-UP VISIT: ICD-10-PCS | Mod: HCNC,S$GLB,, | Performed by: SURGERY

## 2019-09-05 NOTE — PROGRESS NOTES
REFERRING PROVIDER: Salo Vera MD  [address]  [address]  [place] [address]     CHIEF COMPLAINT: left breast cancer     Subjective:      History: Radha Rivera is a 63 y.o. 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 T1N1. A port a cath was placed on 02/08/2019.The patient has now completed neoadjuvant chemotherapy with her last treatment of Taxol on 07/19/2019.    Interval history: She underwent left lumpectomy and SLNBx (2 nodes) on 8/21/2019. Pathology reported lumpectomy specimen negative for carcinoma however does have DCIS present (<1mm from superior margin). Three lymph nodes negative.   She has done well post operatively. She reports some tenderness at incision sites and fatigue, otherwise no complaints. She saw neville Gupta/onc, on 8/29 with plan for adjuvant xeloda to start after XRT. Denies fever, chills, weight loss, weakness, nausea or vomiting.            Past Medical History:   Diagnosis Date    Mitral valve prolapse      Thyroid disease              Past Surgical History:   Procedure Laterality Date    Ear drum surgery        INNER EAR SURGERY Right      tonsillectomy        TONSILLECTOMY                 Current Outpatient Medications on File Prior to Visit   Medication Sig Dispense Refill    levothyroxine (SYNTHROID) 25 MCG tablet 50 mcg.         levothyroxine (SYNTHROID) 50 MCG tablet          vitamin D 1000 units Tab Take 185 mg by mouth once daily.        aspirin 81 MG Chew Take 81 mg by mouth once daily.        meloxicam (MOBIC) 15 MG tablet          naproxen (NAPROSYN) 500 MG tablet          tramadol (ULTRAM) 50 mg tablet            No current facility-administered medications on file prior to visit.       Social History               Socioeconomic History    Marital status:        Spouse name: Not on file    Number of  "children: Not on file    Years of education: Not on file    Highest education level: Not on file   Social Needs    Financial resource strain: Not on file    Food insecurity - worry: Not on file    Food insecurity - inability: Not on file    Transportation needs - medical: Not on file    Transportation needs - non-medical: Not on file   Occupational History    Not on file   Tobacco Use    Smoking status: Never Smoker   Substance and Sexual Activity    Alcohol use: Yes       Comment: Seldom    Drug use: No    Sexual activity: Yes       Partners: Male       Birth control/protection: Post-menopausal   Other Topics Concern    Not on file   Social History Narrative    Not on file               Family History   Problem Relation Age of Onset    Breast cancer Sister 68    Breast cancer Paternal Aunt      Cancer Father           prostate cancer    Thyroid disease Daughter      Breast cancer Maternal Aunt      Colon cancer Neg Hx      Ovarian cancer Neg Hx      Stroke Neg Hx      Diabetes Neg Hx           Review of Systems  Review of Systems  General: no fevers or chills.  Cardio: No palpitations.  Resp: No SOB or wheezing.  GIT: Positive for cramping.  Neuro: Positive for neuropathy. Negative for lightheadedness.    Objective:   PHYSICAL EXAM:  BP (!) 144/63 (BP Location: Left arm, Patient Position: Sitting, BP Method: Medium (Automatic))   Pulse 68   Temp 97.7 °F (36.5 °C) (Oral)   Resp 18   Ht 5' 4" (1.626 m)   Wt 54.5 kg (120 lb 2.4 oz)   BMI 20.62 kg/m²      Physical Exam  General: alert and cooperative. Well appearing   Breast: Left breast: breast and axillary incisions healing well. Dermabond in place. No erythema or drainage. No masses or lesion palpated bilaterally.     Assessment:  63 yo female who completed neoadjuvant chemotherapy for L invasive ductal carcinoma with axillary node involvement s/p left lumpectomy and SLNBx on 8/21/2019.     Plan:  - Follow up in 6 months.   - Will plan " to present patient in multidisciplinary conference   - Has appointment with radiation oncology on 9/19.  - Continue to follow up with hematology oncology     I have personally taken the history and examined this patient and agree with the resident's note as stated above.  The patient presents with her  Criss for her 1st postop visit status post left breast conservation surgery with a seed localization lumpectomy and left axillary targeted axillary lymph node dissection and sentinel lymph node biopsy for a previously biopsy-proven positive lymph node prior to neoadjuvant chemotherapy.  Surgery was on 08/21/2019 and revealed a complete pathologic response to the invasive cancer at the primary lumpectomy site in the left lower outer quadrant as well as in the axillary targeted lymph node on targeted axillary/sentinel lymph node dissection/biopsy.    There was some residual DCIS within a mm of the superior and posterior deep margin.    Her incision sites are all clean dry and intact healing appropriately without signs of infection hematoma or seroma.  She has excellent symmetrical cosmetic results with no evidence of wound separation drainage or infection.    The tumor was estrogen receptor negative progesterone receptor weakly positive and HER2 Antoinette negative.    Patient will be presented at multidisciplinary breast Cancer Conference.  She will see Dr. Mock for radiation oncology consultation 09/19/2019.  She will see Dr. Baker to consider adjuvant endocrine therapy therapy fopr 5% positivity of the progesterone receptor.  Will consider for portacath removal after patient meets with Dr. Baker on 09/12/2019.  She will otherwise follow up with me in 6 months.  No recommended role for any re-excision with only DCIS residually found on the lumpectomy after neoadjuvant chemotherapy with all of the invasive cancer in both the primary lower outer quadrant and axillary node completely responding  pathologically to neoadjuvant chemotherapy which was essentially a triple negative regimen although the progesterone receptor positivity was weakly positive at 5%.  She she will be considered for adjuvant endocrine therapy because of the weak positivity and residual DCIS follows adjuvant radiotherapy to the left breast and regional lymphatics.  The patient could be potentially enrolled in the and the NSABP B-51 trial as well given the negative node on final pathology after neoadjuvant chemotherapy after initially presenting with a positive node.

## 2019-09-10 ENCOUNTER — TUMOR BOARD CONFERENCE (OUTPATIENT)
Dept: SURGERY | Facility: CLINIC | Age: 64
End: 2019-09-10

## 2019-09-10 NOTE — ADDENDUM NOTE
Addendum  created 09/10/19 1000 by Luis Eduardo Mack MD    Attestation recorded in Intraprocedure, Intraprocedure Attestations filed

## 2019-09-10 NOTE — PROGRESS NOTES
Primary cancer of lower outer quadrant of left female breast    12/31/2018 Imaging Significant Findings     Mammogram and US  Impression:  Left  Mass: Left breast 9 mm x 6 mm x 5 mm mass at the 4 o'clock position. Assessment: 4 - Suspicious finding. Biopsy is recommended.      Complicated cyst versus mass in the left breast at 4 o'clock, 5 cm from the nipple. Given its close proximity to the dominant and more suspicious mass, further recommendations will be provided following pathology results of above lesion.     Right  There is no mammographic or sonographic evidence of malignancy.          Recommendation:  Biopsy is recommended.          1/11/2019 Biopsy     Left breast, mass at 4:00, core biopsy:  Invasive ductal carcinoma, grade 2 (3,3,1).  Tumor measures 3 mm (0.3 cm) in greatest linear dimension within the core biopsy specimen.         1/11/2019 Initial Diagnosis     Primary cancer of lower outer quadrant of left female breast Invasive Ductal Carcinoma         1/11/2019 Breast Tumor Markers     Estrogen Receptor: Negative  Progesterone Receptor: Positive 0-10%  HER2: Negative  Ki67: <10%         1/28/2019 Imaging Significant Findings     Breast MRI  Impression:  Left  Mass: Left breast 9 mm x 6 mm x 7 mm mass at the posterior 4 o'clock position. Assessment: 6 - Known biopsy, proven malignancy. Surgical Consult is recommended.      Prominent left axillary lymph node, although may be reactive, recommend further evaluation with ultrasound.      Right  There is no MR evidence of malignancy.        Recommendation:  Clinical management of left breast cancer. Patient is established with the breast surgery clinic.          2/1/2019 Biopsy     Left axilla core needle biopsy:  - Positive for metastatic ductal carcinoma involving lymph node (4 of 4 cores, 5mm in greatest length)         2/1/2019 Imaging Significant Findings     PET Scan  Impression       Redemonstration of left breast mass and prominent left  axillary nodes without associated hypermetabolic activity.  No evidence for distant metastasis, specifically without osseous metastatic disease to explain the etiology of this patient's hip pain.    0.8 cm right lower lobe pulmonary nodule.  No significant associated hypermetabolic activity, noting that this lesion may be below the threshold in size for PET detection.  Clinical concerns will dictate the role and schedule for continued surveillance in this patient with a history of malignancy.              2/18/2019 - 7/19/2019 Chemotherapy     Treatment Summary   Plan Name: OP BREAST AC - DOXORUBICIN CYCLOPHOSPHAMIDE Q3W  Treatment Goal: Curative  Status: Inactive  Start Date: 2/18/2019  End Date: 4/25/2019  Provider: Aneesh Baker MD  Chemotherapy: DOXOrubicin chemo injection 94 mg, 60 mg/m2 = 94 mg, Intravenous, Clinic/HOD 1 time, 4 of 4 cycles  Administration: 94 mg (2/18/2019), 94 mg (3/11/2019), 94 mg (4/5/2019), 94 mg (4/25/2019)    cyclophosphamide (CYTOXAN) 600 mg/m2 = 940 mg in sodium chloride 0.9% 250 mL chemo infusion, 600 mg/m2 = 940 mg, Intravenous, Clinic/HOD 1 time, 4 of 4 cycles  Administration: 940 mg (2/18/2019), 940 mg (3/11/2019), 940 mg (4/5/2019), 940 mg (4/25/2019)    Plan Name: OP BREAST PACLITAXEL Q3W  Treatment Goal: Curative  Status: Active  Start Date: 5/17/2019  End Date: 7/19/2019  Provider: Aneesh Baker MD  Chemotherapy: PACLitaxel (TAXOL) 175 mg/m2 = 276 mg in sodium chloride 0.9% 500 mL chemo infusion, 175 mg/m2 = 276 mg, Intravenous, Clinic/HOD 1 time, 4 of 4 cycles  Administration: 276 mg (5/17/2019), 276 mg (6/7/2019), 276 mg (6/28/2019), 276 mg (7/19/2019)             8/21/2019 Breast Surgery     Surgery: Dr Twan Valdes, [phone] performed left lumpectomy with sentinel lymph node biopsy with pathology showing grade 3 invasive ductal carcinoma, 5 mm with 0 positive lymph nodes.              8/21/2019 Cancer Staged     ypTis N0         9/10/2019 Tumor Conference         Complete response . No further chemotherapy. Discuss adjuvant endocrine therapy with low threshold for discontinuation. Radiation therapy, Consider  B51 trial to randomize +/- axillary XRT will receive left whole breast radiation.

## 2019-09-12 ENCOUNTER — OFFICE VISIT (OUTPATIENT)
Dept: HEMATOLOGY/ONCOLOGY | Facility: CLINIC | Age: 64
End: 2019-09-12
Payer: MEDICARE

## 2019-09-12 VITALS
OXYGEN SATURATION: 99 % | RESPIRATION RATE: 20 BRPM | DIASTOLIC BLOOD PRESSURE: 63 MMHG | HEART RATE: 73 BPM | WEIGHT: 107.56 LBS | BODY MASS INDEX: 18.47 KG/M2 | SYSTOLIC BLOOD PRESSURE: 137 MMHG

## 2019-09-12 DIAGNOSIS — C50.512 PRIMARY CANCER OF LOWER OUTER QUADRANT OF LEFT FEMALE BREAST: Primary | ICD-10-CM

## 2019-09-12 PROCEDURE — 99214 OFFICE O/P EST MOD 30 MIN: CPT | Mod: HCNC,S$GLB,, | Performed by: INTERNAL MEDICINE

## 2019-09-12 PROCEDURE — 99214 PR OFFICE/OUTPT VISIT, EST, LEVL IV, 30-39 MIN: ICD-10-PCS | Mod: HCNC,S$GLB,, | Performed by: INTERNAL MEDICINE

## 2019-09-12 PROCEDURE — 1101F PT FALLS ASSESS-DOCD LE1/YR: CPT | Mod: HCNC,CPTII,S$GLB, | Performed by: INTERNAL MEDICINE

## 2019-09-12 PROCEDURE — 99999 PR PBB SHADOW E&M-EST. PATIENT-LVL III: CPT | Mod: PBBFAC,HCNC,, | Performed by: INTERNAL MEDICINE

## 2019-09-12 PROCEDURE — 1101F PR PT FALLS ASSESS DOC 0-1 FALLS W/OUT INJ PAST YR: ICD-10-PCS | Mod: HCNC,CPTII,S$GLB, | Performed by: INTERNAL MEDICINE

## 2019-09-12 PROCEDURE — 99999 PR PBB SHADOW E&M-EST. PATIENT-LVL III: ICD-10-PCS | Mod: PBBFAC,HCNC,, | Performed by: INTERNAL MEDICINE

## 2019-09-18 NOTE — PROGRESS NOTES
REFERRING PHYSICIAN: Twan Valdes M.D., Aneesh Baker M.D.    DIAGNOSIS: cT1c N1 M0 (ypTis N0) invasive ductal carcinoma of the left breast    HISTORY OF PRESENT ILLNESS:   Ms. Rivera is a 64-year-old female who was recently diagnosed with left breast cancer after she presented with a palpable mass in the lower outer quadrant.  She 1st noticed it in late 2017 and underwent mammogram which was BI-RADS 1.  A repeat mammogram and ultrasound in December 2018 revealed a 9 x 6 x 5 mm suspicious lesion in the left breast at 4:00 oclock.  A core needle biopsy of this lesion on January 11, 2019 revealed invasive ductal carcinoma, grade 2.  This lesion is ER negative, NC weakly positive at 5%, and HER2 negative after FISH testing, with Ki-67 index of 5%.  An MRI of the bilateral breast on January 28, 2019 revealed a 9 x 6 x 7 mm oval heterogeneous mass in the left breast at 4:00 oclock.  This lesion is 1-2 mm from the chest wall although there is a clear fat plane posteriorly.  There was a prominent left axillary lymph nodes seen as well.  A PET scan on February 1, 2019 revealed a 1.3 cm soft tissue focus in the left breast correlating with the MRI finding with associated hypermetabolic activity.  There were multiple prominent left axillary lymph nodes noted with the largest one measuring 0.6 cm.  A 0.8 cm nodule was seen in the right lower lobe without hypermetabolic activity.  A biopsy of the left axillary lymph node on the same day revealed metastatic ductal carcinoma.    She underwent neoadjuvant chemotherapy with AC followed by Taxol.  A repeat MRI of the bilateral breasts on August 5, 2019 revealed resolution of the left breast lesion.  No enlarged axillary or internal mammary nodes were seen in either breast.  On August 21, 2019, she underwent lumpectomy and sentinel node biopsy.  The pathology revealed the left breast with no residual invasive carcinoma.  However there was grade 3 ductal carcinoma in situ  measuring approximately 5 mm noted.  The closest margin from the DCIS is less than 1 mm superiorly.  3/3 sentinel lymph nodes were negative for involvement.  She is here today for recommendations regarding further treatment.    At present, patient is healing from the surgery without any unexpected side effects.  She denies left breast pain, edema, erythema, or nipple discharge. She also denies fever, night sweats, or weight loss.    REVIEW OF SYSTEMS:  As above.  In addition, patient denies headaches, visual problems, dizziness, chest pain, shortness of breath, cough, nausea, vomiting, diarrhea, or any new bony pains. Patient also denies easy bruising, skin rashes, or numbness or tingling.    GYN HISTORY:   Menarche age 11.  .  Menopause in .    ECO    PAST MEDICAL HISTORY:  Past Medical History:   Diagnosis Date    Breast cancer 2019    left    Mitral valve prolapse     Thyroid disease        PAST SURGICAL HISTORY:  Past Surgical History:   Procedure Laterality Date    BIOPSY, LYMPH NODE, SENTINEL LEFT with SEED Left 2019    [facility]    BREAST BIOPSY Left 2019    Ear drum surgery      INNER EAR SURGERY Right     INSERTION, PORT-A-CATH Right 2019    [facility]    MASTECTOMY, PARTIAL LEFT with SEED Left 2019    [facility]    tonsillectomy      TONSILLECTOMY         ALLERGIES:   Review of patient's allergies indicates:   Allergen Reactions    Bactrim [sulfamethoxazole-trimethoprim] Other (See Comments)     Caused reflux and severe nausea       MEDICATIONS:  Current Outpatient Medications   Medication Sig    aluminum hydrox-magnesium carb (GAVISCON EXTRA STRENGTH) 254-237.5 mg/5 mL Susp Take by mouth every 8 (eight) hours as needed.    levothyroxine (SYNTHROID) 75 MCG tablet Take 75 mcg by mouth every morning.    vit A/vit C/vit E/zinc/copper  (OCUVITE PRESERVISION ORAL) Take 1 tablet by mouth 2 (two) times daily.    vitamin D 1000 units Tab Take 2,000 Units by mouth once daily.     aspirin (ECOTRIN) 81 MG EC tablet Take 81 mg by mouth once daily.    esomeprazole (NEXIUM) 20 MG capsule Take 1 capsule (20 mg total) by mouth 2 (two) times daily.    lidocaine-prilocaine (EMLA) cream Apply to affected area once    ondansetron (ZOFRAN-ODT) 4 MG TbDL Take 1 tablet (4 mg total) by mouth every 8 (eight) hours as needed.     No current facility-administered medications for this visit.        SOCIAL HISTORY:  Social History     Socioeconomic History    Marital status:      Spouse name: Not on file    Number of children: Not on file    Years of education: Not on file    Highest education level: Not on file   Occupational History    Not on file   Social Needs    Financial resource strain: Not on file    Food insecurity:     Worry: Not on file     Inability: Not on file    Transportation needs:     Medical: Not on file     Non-medical: Not on file   Tobacco Use    Smoking status: Never Smoker    Smokeless tobacco: Never Used   Substance and Sexual Activity    Alcohol use: Yes     Comment: Seldom    Drug use: No    Sexual activity: Yes     Partners: Male     Birth control/protection: Post-menopausal   Lifestyle    Physical activity:     Days per week: Not on file     Minutes per session: Not on file    Stress: Not on file   Relationships    Social connections:     Talks on phone: Not on file     Gets together: Not on file     Attends Anabaptism service: Not on file     Active member of club or organization: Not on file     Attends meetings of clubs or organizations: Not on file     Relationship status: Not on file   Other Topics Concern    Not on file   Social History Narrative    Not on file       FAMILY HISTORY:  Family History   Problem Relation Age of Onset    Breast cancer Sister 68    Breast cancer Paternal Aunt     Cancer Father   "       prostate cancer    Thyroid disease Daughter     Breast cancer Maternal Aunt     Colon cancer Neg Hx     Ovarian cancer Neg Hx     Stroke Neg Hx     Diabetes Neg Hx          PHYSICAL EXAMINATION:  Vitals:    09/19/19 0921   BP: (!) 140/63   Pulse: 64   Resp: 16   Temp: 98.1 °F (36.7 °C)   TempSrc: Oral   Weight: 49.1 kg (108 lb 3.9 oz)   Height: 5' 4" (1.626 m)   Body mass index is 18.58 kg/m².  GENERAL: Patient is alert and oriented, in no acute distress.  HEENT:Extraocular muscles are intact.  Oropharynx is clear without lesions.  There is no cervical or supraclavicular lymphadenopathy palpated.  No thyromegaly noted.  HEART: Regular rate and rhythm.  LUNGS: Clear to auscultation bilaterally.  BREAST EXAM: She has very small breasts bilaterally. The scar secondary to lumpectomy is noted in the lower outer quadrant of the left breast.  There is also a scar in the left axilla secondary to sentinel node biopsy.  No abnormal masses palpated in the left breast or left axilla.  The right breast and right axilla are also without palpable masses.  ABDOMEN:Soft, nontender, nondistended, without hepatosplenomegaly.  Normoactive bowel sounds.  EXTREMITIES: No clubbing, cyanosis, or edema.  NEUROLOGICAL: Cranial nerve II through XII grossly intact.  Sensation is intact.  Strength is 5 out of 5 in the upper and lower extremities bilaterally.     ASSESSMENT:   This is a 64-year-old female with clinical T1c N1 M0 (ypTis N0) grade 2, invasive ductal carcinoma of the left breast who underwent neoadjuvant chemotherapy followed by left breast lumpectomy and sentinel node biopsy on August 21, 2019 with residual grade 3, DCIS with the closest margin at less than 1 mm superiorly from the DCIS, ER/IL negative, Her 2-, Ki-67 5%.    PLAN:  After discussion the multidisciplinary breast Conference and review of the available pathology and radiological images, Ms. Rivera is noted to have good response to neoadjuvant " chemotherapy with residual grade 3 DCIS which was excised with close margins superiorly.  To reduce her chance of local recurrence, I recommend postoperative radiation to the left breast and draining lymph nodes for a total dose of 4500 cGy +1600 cGy boost.  I also discussed the option of participating in NSABP B 51 trial which randomizes node positive breast cancer patients to lymph avni irradiation vs. No lymph avni radiation.  After discussion with research nurse, patient declined participation in the trial.     The risks, benefits, and side effects of radiation were explained in detail to the patient and her .  All questions were answered and informed consent was signed.  I plan to see the patient back for radiation planning CT2-3 weeks.    Psychosocial Distress screening score of Distress Score: 3 noted and reviewed. No intervention indicated.    I spent approximately 60 minutes reviewing the available records and evaluating the patient, out of which over 50% of the time was spent face to face with the patient in counseling and coordinating this patient's care.

## 2019-09-19 ENCOUNTER — OFFICE VISIT (OUTPATIENT)
Dept: SURGERY | Facility: CLINIC | Age: 64
End: 2019-09-19
Payer: MEDICARE

## 2019-09-19 VITALS
HEIGHT: 64 IN | WEIGHT: 108.25 LBS | TEMPERATURE: 98 F | BODY MASS INDEX: 18.48 KG/M2 | SYSTOLIC BLOOD PRESSURE: 140 MMHG | DIASTOLIC BLOOD PRESSURE: 63 MMHG | HEART RATE: 64 BPM | RESPIRATION RATE: 16 BRPM

## 2019-09-19 DIAGNOSIS — C50.912 INFILTRATING DUCTAL CARCINOMA OF LEFT FEMALE BREAST: ICD-10-CM

## 2019-09-19 DIAGNOSIS — C50.512 PRIMARY CANCER OF LOWER OUTER QUADRANT OF LEFT FEMALE BREAST: Primary | ICD-10-CM

## 2019-09-19 PROCEDURE — 99999 PR PBB SHADOW E&M-EST. PATIENT-LVL III: CPT | Mod: PBBFAC,HCNC,, | Performed by: RADIOLOGY

## 2019-09-19 PROCEDURE — 99999 PR PBB SHADOW E&M-EST. PATIENT-LVL III: ICD-10-PCS | Mod: PBBFAC,HCNC,, | Performed by: RADIOLOGY

## 2019-09-19 PROCEDURE — 1101F PT FALLS ASSESS-DOCD LE1/YR: CPT | Mod: HCNC,CPTII,S$GLB, | Performed by: RADIOLOGY

## 2019-09-19 PROCEDURE — 99204 OFFICE O/P NEW MOD 45 MIN: CPT | Mod: HCNC,S$GLB,, | Performed by: RADIOLOGY

## 2019-09-19 PROCEDURE — 1101F PR PT FALLS ASSESS DOC 0-1 FALLS W/OUT INJ PAST YR: ICD-10-PCS | Mod: HCNC,CPTII,S$GLB, | Performed by: RADIOLOGY

## 2019-09-19 PROCEDURE — 99204 PR OFFICE/OUTPT VISIT, NEW, LEVL IV, 45-59 MIN: ICD-10-PCS | Mod: HCNC,S$GLB,, | Performed by: RADIOLOGY

## 2019-09-19 NOTE — LETTER
September 19, 2019      Aneesh Baker MD  [address]  [facility] [address]           [facility]  [address], [address]  [facility] [address]  Phone: [phone]  Fax: [phone]          Patient: Radha Rivera   MR Number: [id number]   YOB: 1955   Date of Visit: 9/19/2019       Dear Dr. Aneesh Baker:    Thank you for referring Radha Rivera to me for evaluation. Attached you will find relevant portions of my assessment and plan of care.    If you have questions, please do not hesitate to call me. I look forward to following Radha Rivera along with you.    Sincerely,    Breana Mock MD    Enclosure  CC:  No Recipients    If you would like to receive this communication electronically, please contact [email or web address] or [phone] to request more information on [employer] access.    For providers and/or their staff who would like to refer a patient to [facility], please contact us through our one-stop-shop provider referral line, [facility], at [phone].    If you feel you have received this communication in error or would no longer like to receive these types of communications, please e-mail [email or web address]

## 2019-09-19 NOTE — PATIENT INSTRUCTIONS
Radiation Therapy Treatment  Radiation therapy can help you in your fight against cancer. It begins with a session to discuss treatment with your doctor. If you and your doctor decide on radiation, you will return for a simulation. The simulation is a planning session that helps the doctor target your cancer. He or she will design a radiation plan to protect normal tissues. When the simulation and plan are completed, you will begin your daily treatments. Treatment is usually once daily Monday to Friday. It takes less than a half an hour. Sometimes you may need radiation twice a day, with usually 6 hours between treatments. After the course of radiation is complete, you will be scheduled for follow-up appointments. This is to make sure the cancer is under control. The follow-ups will also make sure that any side effects from the treatment are taken care of.  Radiation therapy uses high-energy X-rays to kill cancer cells.   Your treatment planning visit: The simulation  Your radiation therapy team uses a special machine called a simulator to map out your treatment. The simulator is usually an X-ray machine (fluoroscopy), CT scanner, MRI scanner, or PET-CT scanner machine. Laser lights act as guides to help position your body accurately. During this visit:  · The team figures out the best position for your body. They make notes in your chart so youll be placed the same way each time.  · You may use special devices to keep your body correctly positioned and still during treatment. These may include molds, masks, rests, and blocks.  · The team makes ink marks on your skin. These will help you get in the same position for each treatment. Tiny permanent tattoos may also be used.   · Markers such as metal balls or wires may be put on or in your body. Sometime these are taped to the skin to help with the imaging process. These work with the X-rays to position your body. The markers are removed when the visit is  over.  After the team has the imaging and data, the information is sent into the computer planning system. Your doctor and the team of physicists and dosimetrists design a treatment field. The field will best target your cancer and how it might spread. It will also help limit radiation to nearby normal tissues.  Your treatments  Each treatment usually takes 10 to 30 minutes. You may need to change into a hospital gown. The radiation therapist puts you in the correct position on the treatment table, then leaves the room. Sometimes you may need more imaging before each treatment. The machine may take digital X-rays or a CT scan to help make sure you are lined up correctly. During treatment, lie as still as you can and breathe normally. You will hear noises coming from the machine. You can talk to the radiation therapist, who watches you from the control room on a TV monitor. After treatment, the therapist will help you off the table. You can then get dressed and go back to your normal activities.  Date Last Reviewed: 1/14/2016 © 2000-2017 [employer]. [address] [place] [address]. All rights reserved. This information is not intended as a substitute for professional medical care. Always follow your healthcare professional's instructions.        Discharge Instructions for Radiation Therapy  Radiation therapy uses high-energy X-rays to kill cancer cells and help you in your fight against cancer. Radiation destroys cancer cells gradually, over time. The goal of therapy is to focus on and kill as many cancer cells as possible. Radiation can also damage or kill some of the normal cells that are closest to the tumor. Damaged normal cells can repair themselves, often within a few days.  Caring for your skin  You should ask your healthcare provider for specific products that he or she recommends for washing and bathing. In general, use a mild nondetergent soap and warm (not hot) water to clean the  "area receiving radiation. Pat the region dry rather than rubbing.  Your healthcare provider may give you products to moisturize the skin and prevent infection. The goal is to prevent cracks or breaks in the skin that may be sensitive from treatment:   · Dont be surprised if your treatment causes skin redness, and a type of "sunburn" over time. Some radiation treatments can cause this.   · Ask your therapy team what lotion to use. Also ask for directions about when and how to apply it.  · Avoid prolonged or direct sunlight on the treated area. Ask your therapy team about using a sunscreen. You do not have to avoid going outside altogether, but must take appropriate precautions.  · Dont remove ink marks unless your radiation therapist says its OK. Dont scrub or use soap on the marks when you wash. Let water run over them and pat them dry.  · Protect your skin from heat or cold. Avoid hot tubs, saunas, heating pads, or ice packs.  · Avoid clothing that causes friction or rubbing on the skin.  Fighting fatigue  Radiation therapy may cause you to feel tired. Your body is working hard to heal and repair itself. To feel better, try these things:  · Do light exercise each day. Take short walks.  · Plan tasks for the times when you tend to have the most energy. Ask for help when you need it.  · Relax before you go to bed. This will help you sleep better. Try reading or listening to soothing music.  Coping with appetite changes  Here are ways to cope:  · Tell your therapy team if you find it hard to eat or you have no appetite. You may be referred to a nutritionist to help you with meal planning.  · Radiation to certain internal sites can cause nausea, depending on the location of treatment. This can affect your appetite. Think of healthy eating as part of your treatment. Try these tips:  ¨ Eat slowly.  ¨ Eat small meals several times a day.  ¨ Eat more food when youre feeling better.  ¨ Ask others to keep you company " when you eat.  ¨ Stock up on easy-to-prepare foods.  ¨ Eat foods high in protein and calories. Your healthcare provider may recommend liquid meal supplements.  ¨ Drink plenty of water and other fluids.  ¨ Ask your healthcare provider before taking any vitamins or over-the-counter supplements. Such products are not regulated by the FDA and can sometimes interfere with your treatments.   Dealing with other side effects  Here are suggestions to deal with other side effects:   · Be prepared for hair loss in the area being treated. The hair loss may be permanent. Be sure to discuss this with your healthcare provider.  · Sip cool water if your mouth or throat becomes dry or sore. Ice chips may also help.  · Tell your healthcare provider if you have diarrhea or constipation. You may be given a special diet.  · If you have trouble swallowing liquids, tell your healthcare provider.  Follow-up  Make a follow-up appointment as directed by your healthcare provider.     When to call your healthcare provider  Call your healthcare provider right away if you have any of the following:  · Unexpected headaches  · Trouble concentrating  · Ongoing fatigue  · Wheezing, shortness of breath, or trouble breathing  · Pain that doesnt go away, especially if its always in the same place  · New or unusual lumps, bumps, or swelling  · Dizziness or lightheadedness  · Unusual rashes, bruises, or bleeding  · Fever of 100.4°F (38°C) or higher, or chills  · Nausea and vomiting  · Diarrhea that doesnt improve with time  · Skin breakdown; significant pain due to skin irritation   Date Last Reviewed: 1/13/2016  © 9715-9865 [employer]. [address] [place] [address], [place] [address]. All rights reserved. This information is not intended as a substitute for professional medical care. Always follow your healthcare professional's instructions.        Radiation Therapy: Managing Short-Term Side Effects     Take short walks daily.   Radiation  therapy uses high-energy X-rays or particles to kill cancer cells. Some normal cells can also be affected. This causes side effects such as dry skin, tiredness (fatigue), or changes in your appetite. Most side effects go away when your radiation therapy is over.  Having side effects of radiation therapy does not mean that your cancer is getting worse or that therapy isnt working.   Caring for your skin  Skin problems may happen where your body gets radiation. Your skin may become dry, itchy, red, and peeling. It may darken in that spot, like a tan. To care for your skin:  · Dont scrub on the treatment area. Clean that area of the skin every day. Use warm water and mild soap, or as your healthcare provider advises. Pat the skin afterward or let it air dry.  · Ask your therapy team what lotion to use and when to use it.  · Keep the treated area out of the sun. Ask your team about using a sunscreen.  · Don't remove ink marks unless your radiation therapist says you can. Dont scrub the marks when you wash. Let water run over them and pat them dry.  · Protect your skin from heat or cold. Avoid hot tubs, saunas, hot pads, and ice packs.  · Wear soft, loose clothing to avoid rubbing skin.  Fighting tiredness  The cancer itself or the radiation therapy may cause you to feel tired. Your body is working hard to heal and repair itself. To feel better:  · Try light exercise each day. Take short walks.  · Plan tasks for the times when you tend to have the most energy. Ask for help when you need it.  · Relax before you go to bed to sleep better. Try reading or listening to soothing music.  · Be sure to let your cancer care team know if you continue to have fatigue that is not getting better. They may be able to offer ways to help.   Coping with appetite changes  Tell your therapy team if you find it hard to eat or have no appetite. You may need to see a nutritionist. This is a healthcare provider with special training in meal  planning. To keep your strength up, you need to eat well and maintain your weight. Think of healthy eating as part of your treatment. Try these tips:  · Eat slowly.  · Eat small meals several times a day.  · Eat more food when youre feeling better, even if it is not mealtime.  · Ask others to keep you company when you eat.  · Stock up on easy-to-prepare foods.  · Eat foods high in protein and calories.  · Drink plenty of water and other fluids.  · Ask your healthcare provider before taking any vitamins.  Site-specific side effects  These side effects include the following:   · You may lose hair in the area being treated. The hair often grows back after treatment.  · Your mouth or throat can become dry or sore if your head or neck is being treated. Sip cool water to help ease discomfort.  · Nausea and bowel changes can happen with radiation to the pelvic region. Tell your healthcare provider if you have nausea, diarrhea, or constipation. You may need to take medicine or follow a special diet.  Talk with your healthcare team  Radiation therapy can also have other side effects, including some that might not show up until years later. Be sure to talk with your healthcare team about what to expect with the type of radiation therapy you are getting, including when you should call them with concerns.   Date Last Reviewed: 5/1/2016  © 0591-7133 [employer]. [address] [place] [address], [place] [address]. All rights reserved. This information is not intended as a substitute for professional medical care. Always follow your healthcare professional's instructions.

## 2019-09-25 ENCOUNTER — TELEPHONE (OUTPATIENT)
Dept: FAMILY MEDICINE | Facility: CLINIC | Age: 64
End: 2019-09-25

## 2019-09-25 NOTE — TELEPHONE ENCOUNTER
Lmovm, we dont have [facility] records, unsure of when the last time Dr. Vera ordered a thyroid panel.

## 2019-09-25 NOTE — TELEPHONE ENCOUNTER
----- Message from Berta lCaros sent at 9/25/2019  4:02 PM CDT -----  Contact: self  Pt called in about wanting to see when was the last time she had blood work for her thyroid done      Pt can be reached at [phone] ty

## 2019-09-26 NOTE — TELEPHONE ENCOUNTER
----- Message from Thea Prabhakar sent at 9/25/2019  4:51 PM CDT -----  Contact: pt  Pt missed a call and would the nurse to return their call.    Pt can be reached at [phone]

## 2019-09-27 ENCOUNTER — TELEPHONE (OUTPATIENT)
Dept: HEMATOLOGY/ONCOLOGY | Facility: CLINIC | Age: 64
End: 2019-09-27

## 2019-09-27 ENCOUNTER — TELEPHONE (OUTPATIENT)
Dept: FAMILY MEDICINE | Facility: CLINIC | Age: 64
End: 2019-09-27

## 2019-09-27 NOTE — TELEPHONE ENCOUNTER
----- Message from Dara Dean sent at 9/27/2019  9:36 AM CDT -----  Contact: self   [phone]  Pt missed a call and would like the nurse to return their call.        Thank you!

## 2019-09-27 NOTE — TELEPHONE ENCOUNTER
"----- Message from Lisa Singh sent at 9/27/2019  9:39 AM CDT -----  Contact: Radha   Consult/Advisory:    Name Of Caller: Radha   Provider Name: Aneesh Watt   What is the nature of the call?:  - blood work or results has questions please call and advise.   Does patient feel the need to be seen today? No   Relationship to the Pt?: self   Contact Preference?: [phone]    Additional Notes:  "Thank you for all that you do for our patients'"      "

## 2019-09-27 NOTE — TELEPHONE ENCOUNTER
Returned call to patient to discuss her questions/concerns. Patient had questions regarding who would be checking her labs on her thyroid function.   Explained to patient that she follow up with her prescribing provider of the levothyroxine, who is her PCP, to discuss follow up and annual labs necessary.   Patient voiced understanding and to follow up with their clinic.   She voiced appreciation.

## 2019-10-02 ENCOUNTER — TELEPHONE (OUTPATIENT)
Dept: FAMILY MEDICINE | Facility: CLINIC | Age: 64
End: 2019-10-02

## 2019-10-02 ENCOUNTER — TELEPHONE (OUTPATIENT)
Dept: RADIATION ONCOLOGY | Facility: CLINIC | Age: 64
End: 2019-10-02

## 2019-10-02 NOTE — TELEPHONE ENCOUNTER
Tried calling pt back, no answer, no vm we need to know if pt is staying with Dr. Herrera? If so, she needs appt. We cant order any labs, we dont have any records from  yet.

## 2019-10-02 NOTE — TELEPHONE ENCOUNTER
----- Message from Sam Vasquez sent at 10/2/2019 10:39 AM CDT -----  Contact: Criss landis)   Pt requesting lab orders and would like a call back at [phone].

## 2019-10-02 NOTE — TELEPHONE ENCOUNTER
Pt. Called and informed Lab work needs to be ordered by pcp.  Pt. Verbalized understanding of same.      ----- Message from Keila Cordero MA sent at 10/2/2019 11:35 AM CDT -----  Contact: [phone]     Pt would like to ask Dr Mock to order some lab work for her

## 2019-10-04 ENCOUNTER — TELEPHONE (OUTPATIENT)
Dept: FAMILY MEDICINE | Facility: CLINIC | Age: 64
End: 2019-10-04

## 2019-10-04 NOTE — TELEPHONE ENCOUNTER
----- Message from Berta Claros sent at 10/4/2019  4:41 PM CDT -----  Contact: self  Pt called in about wanting to speak with . Pt would like to talk to him about her blood work for thyroid. Pt would like to discuss further. PT radiation start Oct 14      Pt can be reached at [phone]        TY

## 2019-10-07 ENCOUNTER — TELEPHONE (OUTPATIENT)
Dept: FAMILY MEDICINE | Facility: CLINIC | Age: 64
End: 2019-10-07

## 2019-10-07 NOTE — TELEPHONE ENCOUNTER
----- Message from Lisa Navas sent at 10/7/2019  1:56 PM CDT -----  Contact: self/[phone]  She is returning the nurse's call.

## 2019-10-08 ENCOUNTER — HOSPITAL ENCOUNTER (OUTPATIENT)
Dept: RADIATION THERAPY | Facility: HOSPITAL | Age: 64
Discharge: HOME | End: 2019-10-08
Attending: RADIOLOGY
Payer: MEDICARE

## 2019-10-08 ENCOUNTER — OFFICE VISIT (OUTPATIENT)
Dept: FAMILY MEDICINE | Facility: CLINIC | Age: 64
End: 2019-10-08
Payer: MEDICARE

## 2019-10-08 VITALS
TEMPERATURE: 99 F | OXYGEN SATURATION: 97 % | WEIGHT: 106.13 LBS | HEIGHT: 64 IN | BODY MASS INDEX: 18.12 KG/M2 | HEART RATE: 71 BPM | DIASTOLIC BLOOD PRESSURE: 76 MMHG | SYSTOLIC BLOOD PRESSURE: 124 MMHG

## 2019-10-08 DIAGNOSIS — R73.01 IMPAIRED FASTING GLUCOSE: ICD-10-CM

## 2019-10-08 DIAGNOSIS — C50.912 INFILTRATING DUCTAL CARCINOMA OF LEFT FEMALE BREAST: Primary | ICD-10-CM

## 2019-10-08 DIAGNOSIS — E03.9 HYPOTHYROIDISM (ACQUIRED): ICD-10-CM

## 2019-10-08 PROCEDURE — 77334 PR  RADN TREATMENT AID(S) COMPLX: ICD-10-PCS | Mod: 26,HCNC,, | Performed by: RADIOLOGY

## 2019-10-08 PROCEDURE — 77290 THER RAD SIMULAJ FIELD CPLX: CPT | Mod: 26,HCNC,, | Performed by: RADIOLOGY

## 2019-10-08 PROCEDURE — 99999 PR PBB SHADOW E&M-EST. PATIENT-LVL III: ICD-10-PCS | Mod: PBBFAC,HCNC,, | Performed by: INTERNAL MEDICINE

## 2019-10-08 PROCEDURE — 77014 HC CT GUIDANCE RADIATION THERAPY FLDS PLACEMENT: CPT | Mod: TC,HCNC | Performed by: RADIOLOGY

## 2019-10-08 PROCEDURE — 77290 PR  SET RADN THERAPY FIELD COMPLEX: ICD-10-PCS | Mod: 26,HCNC,, | Performed by: RADIOLOGY

## 2019-10-08 PROCEDURE — 77263 PR  RADIATION THERAPY PLAN COMPLEX: ICD-10-PCS | Mod: HCNC,,, | Performed by: RADIOLOGY

## 2019-10-08 PROCEDURE — 99214 PR OFFICE/OUTPT VISIT, EST, LEVL IV, 30-39 MIN: ICD-10-PCS | Mod: HCNC,S$GLB,, | Performed by: INTERNAL MEDICINE

## 2019-10-08 PROCEDURE — 1101F PT FALLS ASSESS-DOCD LE1/YR: CPT | Mod: HCNC,CPTII,S$GLB, | Performed by: INTERNAL MEDICINE

## 2019-10-08 PROCEDURE — 77334 RADIATION TREATMENT AID(S): CPT | Mod: 26,HCNC,, | Performed by: RADIOLOGY

## 2019-10-08 PROCEDURE — 1101F PR PT FALLS ASSESS DOC 0-1 FALLS W/OUT INJ PAST YR: ICD-10-PCS | Mod: HCNC,CPTII,S$GLB, | Performed by: INTERNAL MEDICINE

## 2019-10-08 PROCEDURE — 99499 UNLISTED E&M SERVICE: CPT | Mod: HCNC,S$GLB,, | Performed by: INTERNAL MEDICINE

## 2019-10-08 PROCEDURE — 99499 RISK ADDL DX/OHS AUDIT: ICD-10-PCS | Mod: HCNC,S$GLB,, | Performed by: INTERNAL MEDICINE

## 2019-10-08 PROCEDURE — 99214 OFFICE O/P EST MOD 30 MIN: CPT | Mod: HCNC,S$GLB,, | Performed by: INTERNAL MEDICINE

## 2019-10-08 PROCEDURE — 77334 RADIATION TREATMENT AID(S): CPT | Mod: TC,HCNC | Performed by: RADIOLOGY

## 2019-10-08 PROCEDURE — 77290 THER RAD SIMULAJ FIELD CPLX: CPT | Mod: TC,HCNC | Performed by: RADIOLOGY

## 2019-10-08 PROCEDURE — 77263 THER RADIOLOGY TX PLNG CPLX: CPT | Mod: HCNC,,, | Performed by: RADIOLOGY

## 2019-10-08 PROCEDURE — 99999 PR PBB SHADOW E&M-EST. PATIENT-LVL III: CPT | Mod: PBBFAC,HCNC,, | Performed by: INTERNAL MEDICINE

## 2019-10-08 NOTE — PROGRESS NOTES
[facility] Note    Chief Complaint      Chief Complaint   Patient presents with    Follow-up     thyriod labs    Fatigue     after chemo, thristy    Constipation       History of Present Illness      Radha Rivera is a 64 y.o. female with chronic conditions of breast cancer, hypothyroidism who presents today for: re-establish care from  and complains of fatigue.    Breast cancer, L invasive ductal carcinoma with axillary avni involvement T1N1: Sees Dr. Baker, Dr. Mock, Dr. Valdes.  Has had chemo s/p 8 rounds.  Will start radiation 10/14/19.  Complains of fatigue, increased thirst, constipation since chemo 8 weeks ago.    Hypothyroidism: Controlled on synthroid.  Will udpate labs.      Past Medical History:  Past Medical History:   Diagnosis Date    Breast cancer 01/2019    left    Mitral valve prolapse     Thyroid disease        Past Surgical History:   has a past surgical history that includes tonsillectomy; Ear drum surgery; Tonsillectomy; Inner ear surgery (Right); Breast biopsy (Left, 01/2019); Insertion of tunneled central venous catheter (CVC) with subcutaneous port (Right, 2/8/2019); Mastectomy, partial (Left, 8/21/2019); and [place] lymph node biopsy (Left, 8/21/2019).    Family History:  family history includes Breast cancer in her maternal aunt and paternal aunt; Breast cancer (age of onset: 68) in her sister; Cancer in her father; Thyroid disease in her daughter.     Social History:  Social History     Tobacco Use    Smoking status: Never Smoker    Smokeless tobacco: Never Used   Substance Use Topics    Alcohol use: Yes     Comment: Seldom    Drug use: No       Review of Systems   Constitutional: Positive for malaise/fatigue. Negative for chills and fever.   Respiratory: Negative for shortness of breath.    Cardiovascular: Negative for chest pain.   Gastrointestinal: Positive for constipation. Negative for diarrhea, nausea and vomiting.   Skin: Negative for  rash.   Neurological: Negative for weakness.   Endo/Heme/Allergies: Positive for polydipsia.        Medications:  Outpatient Encounter Medications as of 10/8/2019   Medication Sig Note Dispense Refill    levothyroxine (SYNTHROID) 75 MCG tablet Take 75 mcg by mouth every morning. 8/20/2019: Take as prescribed am of procedure                           vit A/vit C/vit E/zinc/copper (OCUVITE PRESERVISION ORAL) Take 1 tablet by mouth 2 (two) times daily. 8/20/2019: Hold am of surgery       vitamin D 1000 units Tab Take 2,000 Units by mouth once daily.  8/20/2019: Hold am of surgery       aluminum hydrox-magnesium carb (GAVISCON EXTRA STRENGTH) 254-237.5 mg/5 mL Susp Take by mouth every 8 (eight) hours as needed. 8/20/2019: Currently not taking      aspirin (ECOTRIN) 81 MG EC tablet Take 81 mg by mouth once daily. 8/20/2019: Currently not taking      esomeprazole (NEXIUM) 20 MG capsule Take 1 capsule (20 mg total) by mouth 2 (two) times daily. (Patient not taking: Reported on 10/8/2019) 8/20/2019: Take as prescribed am of procedure                      30 capsule 1    lidocaine-prilocaine (EMLA) cream Apply to affected area once (Patient not taking: Reported on 10/8/2019) 8/20/2019: Currently not taking 5 g 0    ondansetron (ZOFRAN-ODT) 4 MG TbDL Take 1 tablet (4 mg total) by mouth every 8 (eight) hours as needed. (Patient not taking: Reported on 10/8/2019) 8/20/2019: Currently not taking 20 tablet 0     No facility-administered encounter medications on file as of 10/8/2019.        Allergies:  Review of patient's allergies indicates:   Allergen Reactions    Bactrim [sulfamethoxazole-trimethoprim] Other (See Comments)     Caused reflux and severe nausea       Health Maintenance:  Immunization History   Administered Date(s) Administered    Tdap 10/17/2010, 07/22/2016      Health Maintenance   Topic Date Due    Hepatitis C Screening  1955    Lipid Panel  1955    High Dose Statin  06/30/1974    DEXA  "SCAN  06/30/1993    Colonoscopy  06/30/2003    Pneumococcal Vaccine (65+ High/Highest Risk) (1 of 2 - PCV13) 06/30/2018    Mammogram  08/14/2021    TETANUS VACCINE  07/22/2026      Flu shot declines.  TDAP 2016.  Will get [facility] records for other vaccines.    Mammo per Dr. Baker.    Cscope 2012 approx, Dr. Delcid, no polyps, 10 yr interval.    Physical Exam      Vital Signs  Temp: 99 °F (37.2 °C)  Temp src: Oral  Pulse: 71  SpO2: 97 %  BP: 124/76  BP Location: Right arm  Patient Position: Sitting  Height and Weight  Height: 5' 4" (162.6 cm)  Weight: 48.1 kg (106 lb 2.4 oz)  BSA (Calculated - sq m): 1.47 sq meters  BMI (Calculated): 18.3  Weight in (lb) to have BMI = 25: 145.3]    Physical Exam   Constitutional: She appears well-developed and well-nourished.   HENT:   Head: Normocephalic and atraumatic.   Right Ear: External ear normal.   Left Ear: External ear normal.   Mouth/Throat: Oropharynx is clear and moist.   Eyes: Pupils are equal, round, and reactive to light. Conjunctivae and EOM are normal.   Neck: Carotid bruit is not present.   Cardiovascular: Normal rate, regular rhythm, normal heart sounds and intact distal pulses.   No murmur heard.  Pulmonary/Chest: Effort normal and breath sounds normal. She has no wheezes. She has no rales.   Abdominal: Soft. Bowel sounds are normal. She exhibits no distension. There is no hepatosplenomegaly. There is no tenderness.   Musculoskeletal: She exhibits no edema.   Vitals reviewed.       Laboratory:  CBC:  Recent Labs   Lab 06/28/19  0841 07/19/19  1257 08/14/19  1010   WBC 5.81 8.23 6.91   RBC 3.59 L 3.90 L 4.14   Hemoglobin 10.7 L 11.4 L 11.9 L   Hematocrit 33.4 L 36.1 L 39.3   Platelets 291 286 228   Mean Corpuscular Volume 93 93 95   Mean Corpuscular Hemoglobin 29.8 29.2 28.7   Mean Corpuscular Hemoglobin Conc 32.0 31.6 L 30.3 L     CMP:  Recent Labs   Lab 06/28/19  0842 07/19/19  1257 08/14/19  1010   Glucose 171 H 199 H 101   Calcium 10.2 10.1 10.0 "   Albumin 4.2 4.0 4.3   Total Protein 7.5 7.4 7.3   Sodium 139 139 144   Potassium 4.0 4.0 4.1   CO2 25 25 27   Chloride 106 105 108   BUN, Bld 13 18 12   Alkaline Phosphatase 73 76 80   ALT 21 21 20   AST 19 18 19   Total Bilirubin 0.2 0.2 0.5     URINALYSIS:  Recent Labs   Lab 04/05/19  1846   Color, UA Red A   Specific Gravity, UA 1.025   pH, UA 6.0   Protein, UA Negative   Nitrite, UA Negative   Leukocytes, UA Negative   Urobilinogen, UA Negative      LIPIDS:      TSH:      A1C:        Assessment/Plan     Radha Rivera is a 64 y.o.female with:    1. Infiltrating ductal carcinoma of left female breast  - TSH; Future  - T4, free; Future  - Lipid panel; Future  - Comprehensive metabolic panel; Future  - CBC auto differential; Future  F/U with oncology, rad onc for radiation.    2. Hypothyroidism (acquired)  - TSH; Future  - T4, free; Future  - Lipid panel; Future  - Comprehensive metabolic panel; Future  - CBC auto differential; Future  Continue current meds.    3. Impaired fasting glucose  - TSH; Future  - T4, free; Future  - Lipid panel; Future  - Comprehensive metabolic panel; Future  - Hemoglobin A1c; Future  - CBC auto differential; Future   Update labs.    Chronic conditions status updated as per HPI.  Other than changes above, cont current medications and maintain follow up with specialists.  Return to clinic in 3 months.    Salo Vera MD  [facility]

## 2019-10-10 ENCOUNTER — TELEPHONE (OUTPATIENT)
Dept: FAMILY MEDICINE | Facility: CLINIC | Age: 64
End: 2019-10-10

## 2019-10-10 LAB
ALBUMIN SERPL-MCNC: 4.7 G/DL (ref 3.6–5.1)
ALBUMIN/GLOB SERPL: 1.9 (CALC) (ref 1–2.5)
ALP SERPL-CCNC: 71 U/L (ref 33–130)
ALT SERPL-CCNC: 26 U/L (ref 6–29)
AST SERPL-CCNC: 25 U/L (ref 10–35)
BASOPHILS # BLD AUTO: 50 CELLS/UL (ref 0–200)
BASOPHILS NFR BLD AUTO: 1.2 %
BILIRUB SERPL-MCNC: 0.6 MG/DL (ref 0.2–1.2)
BUN SERPL-MCNC: 15 MG/DL (ref 7–25)
BUN/CREAT SERPL: NORMAL (CALC) (ref 6–22)
CALCIUM SERPL-MCNC: 9.7 MG/DL (ref 8.6–10.4)
CHLORIDE SERPL-SCNC: 105 MMOL/L (ref 98–110)
CHOLEST SERPL-MCNC: 214 MG/DL
CHOLEST/HDLC SERPL: 3.9 (CALC)
CO2 SERPL-SCNC: 23 MMOL/L (ref 20–32)
CREAT SERPL-MCNC: 0.63 MG/DL (ref 0.5–0.99)
EOSINOPHIL # BLD AUTO: 50 CELLS/UL (ref 15–500)
EOSINOPHIL NFR BLD AUTO: 1.2 %
ERYTHROCYTE [DISTWIDTH] IN BLOOD BY AUTOMATED COUNT: 15 % (ref 11–15)
GFRSERPLBLD MDRD-ARVRAT: 93 ML/MIN/1.73M2
GLOBULIN SER CALC-MCNC: 2.5 G/DL (CALC) (ref 1.9–3.7)
GLUCOSE SERPL-MCNC: 87 MG/DL (ref 65–99)
HBA1C MFR BLD: 5.7 % OF TOTAL HGB
HCT VFR BLD AUTO: 38.5 % (ref 35–45)
HDLC SERPL-MCNC: 55 MG/DL
HGB BLD-MCNC: 12.3 G/DL (ref 11.7–15.5)
LDLC SERPL CALC-MCNC: 139 MG/DL (CALC)
LYMPHOCYTES # BLD AUTO: 1407 CELLS/UL (ref 850–3900)
LYMPHOCYTES NFR BLD AUTO: 33.5 %
MCH RBC QN AUTO: 29.1 PG (ref 27–33)
MCHC RBC AUTO-ENTMCNC: 31.9 G/DL (ref 32–36)
MCV RBC AUTO: 91 FL (ref 80–100)
MONOCYTES # BLD AUTO: 445 CELLS/UL (ref 200–950)
MONOCYTES NFR BLD AUTO: 10.6 %
NEUTROPHILS # BLD AUTO: 2247 CELLS/UL (ref 1500–7800)
NEUTROPHILS NFR BLD AUTO: 53.5 %
NONHDLC SERPL-MCNC: 159 MG/DL (CALC)
PLATELET # BLD AUTO: 231 THOUSAND/UL (ref 140–400)
PMV BLD REES-ECKER: 10.1 FL (ref 7.5–12.5)
POTASSIUM SERPL-SCNC: 4.4 MMOL/L (ref 3.5–5.3)
PROT SERPL-MCNC: 7.2 G/DL (ref 6.1–8.1)
RBC # BLD AUTO: 4.23 MILLION/UL (ref 3.8–5.1)
SODIUM SERPL-SCNC: 141 MMOL/L (ref 135–146)
T4 FREE SERPL-MCNC: 1.6 NG/DL (ref 0.8–1.8)
TRIGL SERPL-MCNC: 94 MG/DL
TSH SERPL-ACNC: 0.5 MIU/L (ref 0.4–4.5)
WBC # BLD AUTO: 4.2 THOUSAND/UL (ref 3.8–10.8)

## 2019-10-15 PROCEDURE — 77295 3-D RADIOTHERAPY PLAN: CPT | Mod: TC,HCNC | Performed by: RADIOLOGY

## 2019-10-15 PROCEDURE — 77334 RADIATION TREATMENT AID(S): CPT | Mod: TC,HCNC | Performed by: RADIOLOGY

## 2019-10-15 PROCEDURE — 77300 PR RADIATION THERAPY,DOSIMETRY PLAN: ICD-10-PCS | Mod: 26,HCNC,, | Performed by: RADIOLOGY

## 2019-10-15 PROCEDURE — 77295 3-D RADIOTHERAPY PLAN: CPT | Mod: 26,HCNC,, | Performed by: RADIOLOGY

## 2019-10-15 PROCEDURE — 77334 RADIATION TREATMENT AID(S): CPT | Mod: 26,HCNC,, | Performed by: RADIOLOGY

## 2019-10-15 PROCEDURE — 77300 RADIATION THERAPY DOSE PLAN: CPT | Mod: TC,HCNC | Performed by: RADIOLOGY

## 2019-10-15 PROCEDURE — 77300 RADIATION THERAPY DOSE PLAN: CPT | Mod: 26,HCNC,, | Performed by: RADIOLOGY

## 2019-10-15 PROCEDURE — 77295 PR 3D RADIOTHERAPY PLAN: ICD-10-PCS | Mod: 26,HCNC,, | Performed by: RADIOLOGY

## 2019-10-15 PROCEDURE — 77334 PR  RADN TREATMENT AID(S) COMPLX: ICD-10-PCS | Mod: 26,HCNC,, | Performed by: RADIOLOGY

## 2019-10-16 PROCEDURE — 77280 THER RAD SIMULAJ FIELD SMPL: CPT | Mod: TC,HCNC | Performed by: RADIOLOGY

## 2019-10-16 PROCEDURE — 77280 THER RAD SIMULAJ FIELD SMPL: CPT | Mod: 26,HCNC,, | Performed by: RADIOLOGY

## 2019-10-16 PROCEDURE — 77280 PR  SET RADN THERAPY FIELD SIMPLE: ICD-10-PCS | Mod: 26,HCNC,, | Performed by: RADIOLOGY

## 2019-10-17 PROCEDURE — 77412 RADIATION TX DELIVERY LVL 3: CPT | Mod: HCNC | Performed by: RADIOLOGY

## 2019-10-17 PROCEDURE — G6002 PR STEREOSCOPIC XRAY GUIDE FOR RADIATION TX DELIV: ICD-10-PCS | Mod: 26,HCNC,, | Performed by: RADIOLOGY

## 2019-10-17 PROCEDURE — 77387 GUIDANCE FOR RADJ TX DLVR: CPT | Mod: TC,HCNC | Performed by: RADIOLOGY

## 2019-10-17 PROCEDURE — G6002 STEREOSCOPIC X-RAY GUIDANCE: HCPCS | Mod: 26,HCNC,, | Performed by: RADIOLOGY

## 2019-10-18 PROCEDURE — G6002 PR STEREOSCOPIC XRAY GUIDE FOR RADIATION TX DELIV: ICD-10-PCS | Mod: 26,HCNC,, | Performed by: RADIOLOGY

## 2019-10-18 PROCEDURE — 77387 GUIDANCE FOR RADJ TX DLVR: CPT | Mod: TC,HCNC | Performed by: RADIOLOGY

## 2019-10-18 PROCEDURE — G6002 STEREOSCOPIC X-RAY GUIDANCE: HCPCS | Mod: 26,HCNC,, | Performed by: RADIOLOGY

## 2019-10-18 PROCEDURE — 77412 RADIATION TX DELIVERY LVL 3: CPT | Mod: HCNC | Performed by: RADIOLOGY

## 2019-10-21 PROCEDURE — G6002 PR STEREOSCOPIC XRAY GUIDE FOR RADIATION TX DELIV: ICD-10-PCS | Mod: 26,HCNC,, | Performed by: RADIOLOGY

## 2019-10-21 PROCEDURE — G6002 STEREOSCOPIC X-RAY GUIDANCE: HCPCS | Mod: 26,HCNC,, | Performed by: RADIOLOGY

## 2019-10-21 PROCEDURE — 77387 GUIDANCE FOR RADJ TX DLVR: CPT | Mod: TC,HCNC | Performed by: RADIOLOGY

## 2019-10-21 PROCEDURE — 77412 RADIATION TX DELIVERY LVL 3: CPT | Mod: HCNC | Performed by: RADIOLOGY

## 2019-10-22 ENCOUNTER — DOCUMENTATION ONLY (OUTPATIENT)
Dept: RADIATION ONCOLOGY | Facility: CLINIC | Age: 64
End: 2019-10-22

## 2019-10-22 PROCEDURE — G6002 STEREOSCOPIC X-RAY GUIDANCE: HCPCS | Mod: 26,HCNC,, | Performed by: RADIOLOGY

## 2019-10-22 PROCEDURE — G6002 PR STEREOSCOPIC XRAY GUIDE FOR RADIATION TX DELIV: ICD-10-PCS | Mod: 26,HCNC,, | Performed by: RADIOLOGY

## 2019-10-22 PROCEDURE — 77387 GUIDANCE FOR RADJ TX DLVR: CPT | Mod: TC,HCNC | Performed by: RADIOLOGY

## 2019-10-22 PROCEDURE — 77412 RADIATION TX DELIVERY LVL 3: CPT | Mod: HCNC | Performed by: RADIOLOGY

## 2019-10-22 PROCEDURE — 77336 RADIATION PHYSICS CONSULT: CPT | Mod: HCNC | Performed by: RADIOLOGY

## 2019-10-22 NOTE — PLAN OF CARE
Day 4 of outpatient XRT to the left breast. Denies pain. Just started XRT. Miaderm used only once so far.

## 2019-10-23 PROCEDURE — G6002 PR STEREOSCOPIC XRAY GUIDE FOR RADIATION TX DELIV: ICD-10-PCS | Mod: 26,HCNC,, | Performed by: RADIOLOGY

## 2019-10-23 PROCEDURE — G6002 STEREOSCOPIC X-RAY GUIDANCE: HCPCS | Mod: 26,HCNC,, | Performed by: RADIOLOGY

## 2019-10-23 PROCEDURE — 77417 THER RADIOLOGY PORT IMAGE(S): CPT | Mod: HCNC | Performed by: RADIOLOGY

## 2019-10-23 PROCEDURE — 77387 GUIDANCE FOR RADJ TX DLVR: CPT | Mod: TC,HCNC | Performed by: RADIOLOGY

## 2019-10-23 PROCEDURE — 77412 RADIATION TX DELIVERY LVL 3: CPT | Mod: HCNC | Performed by: RADIOLOGY

## 2019-10-24 PROCEDURE — 77387 GUIDANCE FOR RADJ TX DLVR: CPT | Mod: TC,HCNC | Performed by: RADIOLOGY

## 2019-10-24 PROCEDURE — G6002 STEREOSCOPIC X-RAY GUIDANCE: HCPCS | Mod: 26,HCNC,, | Performed by: RADIOLOGY

## 2019-10-24 PROCEDURE — G6002 PR STEREOSCOPIC XRAY GUIDE FOR RADIATION TX DELIV: ICD-10-PCS | Mod: 26,HCNC,, | Performed by: RADIOLOGY

## 2019-10-24 PROCEDURE — 77412 RADIATION TX DELIVERY LVL 3: CPT | Mod: HCNC | Performed by: RADIOLOGY

## 2019-10-25 PROCEDURE — G6002 PR STEREOSCOPIC XRAY GUIDE FOR RADIATION TX DELIV: ICD-10-PCS | Mod: 26,HCNC,, | Performed by: RADIOLOGY

## 2019-10-25 PROCEDURE — 77387 GUIDANCE FOR RADJ TX DLVR: CPT | Mod: TC,HCNC | Performed by: RADIOLOGY

## 2019-10-25 PROCEDURE — 77412 RADIATION TX DELIVERY LVL 3: CPT | Mod: HCNC | Performed by: RADIOLOGY

## 2019-10-25 PROCEDURE — G6002 STEREOSCOPIC X-RAY GUIDANCE: HCPCS | Mod: 26,HCNC,, | Performed by: RADIOLOGY

## 2019-10-28 PROCEDURE — 77412 RADIATION TX DELIVERY LVL 3: CPT | Mod: HCNC | Performed by: RADIOLOGY

## 2019-10-28 PROCEDURE — G6002 PR STEREOSCOPIC XRAY GUIDE FOR RADIATION TX DELIV: ICD-10-PCS | Mod: 26,HCNC,, | Performed by: RADIOLOGY

## 2019-10-28 PROCEDURE — 77387 GUIDANCE FOR RADJ TX DLVR: CPT | Mod: TC,HCNC | Performed by: RADIOLOGY

## 2019-10-28 PROCEDURE — G6002 STEREOSCOPIC X-RAY GUIDANCE: HCPCS | Mod: 26,HCNC,, | Performed by: RADIOLOGY

## 2019-10-29 ENCOUNTER — DOCUMENTATION ONLY (OUTPATIENT)
Dept: RADIATION ONCOLOGY | Facility: CLINIC | Age: 64
End: 2019-10-29

## 2019-10-29 PROCEDURE — G6002 STEREOSCOPIC X-RAY GUIDANCE: HCPCS | Mod: 26,HCNC,, | Performed by: RADIOLOGY

## 2019-10-29 PROCEDURE — G6002 PR STEREOSCOPIC XRAY GUIDE FOR RADIATION TX DELIV: ICD-10-PCS | Mod: 26,HCNC,, | Performed by: RADIOLOGY

## 2019-10-29 PROCEDURE — 77387 GUIDANCE FOR RADJ TX DLVR: CPT | Mod: TC,HCNC | Performed by: RADIOLOGY

## 2019-10-29 PROCEDURE — 77412 RADIATION TX DELIVERY LVL 3: CPT | Mod: HCNC | Performed by: RADIOLOGY

## 2019-10-29 NOTE — PLAN OF CARE
Pt. On day9 of outpt. Xrt to breast.  Faint erythema.  Mild hyperpigmentation.  No desquamation.  Mild tenderness.  C/o lower back pain.

## 2019-10-31 PROCEDURE — G6002 STEREOSCOPIC X-RAY GUIDANCE: HCPCS | Mod: 26,HCNC,, | Performed by: RADIOLOGY

## 2019-10-31 PROCEDURE — 77336 RADIATION PHYSICS CONSULT: CPT | Mod: HCNC | Performed by: RADIOLOGY

## 2019-10-31 PROCEDURE — 77387 GUIDANCE FOR RADJ TX DLVR: CPT | Mod: TC,HCNC | Performed by: RADIOLOGY

## 2019-10-31 PROCEDURE — 77412 RADIATION TX DELIVERY LVL 3: CPT | Mod: HCNC | Performed by: RADIOLOGY

## 2019-10-31 PROCEDURE — G6002 PR STEREOSCOPIC XRAY GUIDE FOR RADIATION TX DELIV: ICD-10-PCS | Mod: 26,HCNC,, | Performed by: RADIOLOGY

## 2019-11-01 ENCOUNTER — HOSPITAL ENCOUNTER (OUTPATIENT)
Dept: RADIATION THERAPY | Facility: HOSPITAL | Age: 64
Discharge: HOME | End: 2019-11-01
Attending: RADIOLOGY
Payer: MEDICARE

## 2019-11-01 PROCEDURE — 77412 RADIATION TX DELIVERY LVL 3: CPT | Mod: HCNC | Performed by: RADIOLOGY

## 2019-11-01 PROCEDURE — 77417 THER RADIOLOGY PORT IMAGE(S): CPT | Mod: HCNC | Performed by: RADIOLOGY

## 2019-11-01 PROCEDURE — G6002 PR STEREOSCOPIC XRAY GUIDE FOR RADIATION TX DELIV: ICD-10-PCS | Mod: 26,HCNC,, | Performed by: RADIOLOGY

## 2019-11-01 PROCEDURE — 77387 GUIDANCE FOR RADJ TX DLVR: CPT | Mod: TC,HCNC | Performed by: RADIOLOGY

## 2019-11-01 PROCEDURE — G6002 STEREOSCOPIC X-RAY GUIDANCE: HCPCS | Mod: 26,HCNC,, | Performed by: RADIOLOGY

## 2019-11-04 PROCEDURE — G6002 STEREOSCOPIC X-RAY GUIDANCE: HCPCS | Mod: 26,HCNC,, | Performed by: RADIOLOGY

## 2019-11-04 PROCEDURE — 77387 GUIDANCE FOR RADJ TX DLVR: CPT | Mod: TC,HCNC | Performed by: RADIOLOGY

## 2019-11-04 PROCEDURE — G6002 PR STEREOSCOPIC XRAY GUIDE FOR RADIATION TX DELIV: ICD-10-PCS | Mod: 26,HCNC,, | Performed by: RADIOLOGY

## 2019-11-04 PROCEDURE — 77412 RADIATION TX DELIVERY LVL 3: CPT | Mod: HCNC | Performed by: RADIOLOGY

## 2019-11-05 ENCOUNTER — DOCUMENTATION ONLY (OUTPATIENT)
Dept: RADIATION ONCOLOGY | Facility: CLINIC | Age: 64
End: 2019-11-05

## 2019-11-05 PROCEDURE — 77387 GUIDANCE FOR RADJ TX DLVR: CPT | Mod: TC,HCNC | Performed by: RADIOLOGY

## 2019-11-05 PROCEDURE — G6002 STEREOSCOPIC X-RAY GUIDANCE: HCPCS | Mod: 26,HCNC,, | Performed by: RADIOLOGY

## 2019-11-05 PROCEDURE — G6002 PR STEREOSCOPIC XRAY GUIDE FOR RADIATION TX DELIV: ICD-10-PCS | Mod: 26,HCNC,, | Performed by: RADIOLOGY

## 2019-11-05 PROCEDURE — 77412 RADIATION TX DELIVERY LVL 3: CPT | Mod: HCNC | Performed by: RADIOLOGY

## 2019-11-06 PROCEDURE — G6002 PR STEREOSCOPIC XRAY GUIDE FOR RADIATION TX DELIV: ICD-10-PCS | Mod: 26,HCNC,, | Performed by: RADIOLOGY

## 2019-11-06 PROCEDURE — 77387 GUIDANCE FOR RADJ TX DLVR: CPT | Mod: TC,HCNC | Performed by: RADIOLOGY

## 2019-11-06 PROCEDURE — G6002 STEREOSCOPIC X-RAY GUIDANCE: HCPCS | Mod: 26,HCNC,, | Performed by: RADIOLOGY

## 2019-11-06 PROCEDURE — 77412 RADIATION TX DELIVERY LVL 3: CPT | Mod: HCNC | Performed by: RADIOLOGY

## 2019-11-07 PROCEDURE — 77336 RADIATION PHYSICS CONSULT: CPT | Mod: HCNC | Performed by: RADIOLOGY

## 2019-11-07 PROCEDURE — 77387 GUIDANCE FOR RADJ TX DLVR: CPT | Mod: TC,HCNC | Performed by: RADIOLOGY

## 2019-11-07 PROCEDURE — G6002 PR STEREOSCOPIC XRAY GUIDE FOR RADIATION TX DELIV: ICD-10-PCS | Mod: 26,HCNC,, | Performed by: RADIOLOGY

## 2019-11-07 PROCEDURE — 77412 RADIATION TX DELIVERY LVL 3: CPT | Mod: HCNC | Performed by: RADIOLOGY

## 2019-11-07 PROCEDURE — G6002 STEREOSCOPIC X-RAY GUIDANCE: HCPCS | Mod: 26,HCNC,, | Performed by: RADIOLOGY

## 2019-11-08 PROCEDURE — G6002 STEREOSCOPIC X-RAY GUIDANCE: HCPCS | Mod: 26,HCNC,, | Performed by: RADIOLOGY

## 2019-11-08 PROCEDURE — 77387 GUIDANCE FOR RADJ TX DLVR: CPT | Mod: TC,HCNC | Performed by: RADIOLOGY

## 2019-11-08 PROCEDURE — 77412 RADIATION TX DELIVERY LVL 3: CPT | Mod: HCNC | Performed by: RADIOLOGY

## 2019-11-08 PROCEDURE — G6002 PR STEREOSCOPIC XRAY GUIDE FOR RADIATION TX DELIV: ICD-10-PCS | Mod: 26,HCNC,, | Performed by: RADIOLOGY

## 2019-11-08 PROCEDURE — 77417 THER RADIOLOGY PORT IMAGE(S): CPT | Mod: HCNC | Performed by: RADIOLOGY

## 2019-11-11 PROCEDURE — G6002 PR STEREOSCOPIC XRAY GUIDE FOR RADIATION TX DELIV: ICD-10-PCS | Mod: 26,HCNC,, | Performed by: RADIOLOGY

## 2019-11-11 PROCEDURE — 77334 PR  RADN TREATMENT AID(S) COMPLX: ICD-10-PCS | Mod: 26,HCNC,, | Performed by: RADIOLOGY

## 2019-11-11 PROCEDURE — 77321 PR  TELETHER ISO-PORT PLAN: ICD-10-PCS | Mod: 26,HCNC,, | Performed by: RADIOLOGY

## 2019-11-11 PROCEDURE — 77334 RADIATION TREATMENT AID(S): CPT | Mod: TC,HCNC | Performed by: RADIOLOGY

## 2019-11-11 PROCEDURE — 77387 GUIDANCE FOR RADJ TX DLVR: CPT | Mod: TC,HCNC | Performed by: RADIOLOGY

## 2019-11-11 PROCEDURE — 77412 RADIATION TX DELIVERY LVL 3: CPT | Mod: HCNC | Performed by: RADIOLOGY

## 2019-11-11 PROCEDURE — G6002 STEREOSCOPIC X-RAY GUIDANCE: HCPCS | Mod: 26,HCNC,, | Performed by: RADIOLOGY

## 2019-11-11 PROCEDURE — 77321 SPECIAL TELETX PORT PLAN: CPT | Mod: TC,HCNC | Performed by: RADIOLOGY

## 2019-11-11 PROCEDURE — 77321 SPECIAL TELETX PORT PLAN: CPT | Mod: 26,HCNC,, | Performed by: RADIOLOGY

## 2019-11-11 PROCEDURE — 77334 RADIATION TREATMENT AID(S): CPT | Mod: 26,HCNC,, | Performed by: RADIOLOGY

## 2019-11-12 ENCOUNTER — PATIENT MESSAGE (OUTPATIENT)
Dept: HEMATOLOGY/ONCOLOGY | Facility: CLINIC | Age: 64
End: 2019-11-12

## 2019-11-12 ENCOUNTER — DOCUMENTATION ONLY (OUTPATIENT)
Dept: RADIATION ONCOLOGY | Facility: CLINIC | Age: 64
End: 2019-11-12

## 2019-11-12 PROCEDURE — 77412 RADIATION TX DELIVERY LVL 3: CPT | Mod: HCNC | Performed by: RADIOLOGY

## 2019-11-12 PROCEDURE — G6002 PR STEREOSCOPIC XRAY GUIDE FOR RADIATION TX DELIV: ICD-10-PCS | Mod: 26,HCNC,, | Performed by: RADIOLOGY

## 2019-11-12 PROCEDURE — 77387 GUIDANCE FOR RADJ TX DLVR: CPT | Mod: TC,HCNC | Performed by: RADIOLOGY

## 2019-11-12 PROCEDURE — G6002 STEREOSCOPIC X-RAY GUIDANCE: HCPCS | Mod: 26,HCNC,, | Performed by: RADIOLOGY

## 2019-11-12 NOTE — PLAN OF CARE
Pt. On day 18 of outpt. Xrt to breast.  Mild hyperpigmentation in axilla. Pt. Skin intact.  Given gel sheets for comfort.

## 2019-11-13 PROCEDURE — 77387 GUIDANCE FOR RADJ TX DLVR: CPT | Mod: TC,HCNC | Performed by: RADIOLOGY

## 2019-11-13 PROCEDURE — G6002 STEREOSCOPIC X-RAY GUIDANCE: HCPCS | Mod: 26,HCNC,, | Performed by: RADIOLOGY

## 2019-11-13 PROCEDURE — 77412 RADIATION TX DELIVERY LVL 3: CPT | Mod: HCNC | Performed by: RADIOLOGY

## 2019-11-13 PROCEDURE — G6002 PR STEREOSCOPIC XRAY GUIDE FOR RADIATION TX DELIV: ICD-10-PCS | Mod: 26,HCNC,, | Performed by: RADIOLOGY

## 2019-11-14 PROCEDURE — G6002 PR STEREOSCOPIC XRAY GUIDE FOR RADIATION TX DELIV: ICD-10-PCS | Mod: 26,HCNC,, | Performed by: RADIOLOGY

## 2019-11-14 PROCEDURE — 77336 RADIATION PHYSICS CONSULT: CPT | Mod: HCNC | Performed by: RADIOLOGY

## 2019-11-14 PROCEDURE — G6002 STEREOSCOPIC X-RAY GUIDANCE: HCPCS | Mod: 26,HCNC,, | Performed by: RADIOLOGY

## 2019-11-14 PROCEDURE — 77387 GUIDANCE FOR RADJ TX DLVR: CPT | Mod: TC,HCNC | Performed by: RADIOLOGY

## 2019-11-14 PROCEDURE — 77412 RADIATION TX DELIVERY LVL 3: CPT | Mod: HCNC | Performed by: RADIOLOGY

## 2019-11-15 PROCEDURE — 77387 GUIDANCE FOR RADJ TX DLVR: CPT | Mod: TC,HCNC | Performed by: RADIOLOGY

## 2019-11-15 PROCEDURE — 77417 THER RADIOLOGY PORT IMAGE(S): CPT | Mod: HCNC | Performed by: RADIOLOGY

## 2019-11-15 PROCEDURE — 77412 RADIATION TX DELIVERY LVL 3: CPT | Mod: HCNC | Performed by: RADIOLOGY

## 2019-11-15 PROCEDURE — G6002 PR STEREOSCOPIC XRAY GUIDE FOR RADIATION TX DELIV: ICD-10-PCS | Mod: 26,HCNC,, | Performed by: RADIOLOGY

## 2019-11-15 PROCEDURE — G6002 STEREOSCOPIC X-RAY GUIDANCE: HCPCS | Mod: 26,HCNC,, | Performed by: RADIOLOGY

## 2019-11-18 PROCEDURE — 77412 RADIATION TX DELIVERY LVL 3: CPT | Mod: HCNC | Performed by: RADIOLOGY

## 2019-11-18 PROCEDURE — G6002 PR STEREOSCOPIC XRAY GUIDE FOR RADIATION TX DELIV: ICD-10-PCS | Mod: 26,HCNC,, | Performed by: RADIOLOGY

## 2019-11-18 PROCEDURE — G6002 STEREOSCOPIC X-RAY GUIDANCE: HCPCS | Mod: 26,HCNC,, | Performed by: RADIOLOGY

## 2019-11-18 PROCEDURE — 77387 GUIDANCE FOR RADJ TX DLVR: CPT | Mod: TC,HCNC | Performed by: RADIOLOGY

## 2019-11-19 ENCOUNTER — DOCUMENTATION ONLY (OUTPATIENT)
Dept: RADIATION ONCOLOGY | Facility: CLINIC | Age: 64
End: 2019-11-19

## 2019-11-19 PROCEDURE — 77387 GUIDANCE FOR RADJ TX DLVR: CPT | Mod: TC,HCNC | Performed by: RADIOLOGY

## 2019-11-19 PROCEDURE — 77412 RADIATION TX DELIVERY LVL 3: CPT | Mod: HCNC | Performed by: RADIOLOGY

## 2019-11-19 PROCEDURE — G6002 STEREOSCOPIC X-RAY GUIDANCE: HCPCS | Mod: 26,HCNC,, | Performed by: RADIOLOGY

## 2019-11-19 PROCEDURE — G6002 PR STEREOSCOPIC XRAY GUIDE FOR RADIATION TX DELIV: ICD-10-PCS | Mod: 26,HCNC,, | Performed by: RADIOLOGY

## 2019-11-19 NOTE — PLAN OF CARE
Pt. On day 23 of outpt. Xrt to breast. Pt. With brisk erythema.  Skin care discussed.  Skin intact.  Gel packs given.

## 2019-11-20 ENCOUNTER — TELEPHONE (OUTPATIENT)
Dept: HEMATOLOGY/ONCOLOGY | Facility: CLINIC | Age: 64
End: 2019-11-20

## 2019-11-20 PROCEDURE — 77387 GUIDANCE FOR RADJ TX DLVR: CPT | Mod: TC,HCNC | Performed by: RADIOLOGY

## 2019-11-20 PROCEDURE — G6002 STEREOSCOPIC X-RAY GUIDANCE: HCPCS | Mod: 26,HCNC,, | Performed by: RADIOLOGY

## 2019-11-20 PROCEDURE — 77412 RADIATION TX DELIVERY LVL 3: CPT | Mod: HCNC | Performed by: RADIOLOGY

## 2019-11-20 PROCEDURE — G6002 PR STEREOSCOPIC XRAY GUIDE FOR RADIATION TX DELIV: ICD-10-PCS | Mod: 26,HCNC,, | Performed by: RADIOLOGY

## 2019-11-20 NOTE — PROGRESS NOTES
Chief Complaint: No chief complaint on file.     HPI   Ms. Rivera presents today for follow up.  She is still receiving pots lumpectomy XRT, and will finish in early December.  Walter note, on August 21, 2019 she underwent a left lumpectomy and sentinel node biopsy.  Three sentinel nodes were negative, while on the lumpectomy specimen there was only 5 mm of residual DCIS.     Briefly, she is a 64-year-old  female from [place] who was diagnosed with breast cancer.  Apparently, she initially felt a mass in late 2017.  A mammogram at that time was read as BI-RADS category 1.  A repeat mammogram in December 2018 was read as BI-RADS category 4 and she underwent a biopsy that showed a carcinoma that was ER negative, ME weakly positive in 5% of the cells and HER-2 2+ by immunohistochemistry, but negative by FISH.  Ki-67 was 5%. She subsequently underwent a biopsy of a palpable lymph node on 02/01/2019, that showed evidence of lymph node metastasis.  She was referred for evaluation for chemotherapy.  Of note, she also had a PET scan on 02/01/2019 that showed an 8 mm right lower lobe pulmonary nodule that was not FDG avid.  She stated AC chemotherapy, completed four cycles, and subsequently received 4 cycles of taxol.  She had surgery on 8/21 with results as above..     Review of Systems    Overall she feels OK.   She has fully recovered from her surgery and has not experienced any side effects from chemotherapy.  She denies any fever, depression, easy bruising, chills, night  sweats, weight loss, nausea, vomiting, diarrhea, diplopia, blurred vision, headache, chest pain, palpitations, shortness of breath, breast pain, extremity pain, or difficulty ambulating.  The remainder of the ten-point ROS, including general, skin, lymph, H/N, cardiorespiratory, GI, , Neuro, Endocrine, and psychiatric is negative.      Objective:   Physical Exam       She is alert, oriented to time, place, person, pleasant, well       nourished, in no acute physical distress.    She is accompanied by her .                               VITAL SIGNS:  Reviewed                                      HEENT:  Alopecia is no longer noted.  There are no nasal, oral, lip, gingival, auricular, lid,    or conjunctival lesions.  Mucosae are moist and pink, and there is no        thrush.  Pupils are equal, reactive to light and accommodation.              Extraocular muscle movements are intact.  Dentition is good.                                     NECK:  Supple without JVD, adenopathy, or thyromegaly.                       LUNGS:  Clear to auscultation without wheezing, rales, or rhonchi.           CARDIOVASCULAR:  Reveals an S1, S2, no murmurs, no rubs, no gallops.         ABDOMEN:  Soft, with minimal epigastric tenderness on palpation.  No rebound.  She has no organomegaly.  Bowel sounds are    present.                                                                     EXTREMITIES:  No cyanosis, clubbing, or edema.                          BREASTS:  She is s/p left lumpectomy with a healing incision laterally.  A left sentinel node excision scar is seen.  It is also healing well.  There are no masses in the right breast.    A right sided portacath is still in place                               LYMPHATIC:  There is no cervical, right axillary, or supraclavicular adenopathy.   SKIN:  Warm and moist, without petechiae, rashes, induration, or ecchymoses.   There is mild erythema within the radiation field.        NEUROLOGIC:  DTRs are 0-1+ bilaterally, symmetrical, motor function is 5/5,  and cranial nerves are  within normal limits.     Assessment:       1. Primary cancer of lower outer quadrant of left female breast, s/o neoadjuvant chemotherapy, s/p lumpectomy    2. .                 Plan:        I had a long discussion with her.  She will complete with XRT as planned.  We had a long discussion about the option of adjuvant hormonal therapy.   I told her that with PRs being weakly positive at 5 % the benefit of adjuvant therapy is very small.  She will think about it and let me know at the time of her next visit in early January whether she is interested in pursuing adjuvant hormonal therapy.  Finally, we will ask Dr. Valdes to remove her port.  Her multiple questions were answered to her satisfaction.

## 2019-11-21 ENCOUNTER — OFFICE VISIT (OUTPATIENT)
Dept: HEMATOLOGY/ONCOLOGY | Facility: CLINIC | Age: 64
End: 2019-11-21
Payer: MEDICARE

## 2019-11-21 VITALS
HEART RATE: 69 BPM | TEMPERATURE: 98 F | DIASTOLIC BLOOD PRESSURE: 63 MMHG | OXYGEN SATURATION: 100 % | HEIGHT: 64 IN | WEIGHT: 109.81 LBS | SYSTOLIC BLOOD PRESSURE: 143 MMHG | RESPIRATION RATE: 16 BRPM | BODY MASS INDEX: 18.75 KG/M2

## 2019-11-21 DIAGNOSIS — C50.912 INFILTRATING DUCTAL CARCINOMA OF LEFT FEMALE BREAST: Primary | ICD-10-CM

## 2019-11-21 PROCEDURE — 1159F PR MEDICATION LIST DOCUMENTED IN MEDICAL RECORD: ICD-10-PCS | Mod: HCNC,S$GLB,, | Performed by: INTERNAL MEDICINE

## 2019-11-21 PROCEDURE — 1126F PR PAIN SEVERITY QUANTIFIED, NO PAIN PRESENT: ICD-10-PCS | Mod: HCNC,S$GLB,, | Performed by: INTERNAL MEDICINE

## 2019-11-21 PROCEDURE — 1101F PR PT FALLS ASSESS DOC 0-1 FALLS W/OUT INJ PAST YR: ICD-10-PCS | Mod: HCNC,CPTII,S$GLB, | Performed by: INTERNAL MEDICINE

## 2019-11-21 PROCEDURE — 1101F PT FALLS ASSESS-DOCD LE1/YR: CPT | Mod: HCNC,CPTII,S$GLB, | Performed by: INTERNAL MEDICINE

## 2019-11-21 PROCEDURE — 99999 PR PBB SHADOW E&M-EST. PATIENT-LVL IV: ICD-10-PCS | Mod: PBBFAC,HCNC,, | Performed by: INTERNAL MEDICINE

## 2019-11-21 PROCEDURE — G6002 PR STEREOSCOPIC XRAY GUIDE FOR RADIATION TX DELIV: ICD-10-PCS | Mod: 26,HCNC,, | Performed by: RADIOLOGY

## 2019-11-21 PROCEDURE — 77387 GUIDANCE FOR RADJ TX DLVR: CPT | Mod: TC,HCNC | Performed by: RADIOLOGY

## 2019-11-21 PROCEDURE — G6002 STEREOSCOPIC X-RAY GUIDANCE: HCPCS | Mod: 26,HCNC,, | Performed by: RADIOLOGY

## 2019-11-21 PROCEDURE — 99214 PR OFFICE/OUTPT VISIT, EST, LEVL IV, 30-39 MIN: ICD-10-PCS | Mod: HCNC,S$GLB,, | Performed by: INTERNAL MEDICINE

## 2019-11-21 PROCEDURE — 77336 RADIATION PHYSICS CONSULT: CPT | Mod: HCNC | Performed by: RADIOLOGY

## 2019-11-21 PROCEDURE — 1159F MED LIST DOCD IN RCRD: CPT | Mod: HCNC,S$GLB,, | Performed by: INTERNAL MEDICINE

## 2019-11-21 PROCEDURE — 99214 OFFICE O/P EST MOD 30 MIN: CPT | Mod: HCNC,S$GLB,, | Performed by: INTERNAL MEDICINE

## 2019-11-21 PROCEDURE — 77412 RADIATION TX DELIVERY LVL 3: CPT | Mod: HCNC | Performed by: RADIOLOGY

## 2019-11-21 PROCEDURE — 99999 PR PBB SHADOW E&M-EST. PATIENT-LVL IV: CPT | Mod: PBBFAC,HCNC,, | Performed by: INTERNAL MEDICINE

## 2019-11-21 PROCEDURE — 1126F AMNT PAIN NOTED NONE PRSNT: CPT | Mod: HCNC,S$GLB,, | Performed by: INTERNAL MEDICINE

## 2019-11-21 RX ORDER — MELOXICAM 15 MG/1
TABLET ORAL
Status: ON HOLD | COMMUNITY
Start: 2014-07-08 | End: 2020-07-15 | Stop reason: HOSPADM

## 2019-11-22 PROCEDURE — 77412 RADIATION TX DELIVERY LVL 3: CPT | Mod: HCNC | Performed by: RADIOLOGY

## 2019-11-24 PROCEDURE — 77412 RADIATION TX DELIVERY LVL 3: CPT | Mod: HCNC | Performed by: RADIOLOGY

## 2019-11-25 PROCEDURE — 77412 RADIATION TX DELIVERY LVL 3: CPT | Mod: HCNC | Performed by: RADIOLOGY

## 2019-11-26 PROCEDURE — 77412 RADIATION TX DELIVERY LVL 3: CPT | Mod: HCNC | Performed by: RADIOLOGY

## 2019-11-27 ENCOUNTER — DOCUMENTATION ONLY (OUTPATIENT)
Dept: RADIATION ONCOLOGY | Facility: CLINIC | Age: 64
End: 2019-11-27

## 2019-11-27 PROCEDURE — 77336 RADIATION PHYSICS CONSULT: CPT | Mod: HCNC | Performed by: RADIOLOGY

## 2019-11-27 PROCEDURE — 77412 RADIATION TX DELIVERY LVL 3: CPT | Mod: HCNC | Performed by: RADIOLOGY

## 2019-11-27 NOTE — PLAN OF CARE
Pt. On day 29 of outpt. Xrt to breast.  Pt. With brisk erythema.  Skin intact.  Hyperpigmentation.   Given gel packs for relief of discomfort.

## 2019-12-02 ENCOUNTER — HOSPITAL ENCOUNTER (OUTPATIENT)
Dept: RADIATION THERAPY | Facility: HOSPITAL | Age: 64
Discharge: HOME | End: 2019-12-02
Attending: RADIOLOGY
Payer: MEDICARE

## 2019-12-02 PROCEDURE — 77412 RADIATION TX DELIVERY LVL 3: CPT | Mod: HCNC | Performed by: RADIOLOGY

## 2019-12-03 PROCEDURE — 77412 RADIATION TX DELIVERY LVL 3: CPT | Mod: HCNC | Performed by: RADIOLOGY

## 2019-12-04 PROCEDURE — 77412 RADIATION TX DELIVERY LVL 3: CPT | Mod: HCNC | Performed by: RADIOLOGY

## 2019-12-04 PROCEDURE — 77336 RADIATION PHYSICS CONSULT: CPT | Mod: HCNC | Performed by: RADIOLOGY

## 2019-12-06 ENCOUNTER — DOCUMENTATION ONLY (OUTPATIENT)
Dept: RADIATION ONCOLOGY | Facility: CLINIC | Age: 64
End: 2019-12-06

## 2019-12-11 ENCOUNTER — TELEPHONE (OUTPATIENT)
Dept: SURGERY | Facility: CLINIC | Age: 64
End: 2019-12-11

## 2019-12-12 ENCOUNTER — TELEPHONE (OUTPATIENT)
Dept: SURGERY | Facility: CLINIC | Age: 64
End: 2019-12-12

## 2019-12-12 NOTE — TELEPHONE ENCOUNTER
Attempted to contact patient to review Survivorship Care Plan but was unsuccessful. Initially offered appointment to review, but pt declined. Left message on home number. Call back with questions and Care Plan mailed to patient.

## 2019-12-15 ENCOUNTER — PATIENT MESSAGE (OUTPATIENT)
Dept: HEMATOLOGY/ONCOLOGY | Facility: CLINIC | Age: 64
End: 2019-12-15

## 2020-01-09 ENCOUNTER — OFFICE VISIT (OUTPATIENT)
Dept: HEMATOLOGY/ONCOLOGY | Facility: CLINIC | Age: 65
End: 2020-01-09
Payer: MEDICARE

## 2020-01-09 ENCOUNTER — TELEPHONE (OUTPATIENT)
Dept: SURGERY | Facility: CLINIC | Age: 65
End: 2020-01-09

## 2020-01-09 VITALS
HEART RATE: 77 BPM | DIASTOLIC BLOOD PRESSURE: 64 MMHG | TEMPERATURE: 98 F | SYSTOLIC BLOOD PRESSURE: 141 MMHG | BODY MASS INDEX: 19.54 KG/M2 | WEIGHT: 114.44 LBS | HEIGHT: 64 IN | OXYGEN SATURATION: 99 % | RESPIRATION RATE: 18 BRPM

## 2020-01-09 DIAGNOSIS — C50.512 PRIMARY CANCER OF LOWER OUTER QUADRANT OF LEFT FEMALE BREAST: Primary | ICD-10-CM

## 2020-01-09 PROCEDURE — 99999 PR PBB SHADOW E&M-EST. PATIENT-LVL IV: CPT | Mod: PBBFAC,HCNC,, | Performed by: INTERNAL MEDICINE

## 2020-01-09 PROCEDURE — 1101F PR PT FALLS ASSESS DOC 0-1 FALLS W/OUT INJ PAST YR: ICD-10-PCS | Mod: HCNC,CPTII,S$GLB, | Performed by: INTERNAL MEDICINE

## 2020-01-09 PROCEDURE — 99214 OFFICE O/P EST MOD 30 MIN: CPT | Mod: HCNC,S$GLB,, | Performed by: INTERNAL MEDICINE

## 2020-01-09 PROCEDURE — 99999 PR PBB SHADOW E&M-EST. PATIENT-LVL IV: ICD-10-PCS | Mod: PBBFAC,HCNC,, | Performed by: INTERNAL MEDICINE

## 2020-01-09 PROCEDURE — 1126F PR PAIN SEVERITY QUANTIFIED, NO PAIN PRESENT: ICD-10-PCS | Mod: HCNC,S$GLB,, | Performed by: INTERNAL MEDICINE

## 2020-01-09 PROCEDURE — 99214 PR OFFICE/OUTPT VISIT, EST, LEVL IV, 30-39 MIN: ICD-10-PCS | Mod: HCNC,S$GLB,, | Performed by: INTERNAL MEDICINE

## 2020-01-09 PROCEDURE — 1101F PT FALLS ASSESS-DOCD LE1/YR: CPT | Mod: HCNC,CPTII,S$GLB, | Performed by: INTERNAL MEDICINE

## 2020-01-09 PROCEDURE — 1159F MED LIST DOCD IN RCRD: CPT | Mod: HCNC,S$GLB,, | Performed by: INTERNAL MEDICINE

## 2020-01-09 PROCEDURE — 1159F PR MEDICATION LIST DOCUMENTED IN MEDICAL RECORD: ICD-10-PCS | Mod: HCNC,S$GLB,, | Performed by: INTERNAL MEDICINE

## 2020-01-09 PROCEDURE — 1126F AMNT PAIN NOTED NONE PRSNT: CPT | Mod: HCNC,S$GLB,, | Performed by: INTERNAL MEDICINE

## 2020-01-09 NOTE — TELEPHONE ENCOUNTER
Spoke to Patient to remind her of the appointment in the General Surgery Department 1-10-20.  Directions given.  Explained that there are no dietary restrictions.  Questions answered.  Patient verbalized understanding of instructions.

## 2020-01-09 NOTE — PROGRESS NOTES
Chief Complaint: No chief complaint on file.     HPI   Ms. Rivera presents today for follow up.  She has now completed her post lumpectomy XRT.  Of note, on August 21, 2019 she underwent a left lumpectomy and sentinel node biopsy.  Three sentinel nodes were negative, while on the lumpectomy specimen there was only 5 mm of residual DCIS.     Briefly, she is a 64-year-old  female from [place] who was diagnosed with breast cancer.  Apparently, she initially felt a mass in late 2017.  A mammogram at that time was read as BI-RADS category 1.  A repeat mammogram in December 2018 was read as BI-RADS category 4 and she underwent a biopsy that showed a carcinoma that was ER negative, FL weakly positive in 5% of the cells and HER-2 2+ by immunohistochemistry, but negative by FISH.  Ki-67 was 5%. She subsequently underwent a biopsy of a palpable lymph node on 02/01/2019, that showed evidence of lymph node metastasis.  She was referred for evaluation for chemotherapy.  Of note, she also had a PET scan on 02/01/2019 that showed an 8 mm right lower lobe pulmonary nodule that was not FDG avid.  She started AC chemotherapy, completed four cycles, and subsequently received 4 cycles of taxol.  She had surgery on 8/21 with results as above..     Review of Systems    Overall she feels OK.    ECOG performance status is 1.  She denies any fever, depression, easy bruising, chills, night  sweats, weight loss, nausea, vomiting, diarrhea, diplopia, blurred vision, headache, chest pain, palpitations, shortness of breath, breast pain, extremity pain, or difficulty ambulating.  The remainder of the ten-point ROS, including general, skin, lymph, H/N, cardiorespiratory, GI, , Neuro, Endocrine, and psychiatric is negative.      Objective:   Physical Exam       She is alert, oriented to time, place, person, pleasant, well      nourished, in no acute physical distress.    She is accompanied by her  .                               VITAL SIGNS:  Reviewed                                      HEENT:  Alopecia is no longer noted.  There are no nasal, oral, lip, gingival, auricular, lid,    or conjunctival lesions.  Mucosae are moist and pink, and there is no        thrush.  Pupils are equal, reactive to light and accommodation.              Extraocular muscle movements are intact.  Dentition is good.                                     NECK:  Supple without JVD, adenopathy, or thyromegaly.                       LUNGS:  Clear to auscultation without wheezing, rales, or rhonchi.           CARDIOVASCULAR:  Reveals an S1, S2, no murmurs, no rubs, no gallops.         ABDOMEN:  Soft, with minimal epigastric tenderness on palpation.  No rebound.  She has no organomegaly.  Bowel sounds are    present.                                                                     EXTREMITIES:  No cyanosis, clubbing, or edema.                          BREASTS:  She is s/p left lumpectomy with a healing incision laterally.  A left sentinel node excision scar is seen.  It has completely healed.     There are no masses in the right breast.                               LYMPHATIC:  There is no cervical, right axillary, or supraclavicular adenopathy.   SKIN:  Warm and moist, without petechiae, rashes, induration, or ecchymoses.   There is mild erythema within the radiation field.        NEUROLOGIC:  DTRs are 0-1+ bilaterally, symmetrical, motor function is 5/5,  and cranial nerves are  within normal limits.     Assessment:       1. Primary cancer of lower outer quadrant of left female breast, s/o neoadjuvant chemotherapy, s/p lumpectomy    2. Small lung nodule seen in the right lower lobe on her original PET scan..                 Plan:        I had a long discussion with her.  She has not made a decision on the adjuvant hormonal therapy issue.  The pros and cons were again discussed in great detail.  I will see her again on January  31st and she will let me know at that time whether she wants to pursue adjuvant hormonal therapy.  She understands that the benefit is rather small.    Prior to her next visit we will obtain a noncontrast CT of the chest to assess that small pulmonary nodule seen on the right lower lobe on her initial PET scan.  Her multiple questions were answered to her satisfaction.

## 2020-01-10 ENCOUNTER — TELEPHONE (OUTPATIENT)
Dept: SURGERY | Facility: CLINIC | Age: 65
End: 2020-01-10

## 2020-01-10 ENCOUNTER — TELEPHONE (OUTPATIENT)
Dept: HEMATOLOGY/ONCOLOGY | Facility: CLINIC | Age: 65
End: 2020-01-10

## 2020-01-10 NOTE — TELEPHONE ENCOUNTER
----- Message from Adilene Finnegan sent at 1/10/2020  8:07 AM CST -----  Pt is calling because she wanted to know if Dr Valdes is supposed to take her port out today she has to take a CT SCAN ordered by Dr Paula. She wants to make sure if its still necessary for the port to be taken out today      Pt contact [phone]

## 2020-01-10 NOTE — TELEPHONE ENCOUNTER
"Patient was scheduled for port removal today at 10:00 AM. Patient states she will be having further testing due to "spot on my lung". Patient states she wants to postpone port removal until testing is complete and has an answer about the spot on her lung. Patient rescheduled for port removal on 2/14/2020 at 1:00 PM. Patient verbalized understanding.   "

## 2020-01-10 NOTE — TELEPHONE ENCOUNTER
Returned call to patient to discuss questions/concerns.  Patient wants to know if she should have port removed today given upcoming CT.  Patient concerned that if for some reason CT shows concern requiring further treatment, why would she have port removed today.  Advised patient okay to keep in for now and delay removal until next month after CT imaging completed.  Patient voiced understanding and plans to do so. Also discussed with [facility] Staff  She expressed gratitude.

## 2020-01-14 ENCOUNTER — OFFICE VISIT (OUTPATIENT)
Dept: FAMILY MEDICINE | Facility: CLINIC | Age: 65
End: 2020-01-14
Payer: MEDICARE

## 2020-01-14 VITALS
OXYGEN SATURATION: 99 % | BODY MASS INDEX: 19.08 KG/M2 | HEART RATE: 73 BPM | HEIGHT: 64 IN | SYSTOLIC BLOOD PRESSURE: 114 MMHG | WEIGHT: 111.75 LBS | TEMPERATURE: 99 F | DIASTOLIC BLOOD PRESSURE: 80 MMHG

## 2020-01-14 DIAGNOSIS — E03.9 HYPOTHYROIDISM (ACQUIRED): ICD-10-CM

## 2020-01-14 DIAGNOSIS — Z78.0 POSTMENOPAUSAL: ICD-10-CM

## 2020-01-14 DIAGNOSIS — M85.80 OSTEOPENIA, UNSPECIFIED LOCATION: ICD-10-CM

## 2020-01-14 DIAGNOSIS — C50.912 INFILTRATING DUCTAL CARCINOMA OF LEFT FEMALE BREAST: Primary | ICD-10-CM

## 2020-01-14 PROCEDURE — 99499 RISK ADDL DX/OHS AUDIT: ICD-10-PCS | Mod: HCNC,S$GLB,, | Performed by: INTERNAL MEDICINE

## 2020-01-14 PROCEDURE — 1101F PR PT FALLS ASSESS DOC 0-1 FALLS W/OUT INJ PAST YR: ICD-10-PCS | Mod: HCNC,CPTII,S$GLB, | Performed by: INTERNAL MEDICINE

## 2020-01-14 PROCEDURE — 99999 PR PBB SHADOW E&M-EST. PATIENT-LVL III: ICD-10-PCS | Mod: PBBFAC,HCNC,, | Performed by: INTERNAL MEDICINE

## 2020-01-14 PROCEDURE — 99499 UNLISTED E&M SERVICE: CPT | Mod: HCNC,S$GLB,, | Performed by: INTERNAL MEDICINE

## 2020-01-14 PROCEDURE — 99999 PR PBB SHADOW E&M-EST. PATIENT-LVL III: CPT | Mod: PBBFAC,HCNC,, | Performed by: INTERNAL MEDICINE

## 2020-01-14 PROCEDURE — 1101F PT FALLS ASSESS-DOCD LE1/YR: CPT | Mod: HCNC,CPTII,S$GLB, | Performed by: INTERNAL MEDICINE

## 2020-01-14 PROCEDURE — 99214 PR OFFICE/OUTPT VISIT, EST, LEVL IV, 30-39 MIN: ICD-10-PCS | Mod: HCNC,S$GLB,, | Performed by: INTERNAL MEDICINE

## 2020-01-14 PROCEDURE — 1159F MED LIST DOCD IN RCRD: CPT | Mod: HCNC,S$GLB,, | Performed by: INTERNAL MEDICINE

## 2020-01-14 PROCEDURE — 1126F AMNT PAIN NOTED NONE PRSNT: CPT | Mod: HCNC,S$GLB,, | Performed by: INTERNAL MEDICINE

## 2020-01-14 PROCEDURE — 1159F PR MEDICATION LIST DOCUMENTED IN MEDICAL RECORD: ICD-10-PCS | Mod: HCNC,S$GLB,, | Performed by: INTERNAL MEDICINE

## 2020-01-14 PROCEDURE — 99214 OFFICE O/P EST MOD 30 MIN: CPT | Mod: HCNC,S$GLB,, | Performed by: INTERNAL MEDICINE

## 2020-01-14 PROCEDURE — 1126F PR PAIN SEVERITY QUANTIFIED, NO PAIN PRESENT: ICD-10-PCS | Mod: HCNC,S$GLB,, | Performed by: INTERNAL MEDICINE

## 2020-01-14 NOTE — PROGRESS NOTES
[facility] Note    Chief Complaint      Chief Complaint   Patient presents with    Follow-up     3 months cancer       History of Present Illness      Radha Rivera is a 64 y.o. female with chronic conditions of breast cancer, HLD, hypothyroidism, osteopenia who presents today for: follow up chronic conditions.  Pt will also be going for right cataract resection with Dr. Hoffmann scheduled on 2/5/2020.  Denies chest pain, shortness of breath.  Able walk a few city blocks and climb a flight of stairs without chest pain.  Recent kidney function normal.    Breast cancer, L invasive ductal carcinoma with axillary avni involvement T1N1: Sees Dr. Baker, Dr. Mock, Dr. Valdes.  Pt has recently completed chemotherapy and radiation.  Dr. Baker is discussing adjuvant hormonal therapy with patient and will see her again on 1/31/2020 to decide whether to start.  Of note, pt has port in place which was scheduled to be removed but delayed until upcoming CT confirms cancer remission and stability of lung nodule.  Hypothyroidism: Controlled on synthroid.  TSH on 10/10/2019 at goal.  No new or worsening symptoms.  Osteopenia: Due for repeat DEXA.  Was deferred until pt completed cancer treatment.  Will schedule at this point.  Flu shot declines.  TdAP 2016.    Cscope with Dr. Delcid.  Not sure date.  Will request record.    Past Medical History:  Past Medical History:   Diagnosis Date    Breast cancer 01/2019    left    Mitral valve prolapse     Thyroid disease        Past Surgical History:   has a past surgical history that includes tonsillectomy; Ear drum surgery; Tonsillectomy; Inner ear surgery (Right); Breast biopsy (Left, 01/2019); Insertion of tunneled central venous catheter (CVC) with subcutaneous port (Right, 2/8/2019); Mastectomy, partial (Left, 8/21/2019); and [facility] lymph node biopsy (Left, 8/21/2019).    Family History:  family history includes Breast cancer in her maternal  aunt and paternal aunt; Breast cancer (age of onset: 68) in her sister; Cancer in her father; Thyroid disease in her daughter.     Social History:  Social History     Tobacco Use    Smoking status: Never Smoker    Smokeless tobacco: Never Used   Substance Use Topics    Alcohol use: Yes     Comment: Seldom    Drug use: No       Review of Systems   Constitutional: Negative for chills, fever and malaise/fatigue.   Respiratory: Negative for shortness of breath.    Cardiovascular: Negative for chest pain.   Gastrointestinal: Negative for constipation, diarrhea, nausea and vomiting.   Skin: Negative for rash.   Neurological: Negative for weakness.        Medications:  Outpatient Encounter Medications as of 1/14/2020   Medication Sig Note Dispense Refill    levothyroxine (SYNTHROID) 75 MCG tablet Take 75 mcg by mouth every morning. 8/20/2019: Take as prescribed am of procedure                           vit A/vit C/vit E/zinc/copper (OCUVITE PRESERVISION ORAL) Take 1 tablet by mouth 2 (two) times daily. 8/20/2019: Hold am of surgery       vitamin D 1000 units Tab Take 2,000 Units by mouth once daily.  8/20/2019: Hold am of surgery       aluminum hydrox-magnesium carb (GAVISCON EXTRA STRENGTH) 254-237.5 mg/5 mL Susp Take by mouth every 8 (eight) hours as needed. 8/20/2019: Currently not taking      aspirin (ECOTRIN) 81 MG EC tablet Take 81 mg by mouth once daily. 8/20/2019: Currently not taking      esomeprazole (NEXIUM) 20 MG capsule Take 1 capsule (20 mg total) by mouth 2 (two) times daily. (Patient not taking: Reported on 1/14/2020) 8/20/2019: Take as prescribed am of procedure                      30 capsule 1    lidocaine-prilocaine (EMLA) cream Apply to affected area once (Patient not taking: Reported on 1/14/2020) 8/20/2019: Currently not taking 5 g 0    meloxicam (MOBIC) 15 MG tablet Take by mouth.       ondansetron (ZOFRAN-ODT) 4 MG TbDL Take 1 tablet (4 mg total) by mouth every 8 (eight) hours as  "needed. (Patient not taking: Reported on 1/14/2020) 8/20/2019: Currently not taking 20 tablet 0     No facility-administered encounter medications on file as of 1/14/2020.        Allergies:  Review of patient's allergies indicates:   Allergen Reactions    Bactrim [sulfamethoxazole-trimethoprim] Other (See Comments)     Caused reflux and severe nausea       Health Maintenance:  Immunization History   Administered Date(s) Administered    Tdap 10/17/2010, 07/22/2016      Health Maintenance   Topic Date Due    Hepatitis C Screening  1955    High Dose Statin  06/30/1974    DEXA SCAN  06/30/1993    Colonoscopy  06/30/2003    Pneumococcal Vaccine (65+ High/Highest Risk) (1 of 2 - PCV13) 06/30/2018    Mammogram  08/14/2021    Lipid Panel  10/10/2024    TETANUS VACCINE  07/22/2026        Physical Exam      Vital Signs  Temp: 98.7 °F (37.1 °C)  Temp src: Oral  Pulse: 73  SpO2: 99 %  BP: 114/80  BP Location: Right arm  Patient Position: Sitting  Pain Score: 0-No pain  Height and Weight  Height: 5' 4" (162.6 cm)  Weight: 50.7 kg (111 lb 12.4 oz)  BSA (Calculated - sq m): 1.51 sq meters  BMI (Calculated): 19.2  Weight in (lb) to have BMI = 25: 145.3]    Physical Exam   Constitutional: She appears well-developed and well-nourished.   HENT:   Head: Normocephalic and atraumatic.   Right Ear: External ear normal.   Left Ear: External ear normal.   Mouth/Throat: Oropharynx is clear and moist.   Eyes: Pupils are equal, round, and reactive to light. Conjunctivae and EOM are normal.   Neck: Carotid bruit is not present.   Cardiovascular: Normal rate, regular rhythm, normal heart sounds and intact distal pulses.   No murmur heard.  Pulmonary/Chest: Effort normal and breath sounds normal. She has no wheezes. She has no rales.   Abdominal: Soft. Bowel sounds are normal. She exhibits no distension. There is no hepatosplenomegaly. There is no tenderness.   Musculoskeletal: She exhibits no edema.   Vitals reviewed.   "     Laboratory:  CBC:  Recent Labs   Lab 07/19/19  1257 08/14/19  1010 10/10/19  0811   WBC 8.23 6.91 4.2   RBC 3.90 L 4.14 4.23   Hemoglobin 11.4 L 11.9 L 12.3   Hematocrit 36.1 L 39.3 38.5   Platelets 286 228 231   Mean Corpuscular Volume 93 95 91.0   Mean Corpuscular Hemoglobin 29.2 28.7 29.1   Mean Corpuscular Hemoglobin Conc 31.6 L 30.3 L 31.9 L     CMP:  Recent Labs   Lab 07/19/19  1257 08/14/19  1010 10/10/19  0811   Glucose 199 H 101 87   Calcium 10.1 10.0 9.7   Albumin 4.0 4.3 4.7   Total Protein 7.4 7.3 7.2   Sodium 139 144 141   Potassium 4.0 4.1 4.4   CO2 25 27 23   Chloride 105 108 105   BUN, Bld 18 12 15   Alkaline Phosphatase 76 80 71   ALT 21 20 26   AST 18 19 25   Total Bilirubin 0.2 0.5 0.6     URINALYSIS:  Recent Labs   Lab 04/05/19  1846   Color, UA Red A   Specific Gravity, UA 1.025   pH, UA 6.0   Protein, UA Negative   Nitrite, UA Negative   Leukocytes, UA Negative   Urobilinogen, UA Negative      LIPIDS:  Recent Labs   Lab 10/10/19  0811   TSH 0.50   HDL 55   Cholesterol 214 H   Triglycerides 94   LDL Cholesterol 139 H   Hdl/Cholesterol Ratio 3.9   Non HDL Chol. (LDL+VLDL) 159 H     TSH:  Recent Labs   Lab 10/10/19  0811   TSH 0.50     A1C:  Recent Labs   Lab 10/10/19  0811   Hemoglobin A1C 5.7 H       Assessment/Plan     Radha Rivera is a 64 y.o.female with:    1. Infiltrating ductal carcinoma of left female breast    2. Hypothyroidism (acquired)    3. Osteopenia, unspecified location  - DXA Bone Density Spine And Hip; Future    4. Postmenopausal  - DXA Bone Density Spine And Hip; Future       Chronic conditions status updated as per HPI.  Other than changes above, cont current medications and maintain follow up with specialists.  Return to clinic in 3 months.    Salo Vera MD  [facility]

## 2020-01-16 ENCOUNTER — OFFICE VISIT (OUTPATIENT)
Dept: RADIATION ONCOLOGY | Facility: CLINIC | Age: 65
End: 2020-01-16
Payer: MEDICARE

## 2020-01-16 VITALS
HEIGHT: 64 IN | DIASTOLIC BLOOD PRESSURE: 65 MMHG | TEMPERATURE: 99 F | HEART RATE: 80 BPM | BODY MASS INDEX: 19.29 KG/M2 | WEIGHT: 113 LBS | RESPIRATION RATE: 16 BRPM | SYSTOLIC BLOOD PRESSURE: 149 MMHG

## 2020-01-16 DIAGNOSIS — C50.512 PRIMARY CANCER OF LOWER OUTER QUADRANT OF LEFT FEMALE BREAST: Primary | ICD-10-CM

## 2020-01-16 DIAGNOSIS — C50.912 INFILTRATING DUCTAL CARCINOMA OF LEFT FEMALE BREAST: ICD-10-CM

## 2020-01-16 PROCEDURE — 1159F PR MEDICATION LIST DOCUMENTED IN MEDICAL RECORD: ICD-10-PCS | Mod: HCNC,S$GLB,, | Performed by: RADIOLOGY

## 2020-01-16 PROCEDURE — 99999 PR PBB SHADOW E&M-EST. PATIENT-LVL III: CPT | Mod: PBBFAC,HCNC,, | Performed by: RADIOLOGY

## 2020-01-16 PROCEDURE — 1125F PR PAIN SEVERITY QUANTIFIED, PAIN PRESENT: ICD-10-PCS | Mod: HCNC,S$GLB,, | Performed by: RADIOLOGY

## 2020-01-16 PROCEDURE — 1125F AMNT PAIN NOTED PAIN PRSNT: CPT | Mod: HCNC,S$GLB,, | Performed by: RADIOLOGY

## 2020-01-16 PROCEDURE — 99999 PR PBB SHADOW E&M-EST. PATIENT-LVL III: ICD-10-PCS | Mod: PBBFAC,HCNC,, | Performed by: RADIOLOGY

## 2020-01-16 PROCEDURE — 1101F PT FALLS ASSESS-DOCD LE1/YR: CPT | Mod: HCNC,CPTII,S$GLB, | Performed by: RADIOLOGY

## 2020-01-16 PROCEDURE — 1101F PR PT FALLS ASSESS DOC 0-1 FALLS W/OUT INJ PAST YR: ICD-10-PCS | Mod: HCNC,CPTII,S$GLB, | Performed by: RADIOLOGY

## 2020-01-16 PROCEDURE — 99213 OFFICE O/P EST LOW 20 MIN: CPT | Mod: HCNC,S$GLB,, | Performed by: RADIOLOGY

## 2020-01-16 PROCEDURE — 1159F MED LIST DOCD IN RCRD: CPT | Mod: HCNC,S$GLB,, | Performed by: RADIOLOGY

## 2020-01-16 PROCEDURE — 99213 PR OFFICE/OUTPT VISIT, EST, LEVL III, 20-29 MIN: ICD-10-PCS | Mod: HCNC,S$GLB,, | Performed by: RADIOLOGY

## 2020-01-16 NOTE — PROGRESS NOTES
REFERRING PHYSICIAN: Twan Valdes MD, Aneesh Baker M.D.    DIAGNOSIS: cT1c N1 M0 (ypTis N0) invasive ductal carcinoma of the left breast    Radiation Treatment Summary:  Ms. Rivera completed radiation to the left breast and draining lymph nodes as shown below.     Treatment Site Energy Dose/Fx (Gy) #Fx Total Dose (Gy) Start Date End Date Elapsed Days   LT SCLAV 6X 1.8 25 / 25 45 10/17/2019 11/21/2019 35   LTBREAST/ IM 6X 1.8 25 / 25 45 10/17/2019 11/21/2019 35   BOOST 6E 2 8 / 8 16 11/22/2019 12/4/2019 12     INTERVAL HISTORY:   Ms. Rivera is a 64-year-old female with left breast cancer who completed radiation as above.  She returns for a follow-up.    She was diagnosed with left breast cancer after she presented with a palpable mass in the lower outer quadrant. She 1st noticed it in late 2017 and underwent mammogram which was BI-RADS 1. A repeat mammogram and ultrasound in December 2018 revealed a 9 x 6 x 5 mm suspicious lesion in the left breast at 4:00 oclock. A core needle biopsy of this lesion on January 11, 2019 revealed invasive ductal carcinoma, grade 2. This lesion is ER negative, AK weakly positive at 5%, and HER2 negative after FISH testing, with Ki-67 index of 5%. An MRI of the bilateral breast on January 28, 2019 revealed a 9 x 6 x 7 mm oval heterogeneous mass in the left breast at 4:00 oclock. This lesion is 1-2 mm from the chest wall although there is a clear fat plane posteriorly. There was a prominent left axillary lymph node seen as well. A PET scan on February 1, 2019 revealed a 1.3 cm soft tissue focus in the left breast correlating with the MRI finding with associated hypermetabolic activity. There were multiple prominent left axillary lymph nodes noted with the largest one measuring 0.6 cm. A 0.8 cm nodule was seen in the right lower lobe without hypermetabolic activity. A biopsy of the left axillary lymph node on the same day revealed metastatic ductal carcinoma.    She underwent  "neoadjuvant chemotherapy with AC followed by Taxol. A repeat MRI of the bilateral breasts on August 5, 2019 revealed resolution of the left breast lesion. No enlarged axillary or internal mammary nodes were seen in either breast. On August 21, 2019, she underwent lumpectomy and sentinel node biopsy. The pathology revealed the left breast with no residual invasive carcinoma. However, there was grade 3 ductal carcinoma in situ measuring approximately 5 mm noted. The closest margin from the DCIS is less than 1 mm superiorly. 3/3 sentinel lymph nodes were negative for involvement. To reduce her chance of local regional recurrence, she received postoperative radiation to the left breast and draining lymph nodes including the left supraclavicular, left axillary, and left internal mammary region as above. She returns for a routine follow up.    She is contemplating starting endocrine therapy after discussions with Dr. Baker.  She has not reached a decision.  She has planned follow-up with him on January 3, 2020.  She reports left hip and leg pain over the last 2 days which is improving.  She has occasional tenderness in the left breast.  She denies left breast edema, erythema, or nipple discharge. She also denies fever, night sweats, or weight loss.    PHYSICAL EXAMINATION:  VITAL SINGS: BP (!) 149/65   Pulse 80   Temp 98.9 °F (37.2 °C) (Oral)   Resp 16   Ht 5' 4" (1.626 m)   Wt 51.3 kg (113 lb)   BMI 19.40 kg/m²   GENERAL: Patient is alert and oriented, in no acute distress.  HEENT: Extraocular muscles are intact.  Oropharynx is clear without lesions.  There is no cervical or supraclavicular adenopathy palpated.    CHEST: Breath sounds clear bilaterally.  No rales.  No rhonchi.  Unlabored respirations.  CARDIOVASCULAR: Normal S1, S2.  Normal rate.  Regular rhythm.  BREAST EXAM: She has very small breasts bilaterally.  There is mild hyperpigmentation of the skin of the left breast secondary to radiation.  The " scar secondary to lumpectomy is noted in the lower outer quadrant of the left breast. There is also a scar in the left axilla secondary to sentinel node biopsy. No abnormal masses palpated in the left breast or left axilla. The right breast and right axilla are also without palpable masses.  ABDOMEN: Bowel sounds normal.  No tenderness.  No abdominal distention.  No hepatomegaly.  No splenomegaly.  EXTREMITIES: No clubbing, cyanosis, edema  NEUROLOGIC: Cranial nerves II through XII are grossly intact.  Sensation is intact.  Strength is 5 out of 5 in the upper and lower extremities bilaterally.    ASSESSMENT:   This is a 64-year-old female with clinical T1c N1 M0 (ypTis N0) grade 2, invasive ductal carcinoma of the left breast who underwent neoadjuvant chemotherapy followed by left breast lumpectomy and sentinel node biopsy on August 21, 2019 followed by postoperative radiation, with residual grade 3, DCIS with the closest margin at less than 1 mm superiorly from the DCIS at the time of lumpectomy, ER/ND negative, Her 2-, Ki-67 5%.    PLAN:   Ms. Rivera is recovering from the radiation without any unexpected side effects.  She will follow up with Dr. Baker as planned guarding endocrine therapy.  She will also follow up with Dr. Valdes as planned.  She will repeat yearly mammograms.  I plan to see her back in approximately 6 months, unless symptoms warrant otherwise.

## 2020-01-16 NOTE — PATIENT INSTRUCTIONS
Follow up with Dr. Ramos and Dr. Valdes as scheduled.   Repeat yearly mammograms  Return to see me in 6 months.

## 2020-01-17 ENCOUNTER — HOSPITAL ENCOUNTER (OUTPATIENT)
Dept: RADIOLOGY | Facility: HOSPITAL | Age: 65
Discharge: HOME | End: 2020-01-17
Attending: NURSE PRACTITIONER
Payer: MEDICARE

## 2020-01-17 ENCOUNTER — OFFICE VISIT (OUTPATIENT)
Dept: HEMATOLOGY/ONCOLOGY | Facility: CLINIC | Age: 65
End: 2020-01-17
Payer: MEDICARE

## 2020-01-17 ENCOUNTER — TELEPHONE (OUTPATIENT)
Dept: HEMATOLOGY/ONCOLOGY | Facility: CLINIC | Age: 65
End: 2020-01-17

## 2020-01-17 VITALS
HEIGHT: 64 IN | OXYGEN SATURATION: 98 % | TEMPERATURE: 99 F | BODY MASS INDEX: 19.29 KG/M2 | WEIGHT: 113 LBS | HEART RATE: 78 BPM | RESPIRATION RATE: 16 BRPM | DIASTOLIC BLOOD PRESSURE: 67 MMHG | SYSTOLIC BLOOD PRESSURE: 131 MMHG

## 2020-01-17 DIAGNOSIS — C50.512 PRIMARY CANCER OF LOWER OUTER QUADRANT OF LEFT FEMALE BREAST: ICD-10-CM

## 2020-01-17 DIAGNOSIS — M25.552 HIP PAIN, ACUTE, LEFT: ICD-10-CM

## 2020-01-17 DIAGNOSIS — C50.912 INFILTRATING DUCTAL CARCINOMA OF LEFT FEMALE BREAST: ICD-10-CM

## 2020-01-17 DIAGNOSIS — M54.40 ACUTE BILATERAL LOW BACK PAIN WITH SCIATICA, SCIATICA LATERALITY UNSPECIFIED: Primary | ICD-10-CM

## 2020-01-17 DIAGNOSIS — C50.512 PRIMARY CANCER OF LOWER OUTER QUADRANT OF LEFT FEMALE BREAST: Primary | ICD-10-CM

## 2020-01-17 DIAGNOSIS — R91.1 LUNG NODULE: ICD-10-CM

## 2020-01-17 PROCEDURE — 1159F MED LIST DOCD IN RCRD: CPT | Mod: HCNC,S$GLB,, | Performed by: NURSE PRACTITIONER

## 2020-01-17 PROCEDURE — 99214 PR OFFICE/OUTPT VISIT, EST, LEVL IV, 30-39 MIN: ICD-10-PCS | Mod: HCNC,S$GLB,, | Performed by: NURSE PRACTITIONER

## 2020-01-17 PROCEDURE — 1159F PR MEDICATION LIST DOCUMENTED IN MEDICAL RECORD: ICD-10-PCS | Mod: HCNC,S$GLB,, | Performed by: NURSE PRACTITIONER

## 2020-01-17 PROCEDURE — 73502 X-RAY EXAM HIP UNI 2-3 VIEWS: CPT | Mod: 26,HCNC,LT, | Performed by: RADIOLOGY

## 2020-01-17 PROCEDURE — 73502 X-RAY EXAM HIP UNI 2-3 VIEWS: CPT | Mod: TC,HCNC,LT

## 2020-01-17 PROCEDURE — 99999 PR PBB SHADOW E&M-EST. PATIENT-LVL III: CPT | Mod: PBBFAC,HCNC,, | Performed by: NURSE PRACTITIONER

## 2020-01-17 PROCEDURE — 73502 XR HIP 2 VIEW LEFT: ICD-10-PCS | Mod: 26,HCNC,LT, | Performed by: RADIOLOGY

## 2020-01-17 PROCEDURE — 1101F PR PT FALLS ASSESS DOC 0-1 FALLS W/OUT INJ PAST YR: ICD-10-PCS | Mod: HCNC,CPTII,S$GLB, | Performed by: NURSE PRACTITIONER

## 2020-01-17 PROCEDURE — 99999 PR PBB SHADOW E&M-EST. PATIENT-LVL III: ICD-10-PCS | Mod: PBBFAC,HCNC,, | Performed by: NURSE PRACTITIONER

## 2020-01-17 PROCEDURE — 1125F AMNT PAIN NOTED PAIN PRSNT: CPT | Mod: HCNC,S$GLB,, | Performed by: NURSE PRACTITIONER

## 2020-01-17 PROCEDURE — 99214 OFFICE O/P EST MOD 30 MIN: CPT | Mod: HCNC,S$GLB,, | Performed by: NURSE PRACTITIONER

## 2020-01-17 PROCEDURE — 1125F PR PAIN SEVERITY QUANTIFIED, PAIN PRESENT: ICD-10-PCS | Mod: HCNC,S$GLB,, | Performed by: NURSE PRACTITIONER

## 2020-01-17 PROCEDURE — 1101F PT FALLS ASSESS-DOCD LE1/YR: CPT | Mod: HCNC,CPTII,S$GLB, | Performed by: NURSE PRACTITIONER

## 2020-01-17 RX ORDER — IBUPROFEN 600 MG/1
600 TABLET ORAL EVERY 6 HOURS PRN
COMMUNITY
End: 2020-07-31

## 2020-01-17 NOTE — TELEPHONE ENCOUNTER
Returned call to patient to discuss her concerns.   Patient reports a new abrupt hip pain on the left side that started this week. She said since it started it has been constant and moderate in nature. She has required motrin a few times, with an inadequate response.   She does have anything additional for pain. Per patient she does not have any noted additional pain with weight bearing, numbness/tingling, or generalized weakness.   Concern for possible bone lesion. Discussed with Dr. Ceasar rodriguez who recommended xray initially with urgent care and later bone scan   Will discuss with Ira Lee NP to see if could be added on or if alternatively patient should be advised ED.  Patient also reporting low grade temp, however,  Has not been on active chemo for 6 months. Last temp was prior to motrin and was reported to be 99.5, unsure if any temp was slightly masked. She does not exhibit or report any symptoms concerning of infection. Advised that if anything noted would be best to follow up with PCP.       Will follow up with patient at later time.

## 2020-01-17 NOTE — TELEPHONE ENCOUNTER
----- Message from Morales Ray sent at 1/17/2020  9:22 AM CST -----  Contact: Self  Pt states that she have been running a low grate fever of: 99.5 check temp at 5a today     Contact info  [phone]

## 2020-01-18 NOTE — PROGRESS NOTES
Chief Complaint: left hip pain     URGENT CARE:     Ms. Rivera presents today for an urgent care visit.    She reports back and left hip/leg pain which started last week.   Pain in mid back left hip and in left leg. Reports pain comes and goes and occurs in different areas.   No weakness. No numbness and tingling.  No urinary complaints.   Ambulating without difficulty. No pain with bearing weight.  Took IBP a few days ago with minimal relief.   Unable to tell me the exact day this pain started. She is unable to describe the pain and just states it hurts. She rubs her leg with some relief.   Does not remember trauma or injury occurring.   No swelling.   No h/o clotting disorder.   Had episode of sciatica in 2005.   States sister had breast cancer and after surgery and chemo was discovered to have bone mets and soon passed away. This makes her anxious.   Of note, patient is a poor historian.   When asked if she is having pain at this time, she states that she is not sure but thinks so while rubbing left thigh.     She has now completed her post lumpectomy XRT.  Of note, on August 21, 2019 she underwent a left lumpectomy and sentinel node biopsy.  Three sentinel nodes were negative, while on the lumpectomy specimen there was only 5 mm of residual DCIS.     Briefly, she is a 64-year-old  female from [place] who was diagnosed with breast cancer.  Apparently, she initially felt a mass in late 2017.  A mammogram at that time was read as BI-RADS category 1.  A repeat mammogram in December 2018 was read as BI-RADS category 4 and she underwent a biopsy that showed a carcinoma that was ER negative, OH weakly positive in 5% of the cells and HER-2 2+ by immunohistochemistry, but negative by FISH.  Ki-67 was 5%. She subsequently underwent a biopsy of a palpable lymph node on 02/01/2019, that showed evidence of lymph node metastasis.  She was referred for evaluation for chemotherapy.  Of note, she also had a PET  scan on 02/01/2019 that showed an 8 mm right lower lobe pulmonary nodule that was not FDG avid.  She started AC chemotherapy, completed four cycles, and subsequently received 4 cycles of taxol.  She had surgery on 8/21 with results as above..     Review of Systems    See above.   The remainder of the ten-point ROS, including general, skin, lymph, H/N, cardiorespiratory, GI, , Neuro, Endocrine, and psychiatric is negative.      Objective:   Physical Exam       She is alert, oriented to time, place, person, pleasant, well      nourished, in no acute physical distress.    She is accompanied by her .                               VITAL SIGNS:  Reviewed                                      HEENT:  Alopecia is no longer noted.  There are no nasal, oral, lip, gingival, auricular, lid,    or conjunctival lesions.  Mucosae are moist and pink, and there is no        thrush.  Pupils are equal, reactive to light and accommodation.              Extraocular muscle movements are intact.  Dentition is good.                                     NECK:  Supple without JVD, adenopathy, or thyromegaly.                       LUNGS:  Clear to auscultation without wheezing, rales, or rhonchi.           CARDIOVASCULAR:  Reveals an S1, S2, no murmurs, no rubs, no gallops.         ABDOMEN:  Soft, with minimal epigastric tenderness on palpation.  No rebound.  She has no organomegaly.  Bowel sounds are    present.                                                                     EXTREMITIES:  No cyanosis, clubbing, or edema.    MS: no spinal or paraspinal tenderness. tenderness when palpating left buttock. ROM intact                        BREASTS:  She is s/p left lumpectomy with a healing incision laterally.  A left sentinel node excision scar is seen.  It has completely healed.     There are no masses in the right breast.                               LYMPHATIC:  There is no cervical, right axillary, or supraclavicular adenopathy.    SKIN:  Warm and moist, without petechiae, rashes, induration, or ecchymoses.        NEUROLOGIC:  DTRs are 1+ bilaterally, symmetrical, motor function is 5/5,  and cranial nerves are  within normal limits.     Assessment:         1. Acute bilateral low back pain with sciatica, sciatica laterality unspecified     2. Primary cancer of lower outer quadrant of left female breast     3. Lung nodule - seen in right lower lobe on original PET                   Plan:       Left hip xray reviewed and normal.   Bone scan as a precaution.   IBP prn pain.   Keep CT chest and follow up as scheduled previously.     Patient is in agreement with the proposed treatment plan. All questions were answered to the patient's satisfaction. Pt knows to call clinic for any new or worsening symptoms and if anything is needed before the next clinic visit.      LIZA PoeP-C  Hematology & Oncology  [facility] [place] [address]  ph. [phone]  Fax. [phone]     I spent 30 minutes (face to face) with the patient, more than 50% was in counseling and coordination of care as detailed above.

## 2020-01-23 ENCOUNTER — PATIENT MESSAGE (OUTPATIENT)
Dept: HEMATOLOGY/ONCOLOGY | Facility: CLINIC | Age: 65
End: 2020-01-23

## 2020-01-23 ENCOUNTER — TELEPHONE (OUTPATIENT)
Dept: HEMATOLOGY/ONCOLOGY | Facility: CLINIC | Age: 65
End: 2020-01-23

## 2020-01-23 DIAGNOSIS — R50.9 FEVER, UNSPECIFIED FEVER CAUSE: Primary | ICD-10-CM

## 2020-01-23 NOTE — TELEPHONE ENCOUNTER
Returned call to patients daughter to discuss her concerns.  Patient recently seen in urgent care on the 17th for an intermittent left leg pain, that was keeping her awake. Patient pending bone scan tomorrow.  At that time, she also c/o low fever spike of 99.5. Since visit her temps have continued, with no evident signs of infection.  Leg pain is still constant, despite taking the ibuprofen. Per daughter evaluation no new swelling, slight redness (and noted two little red dots) concern of insect bite. No other respiratory, uti, or gi infection symptoms.   Concern of temp masking with the ibuprofen. Informed them to hold of if pain manageable to appropriately trend fevers.  Will do CBC tomorrow (after discussion with Ira mahan) when reports for bone scan. Pending results will follow up with patient tomorrow.   If no evident signs of infection or new bone findings, plan to have patient follow up w/ PCP for further work up.  Will also loop updates to PRIMARY CARE OFFICe.

## 2020-01-23 NOTE — TELEPHONE ENCOUNTER
"----- Message from Lisa Singh sent at 1/23/2020  3:49 PM CST -----  Consult/Advisory:    Name Of Caller: Radha   Provider Name: Samuel Melendrez MD   Does patient feel the need to be seen today? Unclear   Relationship to the Pt?: self   Contact Preference?: [phone]  What is the nature of the call?:    - pt  Would like to be called on her daughters cell phone,   she has been experiencing pain and other symptoms lately.   Please call and advise.     Additional Notes:  "Thank you for all that you do for our patients'"      "

## 2020-01-24 ENCOUNTER — HOSPITAL ENCOUNTER (OUTPATIENT)
Dept: RADIOLOGY | Facility: HOSPITAL | Age: 65
Discharge: HOME | End: 2020-01-24
Attending: NURSE PRACTITIONER
Payer: MEDICARE

## 2020-01-24 ENCOUNTER — TELEPHONE (OUTPATIENT)
Dept: HEMATOLOGY/ONCOLOGY | Facility: CLINIC | Age: 65
End: 2020-01-24

## 2020-01-24 DIAGNOSIS — C50.512 PRIMARY CANCER OF LOWER OUTER QUADRANT OF LEFT FEMALE BREAST: ICD-10-CM

## 2020-01-24 DIAGNOSIS — M54.40 ACUTE BILATERAL LOW BACK PAIN WITH SCIATICA, SCIATICA LATERALITY UNSPECIFIED: ICD-10-CM

## 2020-01-24 DIAGNOSIS — R91.1 LUNG NODULE: ICD-10-CM

## 2020-01-24 PROCEDURE — A9503 TC99M MEDRONATE: HCPCS | Mod: HCNC

## 2020-01-24 PROCEDURE — 78306 NM BONE SCAN WHOLE BODY: ICD-10-PCS | Mod: 26,HCNC,, | Performed by: RADIOLOGY

## 2020-01-24 PROCEDURE — 78306 BONE IMAGING WHOLE BODY: CPT | Mod: 26,HCNC,, | Performed by: RADIOLOGY

## 2020-01-24 NOTE — TELEPHONE ENCOUNTER
Attempted to reach patient x2 attempts. No answer. Will re-attempt and ask her to have  CBC drawn on that floor while present.

## 2020-01-24 NOTE — TELEPHONE ENCOUNTER
----- Message from Apple Glez sent at 1/24/2020 10:56 AM CST -----  Contact: pt  Reason: calling to speak with staff pertaining to picture of leg she sent through [facility]    Communication: [phone]

## 2020-01-30 ENCOUNTER — OFFICE VISIT (OUTPATIENT)
Dept: HEMATOLOGY/ONCOLOGY | Facility: CLINIC | Age: 65
End: 2020-01-30
Payer: MEDICARE

## 2020-01-30 ENCOUNTER — HOSPITAL ENCOUNTER (OUTPATIENT)
Dept: RADIOLOGY | Facility: HOSPITAL | Age: 65
Discharge: HOME | End: 2020-01-30
Attending: INTERNAL MEDICINE
Payer: MEDICARE

## 2020-01-30 VITALS
HEIGHT: 64 IN | HEART RATE: 73 BPM | SYSTOLIC BLOOD PRESSURE: 133 MMHG | TEMPERATURE: 98 F | BODY MASS INDEX: 19.31 KG/M2 | RESPIRATION RATE: 16 BRPM | DIASTOLIC BLOOD PRESSURE: 61 MMHG | OXYGEN SATURATION: 99 % | WEIGHT: 113.13 LBS

## 2020-01-30 DIAGNOSIS — C50.512 PRIMARY CANCER OF LOWER OUTER QUADRANT OF LEFT FEMALE BREAST: ICD-10-CM

## 2020-01-30 DIAGNOSIS — C50.919 INFILTRATING DUCTAL CARCINOMA OF FEMALE BREAST, UNSPECIFIED LATERALITY: Primary | ICD-10-CM

## 2020-01-30 PROCEDURE — 99999 PR PBB SHADOW E&M-EST. PATIENT-LVL III: ICD-10-PCS | Mod: PBBFAC,HCNC,, | Performed by: INTERNAL MEDICINE

## 2020-01-30 PROCEDURE — 1126F PR PAIN SEVERITY QUANTIFIED, NO PAIN PRESENT: ICD-10-PCS | Mod: HCNC,S$GLB,, | Performed by: INTERNAL MEDICINE

## 2020-01-30 PROCEDURE — 99999 PR PBB SHADOW E&M-EST. PATIENT-LVL III: CPT | Mod: PBBFAC,HCNC,, | Performed by: INTERNAL MEDICINE

## 2020-01-30 PROCEDURE — 71250 CT THORAX DX C-: CPT | Mod: 26,HCNC,, | Performed by: RADIOLOGY

## 2020-01-30 PROCEDURE — 1101F PT FALLS ASSESS-DOCD LE1/YR: CPT | Mod: HCNC,CPTII,S$GLB, | Performed by: INTERNAL MEDICINE

## 2020-01-30 PROCEDURE — 1126F AMNT PAIN NOTED NONE PRSNT: CPT | Mod: HCNC,S$GLB,, | Performed by: INTERNAL MEDICINE

## 2020-01-30 PROCEDURE — 71250 CT CHEST WITHOUT CONTRAST: ICD-10-PCS | Mod: 26,HCNC,, | Performed by: RADIOLOGY

## 2020-01-30 PROCEDURE — 1101F PR PT FALLS ASSESS DOC 0-1 FALLS W/OUT INJ PAST YR: ICD-10-PCS | Mod: HCNC,CPTII,S$GLB, | Performed by: INTERNAL MEDICINE

## 2020-01-30 PROCEDURE — 1159F PR MEDICATION LIST DOCUMENTED IN MEDICAL RECORD: ICD-10-PCS | Mod: HCNC,S$GLB,, | Performed by: INTERNAL MEDICINE

## 2020-01-30 PROCEDURE — 1159F MED LIST DOCD IN RCRD: CPT | Mod: HCNC,S$GLB,, | Performed by: INTERNAL MEDICINE

## 2020-01-30 PROCEDURE — 99213 PR OFFICE/OUTPT VISIT, EST, LEVL III, 20-29 MIN: ICD-10-PCS | Mod: HCNC,S$GLB,, | Performed by: INTERNAL MEDICINE

## 2020-01-30 PROCEDURE — 99213 OFFICE O/P EST LOW 20 MIN: CPT | Mod: HCNC,S$GLB,, | Performed by: INTERNAL MEDICINE

## 2020-01-30 PROCEDURE — 71250 CT THORAX DX C-: CPT | Mod: TC,HCNC

## 2020-01-30 NOTE — PROGRESS NOTES
Chief Complaint: No chief complaint on file.     HPI   Ms. Rivera presents today for follow up.  She has now completed her post lumpectomy XRT.  Of note, on August 21, 2019 she underwent a left lumpectomy and sentinel node biopsy.  Three sentinel nodes were negative, while on the lumpectomy specimen there was only 5 mm of residual DCIS.     Briefly, she is a 64-year-old  female from [place] who was diagnosed with breast cancer.  Apparently, she initially felt a mass in late 2017.  A mammogram at that time was read as BI-RADS category 1.  A repeat mammogram in December 2018 was read as BI-RADS category 4 and she underwent a biopsy that showed a carcinoma that was ER negative, GA weakly positive in 5% of the cells and HER-2 2+ by immunohistochemistry, but negative by FISH.  Ki-67 was 5%. She subsequently underwent a biopsy of a palpable lymph node on 02/01/2019, that showed evidence of lymph node metastasis.  She was referred for evaluation for chemotherapy.  Of note, she also had a PET scan on 02/01/2019 that showed an 8 mm right lower lobe pulmonary nodule that was not FDG avid.  She started AC chemotherapy, completed four cycles, and subsequently received 4 cycles of taxol.  She had surgery on 8/21 with results as above    Earlier this week she underwent a noncontrast CT of the chest.  They previously seen small lung nodules have not increased in size.     Review of Systems    Overall she feels OK.    ECOG performance status is 1.  She denies any fever, depression, easy bruising, chills, night  sweats, weight loss, nausea, vomiting, diarrhea, diplopia, blurred vision, headache, chest pain, palpitations, shortness of breath, breast pain, extremity pain, or difficulty ambulating.  The remainder of the ten-point ROS, including general, skin, lymph, H/N, cardiorespiratory, GI, , Neuro, Endocrine, and psychiatric is negative.      Objective:   Physical Exam       She is alert, oriented to time, place,  person, pleasant, well      nourished, in no acute physical distress.    She is accompanied by her .                               VITAL SIGNS:  Reviewed                                      HEENT:  Alopecia is no longer noted.  There are no nasal, oral, lip, gingival, auricular, lid,    or conjunctival lesions.  Mucosae are moist and pink, and there is no        thrush.  Pupils are equal, reactive to light and accommodation.              Extraocular muscle movements are intact.  Dentition is good.                                     NECK:  Supple without JVD, adenopathy, or thyromegaly.                       LUNGS:  Clear to auscultation without wheezing, rales, or rhonchi.           CARDIOVASCULAR:  Reveals an S1, S2, no murmurs, no rubs, no gallops.         ABDOMEN:  Soft, with minimal epigastric tenderness on palpation.  No rebound.  She has no organomegaly.  Bowel sounds are    present.                                                                     EXTREMITIES:  No cyanosis, clubbing, or edema.                          BREASTS:  She is s/p left lumpectomy with a healing incision laterally.  A left sentinel node excision scar is seen.  It has completely healed.     There are no masses in the right breast.                               LYMPHATIC:  There is no cervical, right axillary, or supraclavicular adenopathy.   SKIN:  Warm and moist, without petechiae, rashes, induration, or ecchymoses.      NEUROLOGIC:  DTRs are 0-1+ bilaterally, symmetrical, motor function is 5/5,  and cranial nerves are  within normal limits.     Assessment:       1. Primary cancer of lower outer quadrant of left female breast, s/o neoadjuvant chemotherapy, s/p lumpectomy    2. Small lung nodule seen in the right lower lobe on her original PET scan, radiologically stable.                 Plan:        I had a long discussion with her.  She has not made a decision on the adjuvant hormonal therapy issue.    I will see her again  in 2 months and she will let me know at that time if she would like to pursue adjuvant hormonal therapy.  Her multiple questions were answered to her satisfaction.

## 2020-02-05 ENCOUNTER — TELEPHONE (OUTPATIENT)
Dept: SURGERY | Facility: CLINIC | Age: 65
End: 2020-02-05

## 2020-02-05 NOTE — TELEPHONE ENCOUNTER
Called pt to reschedule appt on 2/14/2020 from 1:00 to 10:00. Called both lines and was able to leave vm on home phone with return clinic number for call back.

## 2020-02-11 ENCOUNTER — TELEPHONE (OUTPATIENT)
Dept: SURGERY | Facility: CLINIC | Age: 65
End: 2020-02-11

## 2020-02-11 NOTE — TELEPHONE ENCOUNTER
Spoke with patient's  to confirm time change for port removal on 2/14/2020. Time changed from 1:00 PM to 10:00 AM. Location, date, and time reviewed.  verbalized understanding.

## 2020-02-13 ENCOUNTER — TELEPHONE (OUTPATIENT)
Dept: SURGERY | Facility: CLINIC | Age: 65
End: 2020-02-13

## 2020-02-13 ENCOUNTER — PATIENT OUTREACH (OUTPATIENT)
Dept: ADMINISTRATIVE | Facility: OTHER | Age: 65
End: 2020-02-13

## 2020-02-13 NOTE — TELEPHONE ENCOUNTER
Left a phone message to remind the Patient of her 1000 appointment in the General Surgery Department with Dr. Valdes.  Directions given.  Explained that there are no dietary restrictions. Left a return phone number for any questions.

## 2020-02-14 ENCOUNTER — PROCEDURE VISIT (OUTPATIENT)
Dept: SURGERY | Facility: CLINIC | Age: 65
End: 2020-02-14
Payer: MEDICARE

## 2020-02-14 VITALS
HEART RATE: 77 BPM | BODY MASS INDEX: 19.31 KG/M2 | HEIGHT: 64 IN | WEIGHT: 113.13 LBS | SYSTOLIC BLOOD PRESSURE: 145 MMHG | DIASTOLIC BLOOD PRESSURE: 67 MMHG

## 2020-02-14 DIAGNOSIS — C50.512 PRIMARY CANCER OF LOWER OUTER QUADRANT OF LEFT FEMALE BREAST: Primary | ICD-10-CM

## 2020-02-14 PROCEDURE — 36590 REMOVAL TUNNELED CV CATH: CPT | Mod: HCNC,S$GLB,, | Performed by: SURGERY

## 2020-02-14 PROCEDURE — 36590 PR REMOVAL TUNNELED CV CATH W SUBQ PORT OR PUMP: ICD-10-PCS | Mod: HCNC,S$GLB,, | Performed by: SURGERY

## 2020-02-14 NOTE — PROCEDURES
Procedures:      Date:  02-     PREOPERATIVE DIAGNOSIS:  Hx of left invasive breast cancer.     PROCEDURE: Removal of right internal jugular vein anterior chest wall, 8-Italian PowerPort Port-A-Cath.     POSTOPERATIVE DIAGNOSIS: Same       PRIMARY SURGEON: Twan Valdes M.D.     ASSISTANT SURGEON:  MONTRELL Parra     ANESTHESIA: Local anesthesia.     PROCEDURE IN DETAIL: The patient was seen in the clinic. She underwent    informed consent for right sided Port-A-Cath removal.  She was brought to the    minor procedure room.  She was placed in a supine position. The right anterior    chest was prepped and draped in a sterile fashion after the area was marked.    Local anesthesia was infiltrated at the prior Port-A-Cath insertion site. Skin    was incised sharply with a 15 blade scalpel. The subcutaneous tissue was    dissected with electrocautery to open the fibrous capsule from around the port    and catheter system. The catheter tract was identified. A figure-of-eight 3-0    Vicryl suture was placed around the catheter tract and this was ligated as the    catheter was pulled while the patient underwent Valsalva maneuver to minimize    risk of back bleeding or air embolism. The port and catheter system were    removed intact. The port and catheter were shown for gross identification. The    deep dermal and subcutaneous layers were reapproximated with interrupted deep    three-point fixation sutures down to the posterior fibrous capsule to obliterate  any potential dead space to minimize risk of post-procedure fluid or seroma    buildup. This was done with three interrupted sutures of this kind. With the    skin reapproximated, the skin itself was closed with a running 4-0 Monocryl    subcuticular skin closure. Sterile topical dressing with Dermabond was applied.   Postop wound care and analgesic instructions were provided.   The patient will follow up in clinic prn.  She will monitor for any     signs of infection.  She tolerated the procedure well without complication.  No specimens were sent.     Twan Valdes MD  02-

## 2020-02-14 NOTE — PATIENT INSTRUCTIONS
Keep wound covered for 48 hours.  May shower directly over Tegaderm (plastic).  Remove Tegaderm after 48 hours or sooner if underlying gauze gets wet while washing/showering.  After 48 hours you may shower directly over steri-strips which you can remove after 7-10 days or allow them to fall off on their own. If they fall off sooner, that is ok, and you may shower directly over wound.  May use Tylenol or ibuprofen as directed for pain.  You should have a follow up appointment in 2-3 weeks for wound check and pathology review.  You may need to follow up sooner if you have sutures that require removal.           Please call clinic at [phone] for any questions or signs of infection such as redness, swelling, abnormal drainage,pain, tenderness, or fever.

## 2020-02-26 RX ORDER — LEVOTHYROXINE SODIUM 75 UG/1
75 TABLET ORAL EVERY MORNING
Qty: 90 TABLET | Refills: 1 | Status: SHIPPED | OUTPATIENT
Start: 2020-02-26 | End: 2020-08-20

## 2020-03-03 ENCOUNTER — PATIENT OUTREACH (OUTPATIENT)
Dept: ADMINISTRATIVE | Facility: OTHER | Age: 65
End: 2020-03-03

## 2020-03-05 ENCOUNTER — OFFICE VISIT (OUTPATIENT)
Dept: SURGERY | Facility: CLINIC | Age: 65
End: 2020-03-05
Payer: MEDICARE

## 2020-03-05 ENCOUNTER — HOSPITAL ENCOUNTER (OUTPATIENT)
Dept: RADIOLOGY | Facility: HOSPITAL | Age: 65
Discharge: HOME | End: 2020-03-05
Attending: SURGERY
Payer: MEDICARE

## 2020-03-05 VITALS
BODY MASS INDEX: 19.31 KG/M2 | HEIGHT: 64 IN | DIASTOLIC BLOOD PRESSURE: 67 MMHG | WEIGHT: 113.13 LBS | HEART RATE: 70 BPM | SYSTOLIC BLOOD PRESSURE: 121 MMHG

## 2020-03-05 DIAGNOSIS — Z85.3 HX OF BREAST CANCER: Primary | ICD-10-CM

## 2020-03-05 DIAGNOSIS — Z12.31 SCREENING MAMMOGRAM FOR HIGH-RISK PATIENT: Primary | ICD-10-CM

## 2020-03-05 DIAGNOSIS — Z12.31 SCREENING MAMMOGRAM FOR HIGH-RISK PATIENT: ICD-10-CM

## 2020-03-05 PROCEDURE — 77063 BREAST TOMOSYNTHESIS BI: CPT | Mod: 26,HCNC,, | Performed by: RADIOLOGY

## 2020-03-05 PROCEDURE — 77067 SCR MAMMO BI INCL CAD: CPT | Mod: TC,HCNC

## 2020-03-05 PROCEDURE — 1126F PR PAIN SEVERITY QUANTIFIED, NO PAIN PRESENT: ICD-10-PCS | Mod: HCNC,S$GLB,, | Performed by: SURGERY

## 2020-03-05 PROCEDURE — 77067 SCR MAMMO BI INCL CAD: CPT | Mod: 26,HCNC,, | Performed by: RADIOLOGY

## 2020-03-05 PROCEDURE — 1126F AMNT PAIN NOTED NONE PRSNT: CPT | Mod: HCNC,S$GLB,, | Performed by: SURGERY

## 2020-03-05 PROCEDURE — 99999 PR PBB SHADOW E&M-EST. PATIENT-LVL III: CPT | Mod: PBBFAC,HCNC,, | Performed by: SURGERY

## 2020-03-05 PROCEDURE — 99999 PR PBB SHADOW E&M-EST. PATIENT-LVL III: ICD-10-PCS | Mod: PBBFAC,HCNC,, | Performed by: SURGERY

## 2020-03-05 PROCEDURE — 99213 OFFICE O/P EST LOW 20 MIN: CPT | Mod: HCNC,S$GLB,, | Performed by: SURGERY

## 2020-03-05 PROCEDURE — 1101F PR PT FALLS ASSESS DOC 0-1 FALLS W/OUT INJ PAST YR: ICD-10-PCS | Mod: HCNC,CPTII,S$GLB, | Performed by: SURGERY

## 2020-03-05 PROCEDURE — 1101F PT FALLS ASSESS-DOCD LE1/YR: CPT | Mod: HCNC,CPTII,S$GLB, | Performed by: SURGERY

## 2020-03-05 PROCEDURE — 99213 PR OFFICE/OUTPT VISIT, EST, LEVL III, 20-29 MIN: ICD-10-PCS | Mod: HCNC,S$GLB,, | Performed by: SURGERY

## 2020-03-05 PROCEDURE — 1159F MED LIST DOCD IN RCRD: CPT | Mod: HCNC,S$GLB,, | Performed by: SURGERY

## 2020-03-05 PROCEDURE — 1159F PR MEDICATION LIST DOCUMENTED IN MEDICAL RECORD: ICD-10-PCS | Mod: HCNC,S$GLB,, | Performed by: SURGERY

## 2020-03-05 PROCEDURE — 77063 MAMMO DIGITAL SCREENING BILAT WITH TOMOSYNTHESIS_CAD: ICD-10-PCS | Mod: 26,HCNC,, | Performed by: RADIOLOGY

## 2020-03-05 PROCEDURE — 77067 MAMMO DIGITAL SCREENING BILAT WITH TOMOSYNTHESIS_CAD: ICD-10-PCS | Mod: 26,HCNC,, | Performed by: RADIOLOGY

## 2020-03-05 NOTE — PROGRESS NOTES
Breast Surgery  [facility]  Department of Surgery      REFERRING PROVIDER: No referring provider defined for this encounter.    Chief Complaint: Follow-up      Subjective:      Patient ID: Radha Rivera is a 64 y.o. female who presents for six month Lt breast partial mastectomy follow up and follow up after port removal (removed 2020).   Pt has a past medical history of triple negative invasive ductal carcinoma of the lower outer quadrant of the left breast. Pt had a port placed for chemotherapy on 2019 and began chemotherapy on 2019. Pt had a partial mastectomy conducted by Dr. Valdes, (with seed biopsy, lymph node, sentinel left with seed) on 2019 and began radiation on Oct 18 2019 (31 sessions, last session on 2019).       Patient does routinely do self breast exams.  Patient has not noted a change on breast exam.  Patient denies nipple discharge. Patient admits to previous breast biopsy. Patient admits to a personal history of breast cancer.        GYN History:  Age of menarche was 12. Age of menopause was 53.  Last menstrual period was . Patient admits to hormonal therapy. Patient is . Age of first live birth was 20. Patient did breast feed.    Additional family history:   Paternal grandmother (breast ca): pt unsure of age of dx or cause of death    Past Medical History:   Diagnosis Date    Breast cancer 2019    left    Mitral valve prolapse     Thyroid disease      Past Surgical History:   Procedure Laterality Date    BREAST BIOPSY Left 2019    Ear drum surgery      INNER EAR SURGERY Right     INSERTION OF TUNNELED CENTRAL VENOUS CATHETER (CVC) WITH SUBCUTANEOUS PORT Right 2019    Procedure: INSERTION, PORT-A-CATH;  Surgeon: Twan Valdes MD;  Location: [facility] OR [address] [place] [address];  Service: General;  Laterality: Right;    MASTECTOMY, PARTIAL Left 2019    Procedure: MASTECTOMY, PARTIAL LEFT with SEED;  Surgeon: Twan Valdes MD;  [facility];  Service: General;  Laterality: Left;    SENTINEL LYMPH NODE BIOPSY Left 8/21/2019    Procedure: BIOPSY, LYMPH NODE, SENTINEL LEFT with SEED;  Surgeon: Twan Valdes MD;  Location: [facility] OR [address] [place] [address];  Service: General;  Laterality: Left;    tonsillectomy      TONSILLECTOMY       Current Outpatient Medications on File Prior to Visit   Medication Sig Dispense Refill    aluminum hydrox-magnesium carb (GAVISCON EXTRA STRENGTH) 254-237.5 mg/5 mL Susp Take by mouth every 8 (eight) hours as needed.      aspirin (ECOTRIN) 81 MG EC tablet Take 81 mg by mouth once daily.      esomeprazole (NEXIUM) 20 MG capsule Take 1 capsule (20 mg total) by mouth 2 (two) times daily. 30 capsule 1    ibuprofen (ADVIL,MOTRIN) 600 MG tablet Take 600 mg by mouth every 6 (six) hours as needed for Pain.      levothyroxine (SYNTHROID) 75 MCG tablet Take 1 tablet (75 mcg total) by mouth every morning. 90 tablet 1    lidocaine-prilocaine (EMLA) cream Apply to affected area once 5 g 0    meloxicam (MOBIC) 15 MG tablet Take by mouth.      ondansetron (ZOFRAN-ODT) 4 MG TbDL Take 1 tablet (4 mg total) by mouth every 8 (eight) hours as needed. 20 tablet 0    vit A/vit C/vit E/zinc/copper (OCUVITE PRESERVISION ORAL) Take 1 tablet by mouth 2 (two) times daily.      vitamin D 1000 units Tab Take 2,000 Units by mouth once daily.        No current facility-administered medications on file prior to visit.      Social History     Socioeconomic History    Marital status:      Spouse name: Not on file    Number of children: Not on file    Years of education: Not on file    Highest education level: Not on file   Occupational History    Not on file   Social Needs    Financial resource strain: Not on file    Food insecurity:     Worry: Not on file     Inability: Not on file    Transportation needs:     Medical: Not on file     Non-medical: Not on file   Tobacco Use    Smoking status: Never Smoker    Smokeless  "tobacco: Never Used   Substance and Sexual Activity    Alcohol use: Yes     Comment: Seldom    Drug use: No    Sexual activity: Yes     Partners: Male     Birth control/protection: Post-menopausal   Lifestyle    Physical activity:     Days per week: Not on file     Minutes per session: Not on file    Stress: Not on file   Relationships    Social connections:     Talks on phone: Not on file     Gets together: Not on file     Attends Congregation service: Not on file     Active member of club or organization: Not on file     Attends meetings of clubs or organizations: Not on file     Relationship status: Not on file   Other Topics Concern    Not on file   Social History Narrative    Not on file     Family History   Problem Relation Age of Onset    Breast cancer Sister 68    Breast cancer Paternal Aunt     Cancer Father         prostate cancer    Thyroid disease Daughter     Breast cancer Maternal Aunt     Colon cancer Neg Hx     Ovarian cancer Neg Hx     Stroke Neg Hx     Diabetes Neg Hx         Review of Systems   Constitutional: Negative.    HENT: Negative.    Eyes: Positive for visual disturbance (Cataract removal scheduled bilaterally ).   Respiratory: Negative.    Cardiovascular: Negative.    Gastrointestinal: Negative.    Endocrine: Negative.    Genitourinary: Negative.    Musculoskeletal: Positive for back pain.   Skin: Negative.    Allergic/Immunologic: Negative.    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   Hematological: Negative.    Psychiatric/Behavioral: Negative.      Objective:   /67 (BP Location: Right arm, Patient Position: Sitting, BP Method: Medium (Automatic))   Pulse 70   Ht 5' 4" (1.626 m)   Wt 51.3 kg (113 lb 1.5 oz)   BMI 19.41 kg/m²     Physical Exam   Pulmonary/Chest:           Radiology review: Images personally reviewed by me in the clinic.   Mammogram:  Result:   Left US Left Magseed Localizaion  Mammo Digital Diagnostic Left  US " Left Magseed Ea Addl     US Left Magseed Localization was performed.  Using standard sterile technique and local anesthesia with lidocaine, a magnetic marker was placed into the 4:00 position of the left breast targeting the biopsy marker.  Successful placement was documented on post procedure ultrasound.  This lesion cannot be seen on mammography.     US Left Magseed Localization was performed.  Using standard sterile technique and local anesthesia with lidocaine, a magnetic marker was placed into the left axillary marked lymph node. Successful placement was documented on post procedure ultrasound.  This lesion cannot be seen on mammography.    Ultrasound:  Result:   Left US Left Magseed Localizaion  Mammo Digital Diagnostic Left  US Left Magseed Ea Addl     US Left Magseed Localization was performed.  Using standard sterile technique and local anesthesia with lidocaine, a magnetic marker was placed into the 4:00 position of the left breast targeting the biopsy marker.  Successful placement was documented on post procedure ultrasound.  This lesion cannot be seen on mammography.     US Left Magseed Localization was performed.  Using standard sterile technique and local anesthesia with lidocaine, a magnetic marker was placed into the left axillary marked lymph node. Successful placement was documented on post procedure ultrasound.  This lesion cannot be seen on mammography.      Assessment:     Pt was seen for six month Lt partial mastectomy follow up.    Plan:     Patient scheduled for bilateral breast MMG (last MMG of the right breast was 12/2018).  Pt scheduled for follow up with LINDA in six months.      I have personally taken the history and examined this patient, and I agree with the history, physical exam, assessment, and plan as written and outlined and stated above per the medical student.  The patient is well known to me status post recent Port-A-Cath removal after neoadjuvant chemotherapy for a left breast  lower outer quadrant essentially triple negative cancer.  She initially presented with a 1 cm tumor in the left lower outer quadrant and was ultimately found to have a positive left axillary lymph node on core needle biopsy.  The receptor status was estrogen receptor negative, progesterone receptor positive but with only 5% staining, and HER2 Antoinette negative, making this centrally a triple negative behaving type tumor.  She underwent neoadjuvant chemotherapy with Dr. Baker and then subsequently breast conservation surgery although she has relatively small a cup size breasts we were able to perform breast conservation surgery because of the excellent clinical response which was a complete clinical response clinically as well as pathologically with only residual DCIS at the lumpectomy site at the time of surgery with clear margins.  She subsequently underwent left whole breast radiotherapy which she completed on 11/27/2019 with Dr. Mock.    She has excellent asymmetry and the bilateral clinical breast exam reveals a well-healed small left breast conservation surgery incision in the left lower outer quadrant with no suspicious masses nodule density skin changes or lymphadenopathy on either side with good symmetry.    Her right-sided Port-A-Cath has since been removed.    She still has to speak with Dr. Baker about potentially using adjuvant endocrine therapy given the 5% progesterone receptor positivity but she states she will likely declined the endocrine therapy with Dr. Paula and she states subjectively Dr. Paula is also on the fence about potentially recommending adjuvant endocrine therapy in this setting.    A/P:  MERLIN.  No evidence of local or regional recurrence with a good cosmetic outcome with excellent breast symmetry status post neoadjuvant chemotherapy and breast conservation and adjuvant radiotherapy for initially a T1 N1 tumor that had a complete pathologic response with only residual DCIS at  the lumpectomy site as outlined above.  We will have her follow up now next available with a bilateral digital screening mammogram with tomosynthesis as the last mammographic imaging she had on the contralateral right side was in December of 2018.  She will technically follow-up with me myself p.r.n. at this point.  Will have her follow up at the breast Center with one of our mid-level providers in 6 months for a bilateral clinical breast exam as part of her ongoing breast cancer screening surveillance follow-up.

## 2020-03-24 ENCOUNTER — PATIENT MESSAGE (OUTPATIENT)
Dept: HEMATOLOGY/ONCOLOGY | Facility: CLINIC | Age: 65
End: 2020-03-24

## 2020-03-30 ENCOUNTER — OFFICE VISIT (OUTPATIENT)
Dept: HEMATOLOGY/ONCOLOGY | Facility: CLINIC | Age: 65
End: 2020-03-30
Payer: MEDICARE

## 2020-03-30 DIAGNOSIS — C50.512 PRIMARY CANCER OF LOWER OUTER QUADRANT OF LEFT FEMALE BREAST: Primary | ICD-10-CM

## 2020-03-30 PROCEDURE — 99213 PR OFFICE/OUTPT VISIT, EST, LEVL III, 20-29 MIN: ICD-10-PCS | Mod: HCNC,S$GLB,, | Performed by: INTERNAL MEDICINE

## 2020-03-30 PROCEDURE — 1159F PR MEDICATION LIST DOCUMENTED IN MEDICAL RECORD: ICD-10-PCS | Mod: HCNC,S$GLB,, | Performed by: INTERNAL MEDICINE

## 2020-03-30 PROCEDURE — 1101F PT FALLS ASSESS-DOCD LE1/YR: CPT | Mod: HCNC,CPTII,S$GLB, | Performed by: INTERNAL MEDICINE

## 2020-03-30 PROCEDURE — 1159F MED LIST DOCD IN RCRD: CPT | Mod: HCNC,S$GLB,, | Performed by: INTERNAL MEDICINE

## 2020-03-30 PROCEDURE — 1101F PR PT FALLS ASSESS DOC 0-1 FALLS W/OUT INJ PAST YR: ICD-10-PCS | Mod: HCNC,CPTII,S$GLB, | Performed by: INTERNAL MEDICINE

## 2020-03-30 PROCEDURE — 99213 OFFICE O/P EST LOW 20 MIN: CPT | Mod: HCNC,S$GLB,, | Performed by: INTERNAL MEDICINE

## 2020-03-30 NOTE — PROGRESS NOTES
Chief Complaint: No chief complaint on file.     HPI       The patient location is:Home  The chief complaint leading to consultation is:breast cancer  Visit type: Virtual visit with synchronous audio and video  Total time spent with patient: 15 minutes    Each patient to whom he or she provides medical services by telemedicine is:  (1) informed of the relationship between the physician and patient and the respective role of any other health care provider with respect to management of the patient; and (2) notified that he or she may decline to receive medical services by telemedicine and may withdraw from such care at any time.    Ms. Rivera had an appointment with me today for follow-up, however, due to the corona virus epidemic she was switched to a virtual visit.  On August 21, 2019 she underwent a left lumpectomy and sentinel node biopsy.  Three sentinel nodes were negative, while on the lumpectomy specimen there was only 5 mm of residual DCIS.   She has since completed her post lumpectomy XRT, but she has not decided whether she wants to pursue adjuvant hormonal therapy for her weakly positive 5% DC.   Briefly, she is a 64-year-old  female from [place] who was diagnosed with breast cancer.  Apparently, she initially felt a mass in late 2017.  A mammogram at that time was read as BI-RADS category 1.  A repeat mammogram in December 2018 was read as BI-RADS category 4 and she underwent a biopsy that showed a carcinoma that was ER negative, DC weakly positive in 5% of the cells and HER-2 2+ by immunohistochemistry, but negative by FISH.  Ki-67 was 5%. She subsequently underwent a biopsy of a palpable lymph node on 02/01/2019, that showed evidence of lymph node metastasis.  She was referred for evaluation for chemotherapy.  Of note, she also had a PET scan on 02/01/2019 that showed an 8 mm right lower lobe pulmonary nodule that was not FDG avid.  She started AC chemotherapy, completed four cycles,  and subsequently received 4 cycles of taxol.  She had surgery on 8/21 with results as above.    Of note, her PET scan has shown several micro nodules in both lungs.  A recent CT scan of the chest 2 months ago showed no significant changes.    Review of Systems    Overall she feels OK.    ECOG performance status is 1.  She denies any fever, depression, easy bruising, chills, night  sweats, weight loss, nausea, vomiting, diarrhea, diplopia, blurred vision, headache, chest pain, palpitations, shortness of breath, breast pain, extremity pain, or difficulty ambulating.  The remainder of the ten-point ROS, including general, skin, lymph, H/N, cardiorespiratory, GI, , Neuro, Endocrine, and psychiatric is negative.      Objective:   Physical Exam was deferred as this was a virtual visit.                            Assessment:       1. Primary cancer of lower outer quadrant of left female breast, s/o neoadjuvant chemotherapy, s/p lumpectomy    2. Small lung nodule seen in the right lower lobe on her original PET scan, radiologically stable.                 Plan:        I had a long discussion with her about the risks and the (very small) benefits of adjuvant hormonal therapy given the fact that her cancer was ER weakly positive at 5%.  At the end of the discussion she elected not to pursue it, which I do not think is unreasonable.  I will see her again in 3 months and she will will undergo another CT of the chest 6 months from now.  Her multiple questions were answered to her satisfaction.  Duration of visit was 15 minutes.

## 2020-05-04 ENCOUNTER — TELEPHONE (OUTPATIENT)
Dept: PRIMARY CARE CLINIC | Facility: CLINIC | Age: 65
End: 2020-05-04

## 2020-05-12 ENCOUNTER — TELEPHONE (OUTPATIENT)
Dept: ADMINISTRATIVE | Facility: HOSPITAL | Age: 65
End: 2020-05-12

## 2020-06-01 ENCOUNTER — TELEPHONE (OUTPATIENT)
Dept: FAMILY MEDICINE | Facility: CLINIC | Age: 65
End: 2020-06-01

## 2020-06-01 NOTE — TELEPHONE ENCOUNTER
----- Message from Agusto Henley sent at 6/1/2020  4:14 PM CDT -----  Contact: [phone] / self   Patient would like to speak with your office regarding the medication levothyroxine (SYNTHROID) 75 MCG tablet, states her pharmacy changed her medication and she is concerned. Please Advise.

## 2020-06-24 ENCOUNTER — TELEPHONE (OUTPATIENT)
Dept: HEMATOLOGY/ONCOLOGY | Facility: CLINIC | Age: 65
End: 2020-06-24

## 2020-06-24 NOTE — TELEPHONE ENCOUNTER
"----- Message from Lisa Singh sent at 6/24/2020  9:31 AM CDT -----  Regarding: Appt cancel  Contact: Radha  Patient Assist    Name of caller: Radha   Provider name: Samuel Melendrez MD   Contact Preference:  [phone]  Is this regarding current patient or new patient?: current   What is the nature of the call?  - pt called to cancel appt. Will like to be called today to r/s it     Additional Notes:   "Thank you for all that you do for our patients'"          "

## 2020-06-25 ENCOUNTER — TELEPHONE (OUTPATIENT)
Dept: HEMATOLOGY/ONCOLOGY | Facility: CLINIC | Age: 65
End: 2020-06-25

## 2020-06-25 NOTE — TELEPHONE ENCOUNTER
"----- Message from Cecilia Flores sent at 6/24/2020  4:16 PM CDT -----  Reschedule Existing Appointment    Appt Date: 6/25/20  Type of appt : ESTABLISHED PATIENT [[id number]]    Physician: Aneesh Baker MD  Reason for rescheduling? Weather conditions   Caller: Radha  Contact Preference: [phone]    Additional Information:  "Thank you for all that you do for our patients'"       "

## 2020-07-09 ENCOUNTER — TELEPHONE (OUTPATIENT)
Dept: FAMILY MEDICINE | Facility: CLINIC | Age: 65
End: 2020-07-09

## 2020-07-09 DIAGNOSIS — I70.0 AORTIC ATHEROSCLEROSIS: ICD-10-CM

## 2020-07-09 DIAGNOSIS — C50.919 INFILTRATING DUCTAL CARCINOMA OF FEMALE BREAST, UNSPECIFIED LATERALITY: ICD-10-CM

## 2020-07-09 DIAGNOSIS — E03.9 HYPOTHYROIDISM (ACQUIRED): Primary | ICD-10-CM

## 2020-07-09 NOTE — TELEPHONE ENCOUNTER
Spoke with patient. Pt calling in with multiple issues. Pt states that she has an infected toe, she describes as a painful bump near the edge of her toenail that appears to be filled with pus. Attempted to schedule appointment for patient, first available is 7/21. Pt will attempt to send a photo and a message through the portal for Dr Vera to see. Pt states that she would need to be seen for surgical clearance, but does not have a date scheduled. Pt will call back to schedule clearance once surgery is scheduled. Pt would also like to have her lab work placed to check her thyroid. Informed patient that I would forward this message to Dr Vera and once he responds someone would give her a call back. Pt verbalizes understanding.

## 2020-07-09 NOTE — TELEPHONE ENCOUNTER
----- Message from [facility] sent at 7/9/2020 11:28 AM CDT -----  Contact: patient//   [phone]  Type:  Sooner Apoointment Request    Caller is requesting a sooner appointment.  Caller declined first available appointment listed below.  Caller will not accept being placed on the waitlist and is requesting a message be sent to doctor.    Name of Caller:PATIENT   When is the first available appointment? 8-5-2020  Symptoms: toe infection and surgery clearnace   Would the patient rather a call back or a response via [facility]? CALLBACK   Best Call Back Number: [phone]  Additional Information: NONE

## 2020-07-09 NOTE — TELEPHONE ENCOUNTER
Toe: please send picture and in the meantime, apply warm compresses.    Preop: would need appt for preop, schedule within 30 days of surgery.    Labs: ordered.

## 2020-07-13 PROBLEM — I21.4 NSTEMI (NON-ST ELEVATED MYOCARDIAL INFARCTION): Status: ACTIVE | Noted: 2020-07-13

## 2020-07-14 PROBLEM — I34.1 MITRAL VALVE PROLAPSE: Status: ACTIVE | Noted: 2020-07-14

## 2020-07-14 PROBLEM — I42.0 DILATED CARDIOMYOPATHY: Status: ACTIVE | Noted: 2020-07-14

## 2020-07-15 PROBLEM — I51.81 TAKOTSUBO CARDIOMYOPATHY: Status: ACTIVE | Noted: 2020-07-14

## 2020-07-16 ENCOUNTER — PATIENT OUTREACH (OUTPATIENT)
Dept: ADMINISTRATIVE | Facility: CLINIC | Age: 65
End: 2020-07-16

## 2020-07-16 NOTE — PATIENT INSTRUCTIONS
Understanding Transradial Cardiac Catheterization    Cardiac catheterization (cardiac cath) is a common, non-surgical procedure. During the procedure, your doctor will insert a long, thin tube (catheter) into an artery and move it up into your heart. Transradial means the catheter is inserted into an artery in the wrist (the radial artery). This procedure can be used to diagnose and treat certain heart problems.  Why do I need a transradial cardiac cath?  You may need a cardiac cath if signs indicate a problem with your heart. These may include:  · Symptoms of chest pain, tightness, or heaviness (known as angina). This is a common symptom of blocked heart arteries, known as coronary artery disease.  · Symptoms of weakness, dizziness, trouble breathing, or swollen legs or feet. These may be symptoms of a problem with a heart valve or the heart muscle.  · Other test results show heart problems. Tests may include stress tests, heart scans, and echocardiography.  During a cardiac cath, your doctor can see the condition of the coronary arteries and heart valves. He or she can also check how well the heart pumps and the flow of blood through the heart. Your doctor can also measure pressures and take blood samples. And, if needed, he or she can open blocked arteries. This can help reduce symptoms of angina.  Cardiac cath is often done using a catheter inserted into an artery in the groin. During transradial cardiac cath, the catheter is inserted into an artery in the wrist. This can mean less bleeding and a faster recovery. Some people may have blockages in the groin arteries as well as in the heart arteries, making it difficult to reach the heart. The transradial approach can be used to get around this problem.   What happens during a transradial cardiac cath?  The procedure is done in the hospital or a surgery center. First, an IV line is put in your arm or hand to deliver fluids and medicines. You will likely be given  medicine to relax you and make you drowsy. When the procedure begins:  · You lie on an X-ray table.  · The skin over the insertion site in your wrist is numbed.  · The doctor makes a tiny puncture or incision into the artery in the wrist. He or she then inserts a catheter and threads it through the blood vessel into your heart.    · The doctor may inject a contrast fluid through the catheter into the arteries. This fluid makes the arteries show up better on X-rays.  · Tests may be done to check the condition of your heart and arteries. If needed, the doctor can clear blockages in the arteries or do other repairs.  · When the doctor is finished, he or she will remove the catheter and put direct pressure on the site to prevent bleeding.  · You will stay for a time to recover, and then go home.  What are the risks of transradial cardiac cath?  These include:  · Bleeding, bruising, infection, or blood clots  · Damage to the radial artery that may cause injury to the hand  · Allergic reaction to the contrast fluid  · Abnormal heartbeat (arrhythmia)  · Damage to blood vessels or tissues  · Kidney damage or failure  · The need for emergency heart surgery  · Heart attack, stroke, or death    © 8947-4419 [employer]. [address] [place] [address], [place] [address]. All rights reserved. This information is not intended as a substitute for professional medical care. Always follow your healthcare professional's instructions.

## 2020-07-20 ENCOUNTER — TELEPHONE (OUTPATIENT)
Dept: FAMILY MEDICINE | Facility: CLINIC | Age: 65
End: 2020-07-20

## 2020-07-20 NOTE — TELEPHONE ENCOUNTER
----- Message from Raj Flores sent at 7/20/2020  9:10 AM CDT -----  Regarding: Orders  Contact: Self/ [phone]  Patient is requesting orders for lab work. She would like to have them done before her upcoming appointment that is on tomorrow. Please advise.

## 2020-07-21 ENCOUNTER — PATIENT OUTREACH (OUTPATIENT)
Dept: ADMINISTRATIVE | Facility: HOSPITAL | Age: 65
End: 2020-07-21

## 2020-07-21 ENCOUNTER — OFFICE VISIT (OUTPATIENT)
Dept: FAMILY MEDICINE | Facility: CLINIC | Age: 65
End: 2020-07-21
Payer: MEDICARE

## 2020-07-21 ENCOUNTER — TELEPHONE (OUTPATIENT)
Dept: ADMINISTRATIVE | Facility: HOSPITAL | Age: 65
End: 2020-07-21

## 2020-07-21 VITALS
BODY MASS INDEX: 19.92 KG/M2 | DIASTOLIC BLOOD PRESSURE: 60 MMHG | TEMPERATURE: 97 F | HEIGHT: 63 IN | HEART RATE: 77 BPM | WEIGHT: 112.44 LBS | SYSTOLIC BLOOD PRESSURE: 116 MMHG | OXYGEN SATURATION: 98 %

## 2020-07-21 DIAGNOSIS — K21.9 GASTROESOPHAGEAL REFLUX DISEASE, ESOPHAGITIS PRESENCE NOT SPECIFIED: ICD-10-CM

## 2020-07-21 DIAGNOSIS — E03.9 HYPOTHYROIDISM (ACQUIRED): ICD-10-CM

## 2020-07-21 DIAGNOSIS — I51.81 TAKOTSUBO CARDIOMYOPATHY: ICD-10-CM

## 2020-07-21 DIAGNOSIS — L03.031 PARONYCHIA OF GREAT TOE OF RIGHT FOOT: Primary | ICD-10-CM

## 2020-07-21 PROCEDURE — 99999 PR PBB SHADOW E&M-EST. PATIENT-LVL IV: ICD-10-PCS | Mod: PBBFAC,HCNC,, | Performed by: INTERNAL MEDICINE

## 2020-07-21 PROCEDURE — 99499 RISK ADDL DX/OHS AUDIT: ICD-10-PCS | Mod: HCNC,S$GLB,, | Performed by: INTERNAL MEDICINE

## 2020-07-21 PROCEDURE — 99999 PR PBB SHADOW E&M-EST. PATIENT-LVL IV: CPT | Mod: PBBFAC,HCNC,, | Performed by: INTERNAL MEDICINE

## 2020-07-21 PROCEDURE — 3008F PR BODY MASS INDEX (BMI) DOCUMENTED: ICD-10-PCS | Mod: HCNC,CPTII,S$GLB, | Performed by: INTERNAL MEDICINE

## 2020-07-21 PROCEDURE — 1101F PT FALLS ASSESS-DOCD LE1/YR: CPT | Mod: HCNC,CPTII,S$GLB, | Performed by: INTERNAL MEDICINE

## 2020-07-21 PROCEDURE — 3008F BODY MASS INDEX DOCD: CPT | Mod: HCNC,CPTII,S$GLB, | Performed by: INTERNAL MEDICINE

## 2020-07-21 PROCEDURE — 1126F PR PAIN SEVERITY QUANTIFIED, NO PAIN PRESENT: ICD-10-PCS | Mod: HCNC,S$GLB,, | Performed by: INTERNAL MEDICINE

## 2020-07-21 PROCEDURE — 1101F PR PT FALLS ASSESS DOC 0-1 FALLS W/OUT INJ PAST YR: ICD-10-PCS | Mod: HCNC,CPTII,S$GLB, | Performed by: INTERNAL MEDICINE

## 2020-07-21 PROCEDURE — 1159F MED LIST DOCD IN RCRD: CPT | Mod: HCNC,S$GLB,, | Performed by: INTERNAL MEDICINE

## 2020-07-21 PROCEDURE — 1126F AMNT PAIN NOTED NONE PRSNT: CPT | Mod: HCNC,S$GLB,, | Performed by: INTERNAL MEDICINE

## 2020-07-21 PROCEDURE — 99214 PR OFFICE/OUTPT VISIT, EST, LEVL IV, 30-39 MIN: ICD-10-PCS | Mod: HCNC,S$GLB,, | Performed by: INTERNAL MEDICINE

## 2020-07-21 PROCEDURE — 99214 OFFICE O/P EST MOD 30 MIN: CPT | Mod: HCNC,S$GLB,, | Performed by: INTERNAL MEDICINE

## 2020-07-21 PROCEDURE — 1159F PR MEDICATION LIST DOCUMENTED IN MEDICAL RECORD: ICD-10-PCS | Mod: HCNC,S$GLB,, | Performed by: INTERNAL MEDICINE

## 2020-07-21 PROCEDURE — 99499 UNLISTED E&M SERVICE: CPT | Mod: HCNC,S$GLB,, | Performed by: INTERNAL MEDICINE

## 2020-07-21 NOTE — PROGRESS NOTES
[facility] Note    Chief Complaint      Chief Complaint   Patient presents with    Follow-up     hospital visit for heart attack last Monday       History of Present Illness      Radha Rivera is a 65 y.o. female with chronic conditions of breast cancer, HLD, hypothyroidism, osteopenia who presents today for: follow up hospitalization for Takatsubo's cardiomyopathy and paronychia of right great toe.  Reports week of redness, swelling and pain of right great toe by medial edge of nail.    Started with chest pain and shortness of breath and right arm pain on 7/13/2020.  [place] and evaluated by Dr. Thomas, cardiology.  Echo showed decreased EF 35% and segmental wall motion abnormality of left ventricle.  LHC showed normal coronary arteries.  Started on metoprolol and discharged to follow up with Dr. Thomas on 7/31/2020.  Since discharge, has been having acid reflux/heartburn/belching, relieved with rolaids.  Has not tried H2 blocker or PPI.    BP at goal today  Flu shot declines.  TdAP 2016.    Mammogram 3/2020.  Cscope with Dr. Delcid.  Not sure date.  Will request record.      Past Medical History:  Past Medical History:   Diagnosis Date    Breast cancer 01/2019    left    Mitral valve prolapse     Thyroid disease        Past Surgical History:   has a past surgical history that includes tonsillectomy; Ear drum surgery; Tonsillectomy; Inner ear surgery (Right); Breast biopsy (Left, 01/2019); Insertion of tunneled central venous catheter (CVC) with subcutaneous port (Right, 2/8/2019); Mastectomy, partial (Left, 8/21/2019); [place] lymph node biopsy (Left, 8/21/2019); Breast lumpectomy; and Left heart catheterization (Left, 7/15/2020).    Family History:  family history includes Breast cancer in her maternal aunt and paternal aunt; Breast cancer (age of onset: 68) in her sister; Cancer in her father; Thyroid disease in her daughter.     Social  "History:  Social History     Tobacco Use    Smoking status: Never Smoker    Smokeless tobacco: Never Used   Substance Use Topics    Alcohol use: Yes     Comment: Seldom    Drug use: No       ROS     Medications:  Outpatient Encounter Medications as of 7/21/2020   Medication Sig Dispense Refill    aspirin 81 MG Chew Take 1 tablet (81 mg total) by mouth once daily. 30 tablet 0    ibuprofen (ADVIL,MOTRIN) 600 MG tablet Take 600 mg by mouth every 6 (six) hours as needed for Pain.      levothyroxine (SYNTHROID) 75 MCG tablet Take 1 tablet (75 mcg total) by mouth every morning. 90 tablet 1    metoprolol succinate (TOPROL-XL) 25 MG 24 hr tablet Take 0.5 tablets (12.5 mg total) by mouth once daily. 15 tablet 1    vit A/vit C/vit E/zinc/copper (OCUVITE PRESERVISION ORAL) Take 1 tablet by mouth 2 (two) times daily.      vitamin D 1000 units Tab Take 2,000 Units by mouth once daily.       esomeprazole (NEXIUM) 20 MG capsule Take 1 capsule (20 mg total) by mouth 2 (two) times daily. 30 capsule 1     No facility-administered encounter medications on file as of 7/21/2020.        Allergies:  Review of patient's allergies indicates:   Allergen Reactions    Bactrim [sulfamethoxazole-trimethoprim] Other (See Comments)     Caused reflux and severe nausea       Health Maintenance:  Immunization History   Administered Date(s) Administered    Tdap 10/17/2010, 07/22/2016      Health Maintenance   Topic Date Due    Hepatitis C Screening  1955    High Dose Statin  06/30/1974    DEXA SCAN  06/30/1993    Pneumococcal Vaccine (65+ High/Highest Risk) (1 of 2 - PCV13) 06/30/2018    Mammogram  03/05/2022    Lipid Panel  07/15/2025    TETANUS VACCINE  07/22/2026        Physical Exam      Vital Signs  Temp: 97.3 °F (36.3 °C)  Temp src: Oral  Pulse: 77  SpO2: 98 %  BP: 116/60  BP Location: Right arm  Patient Position: Sitting  Pain Score: 0-No pain  Height and Weight  Height: 5' 3" (160 cm)  Weight: 51 kg (112 lb 7 oz)  BSA " (Calculated - sq m): 1.51 sq meters  BMI (Calculated): 19.9  Weight in (lb) to have BMI = 25: 140.8]    Physical Exam  Vitals signs reviewed.   Constitutional:       Appearance: She is well-developed.   HENT:      Head: Normocephalic and atraumatic.      Right Ear: External ear normal.      Left Ear: External ear normal.   Eyes:      Conjunctiva/sclera: Conjunctivae normal.      Pupils: Pupils are equal, round, and reactive to light.   Neck:      Vascular: No carotid bruit.   Cardiovascular:      Rate and Rhythm: Normal rate and regular rhythm.      Heart sounds: Normal heart sounds. No murmur.   Pulmonary:      Effort: Pulmonary effort is normal.      Breath sounds: Normal breath sounds. No wheezing or rales.   Abdominal:      General: Bowel sounds are normal. There is no distension.      Palpations: Abdomen is soft.      Tenderness: There is no abdominal tenderness.   Skin:     Comments: Medial surface of right great toenail with subcutaneous nodule, erythema.  No increased warmth and minimal tenderness.          Laboratory:  CBC:  Recent Labs   Lab 07/13/20 2152 07/14/20  0500 07/15/20  0611   WBC 6.32 5.31 4.42   RBC 4.29 3.74 L 4.33   Hemoglobin 12.9 11.1 L 13.0   Hematocrit 41.2 35.7 L 41.3   Platelets 192 163 184   Mean Corpuscular Volume 96 96 95   Mean Corpuscular Hemoglobin 30.1 29.7 30.0   Mean Corpuscular Hemoglobin Conc 31.3 L 31.1 L 31.5 L     CMP:  Recent Labs   Lab 07/13/20 2152 07/14/20  0500 07/15/20  0611   Glucose 136 H 101 104   Calcium 9.9 9.1 9.2   Albumin 5.2 3.9 4.1   Total Protein 8.2 6.3 6.7   Sodium 142 143 144   Potassium 3.8 3.9 4.1   CO2 28 24 26   Chloride 102 109 111 H   BUN, Bld 25 H 18 H 14   Alkaline Phosphatase 76 60 66   ALT 31 25 28   AST 35 36 35   Total Bilirubin 0.2 0.3 0.5     URINALYSIS:  Recent Labs   Lab 04/05/19  1846   Color, UA Red A   Specific Gravity, UA 1.025   pH, UA 6.0   Protein, UA Negative   Nitrite, UA Negative   Leukocytes, UA Negative   Urobilinogen, UA  Negative      LIPIDS:  Recent Labs   Lab 10/10/19  0811 07/14/20  0600 07/15/20  0611   TSH 0.50 0.405  --    HDL 55  --  59   Cholesterol 214 H  --  191   Triglycerides 94  --  77   LDL Cholesterol 139 H  --  116.6   Hdl/Cholesterol Ratio 3.9  --  30.9   Non-HDL Cholesterol  --   --  132   Non HDL Chol. (LDL+VLDL) 159 H  --   --    Total Cholesterol/HDL Ratio  --   --  3.2     TSH:  Recent Labs   Lab 10/10/19  0811 07/14/20  0600   TSH 0.50 0.405     A1C:  Recent Labs   Lab 10/10/19  0811 07/14/20  0600   Hemoglobin A1C 5.7 H 5.7 H       Assessment/Plan     Rahda Rivera is a 65 y.o.female with:    1. Paronychia of great toe of right foot, new to me  - Ambulatory referral/consult to Podiatry; Future  Does not appear infected at thsi time.  Will refer to podiatry to determine definitive treatment  2. Takotsubo cardiomyopathy  Continue current meds.  F/U with Dr. Thomas as scheduled  3. Hypothyroidism (acquired)  Update labs.  4. GERD  Start pepcid.      Chronic conditions status updated as per HPI.  Other than changes above, cont current medications and maintain follow up with specialists.  Return to clinic in 3 months.    Salo Vera MD  [facility]

## 2020-07-28 ENCOUNTER — OFFICE VISIT (OUTPATIENT)
Dept: HEMATOLOGY/ONCOLOGY | Facility: CLINIC | Age: 65
End: 2020-07-28
Payer: MEDICARE

## 2020-07-28 ENCOUNTER — PATIENT OUTREACH (OUTPATIENT)
Dept: ADMINISTRATIVE | Facility: OTHER | Age: 65
End: 2020-07-28

## 2020-07-28 VITALS
WEIGHT: 112 LBS | SYSTOLIC BLOOD PRESSURE: 135 MMHG | DIASTOLIC BLOOD PRESSURE: 62 MMHG | OXYGEN SATURATION: 99 % | RESPIRATION RATE: 16 BRPM | BODY MASS INDEX: 19.12 KG/M2 | HEART RATE: 70 BPM | TEMPERATURE: 99 F | HEIGHT: 64 IN

## 2020-07-28 DIAGNOSIS — C50.512 PRIMARY CANCER OF LOWER OUTER QUADRANT OF LEFT FEMALE BREAST: Primary | ICD-10-CM

## 2020-07-28 DIAGNOSIS — R91.1 LUNG NODULE: ICD-10-CM

## 2020-07-28 DIAGNOSIS — Z12.11 ENCOUNTER FOR FIT (FECAL IMMUNOCHEMICAL TEST) SCREENING: Primary | ICD-10-CM

## 2020-07-28 PROCEDURE — 1101F PR PT FALLS ASSESS DOC 0-1 FALLS W/OUT INJ PAST YR: ICD-10-PCS | Mod: HCNC,CPTII,S$GLB, | Performed by: INTERNAL MEDICINE

## 2020-07-28 PROCEDURE — 99214 PR OFFICE/OUTPT VISIT, EST, LEVL IV, 30-39 MIN: ICD-10-PCS | Mod: HCNC,S$GLB,, | Performed by: INTERNAL MEDICINE

## 2020-07-28 PROCEDURE — 3008F PR BODY MASS INDEX (BMI) DOCUMENTED: ICD-10-PCS | Mod: HCNC,CPTII,S$GLB, | Performed by: INTERNAL MEDICINE

## 2020-07-28 PROCEDURE — 99499 UNLISTED E&M SERVICE: CPT | Mod: HCNC,S$GLB,, | Performed by: INTERNAL MEDICINE

## 2020-07-28 PROCEDURE — 1101F PT FALLS ASSESS-DOCD LE1/YR: CPT | Mod: HCNC,CPTII,S$GLB, | Performed by: INTERNAL MEDICINE

## 2020-07-28 PROCEDURE — 99999 PR PBB SHADOW E&M-EST. PATIENT-LVL IV: CPT | Mod: PBBFAC,HCNC,, | Performed by: INTERNAL MEDICINE

## 2020-07-28 PROCEDURE — 99214 OFFICE O/P EST MOD 30 MIN: CPT | Mod: HCNC,S$GLB,, | Performed by: INTERNAL MEDICINE

## 2020-07-28 PROCEDURE — 1126F AMNT PAIN NOTED NONE PRSNT: CPT | Mod: HCNC,S$GLB,, | Performed by: INTERNAL MEDICINE

## 2020-07-28 PROCEDURE — 99999 PR PBB SHADOW E&M-EST. PATIENT-LVL IV: ICD-10-PCS | Mod: PBBFAC,HCNC,, | Performed by: INTERNAL MEDICINE

## 2020-07-28 PROCEDURE — 3008F BODY MASS INDEX DOCD: CPT | Mod: HCNC,CPTII,S$GLB, | Performed by: INTERNAL MEDICINE

## 2020-07-28 PROCEDURE — 99499 RISK ADDL DX/OHS AUDIT: ICD-10-PCS | Mod: HCNC,S$GLB,, | Performed by: INTERNAL MEDICINE

## 2020-07-28 PROCEDURE — 1126F PR PAIN SEVERITY QUANTIFIED, NO PAIN PRESENT: ICD-10-PCS | Mod: HCNC,S$GLB,, | Performed by: INTERNAL MEDICINE

## 2020-07-28 PROCEDURE — 1159F PR MEDICATION LIST DOCUMENTED IN MEDICAL RECORD: ICD-10-PCS | Mod: HCNC,S$GLB,, | Performed by: INTERNAL MEDICINE

## 2020-07-28 PROCEDURE — 1159F MED LIST DOCD IN RCRD: CPT | Mod: HCNC,S$GLB,, | Performed by: INTERNAL MEDICINE

## 2020-07-28 NOTE — PROGRESS NOTES
Requested updates within Care Everywhere.  Patient's chart was reviewed for overdue PAT topics.  Immunizations reconciled.    Orders placed: FIT kit  Tasked appts:  Labs Linked:

## 2020-07-28 NOTE — PROGRESS NOTES
Chief Complaint: No chief complaint on file.     HPI   Ms. Rivera presents today for follow up.  She has now completed her post lumpectomy XRT.  Of note, on August 21, 2019 she underwent a left lumpectomy and sentinel node biopsy.  Three sentinel nodes were negative, while on the lumpectomy specimen there was only 5 mm of residual DCIS.     Briefly, she is a 64-year-old  female from [place] who was diagnosed with breast cancer.  Apparently, she initially felt a mass in late 2017.  A mammogram at that time was read as BI-RADS category 1.  A repeat mammogram in December 2018 was read as BI-RADS category 4 and she underwent a biopsy that showed a carcinoma that was ER negative, WI weakly positive in 5% of the cells and HER-2 2+ by immunohistochemistry, but negative by FISH.  Ki-67 was 5%. She subsequently underwent a biopsy of a palpable lymph node on 02/01/2019, that showed evidence of lymph node metastasis.  She was referred for evaluation for chemotherapy.  Of note, she also had a PET scan on 02/01/2019 that showed an 8 mm right lower lobe pulmonary nodule that was not FDG avid.  She started AC chemotherapy, completed four cycles, and subsequently received 4 cycles of taxol.  She had surgery on 8/21/2018 with results as above    Apparently earlier this month after a confrontation with her son in law, she developed chest pain and was diagnosed with a heart attack.  He was hospitalized at [facility]     Review of Systems    Overall she feels OK.  ECOG performance status is 1.  She denies any fever, depression, easy bruising, chills, night  sweats, weight loss, nausea, vomiting, diarrhea, diplopia, blurred vision, headache, chest pain, palpitations, shortness of breath, breast pain, extremity pain, or difficulty ambulating.  The remainder of the ten-point ROS, including general, skin, lymph, H/N, cardiorespiratory, GI, , Neuro, Endocrine, and psychiatric is negative.      Objective:    Physical Exam       She is alert, oriented to time, place, person, pleasant, well      nourished, in no acute physical distress.    She is accompanied by her .                               VITAL SIGNS:  Reviewed                                      HEENT:  Normal..  There are no nasal, oral, lip, gingival, auricular, lid,    or conjunctival lesions.  Mucosae are moist and pink, and there is no        thrush.  Pupils are equal, reactive to light and accommodation.              Extraocular muscle movements are intact.  Dentition is good.                                     NECK:  Supple without JVD, adenopathy, or thyromegaly.                       LUNGS:  Clear to auscultation without wheezing, rales, or rhonchi.           CARDIOVASCULAR:  Reveals an S1, S2, no murmurs, no rubs, no gallops.         ABDOMEN:  Soft, with minimal epigastric tenderness on palpation.  No rebound.  She has no organomegaly.  Bowel sounds are    present.                                                                     EXTREMITIES:  No cyanosis, clubbing, or edema.                          BREASTS:  She is s/p left lumpectomy with a healing incision laterally.  A left sentinel node excision scar is seen.  It has completely healed.     There are no masses in the right breast.                               LYMPHATIC:  There is no cervical, right axillary, or supraclavicular adenopathy.   SKIN:  Warm and moist, without petechiae, rashes, induration, or ecchymoses.      NEUROLOGIC:  DTRs are 0-1+ bilaterally, symmetrical, motor function is 5/5,  and cranial nerves are  within normal limits.     Assessment:       1. Primary cancer of lower outer quadrant of left female breast, s/o neoadjuvant chemotherapy, s/p lumpectomy    2. Small lung nodule seen in the right lower lobe on her original PET scan, radiologically stable.         3.   Status post recent acute MI.      Plan:        I had a long discussion with her.  I have asked her to  return in see me in 3 months.  Prior to her next visit we will obtain a noncontrast CT of the chest to ascertain that the lung nodule has not changed.   Her mammogram will be repeated in May 2021.  Her multiple questions were answered to her satisfaction.

## 2020-07-29 ENCOUNTER — OFFICE VISIT (OUTPATIENT)
Dept: PODIATRY | Facility: CLINIC | Age: 65
End: 2020-07-29
Payer: MEDICARE

## 2020-07-29 VITALS
HEIGHT: 64 IN | RESPIRATION RATE: 16 BRPM | SYSTOLIC BLOOD PRESSURE: 124 MMHG | DIASTOLIC BLOOD PRESSURE: 88 MMHG | HEART RATE: 82 BPM | WEIGHT: 112.31 LBS | BODY MASS INDEX: 19.17 KG/M2

## 2020-07-29 DIAGNOSIS — L60.0 INGROWN NAIL: Primary | ICD-10-CM

## 2020-07-29 DIAGNOSIS — L03.031 PARONYCHIA OF GREAT TOE OF RIGHT FOOT: ICD-10-CM

## 2020-07-29 DIAGNOSIS — B35.1 ONYCHOMYCOSIS DUE TO DERMATOPHYTE: ICD-10-CM

## 2020-07-29 PROCEDURE — 99203 PR OFFICE/OUTPT VISIT, NEW, LEVL III, 30-44 MIN: ICD-10-PCS | Mod: 25,HCNC,S$GLB, | Performed by: PODIATRIST

## 2020-07-29 PROCEDURE — 1159F PR MEDICATION LIST DOCUMENTED IN MEDICAL RECORD: ICD-10-PCS | Mod: HCNC,S$GLB,, | Performed by: PODIATRIST

## 2020-07-29 PROCEDURE — 99203 OFFICE O/P NEW LOW 30 MIN: CPT | Mod: 25,HCNC,S$GLB, | Performed by: PODIATRIST

## 2020-07-29 PROCEDURE — 99999 PR PBB SHADOW E&M-EST. PATIENT-LVL IV: CPT | Mod: PBBFAC,HCNC,, | Performed by: PODIATRIST

## 2020-07-29 PROCEDURE — 1125F PR PAIN SEVERITY QUANTIFIED, PAIN PRESENT: ICD-10-PCS | Mod: HCNC,S$GLB,, | Performed by: PODIATRIST

## 2020-07-29 PROCEDURE — 1101F PT FALLS ASSESS-DOCD LE1/YR: CPT | Mod: HCNC,CPTII,S$GLB, | Performed by: PODIATRIST

## 2020-07-29 PROCEDURE — 3008F BODY MASS INDEX DOCD: CPT | Mod: HCNC,CPTII,S$GLB, | Performed by: PODIATRIST

## 2020-07-29 PROCEDURE — 11730 AVULSION NAIL PLATE SIMPLE 1: CPT | Mod: HCNC,S$GLB,, | Performed by: PODIATRIST

## 2020-07-29 PROCEDURE — 99999 PR PBB SHADOW E&M-EST. PATIENT-LVL IV: ICD-10-PCS | Mod: PBBFAC,HCNC,, | Performed by: PODIATRIST

## 2020-07-29 PROCEDURE — 3008F PR BODY MASS INDEX (BMI) DOCUMENTED: ICD-10-PCS | Mod: HCNC,CPTII,S$GLB, | Performed by: PODIATRIST

## 2020-07-29 PROCEDURE — 1159F MED LIST DOCD IN RCRD: CPT | Mod: HCNC,S$GLB,, | Performed by: PODIATRIST

## 2020-07-29 PROCEDURE — 1101F PR PT FALLS ASSESS DOC 0-1 FALLS W/OUT INJ PAST YR: ICD-10-PCS | Mod: HCNC,CPTII,S$GLB, | Performed by: PODIATRIST

## 2020-07-29 PROCEDURE — 1125F AMNT PAIN NOTED PAIN PRSNT: CPT | Mod: HCNC,S$GLB,, | Performed by: PODIATRIST

## 2020-07-29 PROCEDURE — 11730 NAIL REMOVAL: ICD-10-PCS | Mod: HCNC,S$GLB,, | Performed by: PODIATRIST

## 2020-07-29 NOTE — LETTER
July 29, 2020      Salo Vera MD  [address]  [address]  [facility] [address]           [facility]  [address]  [facility] [address]  Phone: [phone]  Fax: [phone]          Patient: Radha Rivera   MR Number: [id number]   YOB: 1955   Date of Visit: 7/29/2020       Dear Dr. Salo Vera:    Thank you for referring Radha Rivera to me for evaluation. Attached you will find relevant portions of my assessment and plan of care.    If you have questions, please do not hesitate to call me. I look forward to following Radha Rivera along with you.    Sincerely,    Rosalva Orozco, ILDEFONSO    Enclosure  CC:  No Recipients    If you would like to receive this communication electronically, please contact [email or web address] or [phone] to request more information on [employer] Link access.    For providers and/or their staff who would like to refer a patient to [facility], please contact us through our one-stop-shop provider referral line, [facility], at [phone].    If you feel you have received this communication in error or would no longer like to receive these types of communications, please e-mail [email or web address]

## 2020-07-29 NOTE — PROCEDURES
Nail Removal    Date/Time: 7/29/2020 11:00 AM  Performed by: Rosalva Orozco DPM  Authorized by: Rosalva Orozco DPM     Consent Done?:  Yes (Written)    Location:  Right foot  Location detail:  Right big toe  Anesthesia:  Local infiltration  Local anesthetic: lidocaine 1% without epinephrine  Anesthetic total (ml):  5  Preparation:  Skin prepped with alcohol and skin prepped with Betadine    Amount removed:  Complete  Wedge excision of skin of nail fold: No    Nail bed sutured?: No    Nail matrix removed:  None  Removed nail replaced and anchored: No    Dressing applied:  4x4, antibiotic ointment and gauze roll  Patient tolerance:  Patient tolerated the procedure well with no immediate complications     Timeout was performed w/ pt in room.

## 2020-07-29 NOTE — PROGRESS NOTES
Subjective:      Patient ID: Radha Rivera is a 65 y.o. female.    Chief Complaint: Nail Problem (right great toe bump)      65 y.o. female presenting with right hallux pain. Points medial border for pain. Seen by PCP and referred to me. Has been dealing with pain several weeks. No injury noted. H/o of breast cancer s/p chemotherapy. Aching pain upon palpation. With her . Ambulating in regular tennis shoe.     Review of Systems   Constitution: Negative for chills, decreased appetite, fever and malaise/fatigue.   HENT: Negative for congestion, ear discharge and sore throat.    Eyes: Negative for discharge and pain.   Cardiovascular: Negative for chest pain, claudication and leg swelling.   Respiratory: Negative for cough and shortness of breath.    Skin: Positive for color change. Negative for nail changes and rash.   Musculoskeletal: Negative for arthritis, joint pain, joint swelling and muscle weakness.        R hallux pain      Gastrointestinal: Negative for bloating, abdominal pain, diarrhea, nausea and vomiting.   Genitourinary: Negative for flank pain and hematuria.   Neurological: Positive for numbness and sensory change. Negative for headaches and weakness.   Psychiatric/Behavioral: Negative for altered mental status.             Past Medical History:   Diagnosis Date    Breast cancer 01/2019    left    Mitral valve prolapse     Thyroid disease        Past Surgical History:   Procedure Laterality Date    BREAST BIOPSY Left 01/2019    BREAST LUMPECTOMY      08/21/2019    Ear drum surgery      INNER EAR SURGERY Right     INSERTION OF TUNNELED CENTRAL VENOUS CATHETER (CVC) WITH SUBCUTANEOUS PORT Right 2/8/2019    Procedure: INSERTION, PORT-A-CATH;  Surgeon: Twan Valdes MD;  Location: [facility] OR [address] [place] [address];  Service: General;  Laterality: Right;    LEFT HEART CATHETERIZATION Left 7/15/2020    Procedure: Left heart cath;  Surgeon: Jefferson Thomas III, MD;  [facility]: [facility];  Service:  Cardiology;  Laterality: Left;    MASTECTOMY, PARTIAL Left 8/21/2019    Procedure: MASTECTOMY, PARTIAL LEFT with SEED;  Surgeon: Twan Valdes MD;  Location: [address];  Service: General;  Laterality: Left;    SENTINEL LYMPH NODE BIOPSY Left 8/21/2019    Procedure: BIOPSY, LYMPH NODE, SENTINEL LEFT with SEED;  Surgeon: Twan Valdes MD;  Location: [address];  Service: General;  Laterality: Left;    tonsillectomy      TONSILLECTOMY         Family History   Problem Relation Age of Onset    Breast cancer Sister 68    Breast cancer Paternal Aunt     Cancer Father         prostate cancer    Thyroid disease Daughter     Breast cancer Maternal Aunt     Colon cancer Neg Hx     Ovarian cancer Neg Hx     Stroke Neg Hx     Diabetes Neg Hx        Social History     Socioeconomic History    Marital status:      Spouse name: Not on file    Number of children: Not on file    Years of education: Not on file    Highest education level: Not on file   Occupational History    Not on file   Social Needs    Financial resource strain: Not on file    Food insecurity     Worry: Not on file     Inability: Not on file    Transportation needs     Medical: Not on file     Non-medical: Not on file   Tobacco Use    Smoking status: Never Smoker    Smokeless tobacco: Never Used   Substance and Sexual Activity    Alcohol use: Yes     Comment: Seldom    Drug use: No    Sexual activity: Yes     Partners: Male     Birth control/protection: Post-menopausal   Lifestyle    Physical activity     Days per week: Not on file     Minutes per session: Not on file    Stress: Not on file   Relationships    Social connections     Talks on phone: Not on file     Gets together: Not on file     Attends Yazidi service: Not on file     Active member of club or organization: Not on file     Attends meetings of clubs or organizations: Not on file     Relationship status: Not on file   Other Topics Concern     "Not on file   Social History Narrative    Not on file       Current Outpatient Medications   Medication Sig Dispense Refill    aspirin 81 MG Chew Take 1 tablet (81 mg total) by mouth once daily. 30 tablet 0    ibuprofen (ADVIL,MOTRIN) 600 MG tablet Take 600 mg by mouth every 6 (six) hours as needed for Pain.      levothyroxine (SYNTHROID) 75 MCG tablet Take 1 tablet (75 mcg total) by mouth every morning. 90 tablet 1    metoprolol succinate (TOPROL-XL) 25 MG 24 hr tablet Take 0.5 tablets (12.5 mg total) by mouth once daily. 15 tablet 1    vit A/vit C/vit E/zinc/copper (OCUVITE PRESERVISION ORAL) Take 1 tablet by mouth 2 (two) times daily.      vitamin D 1000 units Tab Take 2,000 Units by mouth once daily.       esomeprazole (NEXIUM) 20 MG capsule Take 1 capsule (20 mg total) by mouth 2 (two) times daily. (Patient not taking: Reported on 7/29/2020) 30 capsule 1     No current facility-administered medications for this visit.        Review of patient's allergies indicates:   Allergen Reactions    Bactrim [sulfamethoxazole-trimethoprim] Other (See Comments)     Caused reflux and severe nausea       Vitals:    07/29/20 1102   BP: 124/88   Pulse: 82   Resp: 16   Weight: 50.9 kg (112 lb 4.8 oz)   Height: 5' 4" (1.626 m)   PainSc:   2   PainLoc: Toe       Objective:      Physical Exam  Constitutional:       General: She is not in acute distress.     Appearance: She is well-developed.   HENT:      Nose: Nose normal.   Eyes:      Conjunctiva/sclera: Conjunctivae normal.   Neck:      Musculoskeletal: Normal range of motion.   Pulmonary:      Effort: Pulmonary effort is normal.   Chest:      Chest wall: No tenderness.   Abdominal:      Tenderness: There is no abdominal tenderness.   Neurological:      Mental Status: She is alert and oriented to person, place, and time.   Psychiatric:         Behavior: Behavior normal.         Vascular: Distal DP/PT pulses palpable 2/4. CRT < 3 sec to tips of toes. No vericosities noted " to LEs. Hair growth present LE, warm to touch LE, No edema noted to LE.    Dermatologic: No open lesions, lacerations or wounds. Interdigital spaces clean, dry and intact.   R hallux: ingrowing toenail medial border hallux with mild rubor, no erythema, no drainage. No signs of infection. +thickened hallux nail with de attachment of nail plate from the nail bed.     Musculoskeletal: MMT 5/5 in DF/PF/Inv/Ev resistance with no reproduction of pain in any direction. Passive range of motion of ankle and pedal joints is painless. No calf tenderness LE, Compartments soft/compressible.     Neurological: Light touch, proprioception, and sharp/dull sensation are all intact. Protective threshold with the Beaver-Wienstein monofilament is intact. Vibratory sensation intact.         Assessment:       Encounter Diagnoses   Name Primary?    Paronychia of great toe of right foot     Onychomycosis due to dermatophyte     Ingrown nail Yes         Plan:       Radha was seen today for nail problem.    Diagnoses and all orders for this visit:    Ingrown nail    Paronychia of great toe of right foot  -     Ambulatory referral/consult to Podiatry    Onychomycosis due to dermatophyte      I counseled the patient on her conditions, their implications and medical management.    65 y.o. female with ingrown toenail R hallux medial border.    -s/p TNA R hallux. Tolerated well. See procedure note.    -recommend soaking instruction. Instruction was give. WBAT in open toe shoe vs surgical shoe  -The nature of the condition, options for management, as well as potential risks and complications were discussed in detail with patient. Patient was amenable to my recommendations and left my office fully informed and will follow up as instructed or sooner if necessary.    -Patient was advised of signs and symptoms of infection including redness, drainage, purulence, odor, streaking, fever, chills and I advised patient to seek medical attention (ER or  urgent care) if these symptoms arise.   -f/u 1 week for wound check.     Note dictated with voice recognition software, please excuse any grammatical errors.

## 2020-07-31 ENCOUNTER — OFFICE VISIT (OUTPATIENT)
Dept: CARDIOLOGY | Facility: CLINIC | Age: 65
End: 2020-07-31
Payer: MEDICARE

## 2020-07-31 VITALS
HEIGHT: 64 IN | WEIGHT: 122 LBS | SYSTOLIC BLOOD PRESSURE: 124 MMHG | DIASTOLIC BLOOD PRESSURE: 70 MMHG | HEART RATE: 66 BPM | OXYGEN SATURATION: 97 % | BODY MASS INDEX: 20.83 KG/M2

## 2020-07-31 DIAGNOSIS — I51.81 TAKOTSUBO CARDIOMYOPATHY: ICD-10-CM

## 2020-07-31 DIAGNOSIS — I21.4 NSTEMI (NON-ST ELEVATED MYOCARDIAL INFARCTION): ICD-10-CM

## 2020-07-31 DIAGNOSIS — I34.1 MITRAL VALVE PROLAPSE: ICD-10-CM

## 2020-07-31 PROCEDURE — 1126F AMNT PAIN NOTED NONE PRSNT: CPT | Mod: HCNC,S$GLB,, | Performed by: INTERNAL MEDICINE

## 2020-07-31 PROCEDURE — 1159F MED LIST DOCD IN RCRD: CPT | Mod: HCNC,S$GLB,, | Performed by: INTERNAL MEDICINE

## 2020-07-31 PROCEDURE — 1126F PR PAIN SEVERITY QUANTIFIED, NO PAIN PRESENT: ICD-10-PCS | Mod: HCNC,S$GLB,, | Performed by: INTERNAL MEDICINE

## 2020-07-31 PROCEDURE — 1101F PT FALLS ASSESS-DOCD LE1/YR: CPT | Mod: HCNC,CPTII,S$GLB, | Performed by: INTERNAL MEDICINE

## 2020-07-31 PROCEDURE — 99213 PR OFFICE/OUTPT VISIT, EST, LEVL III, 20-29 MIN: ICD-10-PCS | Mod: HCNC,S$GLB,, | Performed by: INTERNAL MEDICINE

## 2020-07-31 PROCEDURE — 99999 PR PBB SHADOW E&M-EST. PATIENT-LVL III: ICD-10-PCS | Mod: PBBFAC,HCNC,, | Performed by: INTERNAL MEDICINE

## 2020-07-31 PROCEDURE — 1101F PR PT FALLS ASSESS DOC 0-1 FALLS W/OUT INJ PAST YR: ICD-10-PCS | Mod: HCNC,CPTII,S$GLB, | Performed by: INTERNAL MEDICINE

## 2020-07-31 PROCEDURE — 1159F PR MEDICATION LIST DOCUMENTED IN MEDICAL RECORD: ICD-10-PCS | Mod: HCNC,S$GLB,, | Performed by: INTERNAL MEDICINE

## 2020-07-31 PROCEDURE — 99999 PR PBB SHADOW E&M-EST. PATIENT-LVL III: CPT | Mod: PBBFAC,HCNC,, | Performed by: INTERNAL MEDICINE

## 2020-07-31 PROCEDURE — 3008F PR BODY MASS INDEX (BMI) DOCUMENTED: ICD-10-PCS | Mod: HCNC,CPTII,S$GLB, | Performed by: INTERNAL MEDICINE

## 2020-07-31 PROCEDURE — 99213 OFFICE O/P EST LOW 20 MIN: CPT | Mod: HCNC,S$GLB,, | Performed by: INTERNAL MEDICINE

## 2020-07-31 PROCEDURE — 3008F BODY MASS INDEX DOCD: CPT | Mod: HCNC,CPTII,S$GLB, | Performed by: INTERNAL MEDICINE

## 2020-07-31 NOTE — PROGRESS NOTES
Subjective:    Patient ID:  Radha Rivera is a 65 y.o. female who presents for follow-up of Hospital Follow Up (NSTEMI 7/13/20)      PCP: Salo Vera MD     HPI  Pt is a 66 yo F w/ PMH of MVP and Stress Induced Cardiomyopathy who presents for s/p hospitalization f/u.  She was admitted 7/13/2020-7/15/2020 for biomarker positive chest pain which was noted to be Takotsubo w/ LVEF of 35% on LHC which noted normal coronaries.  She mentions that she has been doing well since her d/c.  She notes some episodes of postural presyncope which is short lived and has been following her BP some but not consistently.  She denies cp, sob, edema, orthopnea, PND, palpitations, LOC, and claudication.  She notes compliance w/ meds and denies side effects.  She notes that when she checks her BP at home it has been running 120s/60s.  She does not exercise, however is active w/ housework and has been tolerating it.        Past Medical History:   Diagnosis Date    Breast cancer 01/2019    left    Mitral valve prolapse     Thyroid disease      Past Surgical History:   Procedure Laterality Date    BREAST BIOPSY Left 01/2019    BREAST LUMPECTOMY      08/21/2019    Ear drum surgery      INNER EAR SURGERY Right     INSERTION OF TUNNELED CENTRAL VENOUS CATHETER (CVC) WITH SUBCUTANEOUS PORT Right 2/8/2019    Procedure: INSERTION, PORT-A-CATH;  Surgeon: Twan Valdes MD;  Location: [facility] OR [address] [place] [address];  Service: General;  Laterality: Right;    LEFT HEART CATHETERIZATION Left 7/15/2020    Procedure: Left heart cath;  Surgeon: Jefferson Thomas III, MD;  Location: [facility] CATH LAB;  Service: Cardiology;  Laterality: Left;    MASTECTOMY, PARTIAL Left 8/21/2019    Procedure: MASTECTOMY, PARTIAL LEFT with SEED;  Surgeon: Twan Valdes MD;  Location: [facility] OR [address] [place] [address];  Service: General;  Laterality: Left;    SENTINEL LYMPH NODE BIOPSY Left 8/21/2019    Procedure: BIOPSY, LYMPH NODE, SENTINEL LEFT with SEED;  Surgeon: Twan PRICE  MD Lucio;  Location: [facility] OR [address] [place] [address];  Service: General;  Laterality: Left;    tonsillectomy      TONSILLECTOMY       Social History     Socioeconomic History    Marital status:      Spouse name: Not on file    Number of children: Not on file    Years of education: Not on file    Highest education level: Not on file   Occupational History    Not on file   Social Needs    Financial resource strain: Not on file    Food insecurity     Worry: Not on file     Inability: Not on file    Transportation needs     Medical: Not on file     Non-medical: Not on file   Tobacco Use    Smoking status: Never Smoker    Smokeless tobacco: Never Used   Substance and Sexual Activity    Alcohol use: Yes     Comment: Seldom    Drug use: No    Sexual activity: Yes     Partners: Male     Birth control/protection: Post-menopausal   Lifestyle    Physical activity     Days per week: Not on file     Minutes per session: Not on file    Stress: Not on file   Relationships    Social connections     Talks on phone: Not on file     Gets together: Not on file     Attends [employer] service: Not on file     Active member of club or organization: Not on file     Attends meetings of clubs or organizations: Not on file     Relationship status: Not on file   Other Topics Concern    Not on file   Social History Narrative    Not on file     Family History   Problem Relation Age of Onset    Breast cancer Sister 68    Breast cancer Paternal Aunt     Cancer Father         prostate cancer    Thyroid disease Daughter     Breast cancer Maternal Aunt     Colon cancer Neg Hx     Ovarian cancer Neg Hx     Stroke Neg Hx     Diabetes Neg Hx        Review of patient's allergies indicates:   Allergen Reactions    Bactrim [sulfamethoxazole-trimethoprim] Other (See Comments)     Caused reflux and severe nausea       Medication List with Changes/Refills   Current Medications    ASPIRIN 81 MG CHEW    Take 1 tablet (81 mg total) by  "mouth once daily.    ESOMEPRAZOLE (NEXIUM) 20 MG CAPSULE    Take 1 capsule (20 mg total) by mouth 2 (two) times daily.    LEVOTHYROXINE (SYNTHROID) 75 MCG TABLET    Take 1 tablet (75 mcg total) by mouth every morning.    METOPROLOL SUCCINATE (TOPROL-XL) 25 MG 24 HR TABLET    Take 0.5 tablets (12.5 mg total) by mouth once daily.    VIT A/VIT C/VIT E/ZINC/COPPER (OCUVITE PRESERVISION ORAL)    Take 1 tablet by mouth 2 (two) times daily.    VITAMIN D 1000 UNITS TAB    Take 2,000 Units by mouth once daily.    Discontinued Medications    IBUPROFEN (ADVIL,MOTRIN) 600 MG TABLET    Take 600 mg by mouth every 6 (six) hours as needed for Pain.       Review of Systems   Constitution: Negative for diaphoresis and fever.   HENT: Negative for congestion and hearing loss.    Eyes: Negative for blurred vision and pain.   Cardiovascular: Positive for near-syncope. Negative for chest pain, claudication, dyspnea on exertion, leg swelling, palpitations and syncope.   Respiratory: Negative for shortness of breath and sleep disturbances due to breathing.    Hematologic/Lymphatic: Negative for bleeding problem. Does not bruise/bleed easily.   Skin: Negative for color change and poor wound healing.   Gastrointestinal: Negative for abdominal pain and nausea.   Genitourinary: Negative for bladder incontinence and flank pain.   Neurological: Negative for focal weakness and light-headedness.        Objective:   /70 (BP Location: Right arm, Patient Position: Sitting, BP Method: Medium (Manual))   Pulse 66   Ht 5' 4" (1.626 m)   Wt 55.3 kg (122 lb)   SpO2 97%   BMI 20.94 kg/m²    Physical Exam   Constitutional: She is oriented to person, place, and time. She appears well-developed and well-nourished.   HENT:   Head: Normocephalic and atraumatic.   Mouth/Throat: Oropharynx is clear and moist.   Eyes: Pupils are equal, round, and reactive to light. EOM are normal. No scleral icterus.   Neck: Normal range of motion. Neck supple. No JVD " present.   Cardiovascular: Normal rate, regular rhythm, S1 normal, S2 normal and intact distal pulses. Exam reveals no gallop and no friction rub.   No murmur heard.  Pulmonary/Chest: Effort normal and breath sounds normal. No respiratory distress. She has no wheezes. She has no rales. She exhibits no tenderness.   Abdominal: Soft. Bowel sounds are normal. She exhibits no distension and no mass. There is no abdominal tenderness. There is no rebound.   Musculoskeletal: Normal range of motion.         General: No tenderness or edema.   Neurological: She is alert and oriented to person, place, and time. She displays normal reflexes. Coordination normal.   Skin: Skin is warm and dry. She is not diaphoretic. No pallor.   Psychiatric: She has a normal mood and affect. Her behavior is normal. Judgment normal.         Assessment:       1. Takotsubo cardiomyopathy    2. Mitral valve prolapse         Plan:         Stress-induced cardiomyopathy  Compensated.  Stage B, Class I.  Pt compliant w/ metoprolol. Pt was noted to have episodes of hypotension while in the hospital and notes some episodes of presyncope currently.  LVEF 35%.     - continue current therapy  - f/u in 3 mo w/ echo to eval for recovery of LVEF     Mitral valve prolapse  No MR noted on recent echo.     - continue to monitor clinically      Total duration of face to face visit time 30 minutes.  Total time spent counseling greater than fifty percent of total visit time.  Counseling included discussion regarding imaging findings, diagnosis, possibilities, treatment options, risks and benefits.  The patient had many questions regarding the options and long-term effects      Jefferson Thomas M.D.  Interventional Cardiology

## 2020-07-31 NOTE — ASSESSMENT & PLAN NOTE
Compensated.  Stage B, Class I.  Pt compliant w/ metoprolol. Pt was noted to have episodes of hypotension while in the hospital and notes some episodes of presyncope currently.  LVEF 35%.     - continue current therapy  - f/u in 3 mo w/ echo to eval for recovery of LVEF

## 2020-08-05 ENCOUNTER — OFFICE VISIT (OUTPATIENT)
Dept: PODIATRY | Facility: CLINIC | Age: 65
End: 2020-08-05
Payer: MEDICARE

## 2020-08-05 VITALS
BODY MASS INDEX: 19.21 KG/M2 | RESPIRATION RATE: 16 BRPM | SYSTOLIC BLOOD PRESSURE: 110 MMHG | HEIGHT: 64 IN | HEART RATE: 70 BPM | WEIGHT: 112.5 LBS | DIASTOLIC BLOOD PRESSURE: 78 MMHG

## 2020-08-05 DIAGNOSIS — Z09 FOLLOW-UP EXAMINATION FOLLOWING SURGERY: Primary | ICD-10-CM

## 2020-08-05 PROCEDURE — 99999 PR PBB SHADOW E&M-EST. PATIENT-LVL III: ICD-10-PCS | Mod: PBBFAC,HCNC,, | Performed by: PODIATRIST

## 2020-08-05 PROCEDURE — 99999 PR PBB SHADOW E&M-EST. PATIENT-LVL III: CPT | Mod: PBBFAC,HCNC,, | Performed by: PODIATRIST

## 2020-08-05 PROCEDURE — 99024 POSTOP FOLLOW-UP VISIT: CPT | Mod: HCNC,S$GLB,, | Performed by: PODIATRIST

## 2020-08-05 PROCEDURE — 99024 PR POST-OP FOLLOW-UP VISIT: ICD-10-PCS | Mod: HCNC,S$GLB,, | Performed by: PODIATRIST

## 2020-08-05 NOTE — PROGRESS NOTES
Subjective:      Patient ID: Radha Rivera is a 65 y.o. female.    Chief Complaint: Follow-up (ingrown nail right great toe)      65 y.o. female presenting with right hallux pain. Points medial border for pain. Seen by PCP and referred to me. Has been dealing with pain several weeks. No injury noted. H/o of breast cancer s/p chemotherapy. Aching pain upon palpation. With her . Ambulating in regular tennis shoe.    8/5/20 status post right hallux total nail avulsion.  With her .  Ambulating in open toe shoes.  Denies pain.  Has been compliant with dressing and soaking instruction.       Review of Systems   Constitution: Negative for chills, decreased appetite, fever and malaise/fatigue.   HENT: Negative for congestion, ear discharge and sore throat.    Eyes: Negative for discharge and pain.   Cardiovascular: Negative for chest pain, claudication and leg swelling.   Respiratory: Negative for cough and shortness of breath.    Skin: Negative for color change, nail changes and rash.   Musculoskeletal: Negative for arthritis, joint pain, joint swelling and muscle weakness.             Gastrointestinal: Negative for bloating, abdominal pain, diarrhea, nausea and vomiting.   Genitourinary: Negative for flank pain and hematuria.   Neurological: Positive for numbness and sensory change. Negative for headaches and weakness.   Psychiatric/Behavioral: Negative for altered mental status.             Past Medical History:   Diagnosis Date    Breast cancer 01/2019    left    Mitral valve prolapse     Thyroid disease        Past Surgical History:   Procedure Laterality Date    BREAST BIOPSY Left 01/2019    BREAST LUMPECTOMY      08/21/2019    Ear drum surgery      INNER EAR SURGERY Right     INSERTION OF TUNNELED CENTRAL VENOUS CATHETER (CVC) WITH SUBCUTANEOUS PORT Right 2/8/2019    Procedure: INSERTION, PORT-A-CATH;  Surgeon: Twan Valdes MD;  Location: [facility] OR [address] [place] [address];  Service: General;   Laterality: Right;    LEFT HEART CATHETERIZATION Left 7/15/2020    Procedure: Left heart cath;  Surgeon: Jefferson Thomas III, MD;  Location: [facility] CATH LAB;  Service: Cardiology;  Laterality: Left;    MASTECTOMY, PARTIAL Left 8/21/2019    Procedure: MASTECTOMY, PARTIAL LEFT with SEED;  Surgeon: Twan Valdes MD;  Location: [address];  Service: General;  Laterality: Left;    SENTINEL LYMPH NODE BIOPSY Left 8/21/2019    Procedure: BIOPSY, LYMPH NODE, SENTINEL LEFT with SEED;  Surgeon: Twan Valdes MD;  Location: [facility] OR [address] [place] [address];  Service: General;  Laterality: Left;    tonsillectomy      TONSILLECTOMY         Family History   Problem Relation Age of Onset    Breast cancer Sister 68    Breast cancer Paternal Aunt     Cancer Father         prostate cancer    Thyroid disease Daughter     Breast cancer Maternal Aunt     Colon cancer Neg Hx     Ovarian cancer Neg Hx     Stroke Neg Hx     Diabetes Neg Hx        Social History     Socioeconomic History    Marital status:      Spouse name: Not on file    Number of children: Not on file    Years of education: Not on file    Highest education level: Not on file   Occupational History    Not on file   Social Needs    Financial resource strain: Not on file    Food insecurity     Worry: Not on file     Inability: Not on file    Transportation needs     Medical: Not on file     Non-medical: Not on file   Tobacco Use    Smoking status: Never Smoker    Smokeless tobacco: Never Used   Substance and Sexual Activity    Alcohol use: Yes     Comment: Seldom    Drug use: No    Sexual activity: Yes     Partners: Male     Birth control/protection: Post-menopausal   Lifestyle    Physical activity     Days per week: Not on file     Minutes per session: Not on file    Stress: Not on file   Relationships    Social connections     Talks on phone: Not on file     Gets together: Not on file     Attends Yarsanism service: Not on file     Active  "member of club or organization: Not on file     Attends meetings of clubs or organizations: Not on file     Relationship status: Not on file   Other Topics Concern    Not on file   Social History Narrative    Not on file       Current Outpatient Medications   Medication Sig Dispense Refill    aspirin 81 MG Chew Take 1 tablet (81 mg total) by mouth once daily. 30 tablet 0    levothyroxine (SYNTHROID) 75 MCG tablet Take 1 tablet (75 mcg total) by mouth every morning. 90 tablet 1    metoprolol succinate (TOPROL-XL) 25 MG 24 hr tablet Take 0.5 tablets (12.5 mg total) by mouth once daily. 15 tablet 1    vit A/vit C/vit E/zinc/copper (OCUVITE PRESERVISION ORAL) Take 1 tablet by mouth 2 (two) times daily.      vitamin D 1000 units Tab Take 2,000 Units by mouth once daily.       esomeprazole (NEXIUM) 20 MG capsule Take 1 capsule (20 mg total) by mouth 2 (two) times daily. (Patient not taking: Reported on 7/29/2020) 30 capsule 1     No current facility-administered medications for this visit.        Review of patient's allergies indicates:   Allergen Reactions    Bactrim [sulfamethoxazole-trimethoprim] Other (See Comments)     Caused reflux and severe nausea       Vitals:    08/05/20 1337   BP: 110/78   Pulse: 70   Resp: 16   Weight: 51 kg (112 lb 8 oz)   Height: 5' 4" (1.626 m)   PainSc: 0-No pain   PainLoc: Foot       Objective:      Physical Exam  Constitutional:       General: She is not in acute distress.     Appearance: She is well-developed.   HENT:      Nose: Nose normal.   Eyes:      Conjunctiva/sclera: Conjunctivae normal.   Neck:      Musculoskeletal: Normal range of motion.   Pulmonary:      Effort: Pulmonary effort is normal.   Chest:      Chest wall: No tenderness.   Abdominal:      Tenderness: There is no abdominal tenderness.   Neurological:      Mental Status: She is alert and oriented to person, place, and time.   Psychiatric:         Behavior: Behavior normal.         Vascular: Distal DP/PT pulses " palpable 2/4. CRT < 3 sec to tips of toes. No vericosities noted to LEs. Hair growth present LE, warm to touch LE, No edema noted to LE.    Dermatologic: No open lesions, lacerations or wounds. Interdigital spaces clean, dry and intact.   R hallux:  Status post total nail avulsion.  Nail bed intact.  No erythema, no rubor.  No signs of infection.    Musculoskeletal: MMT 5/5 in DF/PF/Inv/Ev resistance with no reproduction of pain in any direction. Passive range of motion of ankle and pedal joints is painless. No calf tenderness LE, Compartments soft/compressible.     Neurological: Light touch, proprioception, and sharp/dull sensation are all intact. Protective threshold with the Hockessin-Wienstein monofilament is intact. Vibratory sensation intact.         Assessment:       Encounter Diagnosis   Name Primary?    Follow-up examination following surgery Yes         Plan:       Radha was seen today for follow-up.    Diagnoses and all orders for this visit:    Follow-up examination following surgery      I counseled the patient on her conditions, their implications and medical management.    65 y.o. female with ingrown toenail R hallux medial border status post total nail avulsion.     -healed well.  Okay to transition to normal weight-bearing.     -The nature of the condition, options for management, as well as potential risks and complications were discussed in detail with patient. Patient was amenable to my recommendations and left my office fully informed and will follow up as instructed or sooner if necessary.    -Patient was advised of signs and symptoms of infection including redness, drainage, purulence, odor, streaking, fever, chills and I advised patient to seek medical attention (ER or urgent care) if these symptoms arise.   -f/u as needed.     Note dictated with voice recognition software, please excuse any grammatical errors.

## 2020-08-20 RX ORDER — LEVOTHYROXINE SODIUM 75 UG/1
TABLET ORAL
Qty: 90 TABLET | Refills: 3 | Status: SHIPPED | OUTPATIENT
Start: 2020-08-20 | End: 2021-08-16

## 2020-08-21 NOTE — TELEPHONE ENCOUNTER
----- Message from Cally Victor sent at 8/21/2020 12:49 PM CDT -----  Regarding: Refills  Contact: [phone]  Patient is calling to get refills on her medication sent to [id number] [address] [facility] [address] [phone] (Phone) [phone] (Fax)    1. EUTHYROX 75 mcg tablet 90 tablet

## 2020-09-08 RX ORDER — METOPROLOL SUCCINATE 25 MG/1
12.5 TABLET, EXTENDED RELEASE ORAL DAILY
Qty: 30 TABLET | Refills: 4 | Status: SHIPPED | OUTPATIENT
Start: 2020-09-08 | End: 2020-10-30 | Stop reason: ALTCHOICE

## 2020-09-08 NOTE — TELEPHONE ENCOUNTER
----- Message from Jeaneth Swartz sent at 9/8/2020  8:54 AM CDT -----  Regarding: RX Refill Request  Contact: [phone]/self  Type:  RX Refill Request    Who Called:  self  Refill or New Rx: refill  RX Name and Strength: metoprolol succinate (TOPROL-XL) 25 MG 24 hr tablet  How is the patient currently taking it? (ex. 1XDay): Take 0.5 tablets (12.5 mg total) by mouth once daily. - Oral  Is this a 30 day or 90 day RX: 15 tablets/1 refill  Preferred Pharmacy with phone number: [phone] - [place], [place] - [address]  Local or Mail Order: l  Ordering Provider: GARY Henderson  Would the patient rather a call back or a response via [facility]?  call  Best Call Back Number: [phone]/self  Additional Information:

## 2020-09-13 ENCOUNTER — PATIENT OUTREACH (OUTPATIENT)
Dept: ADMINISTRATIVE | Facility: HOSPITAL | Age: 65
End: 2020-09-13

## 2020-09-13 NOTE — PROGRESS NOTES
Ernesto reviewed. Care Everywhere reviewed.   Chart scrubbed for Colon Cancer Screening.     SOWMYA

## 2020-10-07 ENCOUNTER — TELEPHONE (OUTPATIENT)
Dept: ADMINISTRATIVE | Facility: HOSPITAL | Age: 65
End: 2020-10-07

## 2020-10-07 ENCOUNTER — PATIENT OUTREACH (OUTPATIENT)
Dept: ADMINISTRATIVE | Facility: HOSPITAL | Age: 65
End: 2020-10-07

## 2020-10-21 ENCOUNTER — TELEPHONE (OUTPATIENT)
Dept: ADMINISTRATIVE | Facility: HOSPITAL | Age: 65
End: 2020-10-21

## 2020-10-21 ENCOUNTER — OFFICE VISIT (OUTPATIENT)
Dept: FAMILY MEDICINE | Facility: CLINIC | Age: 65
End: 2020-10-21
Payer: MEDICARE

## 2020-10-21 VITALS
WEIGHT: 114.19 LBS | OXYGEN SATURATION: 98 % | BODY MASS INDEX: 19.5 KG/M2 | HEIGHT: 64 IN | DIASTOLIC BLOOD PRESSURE: 70 MMHG | TEMPERATURE: 98 F | SYSTOLIC BLOOD PRESSURE: 120 MMHG | HEART RATE: 84 BPM

## 2020-10-21 DIAGNOSIS — I51.81 STRESS-INDUCED CARDIOMYOPATHY: Primary | ICD-10-CM

## 2020-10-21 DIAGNOSIS — C50.919 INFILTRATING DUCTAL CARCINOMA OF FEMALE BREAST, UNSPECIFIED LATERALITY: ICD-10-CM

## 2020-10-21 PROCEDURE — 99499 UNLISTED E&M SERVICE: CPT | Mod: S$GLB,,, | Performed by: INTERNAL MEDICINE

## 2020-10-21 PROCEDURE — 3008F BODY MASS INDEX DOCD: CPT | Mod: HCNC,CPTII,S$GLB, | Performed by: INTERNAL MEDICINE

## 2020-10-21 PROCEDURE — 1126F AMNT PAIN NOTED NONE PRSNT: CPT | Mod: HCNC,S$GLB,, | Performed by: INTERNAL MEDICINE

## 2020-10-21 PROCEDURE — 99999 PR PBB SHADOW E&M-EST. PATIENT-LVL III: CPT | Mod: PBBFAC,HCNC,, | Performed by: INTERNAL MEDICINE

## 2020-10-21 PROCEDURE — 99213 PR OFFICE/OUTPT VISIT, EST, LEVL III, 20-29 MIN: ICD-10-PCS | Mod: HCNC,S$GLB,, | Performed by: INTERNAL MEDICINE

## 2020-10-21 PROCEDURE — 1159F PR MEDICATION LIST DOCUMENTED IN MEDICAL RECORD: ICD-10-PCS | Mod: HCNC,S$GLB,, | Performed by: INTERNAL MEDICINE

## 2020-10-21 PROCEDURE — 1126F PR PAIN SEVERITY QUANTIFIED, NO PAIN PRESENT: ICD-10-PCS | Mod: HCNC,S$GLB,, | Performed by: INTERNAL MEDICINE

## 2020-10-21 PROCEDURE — 99499 RISK ADDL DX/OHS AUDIT: ICD-10-PCS | Mod: S$GLB,,, | Performed by: INTERNAL MEDICINE

## 2020-10-21 PROCEDURE — 99999 PR PBB SHADOW E&M-EST. PATIENT-LVL III: ICD-10-PCS | Mod: PBBFAC,HCNC,, | Performed by: INTERNAL MEDICINE

## 2020-10-21 PROCEDURE — 3008F PR BODY MASS INDEX (BMI) DOCUMENTED: ICD-10-PCS | Mod: HCNC,CPTII,S$GLB, | Performed by: INTERNAL MEDICINE

## 2020-10-21 PROCEDURE — 1159F MED LIST DOCD IN RCRD: CPT | Mod: HCNC,S$GLB,, | Performed by: INTERNAL MEDICINE

## 2020-10-21 PROCEDURE — 1101F PR PT FALLS ASSESS DOC 0-1 FALLS W/OUT INJ PAST YR: ICD-10-PCS | Mod: HCNC,CPTII,S$GLB, | Performed by: INTERNAL MEDICINE

## 2020-10-21 PROCEDURE — 1101F PT FALLS ASSESS-DOCD LE1/YR: CPT | Mod: HCNC,CPTII,S$GLB, | Performed by: INTERNAL MEDICINE

## 2020-10-21 PROCEDURE — 99213 OFFICE O/P EST LOW 20 MIN: CPT | Mod: HCNC,S$GLB,, | Performed by: INTERNAL MEDICINE

## 2020-10-21 NOTE — PROGRESS NOTES
[facility] Note    Chief Complaint      Chief Complaint   Patient presents with    Follow-up     3 MONTH F/U       History of Present Illness      Radha Rivera is a 65 y.o. female with chronic conditions of breast cancer, HLD, hypothyroidism, osteopenia who presents today for: follow up takatsubo's cardiomyopathy.  Denies chest pain, shortness of breath with exertion.  Does have chest pain with anxiety occasionally.  Postural light headedness is less frequent.  Has appt to see Dr. Thomas soon after getting echocardiogram.    Also has CT scan for Dr. Paula   Flu shot declines.  TdAP 2016.    Mammogram 3/2020.  Cscope with Dr. Delcid.  Not sure date.  Will request record.    Past Medical History:  Past Medical History:   Diagnosis Date    Breast cancer 01/2019    left    Mitral valve prolapse     Thyroid disease        Past Surgical History:   has a past surgical history that includes tonsillectomy; Ear drum surgery; Tonsillectomy; Inner ear surgery (Right); Breast biopsy (Left, 01/2019); Insertion of tunneled central venous catheter (CVC) with subcutaneous port (Right, 2/8/2019); Mastectomy, partial (Left, 8/21/2019); Lamar lymph node biopsy (Left, 8/21/2019); Breast lumpectomy; and Left heart catheterization (Left, 7/15/2020).    Family History:  family history includes Breast cancer in her maternal aunt and paternal aunt; Breast cancer (age of onset: 68) in her sister; Cancer in her father; Thyroid disease in her daughter.     Social History:  Social History     Tobacco Use    Smoking status: Never Smoker    Smokeless tobacco: Never Used   Substance Use Topics    Alcohol use: Yes     Comment: Seldom    Drug use: No       I personally reviewed all past medical, surgical, social and family history.    Review of Systems   Constitutional: Negative for chills, fever and malaise/fatigue.   Respiratory: Negative for shortness of breath.    Cardiovascular: Negative for chest pain.  "  Gastrointestinal: Negative for constipation, diarrhea, nausea and vomiting.   Skin: Negative for rash.   Neurological: Negative for weakness.   All other systems reviewed and are negative.       Medications:  Outpatient Encounter Medications as of 10/21/2020   Medication Sig Dispense Refill    EUTHYROX 75 mcg tablet TAKE 1 TABLET BY MOUTH IN THE MORNING 90 tablet 3    metoprolol succinate (TOPROL-XL) 25 MG 24 hr tablet Take 0.5 tablets (12.5 mg total) by mouth once daily. 30 tablet 4    vit A/vit C/vit E/zinc/copper (OCUVITE PRESERVISION ORAL) Take 1 tablet by mouth 2 (two) times daily.      vitamin D 1000 units Tab Take 2,000 Units by mouth once daily.       aspirin 81 MG Chew Take 1 tablet (81 mg total) by mouth once daily. 30 tablet 0    esomeprazole (NEXIUM) 20 MG capsule Take 1 capsule (20 mg total) by mouth 2 (two) times daily. (Patient not taking: Reported on 7/29/2020) 30 capsule 1     No facility-administered encounter medications on file as of 10/21/2020.        Allergies:  Review of patient's allergies indicates:   Allergen Reactions    Bactrim [sulfamethoxazole-trimethoprim] Other (See Comments)     Caused reflux and severe nausea       Health Maintenance:  Immunization History   Administered Date(s) Administered    Tdap 10/17/2010, 07/22/2016      Health Maintenance   Topic Date Due    Hepatitis C Screening  1955    High Dose Statin  06/30/1974    DEXA SCAN  06/30/1993    Pneumococcal Vaccine (65+ High/Highest Risk) (1 of 2 - PCV13) 06/30/2018    Mammogram  03/05/2022    Lipid Panel  07/15/2025    TETANUS VACCINE  07/22/2026        Physical Exam      Vital Signs  Temp: 98.2 °F (36.8 °C)  Temp src: Oral  Pulse: 84  SpO2: 98 %  BP: 120/70  BP Location: Right arm  Patient Position: Sitting  Pain Score: 0-No pain  Height and Weight  Height: 5' 4" (162.6 cm)  Weight: 51.8 kg (114 lb 3.2 oz)  BSA (Calculated - sq m): 1.53 sq meters  BMI (Calculated): 19.6  Weight in (lb) to have BMI = " 25: 145.3]    Physical Exam  Vitals signs reviewed.   Constitutional:       Appearance: She is well-developed.   HENT:      Head: Normocephalic and atraumatic.      Right Ear: External ear normal.      Left Ear: External ear normal.   Eyes:      Conjunctiva/sclera: Conjunctivae normal.      Pupils: Pupils are equal, round, and reactive to light.   Neck:      Vascular: No carotid bruit.   Cardiovascular:      Rate and Rhythm: Normal rate and regular rhythm.      Heart sounds: Normal heart sounds. No murmur.   Pulmonary:      Effort: Pulmonary effort is normal.      Breath sounds: Normal breath sounds. No wheezing or rales.   Abdominal:      General: Bowel sounds are normal. There is no distension.      Palpations: Abdomen is soft.      Tenderness: There is no abdominal tenderness.          Laboratory:  CBC:  Recent Labs   Lab 07/13/20 2152 07/14/20  0500 07/15/20  0611   WBC 6.32 5.31 4.42   RBC 4.29 3.74 L 4.33   Hemoglobin 12.9 11.1 L 13.0   Hematocrit 41.2 35.7 L 41.3   Platelets 192 163 184   Mean Corpuscular Volume 96 96 95   Mean Corpuscular Hemoglobin 30.1 29.7 30.0   Mean Corpuscular Hemoglobin Conc 31.3 L 31.1 L 31.5 L     CMP:  Recent Labs   Lab 07/13/20 2152 07/14/20  0500 07/15/20  0611   Glucose 136 H 101 104   Calcium 9.9 9.1 9.2   Albumin 5.2 3.9 4.1   Total Protein 8.2 6.3 6.7   Sodium 142 143 144   Potassium 3.8 3.9 4.1   CO2 28 24 26   Chloride 102 109 111 H   BUN, Bld 25 H 18 H 14   Alkaline Phosphatase 76 60 66   ALT 31 25 28   AST 35 36 35   Total Bilirubin 0.2 0.3 0.5     URINALYSIS:  Recent Labs   Lab 04/05/19  1846   Color, UA Red A   Specific Gravity, UA 1.025   pH, UA 6.0   Protein, UA Negative   Nitrite, UA Negative   Leukocytes, UA Negative   Urobilinogen, UA Negative      LIPIDS:  Recent Labs   Lab 10/10/19  0811 07/14/20  0600 07/15/20  0611 07/21/20  1517   TSH 0.50 0.405  --  0.629   HDL 55  --  59  --    Cholesterol 214 H  --  191  --    Triglycerides 94  --  77  --    LDL  Cholesterol 139 H  --  116.6  --    Hdl/Cholesterol Ratio 3.9  --  30.9  --    Non-HDL Cholesterol  --   --  132  --    Non HDL Chol. (LDL+VLDL) 159 H  --   --   --    Total Cholesterol/HDL Ratio  --   --  3.2  --      TSH:  Recent Labs   Lab 10/10/19  0811 07/14/20  0600 07/21/20  1517   TSH 0.50 0.405 0.629     A1C:  Recent Labs   Lab 10/10/19  0811 07/14/20  0600   Hemoglobin A1C 5.7 H 5.7 H       Assessment/Plan     Radha Rivera is a 65 y.o.female with:    1. Stress-induced cardiomyopathy  Continue current meds.  F/U with Dr. Thomas.  2. Infiltrating ductal carcinoma of female breast, unspecified laterality  Continue current meds.  F/U with DR. Paula    Chronic conditions status updated as per HPI.  Other than changes above, cont current medications and maintain follow up with specialists.  Return to clinic in 6 months.    Salo Vera MD  [facility]

## 2020-10-23 LAB
AV INDEX (PROSTH): 0.67
AV MEAN GRADIENT: 3 MMHG
AV PEAK GRADIENT: 5 MMHG
AV VALVE AREA: 2.17 CM2
AV VELOCITY RATIO: 0.77
BSA FOR ECHO PROCEDURE: 1.53 M2
CV ECHO LV RWT: 0.35 CM
DOP CALC AO PEAK VEL: 1.15 M/S
DOP CALC AO VTI: 28.27 CM
DOP CALC LVOT AREA: 3.3 CM2
DOP CALC LVOT DIAMETER: 2.04 CM
DOP CALC LVOT PEAK VEL: 0.89 M/S
DOP CALC LVOT STROKE VOLUME: 61.48 CM3
DOP CALCLVOT PEAK VEL VTI: 18.82 CM
E WAVE DECELERATION TIME: 243.66 MSEC
E/A RATIO: 1.24
ECHO LV POSTERIOR WALL: 0.59 CM (ref 0.6–1.1)
FRACTIONAL SHORTENING: 39 % (ref 28–44)
INTERVENTRICULAR SEPTUM: 0.62 CM (ref 0.6–1.1)
LA MAJOR: 3.95 CM
LA MINOR: 4.76 CM
LA WIDTH: 3.15 CM
LEFT INTERNAL DIMENSION IN SYSTOLE: 2.04 CM (ref 2.1–4)
LEFT VENTRICLE DIASTOLIC VOLUME INDEX: 29.64 ML/M2
LEFT VENTRICLE DIASTOLIC VOLUME: 45.65 ML
LEFT VENTRICLE MASS INDEX: 31 G/M2
LEFT VENTRICLE SYSTOLIC VOLUME INDEX: 8.7 ML/M2
LEFT VENTRICLE SYSTOLIC VOLUME: 13.4 ML
LEFT VENTRICULAR INTERNAL DIMENSION IN DIASTOLE: 3.35 CM (ref 3.5–6)
LEFT VENTRICULAR MASS: 48.21 G
MV PEAK A VEL: 0.63 M/S
MV PEAK E VEL: 0.78 M/S
MV STENOSIS PRESSURE HALF TIME: 70.66 MS
MV VALVE AREA P 1/2 METHOD: 3.11 CM2
PISA MRMAX VEL: 0.07 M/S
PISA TR MAX VEL: 2.35 M/S
PULM VEIN S/D RATIO: 1.21
PV PEAK D VEL: 0.39 M/S
PV PEAK S VEL: 0.47 M/S
RA MAJOR: 3.67 CM
RA PRESSURE: 3 MMHG
STJ: 2.56 CM
TR MAX PG: 22 MMHG
TRICUSPID ANNULAR PLANE SYSTOLIC EXCURSION: 2.5 CM
TV REST PULMONARY ARTERY PRESSURE: 25 MMHG

## 2020-10-26 ENCOUNTER — HOSPITAL ENCOUNTER (OUTPATIENT)
Dept: RADIOLOGY | Facility: HOSPITAL | Age: 65
Discharge: HOME | End: 2020-10-26
Attending: INTERNAL MEDICINE
Payer: MEDICARE

## 2020-10-26 DIAGNOSIS — R91.1 LUNG NODULE: ICD-10-CM

## 2020-10-26 PROCEDURE — 71250 CT THORAX DX C-: CPT | Mod: TC,HCNC

## 2020-10-26 PROCEDURE — 71250 CT CHEST WITHOUT CONTRAST: ICD-10-PCS | Mod: 26,HCNC,, | Performed by: RADIOLOGY

## 2020-10-26 PROCEDURE — 71250 CT THORAX DX C-: CPT | Mod: 26,HCNC,, | Performed by: RADIOLOGY

## 2020-10-27 ENCOUNTER — OFFICE VISIT (OUTPATIENT)
Dept: HEMATOLOGY/ONCOLOGY | Facility: CLINIC | Age: 65
End: 2020-10-27
Payer: MEDICARE

## 2020-10-27 VITALS
WEIGHT: 114.63 LBS | HEIGHT: 64 IN | BODY MASS INDEX: 19.57 KG/M2 | DIASTOLIC BLOOD PRESSURE: 63 MMHG | SYSTOLIC BLOOD PRESSURE: 144 MMHG | TEMPERATURE: 98 F | HEART RATE: 69 BPM

## 2020-10-27 DIAGNOSIS — R91.8 LUNG NODULES: ICD-10-CM

## 2020-10-27 DIAGNOSIS — C50.512 PRIMARY CANCER OF LOWER OUTER QUADRANT OF LEFT FEMALE BREAST: Primary | ICD-10-CM

## 2020-10-27 PROCEDURE — 3008F PR BODY MASS INDEX (BMI) DOCUMENTED: ICD-10-PCS | Mod: HCNC,CPTII,S$GLB, | Performed by: INTERNAL MEDICINE

## 2020-10-27 PROCEDURE — 3008F BODY MASS INDEX DOCD: CPT | Mod: HCNC,CPTII,S$GLB, | Performed by: INTERNAL MEDICINE

## 2020-10-27 PROCEDURE — 99999 PR PBB SHADOW E&M-EST. PATIENT-LVL III: CPT | Mod: PBBFAC,HCNC,, | Performed by: INTERNAL MEDICINE

## 2020-10-27 PROCEDURE — 99999 PR PBB SHADOW E&M-EST. PATIENT-LVL III: ICD-10-PCS | Mod: PBBFAC,HCNC,, | Performed by: INTERNAL MEDICINE

## 2020-10-27 PROCEDURE — 1101F PR PT FALLS ASSESS DOC 0-1 FALLS W/OUT INJ PAST YR: ICD-10-PCS | Mod: HCNC,CPTII,S$GLB, | Performed by: INTERNAL MEDICINE

## 2020-10-27 PROCEDURE — 99213 OFFICE O/P EST LOW 20 MIN: CPT | Mod: HCNC,S$GLB,, | Performed by: INTERNAL MEDICINE

## 2020-10-27 PROCEDURE — 1101F PT FALLS ASSESS-DOCD LE1/YR: CPT | Mod: HCNC,CPTII,S$GLB, | Performed by: INTERNAL MEDICINE

## 2020-10-27 PROCEDURE — 99213 PR OFFICE/OUTPT VISIT, EST, LEVL III, 20-29 MIN: ICD-10-PCS | Mod: HCNC,S$GLB,, | Performed by: INTERNAL MEDICINE

## 2020-10-27 PROCEDURE — 1159F PR MEDICATION LIST DOCUMENTED IN MEDICAL RECORD: ICD-10-PCS | Mod: HCNC,S$GLB,, | Performed by: INTERNAL MEDICINE

## 2020-10-27 PROCEDURE — 1126F AMNT PAIN NOTED NONE PRSNT: CPT | Mod: HCNC,S$GLB,, | Performed by: INTERNAL MEDICINE

## 2020-10-27 PROCEDURE — 1126F PR PAIN SEVERITY QUANTIFIED, NO PAIN PRESENT: ICD-10-PCS | Mod: HCNC,S$GLB,, | Performed by: INTERNAL MEDICINE

## 2020-10-27 PROCEDURE — 1159F MED LIST DOCD IN RCRD: CPT | Mod: HCNC,S$GLB,, | Performed by: INTERNAL MEDICINE

## 2020-10-27 NOTE — PROGRESS NOTES
Chief Complaint: No chief complaint on file.     HPI   Ms. Rivera presents today for follow up.  She has now completed her post lumpectomy XRT.  Of note, on August 21, 2019 she underwent a left lumpectomy and sentinel node biopsy.  Three sentinel nodes were negative, while on the lumpectomy specimen there was only 5 mm of residual DCIS.     Briefly, she is a 64-year-old  female from [place] who was diagnosed with breast cancer.  Apparently, she initially felt a mass in late 2017.  A mammogram at that time was read as BI-RADS category 1.  A repeat mammogram in December 2018 was read as BI-RADS category 4 and she underwent a biopsy that showed a carcinoma that was ER negative, LA weakly positive in 5% of the cells and HER-2 2+ by immunohistochemistry, but negative by FISH.  Ki-67 was 5%. She subsequently underwent a biopsy of a palpable lymph node on 02/01/2019, that showed evidence of lymph node metastasis.  She was referred for evaluation for chemotherapy.  Of note, she also had a PET scan on 02/01/2019 that showed an 8 mm right lower lobe pulmonary nodule that was not FDG avid.  She started AC chemotherapy, completed four cycles, and subsequently received 4 cycles of taxol.  She had surgery on 8/21/2018 with results as above.  She received post lumpectomy radiation therapy, however, she decided against adjuvant hormonal therapy.     Review of Systems    Overall she feels OK.  She complains of occasional pain in the left breast.  ECOG performance status is 1.  She denies any fever, depression, easy bruising, chills, night  sweats, weight loss, nausea, vomiting, diarrhea, diplopia, blurred vision, headache, chest pain, palpitations, shortness of breath, breast pain, extremity pain, or difficulty ambulating.  The remainder of the ten-point ROS, including general, skin, lymph, H/N, cardiorespiratory, GI, , Neuro, Endocrine, and psychiatric is negative.      Objective:   Physical Exam       She is  alert, oriented to time, place, person, pleasant, well      nourished, in no acute physical distress.    She is accompanied by her .                               VITAL SIGNS:  Reviewed                                      HEENT:  Normal..  There are no nasal, oral, lip, gingival, auricular, lid,    or conjunctival lesions.  Mucosae are moist and pink, and there is no        thrush.  Pupils are equal, reactive to light and accommodation.              Extraocular muscle movements are intact.  Dentition is good.                                     NECK:  Supple without JVD, adenopathy, or thyromegaly.                       LUNGS:  Clear to auscultation without wheezing, rales, or rhonchi.           CARDIOVASCULAR:  Reveals an S1, S2, no murmurs, no rubs, no gallops.         ABDOMEN:  Soft, with minimal epigastric tenderness on palpation.  No rebound.  She has no organomegaly.  Bowel sounds are    present.                                                                     EXTREMITIES:  No cyanosis, clubbing, or edema.                          BREASTS:  She is s/p left lumpectomy with a well-healed incision laterally.  No masses are palpated.  A left sentinel node excision scar is seen.  It has completely healed.     There are no masses in the right breast.                               LYMPHATIC:  There is no cervical, right axillary, or supraclavicular adenopathy.   SKIN:  Warm and moist, without petechiae, rashes, induration, or ecchymoses.      NEUROLOGIC:  DTRs are 0-1+ bilaterally, symmetrical, motor function is 5/5,  and cranial nerves are  within normal limits.     Assessment:       1. Primary cancer of lower outer quadrant of left female breast, s/o neoadjuvant chemotherapy, s/p lumpectomy    2. Small lung nodule seen in the right lower lobe on her original PET scan, radiologically stable.            Plan:        I had a long discussion with her.  I have asked her to return in see me in 3 months.   Her  mammogram will be repeated in May 2021, and we will repeat a noncontrast CT of her chest in July 2021.  Her multiple questions were answered to her satisfaction.

## 2020-10-28 ENCOUNTER — PATIENT OUTREACH (OUTPATIENT)
Dept: ADMINISTRATIVE | Facility: OTHER | Age: 65
End: 2020-10-28

## 2020-10-28 NOTE — PROGRESS NOTES
Updates were requested from care everywhere.  Chart was reviewed for overdue [facility]active [facility] Encounters (PAT) topics (CRS, Breast Cancer Screening, Eye exam)  Health Maintenance has been updated.  LINKS not responding.

## 2020-10-30 ENCOUNTER — OFFICE VISIT (OUTPATIENT)
Dept: CARDIOLOGY | Facility: CLINIC | Age: 65
End: 2020-10-30
Payer: MEDICARE

## 2020-10-30 VITALS
OXYGEN SATURATION: 99 % | HEART RATE: 71 BPM | WEIGHT: 113 LBS | BODY MASS INDEX: 19.29 KG/M2 | HEIGHT: 64 IN | SYSTOLIC BLOOD PRESSURE: 152 MMHG | DIASTOLIC BLOOD PRESSURE: 84 MMHG

## 2020-10-30 DIAGNOSIS — I70.0 AORTIC ATHEROSCLEROSIS: ICD-10-CM

## 2020-10-30 DIAGNOSIS — K21.9 GASTROESOPHAGEAL REFLUX DISEASE, UNSPECIFIED WHETHER ESOPHAGITIS PRESENT: ICD-10-CM

## 2020-10-30 DIAGNOSIS — I34.1 MITRAL VALVE PROLAPSE: ICD-10-CM

## 2020-10-30 DIAGNOSIS — I51.81 STRESS-INDUCED CARDIOMYOPATHY: ICD-10-CM

## 2020-10-30 DIAGNOSIS — E03.9 HYPOTHYROIDISM (ACQUIRED): ICD-10-CM

## 2020-10-30 DIAGNOSIS — C50.512 PRIMARY CANCER OF LOWER OUTER QUADRANT OF LEFT FEMALE BREAST: ICD-10-CM

## 2020-10-30 PROCEDURE — 99214 OFFICE O/P EST MOD 30 MIN: CPT | Mod: HCNC,S$GLB,, | Performed by: INTERNAL MEDICINE

## 2020-10-30 PROCEDURE — 1159F MED LIST DOCD IN RCRD: CPT | Mod: HCNC,S$GLB,, | Performed by: INTERNAL MEDICINE

## 2020-10-30 PROCEDURE — 3008F PR BODY MASS INDEX (BMI) DOCUMENTED: ICD-10-PCS | Mod: HCNC,CPTII,S$GLB, | Performed by: INTERNAL MEDICINE

## 2020-10-30 PROCEDURE — 99999 PR PBB SHADOW E&M-EST. PATIENT-LVL III: ICD-10-PCS | Mod: PBBFAC,HCNC,, | Performed by: INTERNAL MEDICINE

## 2020-10-30 PROCEDURE — 99214 PR OFFICE/OUTPT VISIT, EST, LEVL IV, 30-39 MIN: ICD-10-PCS | Mod: HCNC,S$GLB,, | Performed by: INTERNAL MEDICINE

## 2020-10-30 PROCEDURE — 1101F PR PT FALLS ASSESS DOC 0-1 FALLS W/OUT INJ PAST YR: ICD-10-PCS | Mod: HCNC,CPTII,S$GLB, | Performed by: INTERNAL MEDICINE

## 2020-10-30 PROCEDURE — 1159F PR MEDICATION LIST DOCUMENTED IN MEDICAL RECORD: ICD-10-PCS | Mod: HCNC,S$GLB,, | Performed by: INTERNAL MEDICINE

## 2020-10-30 PROCEDURE — 1101F PT FALLS ASSESS-DOCD LE1/YR: CPT | Mod: HCNC,CPTII,S$GLB, | Performed by: INTERNAL MEDICINE

## 2020-10-30 PROCEDURE — 99499 RISK ADDL DX/OHS AUDIT: ICD-10-PCS | Mod: S$GLB,,, | Performed by: INTERNAL MEDICINE

## 2020-10-30 PROCEDURE — 1126F PR PAIN SEVERITY QUANTIFIED, NO PAIN PRESENT: ICD-10-PCS | Mod: HCNC,S$GLB,, | Performed by: INTERNAL MEDICINE

## 2020-10-30 PROCEDURE — 1126F AMNT PAIN NOTED NONE PRSNT: CPT | Mod: HCNC,S$GLB,, | Performed by: INTERNAL MEDICINE

## 2020-10-30 PROCEDURE — 99999 PR PBB SHADOW E&M-EST. PATIENT-LVL III: CPT | Mod: PBBFAC,HCNC,, | Performed by: INTERNAL MEDICINE

## 2020-10-30 PROCEDURE — 3008F BODY MASS INDEX DOCD: CPT | Mod: HCNC,CPTII,S$GLB, | Performed by: INTERNAL MEDICINE

## 2020-10-30 PROCEDURE — 99499 UNLISTED E&M SERVICE: CPT | Mod: S$GLB,,, | Performed by: INTERNAL MEDICINE

## 2020-10-30 NOTE — PROGRESS NOTES
Subjective:    Patient ID:  Radha Rivera is a 65 y.o. female who presents for follow-up of Results (Echo)      PCP: Salo Vera MD     HPI    Pt is a 66 yo F w/ PMH of MVP and Stress Induced Cardiomyopathy who presents for f/u appt for stress induced cardiomyopathy.  She was admitted 7/13/2020-7/15/2020 for biomarker positive chest pain which was noted to be Takotsubo w/ LVEF of 35% on C which noted normal coronaries.  She was last seen 7/31/2020 and was continued on medical therapy and an echo was ordered prior to her last appt.  She mentions that she has been doing well since last appt however notes some case at times w/ housework.  She notes some episodes of postural presyncope which is short lived however is decreasing in frequency.  She denies cp, sob, edema, orthopnea, PND, palpitations, LOC, and claudication.  She notes compliance w/ meds and denies side effects.  She notes that when she checks her BP at home it has been running 110-130s/60s.  She does not exercise, however is active w/ housework and has been tolerating it.        Past Medical History:   Diagnosis Date    Breast cancer 01/2019    left    Mitral valve prolapse     Thyroid disease      Past Surgical History:   Procedure Laterality Date    BREAST BIOPSY Left 01/2019    BREAST LUMPECTOMY      08/21/2019    Ear drum surgery      INNER EAR SURGERY Right     INSERTION OF TUNNELED CENTRAL VENOUS CATHETER (CVC) WITH SUBCUTANEOUS PORT Right 2/8/2019    Procedure: INSERTION, PORT-A-CATH;  Surgeon: Twan Valdes MD;  Location: [facility] OR [address] [place] [address];  Service: General;  Laterality: Right;    LEFT HEART CATHETERIZATION Left 7/15/2020    Procedure: Left heart cath;  Surgeon: Jefferson Thomas III, MD;  Location: [facility] CATH LAB;  Service: Cardiology;  Laterality: Left;    MASTECTOMY, PARTIAL Left 8/21/2019    Procedure: MASTECTOMY, PARTIAL LEFT with SEED;  Surgeon: Twan Valdes MD;  Location: [facility] OR [address] [place] [address];  Service: General;   Laterality: Left;    SENTINEL LYMPH NODE BIOPSY Left 8/21/2019    Procedure: BIOPSY, LYMPH NODE, SENTINEL LEFT with SEED;  Surgeon: Twan Valdes MD;  Location: [facility] OR [address] [place] [address];  Service: General;  Laterality: Left;    tonsillectomy      TONSILLECTOMY       Social History     Socioeconomic History    Marital status:      Spouse name: Not on file    Number of children: Not on file    Years of education: Not on file    Highest education level: Not on file   Occupational History    Not on file   Social Needs    Financial resource strain: Not on file    Food insecurity     Worry: Not on file     Inability: Not on file    Transportation needs     Medical: Not on file     Non-medical: Not on file   Tobacco Use    Smoking status: Never Smoker    Smokeless tobacco: Never Used   Substance and Sexual Activity    Alcohol use: Yes     Comment: Seldom    Drug use: No    Sexual activity: Yes     Partners: Male     Birth control/protection: Post-menopausal   Lifestyle    Physical activity     Days per week: Not on file     Minutes per session: Not on file    Stress: Not on file   Relationships    Social connections     Talks on phone: Not on file     Gets together: Not on file     Attends Hinduism service: Not on file     Active member of club or organization: Not on file     Attends meetings of clubs or organizations: Not on file     Relationship status: Not on file   Other Topics Concern    Not on file   Social History Narrative    Not on file     Family History   Problem Relation Age of Onset    Breast cancer Sister 68    Breast cancer Paternal Aunt     Cancer Father         prostate cancer    Thyroid disease Daughter     Breast cancer Maternal Aunt     Colon cancer Neg Hx     Ovarian cancer Neg Hx     Stroke Neg Hx     Diabetes Neg Hx        Review of patient's allergies indicates:   Allergen Reactions    Bactrim [sulfamethoxazole-trimethoprim] Other (See Comments)     Caused reflux  "and severe nausea       Medication List with Changes/Refills   Current Medications    ASPIRIN 81 MG CHEW    Take 1 tablet (81 mg total) by mouth once daily.    ESOMEPRAZOLE (NEXIUM) 20 MG CAPSULE    Take 1 capsule (20 mg total) by mouth 2 (two) times daily.    EUTHYROX 75 MCG TABLET    TAKE 1 TABLET BY MOUTH IN THE MORNING    VIT A/VIT C/VIT E/ZINC/COPPER (PRESERVISION AREDS ORAL)    Take by mouth.    VITAMIN D 1000 UNITS TAB    Take 2,000 Units by mouth once daily.    Discontinued Medications    METOPROLOL SUCCINATE (TOPROL-XL) 25 MG 24 HR TABLET    Take 0.5 tablets (12.5 mg total) by mouth once daily.    VIT A/VIT C/VIT E/ZINC/COPPER (OCUVITE PRESERVISION ORAL)    Take 1 tablet by mouth 2 (two) times daily.       Review of Systems   Constitution: Negative for diaphoresis and fever.   HENT: Negative for congestion and hearing loss.    Eyes: Negative for blurred vision and pain.   Cardiovascular: Positive for dyspnea on exertion and near-syncope. Negative for chest pain, claudication, leg swelling, palpitations and syncope.   Respiratory: Negative for shortness of breath and sleep disturbances due to breathing.    Hematologic/Lymphatic: Negative for bleeding problem. Does not bruise/bleed easily.   Skin: Negative for color change and poor wound healing.   Gastrointestinal: Negative for abdominal pain and nausea.   Genitourinary: Negative for bladder incontinence and flank pain.   Neurological: Negative for focal weakness and light-headedness.        Objective:   BP (!) 152/84 (BP Location: Left arm, Patient Position: Sitting, BP Method: Medium (Manual))   Pulse 71   Ht 5' 4" (1.626 m)   Wt 51.3 kg (113 lb)   SpO2 99%   BMI 19.40 kg/m²    Physical Exam   Constitutional: She is oriented to person, place, and time. She appears well-developed and well-nourished.   HENT:   Head: Normocephalic and atraumatic.   Mouth/Throat: Oropharynx is clear and moist.   Eyes: Pupils are equal, round, and reactive to light. EOM " are normal. No scleral icterus.   Neck: Normal range of motion. Neck supple. No JVD present.   Cardiovascular: Normal rate, regular rhythm, S1 normal, S2 normal and intact distal pulses. Exam reveals no gallop and no friction rub.   No murmur heard.  Pulmonary/Chest: Effort normal and breath sounds normal. No respiratory distress. She has no wheezes. She has no rales. She exhibits no tenderness.   Abdominal: Soft. Bowel sounds are normal. She exhibits no distension and no mass. There is no abdominal tenderness. There is no rebound.   Musculoskeletal: Normal range of motion.         General: No tenderness or edema.   Neurological: She is alert and oriented to person, place, and time. She displays normal reflexes. Coordination normal.   Skin: Skin is warm and dry. She is not diaphoretic. No pallor.   Psychiatric: She has a normal mood and affect. Her behavior is normal. Judgment normal.         Echo: 10/23/2020- reviewed.  Conclusion    · The left ventricle is normal in size with normal systolic function. The estimated ejection fraction is 55%.  · Normal left ventricular diastolic function.  · Normal right ventricular systolic function.  · Mild-to-moderate mitral regurgitation.  · Normal central venous pressure (3 mmHg).  · The estimated PA systolic pressure is 25 mmHg.       Assessment:       1. Aortic atherosclerosis    2. Stress-induced cardiomyopathy    3. Mitral valve prolapse    4. Primary cancer of lower outer quadrant of left female breast    5. Hypothyroidism (acquired)    6. Gastroesophageal reflux disease, unspecified whether esophagitis present         Plan:         Aortic atherosclerosis  Continue on ASA.     Stress-induced cardiomyopathy  Resolved.  Stage B, Class I.  Pt compliant w/ metoprolol. Pt was noted to have episodes of hypotension while in the hospital and notes some episodes of presyncope currently.  LVEF 35%-->55% on echo 10/23/2020.     - monitor clinically  - pt w/ presyncope and previously  w/ low BP, will d/c metoprolol for now    Mitral valve prolapse  Mild to moderate MR noted on echo 10/23/2020.     - continue to monitor clinically    Primary cancer of lower outer quadrant of left female breast  Followed by oncology.      Hypothyroidism (acquired)  Followed by PCP.      Gastroesophageal reflux disease  Followed by PCP.        Total duration of face to face visit time 30 minutes.  Total time spent counseling greater than fifty percent of total visit time.  Counseling included discussion regarding imaging findings, diagnosis, possibilities, treatment options, risks and benefits.  The patient had many questions regarding the options and long-term effects      Jefferson Thomas M.D.  Interventional Cardiology

## 2020-10-30 NOTE — ASSESSMENT & PLAN NOTE
Resolved.  Stage B, Class I.  Pt compliant w/ metoprolol. Pt was noted to have episodes of hypotension while in the hospital and notes some episodes of presyncope currently.  LVEF 35%-->55% on echo 10/23/2020.     - monitor clinically  - pt w/ presyncope and previously w/ low BP, will d/c metoprolol for now

## 2021-01-01 NOTE — TELEPHONE ENCOUNTER
Pt returned my call.  She provided consent for me to discuss her genetics-related information with her daughter Thalia Sharp, whom pt states can be reached at [phone].  She stated she had asked her daughter to call me so that I could discuss pt's genetics-related info with pt's daughter.  I will call the pt's daughter as requested by pt.  
[id number]-[id number]

## 2021-01-04 ENCOUNTER — PATIENT MESSAGE (OUTPATIENT)
Dept: ADMINISTRATIVE | Facility: HOSPITAL | Age: 66
End: 2021-01-04

## 2021-01-19 NOTE — PROGRESS NOTES
Chief Complaint: No chief complaint on file.     HPI   Ms. Rivera presents today for follow up.  She is due for her first cycle of neoadjuvant taxol .      Briefly, she is a 63-year-old  female from [place] who was recently diagnosed with breast cancer.  Apparently, she initially felt a mass in late 2017.  A mammogram at that time was read as BI-RADS category 1.  A repeat mammogram in December 2018 was read as BI-RADS category 4 and she underwent a biopsy that showed a carcinoma that was ER negative, HI weakly positive in 5% of the cells and HER-2 2+ by immunohistochemistry, but negative by FISH.  Ki-67 was 5%. She subsequently underwent a biopsy of a palpable lymph node on 02/01/2019, that showed evidence of lymph node metastasis.  She was referred for evaluation for chemotherapy.  Of note, she also had a PET scan on 02/01/2019 that showed an 8 mm right lower lobe pulmonary nodule that was not FDG avid.  She stated AC chemotherapy 12 weeks ago. She has completed four cycles so far, and is due for cycle 1 of taxol today.  She has been premedicated with oral dexamethasone.     Her CBC today shows a WBC count of 3,800 /mm3,. Hg 9.6 gr/dl, Ht 30.2 % and platelets 397,000 /mm3.  Her ANC was 3,200 /mm3.  LFTs are normal.        Review of Systems    Overall she feels OK.   She denies any fever, depression, easy bruising, chills, night  sweats, weight loss, nausea, vomiting, diarrhea, diplopia, blurred vision, headache, chest pain, palpitations, shortness of breath, breast pain, extremity pain, or difficulty ambulating.  The remainder of the ten-point ROS, including general, skin, lymph, H/N, cardiorespiratory, GI, , Neuro, Endocrine, and psychiatric is negative.      Objective:   Physical Exam       She is alert, oriented to time, place, person, pleasant, well      nourished, in no acute physical distress.    She is accompanied by her  and her daughter.                               VITAL SIGNS:   Reviewed                                      HEENT:  Alopecia is again noted.  There are no nasal, oral, lip, gingival, auricular, lid,    or conjunctival lesions.  Mucosae are moist and pink, and there is no        thrush.  Pupils are equal, reactive to light and accommodation.              Extraocular muscle movements are intact.  Dentition is good. There are no oral ulcerations.                                     NECK:  Supple without JVD, adenopathy, or thyromegaly.                       LUNGS:  Clear to auscultation without wheezing, rales, or rhonchi.           CARDIOVASCULAR:  Reveals an S1, S2, no murmurs, no rubs, no gallops.         ABDOMEN:  Soft, with minimal epigastric tenderness on palpation.  No rebound.  She has no organomegaly.  Bowel sounds are    present.                                                                     EXTREMITIES:  No cyanosis, clubbing, or edema.                          BREASTS:  there are no masses in the left breast.  She no longer has any palpable adenopathy.  There are no masses in the right breast A right sided portacath is identified..                                    LYMPHATIC:  There is no cervical, right axillary, or supraclavicular adenopathy.   SKIN:  Warm and moist, without petechiae, rashes, induration, or ecchymoses.           NEUROLOGIC:  DTRs are 0-1+ bilaterally, symmetrical, motor function is 5/5,  and cranial nerves are  within normal limits.     Assessment:       1. Primary cancer of lower outer quadrant of left female breast, s.p 4 cycles of neoadjuvant AC chemotherapy    2. Encounter for chemotherapy     3.    Mild anemia, secondary to chemotherapy.             Plan:        I had a long discussion with her.  She will proceed with the first cycle of neoadjuvant taxol, and will return in 3 weeks with labs to see one of my colleagues prior to her second cycle.    She will call me in 3 days for an update, and will observe neutropenic precautions.  RTC  3 weeks.  Her multiple questions were answered to her satisfaction.           277.3

## 2021-01-27 ENCOUNTER — TELEPHONE (OUTPATIENT)
Dept: FAMILY MEDICINE | Facility: CLINIC | Age: 66
End: 2021-01-27

## 2021-01-27 DIAGNOSIS — Z12.31 SCREENING MAMMOGRAM, ENCOUNTER FOR: Primary | ICD-10-CM

## 2021-03-08 ENCOUNTER — TELEPHONE (OUTPATIENT)
Dept: PRIMARY CARE CLINIC | Facility: CLINIC | Age: 66
End: 2021-03-08

## 2021-03-09 ENCOUNTER — HOSPITAL ENCOUNTER (OUTPATIENT)
Dept: RADIOLOGY | Facility: HOSPITAL | Age: 66
Discharge: HOME | End: 2021-03-09
Attending: INTERNAL MEDICINE
Payer: MEDICARE

## 2021-03-09 DIAGNOSIS — Z12.31 SCREENING MAMMOGRAM, ENCOUNTER FOR: ICD-10-CM

## 2021-03-09 PROCEDURE — 77067 MAMMO DIGITAL SCREENING BILAT WITH TOMO: ICD-10-PCS | Mod: 26,,, | Performed by: RADIOLOGY

## 2021-03-09 PROCEDURE — 77063 MAMMO DIGITAL SCREENING BILAT WITH TOMO: ICD-10-PCS | Mod: 26,,, | Performed by: RADIOLOGY

## 2021-03-09 PROCEDURE — 77063 BREAST TOMOSYNTHESIS BI: CPT | Mod: 26,,, | Performed by: RADIOLOGY

## 2021-03-09 PROCEDURE — 77067 SCR MAMMO BI INCL CAD: CPT | Mod: 26,,, | Performed by: RADIOLOGY

## 2021-03-09 PROCEDURE — 77067 SCR MAMMO BI INCL CAD: CPT | Mod: TC

## 2021-03-10 ENCOUNTER — PATIENT MESSAGE (OUTPATIENT)
Dept: FAMILY MEDICINE | Facility: CLINIC | Age: 66
End: 2021-03-10

## 2021-04-07 ENCOUNTER — PATIENT OUTREACH (OUTPATIENT)
Dept: ADMINISTRATIVE | Facility: HOSPITAL | Age: 66
End: 2021-04-07

## 2021-04-21 ENCOUNTER — TELEPHONE (OUTPATIENT)
Dept: ADMINISTRATIVE | Facility: HOSPITAL | Age: 66
End: 2021-04-21

## 2021-04-21 ENCOUNTER — OFFICE VISIT (OUTPATIENT)
Dept: FAMILY MEDICINE | Facility: CLINIC | Age: 66
End: 2021-04-21
Payer: MEDICARE

## 2021-04-21 VITALS
OXYGEN SATURATION: 97 % | WEIGHT: 117.19 LBS | HEIGHT: 64 IN | BODY MASS INDEX: 20.01 KG/M2 | SYSTOLIC BLOOD PRESSURE: 120 MMHG | DIASTOLIC BLOOD PRESSURE: 72 MMHG | TEMPERATURE: 98 F | HEART RATE: 74 BPM

## 2021-04-21 DIAGNOSIS — R73.01 IMPAIRED FASTING GLUCOSE: ICD-10-CM

## 2021-04-21 DIAGNOSIS — Z78.0 POSTMENOPAUSAL: ICD-10-CM

## 2021-04-21 DIAGNOSIS — E78.2 HYPERLIPIDEMIA, MIXED: ICD-10-CM

## 2021-04-21 DIAGNOSIS — E03.9 HYPOTHYROIDISM (ACQUIRED): ICD-10-CM

## 2021-04-21 DIAGNOSIS — C50.919 INFILTRATING DUCTAL CARCINOMA OF FEMALE BREAST, UNSPECIFIED LATERALITY: Primary | ICD-10-CM

## 2021-04-21 PROCEDURE — 99499 UNLISTED E&M SERVICE: CPT | Mod: S$GLB,,, | Performed by: INTERNAL MEDICINE

## 2021-04-21 PROCEDURE — 1126F AMNT PAIN NOTED NONE PRSNT: CPT | Mod: HCNC,S$GLB,, | Performed by: INTERNAL MEDICINE

## 2021-04-21 PROCEDURE — 1126F PR PAIN SEVERITY QUANTIFIED, NO PAIN PRESENT: ICD-10-PCS | Mod: HCNC,S$GLB,, | Performed by: INTERNAL MEDICINE

## 2021-04-21 PROCEDURE — 99999 PR PBB SHADOW E&M-EST. PATIENT-LVL III: CPT | Mod: PBBFAC,HCNC,, | Performed by: INTERNAL MEDICINE

## 2021-04-21 PROCEDURE — 99499 RISK ADDL DX/OHS AUDIT: ICD-10-PCS | Mod: S$GLB,,, | Performed by: INTERNAL MEDICINE

## 2021-04-21 PROCEDURE — 1159F PR MEDICATION LIST DOCUMENTED IN MEDICAL RECORD: ICD-10-PCS | Mod: HCNC,S$GLB,, | Performed by: INTERNAL MEDICINE

## 2021-04-21 PROCEDURE — 3008F PR BODY MASS INDEX (BMI) DOCUMENTED: ICD-10-PCS | Mod: HCNC,CPTII,S$GLB, | Performed by: INTERNAL MEDICINE

## 2021-04-21 PROCEDURE — 99214 OFFICE O/P EST MOD 30 MIN: CPT | Mod: HCNC,S$GLB,, | Performed by: INTERNAL MEDICINE

## 2021-04-21 PROCEDURE — 3008F BODY MASS INDEX DOCD: CPT | Mod: HCNC,CPTII,S$GLB, | Performed by: INTERNAL MEDICINE

## 2021-04-21 PROCEDURE — 99999 PR PBB SHADOW E&M-EST. PATIENT-LVL III: ICD-10-PCS | Mod: PBBFAC,HCNC,, | Performed by: INTERNAL MEDICINE

## 2021-04-21 PROCEDURE — 1159F MED LIST DOCD IN RCRD: CPT | Mod: HCNC,S$GLB,, | Performed by: INTERNAL MEDICINE

## 2021-04-21 PROCEDURE — 99214 PR OFFICE/OUTPT VISIT, EST, LEVL IV, 30-39 MIN: ICD-10-PCS | Mod: HCNC,S$GLB,, | Performed by: INTERNAL MEDICINE

## 2021-05-02 ENCOUNTER — PATIENT MESSAGE (OUTPATIENT)
Dept: ADMINISTRATIVE | Facility: HOSPITAL | Age: 66
End: 2021-05-02

## 2021-05-03 ENCOUNTER — TELEPHONE (OUTPATIENT)
Dept: PRIMARY CARE CLINIC | Facility: CLINIC | Age: 66
End: 2021-05-03

## 2021-05-04 ENCOUNTER — PATIENT MESSAGE (OUTPATIENT)
Dept: RESEARCH | Facility: HOSPITAL | Age: 66
End: 2021-05-04

## 2021-05-05 ENCOUNTER — TELEPHONE (OUTPATIENT)
Dept: FAMILY MEDICINE | Facility: CLINIC | Age: 66
End: 2021-05-05

## 2021-05-06 ENCOUNTER — OFFICE VISIT (OUTPATIENT)
Dept: URGENT CARE | Facility: CLINIC | Age: 66
End: 2021-05-06
Payer: MEDICARE

## 2021-05-06 ENCOUNTER — NURSE TRIAGE (OUTPATIENT)
Dept: ADMINISTRATIVE | Facility: CLINIC | Age: 66
End: 2021-05-06

## 2021-05-06 ENCOUNTER — PATIENT MESSAGE (OUTPATIENT)
Dept: ADMINISTRATIVE | Facility: CLINIC | Age: 66
End: 2021-05-06

## 2021-05-06 VITALS
SYSTOLIC BLOOD PRESSURE: 131 MMHG | RESPIRATION RATE: 17 BRPM | HEIGHT: 64 IN | HEART RATE: 81 BPM | OXYGEN SATURATION: 97 % | TEMPERATURE: 100 F | WEIGHT: 115 LBS | DIASTOLIC BLOOD PRESSURE: 60 MMHG | BODY MASS INDEX: 19.63 KG/M2

## 2021-05-06 DIAGNOSIS — U07.1 COVID-19: ICD-10-CM

## 2021-05-06 DIAGNOSIS — R05.9 COUGH: Primary | ICD-10-CM

## 2021-05-06 DIAGNOSIS — R09.81 NASAL CONGESTION: ICD-10-CM

## 2021-05-06 LAB
CTP QC/QA: YES
SARS-COV-2 RDRP RESP QL NAA+PROBE: POSITIVE

## 2021-05-06 PROCEDURE — 3008F PR BODY MASS INDEX (BMI) DOCUMENTED: ICD-10-PCS | Mod: CPTII,S$GLB,, | Performed by: PHYSICIAN ASSISTANT

## 2021-05-06 PROCEDURE — 3008F BODY MASS INDEX DOCD: CPT | Mod: CPTII,S$GLB,, | Performed by: PHYSICIAN ASSISTANT

## 2021-05-06 PROCEDURE — U0002 COVID-19 LAB TEST NON-CDC: HCPCS | Mod: QW,S$GLB,, | Performed by: PHYSICIAN ASSISTANT

## 2021-05-06 PROCEDURE — 99214 OFFICE O/P EST MOD 30 MIN: CPT | Mod: S$GLB,,, | Performed by: PHYSICIAN ASSISTANT

## 2021-05-06 PROCEDURE — 1125F PR PAIN SEVERITY QUANTIFIED, PAIN PRESENT: ICD-10-PCS | Mod: S$GLB,,, | Performed by: PHYSICIAN ASSISTANT

## 2021-05-06 PROCEDURE — 1125F AMNT PAIN NOTED PAIN PRSNT: CPT | Mod: S$GLB,,, | Performed by: PHYSICIAN ASSISTANT

## 2021-05-06 PROCEDURE — U0002: ICD-10-PCS | Mod: QW,S$GLB,, | Performed by: PHYSICIAN ASSISTANT

## 2021-05-06 PROCEDURE — 99214 PR OFFICE/OUTPT VISIT, EST, LEVL IV, 30-39 MIN: ICD-10-PCS | Mod: S$GLB,,, | Performed by: PHYSICIAN ASSISTANT

## 2021-05-06 RX ORDER — FLUTICASONE PROPIONATE 50 MCG
1 SPRAY, SUSPENSION (ML) NASAL DAILY
Qty: 16 G | Refills: 3 | Status: SHIPPED | OUTPATIENT
Start: 2021-05-06 | End: 2021-10-20

## 2021-05-06 RX ORDER — AZITHROMYCIN 250 MG/1
TABLET, FILM COATED ORAL
Qty: 6 TABLET | Refills: 0 | Status: SHIPPED | OUTPATIENT
Start: 2021-05-06 | End: 2021-05-11

## 2021-05-06 RX ORDER — ALBUTEROL SULFATE 90 UG/1
2 AEROSOL, METERED RESPIRATORY (INHALATION) EVERY 4 HOURS PRN
Qty: 18 G | Refills: 2 | Status: SHIPPED | OUTPATIENT
Start: 2021-05-06 | End: 2021-10-20

## 2021-05-07 ENCOUNTER — TELEPHONE (OUTPATIENT)
Dept: URGENT CARE | Facility: CLINIC | Age: 66
End: 2021-05-07

## 2021-05-08 ENCOUNTER — NURSE TRIAGE (OUTPATIENT)
Dept: ADMINISTRATIVE | Facility: CLINIC | Age: 66
End: 2021-05-08

## 2021-05-11 ENCOUNTER — OFFICE VISIT (OUTPATIENT)
Dept: URGENT CARE | Facility: CLINIC | Age: 66
End: 2021-05-11
Payer: MEDICARE

## 2021-05-11 ENCOUNTER — PATIENT MESSAGE (OUTPATIENT)
Dept: FAMILY MEDICINE | Facility: CLINIC | Age: 66
End: 2021-05-11

## 2021-05-11 ENCOUNTER — TELEPHONE (OUTPATIENT)
Dept: ADMINISTRATIVE | Facility: HOSPITAL | Age: 66
End: 2021-05-11

## 2021-05-11 VITALS
SYSTOLIC BLOOD PRESSURE: 143 MMHG | WEIGHT: 115 LBS | DIASTOLIC BLOOD PRESSURE: 67 MMHG | BODY MASS INDEX: 19.63 KG/M2 | HEART RATE: 79 BPM | OXYGEN SATURATION: 99 % | HEIGHT: 64 IN | TEMPERATURE: 98 F | RESPIRATION RATE: 18 BRPM

## 2021-05-11 DIAGNOSIS — J02.9 PHARYNGITIS, UNSPECIFIED ETIOLOGY: Primary | ICD-10-CM

## 2021-05-11 PROCEDURE — 3008F PR BODY MASS INDEX (BMI) DOCUMENTED: ICD-10-PCS | Mod: CPTII,S$GLB,, | Performed by: PHYSICIAN ASSISTANT

## 2021-05-11 PROCEDURE — 3008F BODY MASS INDEX DOCD: CPT | Mod: CPTII,S$GLB,, | Performed by: PHYSICIAN ASSISTANT

## 2021-05-11 PROCEDURE — 99214 PR OFFICE/OUTPT VISIT, EST, LEVL IV, 30-39 MIN: ICD-10-PCS | Mod: S$GLB,,, | Performed by: PHYSICIAN ASSISTANT

## 2021-05-11 PROCEDURE — 99214 OFFICE O/P EST MOD 30 MIN: CPT | Mod: S$GLB,,, | Performed by: PHYSICIAN ASSISTANT

## 2021-05-11 RX ORDER — AMOXICILLIN 875 MG/1
875 TABLET, FILM COATED ORAL 2 TIMES DAILY
Qty: 20 TABLET | Refills: 0 | Status: SHIPPED | OUTPATIENT
Start: 2021-05-11 | End: 2021-07-20 | Stop reason: ALTCHOICE

## 2021-05-13 ENCOUNTER — TELEPHONE (OUTPATIENT)
Dept: ADMINISTRATIVE | Facility: HOSPITAL | Age: 66
End: 2021-05-13

## 2021-07-07 ENCOUNTER — PATIENT MESSAGE (OUTPATIENT)
Dept: ADMINISTRATIVE | Facility: HOSPITAL | Age: 66
End: 2021-07-07

## 2021-07-19 ENCOUNTER — PATIENT MESSAGE (OUTPATIENT)
Dept: CARDIOLOGY | Facility: CLINIC | Age: 66
End: 2021-07-19

## 2021-07-19 ENCOUNTER — PATIENT OUTREACH (OUTPATIENT)
Dept: ADMINISTRATIVE | Facility: OTHER | Age: 66
End: 2021-07-19

## 2021-07-20 ENCOUNTER — OFFICE VISIT (OUTPATIENT)
Dept: CARDIOLOGY | Facility: CLINIC | Age: 66
End: 2021-07-20
Payer: MEDICARE

## 2021-07-20 ENCOUNTER — PATIENT MESSAGE (OUTPATIENT)
Dept: CARDIOLOGY | Facility: CLINIC | Age: 66
End: 2021-07-20

## 2021-07-20 VITALS
DIASTOLIC BLOOD PRESSURE: 74 MMHG | BODY MASS INDEX: 19.77 KG/M2 | WEIGHT: 115.81 LBS | HEART RATE: 73 BPM | HEIGHT: 64 IN | SYSTOLIC BLOOD PRESSURE: 128 MMHG | OXYGEN SATURATION: 98 %

## 2021-07-20 DIAGNOSIS — I34.1 MITRAL VALVE PROLAPSE: ICD-10-CM

## 2021-07-20 DIAGNOSIS — C50.512 PRIMARY CANCER OF LOWER OUTER QUADRANT OF LEFT FEMALE BREAST: ICD-10-CM

## 2021-07-20 DIAGNOSIS — C50.912 INFILTRATING DUCTAL CARCINOMA OF LEFT FEMALE BREAST: ICD-10-CM

## 2021-07-20 DIAGNOSIS — I51.81 STRESS-INDUCED CARDIOMYOPATHY: ICD-10-CM

## 2021-07-20 DIAGNOSIS — K21.9 GASTROESOPHAGEAL REFLUX DISEASE WITHOUT ESOPHAGITIS: ICD-10-CM

## 2021-07-20 DIAGNOSIS — I70.0 AORTIC ATHEROSCLEROSIS: ICD-10-CM

## 2021-07-20 DIAGNOSIS — F41.9 ANXIETY: ICD-10-CM

## 2021-07-20 DIAGNOSIS — R07.89 ATYPICAL CHEST PAIN: ICD-10-CM

## 2021-07-20 PROCEDURE — 1159F MED LIST DOCD IN RCRD: CPT | Mod: HCNC,CPTII,S$GLB, | Performed by: INTERNAL MEDICINE

## 2021-07-20 PROCEDURE — 3008F BODY MASS INDEX DOCD: CPT | Mod: HCNC,CPTII,S$GLB, | Performed by: INTERNAL MEDICINE

## 2021-07-20 PROCEDURE — 1159F PR MEDICATION LIST DOCUMENTED IN MEDICAL RECORD: ICD-10-PCS | Mod: HCNC,CPTII,S$GLB, | Performed by: INTERNAL MEDICINE

## 2021-07-20 PROCEDURE — 99499 UNLISTED E&M SERVICE: CPT | Mod: HCNC,S$GLB,, | Performed by: INTERNAL MEDICINE

## 2021-07-20 PROCEDURE — 1101F PT FALLS ASSESS-DOCD LE1/YR: CPT | Mod: HCNC,CPTII,S$GLB, | Performed by: INTERNAL MEDICINE

## 2021-07-20 PROCEDURE — 1126F AMNT PAIN NOTED NONE PRSNT: CPT | Mod: HCNC,CPTII,S$GLB, | Performed by: INTERNAL MEDICINE

## 2021-07-20 PROCEDURE — 3288F FALL RISK ASSESSMENT DOCD: CPT | Mod: HCNC,CPTII,S$GLB, | Performed by: INTERNAL MEDICINE

## 2021-07-20 PROCEDURE — 1101F PR PT FALLS ASSESS DOC 0-1 FALLS W/OUT INJ PAST YR: ICD-10-PCS | Mod: HCNC,CPTII,S$GLB, | Performed by: INTERNAL MEDICINE

## 2021-07-20 PROCEDURE — 99999 PR PBB SHADOW E&M-EST. PATIENT-LVL III: CPT | Mod: PBBFAC,HCNC,, | Performed by: INTERNAL MEDICINE

## 2021-07-20 PROCEDURE — 3008F PR BODY MASS INDEX (BMI) DOCUMENTED: ICD-10-PCS | Mod: HCNC,CPTII,S$GLB, | Performed by: INTERNAL MEDICINE

## 2021-07-20 PROCEDURE — 1126F PR PAIN SEVERITY QUANTIFIED, NO PAIN PRESENT: ICD-10-PCS | Mod: HCNC,CPTII,S$GLB, | Performed by: INTERNAL MEDICINE

## 2021-07-20 PROCEDURE — 99499 RISK ADDL DX/OHS AUDIT: ICD-10-PCS | Mod: HCNC,S$GLB,, | Performed by: INTERNAL MEDICINE

## 2021-07-20 PROCEDURE — 3288F PR FALLS RISK ASSESSMENT DOCUMENTED: ICD-10-PCS | Mod: HCNC,CPTII,S$GLB, | Performed by: INTERNAL MEDICINE

## 2021-07-20 PROCEDURE — 99214 OFFICE O/P EST MOD 30 MIN: CPT | Mod: HCNC,S$GLB,, | Performed by: INTERNAL MEDICINE

## 2021-07-20 PROCEDURE — 99214 PR OFFICE/OUTPT VISIT, EST, LEVL IV, 30-39 MIN: ICD-10-PCS | Mod: HCNC,S$GLB,, | Performed by: INTERNAL MEDICINE

## 2021-07-20 PROCEDURE — 99999 PR PBB SHADOW E&M-EST. PATIENT-LVL III: ICD-10-PCS | Mod: PBBFAC,HCNC,, | Performed by: INTERNAL MEDICINE

## 2021-08-15 ENCOUNTER — PATIENT MESSAGE (OUTPATIENT)
Dept: ADMINISTRATIVE | Facility: HOSPITAL | Age: 66
End: 2021-08-15

## 2021-08-23 ENCOUNTER — PES CALL (OUTPATIENT)
Dept: ADMINISTRATIVE | Facility: CLINIC | Age: 66
End: 2021-08-23

## 2021-10-11 ENCOUNTER — TELEPHONE (OUTPATIENT)
Dept: PRIMARY CARE CLINIC | Facility: CLINIC | Age: 66
End: 2021-10-11

## 2021-10-20 ENCOUNTER — OFFICE VISIT (OUTPATIENT)
Dept: FAMILY MEDICINE | Facility: CLINIC | Age: 66
End: 2021-10-20
Payer: MEDICARE

## 2021-10-20 VITALS
SYSTOLIC BLOOD PRESSURE: 120 MMHG | TEMPERATURE: 98 F | OXYGEN SATURATION: 98 % | BODY MASS INDEX: 20.36 KG/M2 | HEIGHT: 64 IN | HEART RATE: 83 BPM | DIASTOLIC BLOOD PRESSURE: 68 MMHG | WEIGHT: 119.25 LBS

## 2021-10-20 DIAGNOSIS — M85.80 OSTEOPENIA, UNSPECIFIED LOCATION: ICD-10-CM

## 2021-10-20 DIAGNOSIS — E03.9 HYPOTHYROIDISM (ACQUIRED): ICD-10-CM

## 2021-10-20 DIAGNOSIS — C50.912 INFILTRATING DUCTAL CARCINOMA OF LEFT FEMALE BREAST: Primary | ICD-10-CM

## 2021-10-20 DIAGNOSIS — Z78.0 POSTMENOPAUSAL: ICD-10-CM

## 2021-10-20 DIAGNOSIS — L65.9 HAIR LOSS: ICD-10-CM

## 2021-10-20 DIAGNOSIS — E78.2 HYPERLIPIDEMIA, MIXED: ICD-10-CM

## 2021-10-20 DIAGNOSIS — Z12.11 SCREEN FOR COLON CANCER: ICD-10-CM

## 2021-10-20 PROBLEM — I51.81 STRESS-INDUCED CARDIOMYOPATHY: Status: RESOLVED | Noted: 2020-07-14 | Resolved: 2021-10-20

## 2021-10-20 PROCEDURE — 3078F DIAST BP <80 MM HG: CPT | Mod: HCNC,CPTII,S$GLB, | Performed by: INTERNAL MEDICINE

## 2021-10-20 PROCEDURE — 3044F HG A1C LEVEL LT 7.0%: CPT | Mod: HCNC,CPTII,S$GLB, | Performed by: INTERNAL MEDICINE

## 2021-10-20 PROCEDURE — 1160F PR REVIEW ALL MEDS BY PRESCRIBER/CLIN PHARMACIST DOCUMENTED: ICD-10-PCS | Mod: HCNC,CPTII,S$GLB, | Performed by: INTERNAL MEDICINE

## 2021-10-20 PROCEDURE — 99999 PR PBB SHADOW E&M-EST. PATIENT-LVL IV: ICD-10-PCS | Mod: PBBFAC,HCNC,, | Performed by: INTERNAL MEDICINE

## 2021-10-20 PROCEDURE — 1126F PR PAIN SEVERITY QUANTIFIED, NO PAIN PRESENT: ICD-10-PCS | Mod: HCNC,CPTII,S$GLB, | Performed by: INTERNAL MEDICINE

## 2021-10-20 PROCEDURE — 3044F PR MOST RECENT HEMOGLOBIN A1C LEVEL <7.0%: ICD-10-PCS | Mod: HCNC,CPTII,S$GLB, | Performed by: INTERNAL MEDICINE

## 2021-10-20 PROCEDURE — 1160F RVW MEDS BY RX/DR IN RCRD: CPT | Mod: HCNC,CPTII,S$GLB, | Performed by: INTERNAL MEDICINE

## 2021-10-20 PROCEDURE — 99214 OFFICE O/P EST MOD 30 MIN: CPT | Mod: HCNC,S$GLB,, | Performed by: INTERNAL MEDICINE

## 2021-10-20 PROCEDURE — 3008F PR BODY MASS INDEX (BMI) DOCUMENTED: ICD-10-PCS | Mod: HCNC,CPTII,S$GLB, | Performed by: INTERNAL MEDICINE

## 2021-10-20 PROCEDURE — 3074F SYST BP LT 130 MM HG: CPT | Mod: HCNC,CPTII,S$GLB, | Performed by: INTERNAL MEDICINE

## 2021-10-20 PROCEDURE — 3074F PR MOST RECENT SYSTOLIC BLOOD PRESSURE < 130 MM HG: ICD-10-PCS | Mod: HCNC,CPTII,S$GLB, | Performed by: INTERNAL MEDICINE

## 2021-10-20 PROCEDURE — 1126F AMNT PAIN NOTED NONE PRSNT: CPT | Mod: HCNC,CPTII,S$GLB, | Performed by: INTERNAL MEDICINE

## 2021-10-20 PROCEDURE — 99499 UNLISTED E&M SERVICE: CPT | Mod: S$GLB,,, | Performed by: INTERNAL MEDICINE

## 2021-10-20 PROCEDURE — 99999 PR PBB SHADOW E&M-EST. PATIENT-LVL IV: CPT | Mod: PBBFAC,HCNC,, | Performed by: INTERNAL MEDICINE

## 2021-10-20 PROCEDURE — 3078F PR MOST RECENT DIASTOLIC BLOOD PRESSURE < 80 MM HG: ICD-10-PCS | Mod: HCNC,CPTII,S$GLB, | Performed by: INTERNAL MEDICINE

## 2021-10-20 PROCEDURE — 3008F BODY MASS INDEX DOCD: CPT | Mod: HCNC,CPTII,S$GLB, | Performed by: INTERNAL MEDICINE

## 2021-10-20 PROCEDURE — 1159F MED LIST DOCD IN RCRD: CPT | Mod: HCNC,CPTII,S$GLB, | Performed by: INTERNAL MEDICINE

## 2021-10-20 PROCEDURE — 99214 PR OFFICE/OUTPT VISIT, EST, LEVL IV, 30-39 MIN: ICD-10-PCS | Mod: HCNC,S$GLB,, | Performed by: INTERNAL MEDICINE

## 2021-10-20 PROCEDURE — 1159F PR MEDICATION LIST DOCUMENTED IN MEDICAL RECORD: ICD-10-PCS | Mod: HCNC,CPTII,S$GLB, | Performed by: INTERNAL MEDICINE

## 2021-10-20 PROCEDURE — 99499 RISK ADDL DX/OHS AUDIT: ICD-10-PCS | Mod: S$GLB,,, | Performed by: INTERNAL MEDICINE

## 2021-10-20 RX ORDER — LEVOTHYROXINE SODIUM 75 UG/1
75 TABLET ORAL EVERY MORNING
Qty: 90 TABLET | Refills: 3 | Status: SHIPPED | OUTPATIENT
Start: 2021-10-20 | End: 2022-11-14 | Stop reason: SDUPTHER

## 2021-10-21 ENCOUNTER — PATIENT MESSAGE (OUTPATIENT)
Dept: FAMILY MEDICINE | Facility: CLINIC | Age: 66
End: 2021-10-21
Payer: MEDICARE

## 2021-11-03 ENCOUNTER — TELEPHONE (OUTPATIENT)
Dept: FAMILY MEDICINE | Facility: CLINIC | Age: 66
End: 2021-11-03
Payer: MEDICARE

## 2021-11-12 ENCOUNTER — LAB VISIT (OUTPATIENT)
Dept: LAB | Facility: HOSPITAL | Age: 66
End: 2021-11-12
Attending: INTERNAL MEDICINE
Payer: MEDICARE

## 2021-11-12 DIAGNOSIS — Z12.11 SCREEN FOR COLON CANCER: ICD-10-CM

## 2021-11-12 PROCEDURE — 82274 ASSAY TEST FOR BLOOD FECAL: CPT | Mod: HCNC | Performed by: INTERNAL MEDICINE

## 2021-11-18 LAB — HEMOCCULT STL QL IA: NEGATIVE

## 2021-11-19 ENCOUNTER — PATIENT MESSAGE (OUTPATIENT)
Dept: PRIMARY CARE CLINIC | Facility: CLINIC | Age: 66
End: 2021-11-19
Payer: MEDICARE

## 2022-02-09 ENCOUNTER — PATIENT MESSAGE (OUTPATIENT)
Dept: CARDIOLOGY | Facility: CLINIC | Age: 67
End: 2022-02-09
Payer: MEDICARE

## 2022-02-09 ENCOUNTER — TELEPHONE (OUTPATIENT)
Dept: CARDIOLOGY | Facility: CLINIC | Age: 67
End: 2022-02-09
Payer: MEDICARE

## 2022-02-09 NOTE — TELEPHONE ENCOUNTER
"----- Message from Morelia Mullen sent at 2/9/2022 12:07 PM CST -----  Type:  Needs Medical Advice    Who Called: self  Reason:She woke up the other  night feeling like she was going to pass out and she felt cold and clammy. Also felt like her jaw "wasn't working right' I scheduled for next opening I aw for you but they wanted to speak with nurse too  Would the patient rather a call back or a response via [facility]? call  Best Call Back Number: [phone]  Additional Information:125/66 was her last BP reading after the event       "

## 2022-02-14 ENCOUNTER — OFFICE VISIT (OUTPATIENT)
Dept: CARDIOLOGY | Facility: CLINIC | Age: 67
End: 2022-02-14
Payer: MEDICARE

## 2022-02-14 ENCOUNTER — PATIENT OUTREACH (OUTPATIENT)
Dept: ADMINISTRATIVE | Facility: OTHER | Age: 67
End: 2022-02-14
Payer: MEDICARE

## 2022-02-14 VITALS
OXYGEN SATURATION: 99 % | BODY MASS INDEX: 19.97 KG/M2 | HEART RATE: 72 BPM | DIASTOLIC BLOOD PRESSURE: 68 MMHG | HEIGHT: 65 IN | SYSTOLIC BLOOD PRESSURE: 122 MMHG | WEIGHT: 119.88 LBS

## 2022-02-14 DIAGNOSIS — K21.9 GASTROESOPHAGEAL REFLUX DISEASE WITHOUT ESOPHAGITIS: ICD-10-CM

## 2022-02-14 DIAGNOSIS — C50.912 INFILTRATING DUCTAL CARCINOMA OF LEFT FEMALE BREAST: ICD-10-CM

## 2022-02-14 DIAGNOSIS — C50.512 PRIMARY CANCER OF LOWER OUTER QUADRANT OF LEFT FEMALE BREAST: ICD-10-CM

## 2022-02-14 DIAGNOSIS — E78.2 HYPERLIPIDEMIA, MIXED: ICD-10-CM

## 2022-02-14 DIAGNOSIS — F41.9 ANXIETY: ICD-10-CM

## 2022-02-14 DIAGNOSIS — E03.9 HYPOTHYROIDISM (ACQUIRED): ICD-10-CM

## 2022-02-14 DIAGNOSIS — I34.1 MITRAL VALVE PROLAPSE: ICD-10-CM

## 2022-02-14 DIAGNOSIS — I70.0 AORTIC ATHEROSCLEROSIS: ICD-10-CM

## 2022-02-14 DIAGNOSIS — R07.89 ATYPICAL CHEST PAIN: ICD-10-CM

## 2022-02-14 PROCEDURE — 1126F PR PAIN SEVERITY QUANTIFIED, NO PAIN PRESENT: ICD-10-PCS | Mod: HCNC,CPTII,S$GLB,

## 2022-02-14 PROCEDURE — 3078F PR MOST RECENT DIASTOLIC BLOOD PRESSURE < 80 MM HG: ICD-10-PCS | Mod: HCNC,CPTII,S$GLB,

## 2022-02-14 PROCEDURE — 99214 OFFICE O/P EST MOD 30 MIN: CPT | Mod: HCNC,S$GLB,,

## 2022-02-14 PROCEDURE — 99499 UNLISTED E&M SERVICE: CPT | Mod: HCNC,S$GLB,,

## 2022-02-14 PROCEDURE — 3078F DIAST BP <80 MM HG: CPT | Mod: HCNC,CPTII,S$GLB,

## 2022-02-14 PROCEDURE — 1101F PT FALLS ASSESS-DOCD LE1/YR: CPT | Mod: HCNC,CPTII,S$GLB,

## 2022-02-14 PROCEDURE — 3008F PR BODY MASS INDEX (BMI) DOCUMENTED: ICD-10-PCS | Mod: HCNC,CPTII,S$GLB,

## 2022-02-14 PROCEDURE — 1159F PR MEDICATION LIST DOCUMENTED IN MEDICAL RECORD: ICD-10-PCS | Mod: HCNC,CPTII,S$GLB,

## 2022-02-14 PROCEDURE — 99499 RISK ADDL DX/OHS AUDIT: ICD-10-PCS | Mod: HCNC,S$GLB,,

## 2022-02-14 PROCEDURE — 99999 PR PBB SHADOW E&M-EST. PATIENT-LVL III: CPT | Mod: PBBFAC,HCNC,,

## 2022-02-14 PROCEDURE — 3008F BODY MASS INDEX DOCD: CPT | Mod: HCNC,CPTII,S$GLB,

## 2022-02-14 PROCEDURE — 3288F FALL RISK ASSESSMENT DOCD: CPT | Mod: HCNC,CPTII,S$GLB,

## 2022-02-14 PROCEDURE — 1101F PR PT FALLS ASSESS DOC 0-1 FALLS W/OUT INJ PAST YR: ICD-10-PCS | Mod: HCNC,CPTII,S$GLB,

## 2022-02-14 PROCEDURE — 99214 PR OFFICE/OUTPT VISIT, EST, LEVL IV, 30-39 MIN: ICD-10-PCS | Mod: HCNC,S$GLB,,

## 2022-02-14 PROCEDURE — 3288F PR FALLS RISK ASSESSMENT DOCUMENTED: ICD-10-PCS | Mod: HCNC,CPTII,S$GLB,

## 2022-02-14 PROCEDURE — 3074F PR MOST RECENT SYSTOLIC BLOOD PRESSURE < 130 MM HG: ICD-10-PCS | Mod: HCNC,CPTII,S$GLB,

## 2022-02-14 PROCEDURE — 3074F SYST BP LT 130 MM HG: CPT | Mod: HCNC,CPTII,S$GLB,

## 2022-02-14 PROCEDURE — 99999 PR PBB SHADOW E&M-EST. PATIENT-LVL III: ICD-10-PCS | Mod: PBBFAC,HCNC,,

## 2022-02-14 PROCEDURE — 1126F AMNT PAIN NOTED NONE PRSNT: CPT | Mod: HCNC,CPTII,S$GLB,

## 2022-02-14 PROCEDURE — 1159F MED LIST DOCD IN RCRD: CPT | Mod: HCNC,CPTII,S$GLB,

## 2022-02-14 NOTE — PROGRESS NOTES
Health Maintenance Due   Topic Date Due    Hepatitis C Screening  Never done    COVID-19 Vaccine (1) Never done    Pneumococcal Vaccines (Age 65+) (1 of 4 - PCV13) Never done    High Dose Statin  Never done    DEXA SCAN  Never done    Shingles Vaccine (1 of 2) Never done    Influenza Vaccine (1) Never done    Mammogram  03/09/2022     Updates were requested from care everywhere.  Chart was reviewed for overdue [facility]active [facility] Encounters (PAT) topics (CRS, Breast Cancer Screening, Eye exam)  Health Maintenance has been updated.  LINKS immunization registry triggered.  Immunizations were reconciled.

## 2022-02-14 NOTE — PROGRESS NOTES
Subjective:    Patient ID:  Radha Rivera is a 66 y.o. female who presents for follow-up of Dizziness      PCP: Salo Vera MD         HPI Pt is a 65 yo F w/ PMH of MVP and Stress Induced Cardiomyopathy w/ LVEF 35-->55% who presents for f/u appt for chest pain.  She was admitted 7/13/2020-7/15/2020 for biomarker positive chest pain which was noted to be Takotsubo w/ LVEF of 35% on LHC which noted normal coronaries and ED visit 7/10/21 and was noted for atypical chest pain with a negative cardiac work up.  She was last seen 7/20/21 and was continued on medical therapy. She not around 0300 feeling cold associated with diaphoresis, jaw tight and hands freezing at which time her  checked her /66. Spontaneous resolution of symptoms. . Patient reports increased anxiety after having a long conversation with her daughter.  She denies sob, edema, orthopnea, PND, palpitations, LOC, and claudication.  She notes increased anxiety and panic attacks and social stressors.  She notes compliance w/ meds and denies side effects.  She has not been monitoring her BP at home.  She does not exercise, however is active w/ housework and has been tolerating it.      Past Medical History:   Diagnosis Date    Breast cancer 01/2019    left    Mitral valve prolapse     Stress-induced cardiomyopathy 7/14/2020    Thyroid disease      Past Surgical History:   Procedure Laterality Date    BREAST BIOPSY Left 01/2019    BREAST LUMPECTOMY      08/21/2019    Ear drum surgery      INNER EAR SURGERY Right     INSERTION OF TUNNELED CENTRAL VENOUS CATHETER (CVC) WITH SUBCUTANEOUS PORT Right 2/8/2019    Procedure: INSERTION, PORT-A-CATH;  Surgeon: Twan Valdes MD;  Location: [facility] OR [address] [place] [address];  Service: General;  Laterality: Right;    LEFT HEART CATHETERIZATION Left 7/15/2020    Procedure: Left heart cath;  Surgeon: Jefferson Thomas III, MD;  Location: [facility] CATH LAB;  Service: Cardiology;  Laterality: Left;     MASTECTOMY, PARTIAL Left 8/21/2019    Procedure: MASTECTOMY, PARTIAL LEFT with SEED;  Surgeon: Twan Valdes MD;  Location: [facility] OR [address] [place] [address];  Service: General;  Laterality: Left;    SENTINEL LYMPH NODE BIOPSY Left 8/21/2019    Procedure: BIOPSY, LYMPH NODE, SENTINEL LEFT with SEED;  Surgeon: Twan Valdes MD;  Location: [facility] OR [address] [place] [address];  Service: General;  Laterality: Left;    tonsillectomy      TONSILLECTOMY       Social History     Socioeconomic History    Marital status:    Tobacco Use    Smoking status: Never Smoker    Smokeless tobacco: Never Used   Substance and Sexual Activity    Alcohol use: Yes     Comment: Seldom    Drug use: No    Sexual activity: Yes     Partners: Male     Birth control/protection: Post-menopausal     Family History   Problem Relation Age of Onset    Breast cancer Sister 68    Breast cancer Paternal Aunt     Cancer Father         prostate cancer    Thyroid disease Daughter     Breast cancer Maternal Aunt     Colon cancer Neg Hx     Ovarian cancer Neg Hx     Stroke Neg Hx     Diabetes Neg Hx        Review of patient's allergies indicates:   Allergen Reactions    Azithromycin     Bactrim [sulfamethoxazole-trimethoprim] Other (See Comments)     Caused reflux and severe nausea       Medication List with Changes/Refills   Current Medications    ASPIRIN 81 MG CHEW    Take 1 tablet (81 mg total) by mouth once daily.    LEVOTHYROXINE (EUTHYROX) 75 MCG TABLET    Take 1 tablet (75 mcg total) by mouth every morning.    VIT A/VIT C/VIT E/ZINC/COPPER (PRESERVISION AREDS ORAL)    Take by mouth.    VITAMIN D 1000 UNITS TAB    Take 2,000 Units by mouth once daily.        Review of Systems   Constitutional: Positive for chills and diaphoresis. Negative for fever.   HENT: Negative for congestion and hearing loss.    Eyes: Negative for blurred vision and pain.   Cardiovascular: Negative for chest pain, claudication, dyspnea on exertion, leg swelling, near-syncope,  "palpitations and syncope.   Respiratory: Negative for shortness of breath and sleep disturbances due to breathing.    Hematologic/Lymphatic: Negative for bleeding problem. Does not bruise/bleed easily.   Skin: Negative for color change and poor wound healing.   Musculoskeletal: Positive for joint pain.   Gastrointestinal: Negative for abdominal pain and nausea.   Genitourinary: Negative for bladder incontinence and flank pain.   Neurological: Negative for focal weakness and light-headedness.   Psychiatric/Behavioral: The patient is nervous/anxious.         Objective:   /68   Pulse 72   Ht 5' 5" (1.651 m)   Wt 54.4 kg (119 lb 14.4 oz)   SpO2 99%   BMI 19.95 kg/m²    Physical Exam  Vitals reviewed.   Constitutional:       Appearance: She is well-developed and well-nourished. She is not diaphoretic.   HENT:      Head: Normocephalic and atraumatic.      Mouth/Throat:      Mouth: Oropharynx is clear and moist.   Eyes:      General: No scleral icterus.     Extraocular Movements: EOM normal.      Pupils: Pupils are equal, round, and reactive to light.   Neck:      Vascular: No JVD.   Cardiovascular:      Rate and Rhythm: Normal rate and regular rhythm.      Pulses: Intact distal pulses.           Radial pulses are 2+ on the right side and 2+ on the left side.        Dorsalis pedis pulses are 2+ on the right side and 2+ on the left side.        Posterior tibial pulses are 2+ on the right side and 2+ on the left side.      Heart sounds: S1 normal and S2 normal. No murmur heard.  No friction rub. No gallop.    Pulmonary:      Effort: Pulmonary effort is normal. No respiratory distress.      Breath sounds: Normal breath sounds. No wheezing or rales.   Chest:      Chest wall: No tenderness.   Abdominal:      General: Bowel sounds are normal. There is no distension.      Palpations: Abdomen is soft. There is no mass.      Tenderness: There is no abdominal tenderness. There is no rebound.   Musculoskeletal:         " General: No tenderness or edema. Normal range of motion.      Cervical back: Normal range of motion and neck supple.   Skin:     General: Skin is warm and dry.      Coloration: Skin is not pale.   Neurological:      Mental Status: She is alert and oriented to person, place, and time.      Coordination: Coordination normal.      Deep Tendon Reflexes: Reflexes normal.   Psychiatric:         Mood and Affect: Mood and affect normal.         Behavior: Behavior normal.         Judgment: Judgment normal.             EC/10/2021- reviewed.  NSR, right axis, anterior infarct.  Unchanged from prior.       Echo: 10/23/2020- reviewed.  Conclusion     · The left ventricle is normal in size with normal systolic function. The estimated ejection fraction is 55%.  · Normal left ventricular diastolic function.  · Normal right ventricular systolic function.  · Mild-to-moderate mitral regurgitation.  · Normal central venous pressure (3 mmHg).  · The estimated PA systolic pressure is 25 mmHg.      [facility]: 7/15/2020- reviewed.   Summary        · Normal coronary arteries.  · LV filling pressure is normal, LVEDP 12 mmHg.  · Estimated blood loss: <50 mL      Assessment:       1. Anxiety    2. Aortic atherosclerosis    3. Mitral valve prolapse    4. Hyperlipidemia, mixed    5. Primary cancer of lower outer quadrant of left female breast    6. Infiltrating ductal carcinoma of left female breast    7. Hypothyroidism (acquired)    8. Gastroesophageal reflux disease without esophagitis    9. Atypical chest pain         Plan:         Anxiety  -She mentions multiple social stressors and increased anxiety and panic attacks.  -Experienced increased of anxiety with loss of custody of grandchildren.    - psych referral    Aortic atherosclerosis  Continue on ASA.     Mitral valve prolapse  -Mild to moderate MR noted on echo 10/23/2020.     - continue to monitor clinically    Hyperlipidemia, mixed  -Managed per PCP    Primary cancer of lower outer  quadrant of left female breast  Followed by oncology.      Invasive ductal carcinoma of breast, female  Followed by oncology.      Hypothyroidism (acquired)  Followed by PCP.      Gastroesophageal reflux disease  Followed by PCP.      Atypical chest pain  -Most likely related to anxiety and stress.  Pt w/ normal coronaries 7/2020.    - continue current medical therapy  - psych referral for anxiety evaluation      Total duration of face to face visit time 30 minutes.  Total time spent counseling greater than fifty percent of total visit time.  Counseling included discussion regarding imaging findings, diagnosis, possibilities, treatment options, risks and benefits.  The patient had many questions regarding the options and long-term effects      Brian Monroe NP  Cardiology

## 2022-02-14 NOTE — ASSESSMENT & PLAN NOTE
-Most likely related to anxiety and stress.  Pt w/ normal coronaries 7/2020.    - continue current medical therapy  - psych referral for anxiety evaluation

## 2022-02-14 NOTE — ASSESSMENT & PLAN NOTE
-She mentions multiple social stressors and increased anxiety and panic attacks.  -Experienced increased of anxiety with loss of custody of grandchildren.    - psych referral

## 2022-02-22 ENCOUNTER — TELEPHONE (OUTPATIENT)
Dept: CARDIOLOGY | Facility: CLINIC | Age: 67
End: 2022-02-22

## 2022-02-22 NOTE — TELEPHONE ENCOUNTER
----- Message from Brian Monroe NP sent at 2/22/2022  3:21 PM CST -----  Please inform Ms Rivera that her heart function study was normal. Thanks

## 2022-05-04 ENCOUNTER — TELEPHONE (OUTPATIENT)
Dept: INTERNAL MEDICINE | Facility: CLINIC | Age: 67
End: 2022-05-04
Payer: MEDICARE

## 2022-05-04 NOTE — TELEPHONE ENCOUNTER
----- Message from Rylee Ball sent at 5/4/2022  2:31 PM CDT -----  Contact: Pt [phone]  Caller is requesting an earlier appointment then we can schedule.  Caller is requesting a message be sent to the provider.  If this is for urgent care symptoms, did you offer other providers at this location, providers at other locations, or [facility]? Yes     When is the next available appointment with their provider:  7/6/22  Reason for the appointment:  Back pain and she only wants to see you

## 2022-05-05 ENCOUNTER — PATIENT MESSAGE (OUTPATIENT)
Dept: CARDIOLOGY | Facility: CLINIC | Age: 67
End: 2022-05-05
Payer: MEDICARE

## 2022-05-05 ENCOUNTER — TELEPHONE (OUTPATIENT)
Dept: INTERNAL MEDICINE | Facility: CLINIC | Age: 67
End: 2022-05-05
Payer: MEDICARE

## 2022-05-05 NOTE — TELEPHONE ENCOUNTER
----- Message from Lashawn Aquino sent at 5/5/2022  8:39 AM CDT -----  Contact: [phone]  Patient is returning a phone call.  Who left a message for the patient:Melita Asencio MA   Does patient know what this is regarding:  yes  Would you like a call back, or a response through your [facility] portal?:   call back  Comments:

## 2022-05-13 ENCOUNTER — OFFICE VISIT (OUTPATIENT)
Dept: INTERNAL MEDICINE | Facility: CLINIC | Age: 67
End: 2022-05-13
Payer: MEDICARE

## 2022-05-13 VITALS
WEIGHT: 119.63 LBS | HEART RATE: 69 BPM | BODY MASS INDEX: 19.93 KG/M2 | HEIGHT: 65 IN | OXYGEN SATURATION: 97 % | DIASTOLIC BLOOD PRESSURE: 74 MMHG | SYSTOLIC BLOOD PRESSURE: 114 MMHG

## 2022-05-13 DIAGNOSIS — G89.29 CHRONIC BILATERAL LOW BACK PAIN WITHOUT SCIATICA: ICD-10-CM

## 2022-05-13 DIAGNOSIS — C50.512 PRIMARY CANCER OF LOWER OUTER QUADRANT OF LEFT FEMALE BREAST: Primary | ICD-10-CM

## 2022-05-13 DIAGNOSIS — M81.0 AGE-RELATED OSTEOPOROSIS WITHOUT CURRENT PATHOLOGICAL FRACTURE: ICD-10-CM

## 2022-05-13 DIAGNOSIS — E78.2 HYPERLIPIDEMIA, MIXED: ICD-10-CM

## 2022-05-13 DIAGNOSIS — E03.9 HYPOTHYROIDISM (ACQUIRED): ICD-10-CM

## 2022-05-13 DIAGNOSIS — Z12.31 SCREENING MAMMOGRAM, ENCOUNTER FOR: ICD-10-CM

## 2022-05-13 DIAGNOSIS — M54.50 CHRONIC BILATERAL LOW BACK PAIN WITHOUT SCIATICA: ICD-10-CM

## 2022-05-13 PROBLEM — D84.9 IMMUNOSUPPRESSED STATUS: Status: RESOLVED | Noted: 2019-05-29 | Resolved: 2022-05-13

## 2022-05-13 PROCEDURE — 3078F PR MOST RECENT DIASTOLIC BLOOD PRESSURE < 80 MM HG: ICD-10-PCS | Mod: CPTII,S$GLB,, | Performed by: INTERNAL MEDICINE

## 2022-05-13 PROCEDURE — 3074F SYST BP LT 130 MM HG: CPT | Mod: CPTII,S$GLB,, | Performed by: INTERNAL MEDICINE

## 2022-05-13 PROCEDURE — 99499 UNLISTED E&M SERVICE: CPT | Mod: S$GLB,,, | Performed by: INTERNAL MEDICINE

## 2022-05-13 PROCEDURE — 1125F PR PAIN SEVERITY QUANTIFIED, PAIN PRESENT: ICD-10-PCS | Mod: CPTII,S$GLB,, | Performed by: INTERNAL MEDICINE

## 2022-05-13 PROCEDURE — 3078F DIAST BP <80 MM HG: CPT | Mod: CPTII,S$GLB,, | Performed by: INTERNAL MEDICINE

## 2022-05-13 PROCEDURE — 1159F PR MEDICATION LIST DOCUMENTED IN MEDICAL RECORD: ICD-10-PCS | Mod: CPTII,S$GLB,, | Performed by: INTERNAL MEDICINE

## 2022-05-13 PROCEDURE — 3008F BODY MASS INDEX DOCD: CPT | Mod: CPTII,S$GLB,, | Performed by: INTERNAL MEDICINE

## 2022-05-13 PROCEDURE — 99214 PR OFFICE/OUTPT VISIT, EST, LEVL IV, 30-39 MIN: ICD-10-PCS | Mod: S$GLB,,, | Performed by: INTERNAL MEDICINE

## 2022-05-13 PROCEDURE — 3008F PR BODY MASS INDEX (BMI) DOCUMENTED: ICD-10-PCS | Mod: CPTII,S$GLB,, | Performed by: INTERNAL MEDICINE

## 2022-05-13 PROCEDURE — 99999 PR PBB SHADOW E&M-EST. PATIENT-LVL IV: ICD-10-PCS | Mod: PBBFAC,,, | Performed by: INTERNAL MEDICINE

## 2022-05-13 PROCEDURE — 1159F MED LIST DOCD IN RCRD: CPT | Mod: CPTII,S$GLB,, | Performed by: INTERNAL MEDICINE

## 2022-05-13 PROCEDURE — 99999 PR PBB SHADOW E&M-EST. PATIENT-LVL IV: CPT | Mod: PBBFAC,,, | Performed by: INTERNAL MEDICINE

## 2022-05-13 PROCEDURE — 99499 RISK ADDL DX/OHS AUDIT: ICD-10-PCS | Mod: S$GLB,,, | Performed by: INTERNAL MEDICINE

## 2022-05-13 PROCEDURE — 99214 OFFICE O/P EST MOD 30 MIN: CPT | Mod: S$GLB,,, | Performed by: INTERNAL MEDICINE

## 2022-05-13 PROCEDURE — 3074F PR MOST RECENT SYSTOLIC BLOOD PRESSURE < 130 MM HG: ICD-10-PCS | Mod: CPTII,S$GLB,, | Performed by: INTERNAL MEDICINE

## 2022-05-13 PROCEDURE — 1125F AMNT PAIN NOTED PAIN PRSNT: CPT | Mod: CPTII,S$GLB,, | Performed by: INTERNAL MEDICINE

## 2022-05-13 RX ORDER — SODIUM CHLORIDE 0.9 % (FLUSH) 0.9 %
10 SYRINGE (ML) INJECTION
Status: CANCELLED | OUTPATIENT
Start: 2022-05-16

## 2022-05-13 RX ORDER — ACETAMINOPHEN 500 MG
500 TABLET ORAL
Status: CANCELLED | OUTPATIENT
Start: 2022-05-16

## 2022-05-13 RX ORDER — HEPARIN 100 UNIT/ML
500 SYRINGE INTRAVENOUS
Status: CANCELLED | OUTPATIENT
Start: 2022-05-16

## 2022-05-13 RX ORDER — ZOLEDRONIC ACID 5 MG/100ML
5 INJECTION, SOLUTION INTRAVENOUS
Status: CANCELLED | OUTPATIENT
Start: 2022-05-16

## 2022-05-13 NOTE — PROGRESS NOTES
[facility] Note    Chief Complaint      Chief Complaint   Patient presents with    Back Pain       History of Present Illness      Radha Rivera is a 66 y.o. female with chronic conditions of breast cancer, HLD, hypothyroidism, osteopenia, hx of takatsubo's cardiomyopathy who presents today for: follow up chronic conditions and complaints of back pain. Present for the past few weeks.  Has been taking tylenol which helps temporarily.    Breast cancer, L invasive ductal carcinoma with axillary avni involvement T1N1, lung nodule: Sees Dr. Baker, Dr. Mock, Dr. Valdes.  S/P chemotherapy and radiation.    Hypothyroidism: Controlled on synthroid.  TSH on 10/10/2019 at goal.  No new or worsening symptoms.   Osteoporosis: DEXA 2022.    Hx of takatsubo's cardiomyopathy: resolved.  No residual symptoms.  Flu shot declines.  TdAP 2016.    Mammogram UTD.  DEXA 2022.  Cscope with Dr. Delcid 2007.  FITKit negative 11/2021.        Past Medical History:  Past Medical History:   Diagnosis Date    Breast cancer 01/2019    left    Mitral valve prolapse     Stress-induced cardiomyopathy 7/14/2020    Thyroid disease        Past Surgical History:   has a past surgical history that includes tonsillectomy; Ear drum surgery; Tonsillectomy; Inner ear surgery (Right); Breast biopsy (Left, 01/2019); Insertion of tunneled central venous catheter (CVC) with subcutaneous port (Right, 2/8/2019); Mastectomy, partial (Left, 8/21/2019); [place] lymph node biopsy (Left, 8/21/2019); Breast lumpectomy; and Left heart catheterization (Left, 7/15/2020).    Family History:  family history includes Breast cancer in her maternal aunt and paternal aunt; Breast cancer (age of onset: 68) in her sister; Cancer in her father; Thyroid disease in her daughter.     Social History:  Social History     Tobacco Use    Smoking status: Never Smoker    Smokeless tobacco: Never Used   Substance Use Topics    Alcohol use: Yes      "Comment: Seldom    Drug use: No       I personally reviewed all past medical, surgical, social and family history.    Review of Systems   Constitutional: Negative for chills, fever and malaise/fatigue.   Respiratory: Negative for shortness of breath.    Cardiovascular: Negative for chest pain.   Gastrointestinal: Negative for constipation, diarrhea, nausea and vomiting.   Skin: Negative for rash.   Neurological: Negative for weakness.   All other systems reviewed and are negative.       Medications:  Outpatient Encounter Medications as of 5/13/2022   Medication Sig Dispense Refill    aspirin 81 MG Chew Take 1 tablet (81 mg total) by mouth once daily. 30 tablet 0    levothyroxine (EUTHYROX) 75 MCG tablet Take 1 tablet (75 mcg total) by mouth every morning. 90 tablet 3    vit A/vit C/vit E/zinc/copper (PRESERVISION AREDS ORAL) Take by mouth.      vitamin D 1000 units Tab Take 2,000 Units by mouth once daily.        No facility-administered encounter medications on file as of 5/13/2022.       Allergies:  Review of patient's allergies indicates:   Allergen Reactions    Azithromycin     Bactrim [sulfamethoxazole-trimethoprim] Other (See Comments)     Caused reflux and severe nausea       Health Maintenance:  Immunization History   Administered Date(s) Administered    Tdap 10/17/2010, 07/22/2016      Health Maintenance   Topic Date Due    Hepatitis C Screening  Never done    High Dose Statin  Never done    Mammogram  03/09/2022    DEXA Scan  05/02/2025    Lipid Panel  04/28/2026    TETANUS VACCINE  07/22/2026        Physical Exam      Vital Signs  Pulse: 69  SpO2: 97 %  BP: 114/74  BP Location: Left arm  Patient Position: Sitting  Pain Score:   3  Pain Loc: Back  Height and Weight  Height: 5' 5" (165.1 cm)  Weight: 54.2 kg (119 lb 9.6 oz)  BSA (Calculated - sq m): 1.58 sq meters  BMI (Calculated): 19.9  Weight in (lb) to have BMI = 25: 149.9]    Physical Exam  Vitals reviewed.   Constitutional:       " Appearance: She is well-developed.   HENT:      Head: Normocephalic and atraumatic.      Right Ear: External ear normal.      Left Ear: External ear normal.   Cardiovascular:      Rate and Rhythm: Normal rate and regular rhythm.      Heart sounds: Normal heart sounds. No murmur heard.  Pulmonary:      Effort: Pulmonary effort is normal.      Breath sounds: Normal breath sounds. No wheezing or rales.   Abdominal:      General: Bowel sounds are normal. There is no distension.      Palpations: Abdomen is soft.      Tenderness: There is no abdominal tenderness.          Laboratory:  CBC:  Recent Labs   Lab 04/28/21  0907 07/10/21  1203 10/20/21  1146   WBC 4.36 5.07 5.10   RBC 3.96 L 4.19 4.24   Hemoglobin 12.3 12.9 12.8   Hematocrit 38.7 40.2 40.2   Platelets 186 200 204   MCV 98 96 95   MCH 31.1 H 30.8 30.2   MCHC 31.8 L 32.1 31.8 L     CMP:  Recent Labs   Lab 04/28/21  0907 07/10/21  1203 10/20/21  1146   Glucose 94 104 94   Calcium 9.6 10.0 10.1   Albumin 4.4 4.6 4.6   Total Protein 7.2 7.7 7.5   Sodium 143 142 145   Potassium 4.1 4.1 4.6   CO2 31 H 28 31 H   Chloride 106 104 106   BUN 14 16 16   Alkaline Phosphatase 62 69 63   ALT 21 25 26   AST 28 30 28   Total Bilirubin 0.5 0.3 0.4     URINALYSIS:       LIPIDS:  Recent Labs   Lab 10/10/19  0811 07/14/20  0600 07/15/20  0611 07/21/20  1517 04/28/21  0907 10/20/21  1146   TSH 0.50   < >  --  0.629 0.503 1.070   HDL 55  --  59  --  60  --    Cholesterol 214 H  --  191  --  200 H  --    Triglycerides 94  --  77  --  78  --    LDL Cholesterol 139 H  --  116.6  --  124.4  --    HDL/Cholesterol Ratio 3.9  --  30.9  --  30.0  --    Non-HDL Cholesterol  --   --  132  --  140  --    Non HDL Chol. (LDL+VLDL) 159 H  --   --   --   --   --    Total Cholesterol/HDL Ratio  --   --  3.2  --  3.3  --     < > = values in this interval not displayed.     TSH:  Recent Labs   Lab 07/21/20  1517 04/28/21  0907 10/20/21  1146   TSH 0.629 0.503 1.070     A1C:  Recent Labs   Lab  10/10/19  0811 07/14/20  0600 04/28/21  0907   Hemoglobin A1C 5.7 H 5.7 H 5.6       Assessment/Plan     Radha Rivera is a 66 y.o.female with:    1. Primary cancer of lower outer quadrant of left female breast  Update mammogram.  F/U with oncology as needed.  2. Hyperlipidemia, mixed  Cont to work on diet.  Update labs  3. Hypothyroidism (acquired)  Continue current meds.  Update labs  4. Age-related osteoporosis without current pathological fracture  Discussed starting reclast  5. Chronic bilateral low back pain without sciatica  - X-Ray Lumbar Spine 5 View; Future  Check Xrays to evaluate for lytic bone process considering hx of cancer.  6. Screening mammogram, encounter for  - Mammo Digital Screening Bilat w/ Jeremy; Future       Chronic conditions status updated as per HPI.  Other than changes above, cont current medications and maintain follow up with specialists.  Follow up in about 6 months (around 11/13/2022) for Follow up visit.    Future Appointments   Date Time Provider Department Center   5/17/2022 11:00 AM Brian Monroe NP [facility] CARDIO Tammy Vera MD  [facility]

## 2022-05-16 ENCOUNTER — TELEPHONE (OUTPATIENT)
Dept: INTERNAL MEDICINE | Facility: CLINIC | Age: 67
End: 2022-05-16
Payer: MEDICARE

## 2022-05-16 NOTE — TELEPHONE ENCOUNTER
----- Message from Bogdan Najera sent at 5/16/2022  3:47 PM CDT -----  Contact: [phone]  Pt wants a call back about her left hip. That's all pt would give me.

## 2022-05-16 NOTE — TELEPHONE ENCOUNTER
Patient stated that her hip is from a fall a couple of months ago tripped over her rug. Fell hard on her left side, took tylenol and pain was gone. She stated that you asked her about it and she wanted to make you aware she remebered what happen.

## 2022-05-24 ENCOUNTER — TELEPHONE (OUTPATIENT)
Dept: INTERNAL MEDICINE | Facility: CLINIC | Age: 67
End: 2022-05-24
Payer: MEDICARE

## 2022-05-24 NOTE — TELEPHONE ENCOUNTER
----- Message from Jacqueline Jaime sent at 5/24/2022 11:50 AM CDT -----  Contact: [phone]  Pt called to advise that she would like to speak to the provider in reference to her left hip. Please Advise

## 2022-05-24 NOTE — TELEPHONE ENCOUNTER
Spoke with pt and she was very adamant on speaking with Dr. Vera personally about and old injury and some hip pain she has been having.     She did not want to schedule an appt, she would like a call back from Dr. Vera when he has free time after clinic

## 2022-07-20 ENCOUNTER — HOSPITAL ENCOUNTER (OUTPATIENT)
Dept: RADIOLOGY | Facility: HOSPITAL | Age: 67
Discharge: HOME | End: 2022-07-20
Attending: INTERNAL MEDICINE
Payer: MEDICARE

## 2022-07-20 DIAGNOSIS — Z12.31 SCREENING MAMMOGRAM, ENCOUNTER FOR: ICD-10-CM

## 2022-07-20 PROCEDURE — 77067 SCR MAMMO BI INCL CAD: CPT | Mod: 26,,, | Performed by: RADIOLOGY

## 2022-07-20 PROCEDURE — 77067 MAMMO DIGITAL SCREENING BILAT WITH TOMO: ICD-10-PCS | Mod: 26,,, | Performed by: RADIOLOGY

## 2022-07-20 PROCEDURE — 77063 MAMMO DIGITAL SCREENING BILAT WITH TOMO: ICD-10-PCS | Mod: 26,,, | Performed by: RADIOLOGY

## 2022-07-20 PROCEDURE — 77063 BREAST TOMOSYNTHESIS BI: CPT | Mod: 26,,, | Performed by: RADIOLOGY

## 2022-07-20 PROCEDURE — 77063 BREAST TOMOSYNTHESIS BI: CPT | Mod: TC

## 2022-08-31 ENCOUNTER — TELEPHONE (OUTPATIENT)
Dept: INTERNAL MEDICINE | Facility: CLINIC | Age: 67
End: 2022-08-31
Payer: MEDICARE

## 2022-08-31 DIAGNOSIS — E03.9 HYPOTHYROIDISM (ACQUIRED): Primary | ICD-10-CM

## 2022-08-31 DIAGNOSIS — R73.01 IMPAIRED FASTING GLUCOSE: ICD-10-CM

## 2022-08-31 DIAGNOSIS — C50.912 INFILTRATING DUCTAL CARCINOMA OF LEFT FEMALE BREAST: ICD-10-CM

## 2022-08-31 NOTE — TELEPHONE ENCOUNTER
----- Message from Charlotte Navas sent at 8/31/2022 11:50 AM CDT -----  Contact: [phone]  Type: Test Results    What test was performed?lab    Who ordered the test? [facility]    When and where were the test performed? 5/16/22    Would you like response via [facility]: no    Comments:

## 2022-08-31 NOTE — TELEPHONE ENCOUNTER
Aware of labs. States shes been very tired for about 2 months. Asking if you want to order any other labs or change her meds.

## 2022-09-07 ENCOUNTER — TELEPHONE (OUTPATIENT)
Dept: INTERNAL MEDICINE | Facility: CLINIC | Age: 67
End: 2022-09-07
Payer: MEDICARE

## 2022-09-07 DIAGNOSIS — M54.9 BACK PAIN, UNSPECIFIED BACK LOCATION, UNSPECIFIED BACK PAIN LATERALITY, UNSPECIFIED CHRONICITY: Primary | ICD-10-CM

## 2022-09-12 ENCOUNTER — PATIENT OUTREACH (OUTPATIENT)
Dept: ADMINISTRATIVE | Facility: HOSPITAL | Age: 67
End: 2022-09-12
Payer: MEDICARE

## 2022-11-14 ENCOUNTER — OFFICE VISIT (OUTPATIENT)
Dept: INTERNAL MEDICINE | Facility: CLINIC | Age: 67
End: 2022-11-14
Payer: MEDICARE

## 2022-11-14 VITALS
HEIGHT: 65 IN | HEART RATE: 84 BPM | SYSTOLIC BLOOD PRESSURE: 130 MMHG | DIASTOLIC BLOOD PRESSURE: 70 MMHG | BODY MASS INDEX: 20.17 KG/M2 | OXYGEN SATURATION: 98 % | WEIGHT: 121.06 LBS

## 2022-11-14 DIAGNOSIS — M81.0 AGE-RELATED OSTEOPOROSIS WITHOUT CURRENT PATHOLOGICAL FRACTURE: ICD-10-CM

## 2022-11-14 DIAGNOSIS — E78.2 HYPERLIPIDEMIA, MIXED: ICD-10-CM

## 2022-11-14 DIAGNOSIS — E03.9 HYPOTHYROIDISM (ACQUIRED): ICD-10-CM

## 2022-11-14 DIAGNOSIS — C50.912 INFILTRATING DUCTAL CARCINOMA OF LEFT FEMALE BREAST: Primary | ICD-10-CM

## 2022-11-14 DIAGNOSIS — R73.01 IMPAIRED FASTING GLUCOSE: ICD-10-CM

## 2022-11-14 PROCEDURE — 99999 PR PBB SHADOW E&M-EST. PATIENT-LVL III: ICD-10-PCS | Mod: PBBFAC,,, | Performed by: INTERNAL MEDICINE

## 2022-11-14 PROCEDURE — 3008F BODY MASS INDEX DOCD: CPT | Mod: CPTII,S$GLB,, | Performed by: INTERNAL MEDICINE

## 2022-11-14 PROCEDURE — 3078F DIAST BP <80 MM HG: CPT | Mod: CPTII,S$GLB,, | Performed by: INTERNAL MEDICINE

## 2022-11-14 PROCEDURE — 3008F PR BODY MASS INDEX (BMI) DOCUMENTED: ICD-10-PCS | Mod: CPTII,S$GLB,, | Performed by: INTERNAL MEDICINE

## 2022-11-14 PROCEDURE — 1159F PR MEDICATION LIST DOCUMENTED IN MEDICAL RECORD: ICD-10-PCS | Mod: CPTII,S$GLB,, | Performed by: INTERNAL MEDICINE

## 2022-11-14 PROCEDURE — 99214 PR OFFICE/OUTPT VISIT, EST, LEVL IV, 30-39 MIN: ICD-10-PCS | Mod: S$GLB,,, | Performed by: INTERNAL MEDICINE

## 2022-11-14 PROCEDURE — 3078F PR MOST RECENT DIASTOLIC BLOOD PRESSURE < 80 MM HG: ICD-10-PCS | Mod: CPTII,S$GLB,, | Performed by: INTERNAL MEDICINE

## 2022-11-14 PROCEDURE — 99999 PR PBB SHADOW E&M-EST. PATIENT-LVL III: CPT | Mod: PBBFAC,,, | Performed by: INTERNAL MEDICINE

## 2022-11-14 PROCEDURE — 1126F AMNT PAIN NOTED NONE PRSNT: CPT | Mod: CPTII,S$GLB,, | Performed by: INTERNAL MEDICINE

## 2022-11-14 PROCEDURE — 3075F SYST BP GE 130 - 139MM HG: CPT | Mod: CPTII,S$GLB,, | Performed by: INTERNAL MEDICINE

## 2022-11-14 PROCEDURE — 3075F PR MOST RECENT SYSTOLIC BLOOD PRESS GE 130-139MM HG: ICD-10-PCS | Mod: CPTII,S$GLB,, | Performed by: INTERNAL MEDICINE

## 2022-11-14 PROCEDURE — 1126F PR PAIN SEVERITY QUANTIFIED, NO PAIN PRESENT: ICD-10-PCS | Mod: CPTII,S$GLB,, | Performed by: INTERNAL MEDICINE

## 2022-11-14 PROCEDURE — 1159F MED LIST DOCD IN RCRD: CPT | Mod: CPTII,S$GLB,, | Performed by: INTERNAL MEDICINE

## 2022-11-14 PROCEDURE — 99214 OFFICE O/P EST MOD 30 MIN: CPT | Mod: S$GLB,,, | Performed by: INTERNAL MEDICINE

## 2022-11-14 RX ORDER — LEVOTHYROXINE SODIUM 75 UG/1
75 TABLET ORAL EVERY MORNING
Qty: 90 TABLET | Refills: 3 | Status: SHIPPED | OUTPATIENT
Start: 2022-11-14 | End: 2023-11-12

## 2022-11-14 NOTE — PROGRESS NOTES
[facility] Note    Chief Complaint      Chief Complaint   Patient presents with    Follow-up     6 month f/u       History of Present Illness      Radha Rivera is a 67 y.o. female with chronic conditions of breast cancer, HLD, hypothyroidism, osteopenia, hx of takatsubo's cardiomyopathy who presents today for:  Breast cancer, L invasive ductal carcinoma with axillary avni involvement T1N1, lung nodule: Sees Dr. Baker, Dr. Mock, Dr. Valdes.  S/P chemotherapy and radiation.    Hypothyroidism: Controlled on synthroid.  TSH on 10/10/2019 at goal.  No new or worsening symptoms.   Osteoporosis: DEXA 2022.  Discussed Reclast but pt declines.  Hx of takatsubo's cardiomyopathy: resolved.  No residual symptoms.  Flu shot declines.  TdAP 2016.    Mammogram UTD.  DEXA 2022.  Cscope with Dr. Delcid 2007.  FITKit negative 11/2021.      Past Medical History:  Past Medical History:   Diagnosis Date    Breast cancer 01/2019    left    Mitral valve prolapse     Stress-induced cardiomyopathy 7/14/2020    Thyroid disease        Past Surgical History:   has a past surgical history that includes tonsillectomy; Ear drum surgery; Tonsillectomy; Inner ear surgery (Right); Breast biopsy (Left, 01/2019); Insertion of tunneled central venous catheter (CVC) with subcutaneous port (Right, 2/8/2019); Mastectomy, partial (Left, 8/21/2019); [place] lymph node biopsy (Left, 8/21/2019); Breast lumpectomy; and Left heart catheterization (Left, 7/15/2020).    Family History:  family history includes Breast cancer in her maternal aunt, paternal aunt, and sister; Cancer in her father; Thyroid disease in her daughter.     Social History:  Social History     Tobacco Use    Smoking status: Never    Smokeless tobacco: Never   Substance Use Topics    Alcohol use: Yes     Comment: Seldom    Drug use: No       I personally reviewed all past medical, surgical, social and family history.    Review of Systems   Constitutional:   "Negative for chills, fever and malaise/fatigue.   Respiratory:  Negative for shortness of breath.    Cardiovascular:  Negative for chest pain.   Gastrointestinal:  Negative for constipation, diarrhea, nausea and vomiting.   Skin:  Negative for rash.   Neurological:  Negative for weakness.   All other systems reviewed and are negative.     Medications:  Outpatient Encounter Medications as of 11/14/2022   Medication Sig Dispense Refill    levothyroxine (EUTHYROX) 75 MCG tablet Take 1 tablet (75 mcg total) by mouth every morning. 90 tablet 3    vit A/vit C/vit E/zinc/copper (PRESERVISION AREDS ORAL) Take by mouth.      vitamin D 1000 units Tab Take 2,000 Units by mouth once daily.       [DISCONTINUED] aspirin 81 MG Chew Take 1 tablet (81 mg total) by mouth once daily. 30 tablet 0    [DISCONTINUED] levothyroxine (EUTHYROX) 75 MCG tablet Take 1 tablet (75 mcg total) by mouth every morning. 90 tablet 3     No facility-administered encounter medications on file as of 11/14/2022.       Allergies:  Review of patient's allergies indicates:   Allergen Reactions    Azithromycin     Bactrim [sulfamethoxazole-trimethoprim] Other (See Comments)     Caused reflux and severe nausea       Health Maintenance:  Immunization History   Administered Date(s) Administered    Tdap 10/17/2010, 07/22/2016      Health Maintenance   Topic Date Due    Hepatitis C Screening  Never done    High Dose Statin  Never done    Mammogram  07/20/2023    DEXA Scan  05/02/2025    TETANUS VACCINE  07/22/2026    Lipid Panel  05/16/2027        Physical Exam      Vital Signs  Pulse: 84  SpO2: 98 %  BP: 130/70  BP Location: Right arm  Patient Position: Sitting  Pain Score: 0-No pain  Height and Weight  Height: 5' 5" (165.1 cm)  Weight: 54.9 kg (121 lb 0.5 oz)  BSA (Calculated - sq m): 1.59 sq meters  BMI (Calculated): 20.1  Weight in (lb) to have BMI = 25: 149.9]    Physical Exam  Vitals reviewed.   Constitutional:       Appearance: She is well-developed.   HENT: "      Head: Normocephalic and atraumatic.      Right Ear: External ear normal.      Left Ear: External ear normal.   Cardiovascular:      Rate and Rhythm: Normal rate and regular rhythm.      Heart sounds: Normal heart sounds. No murmur heard.  Pulmonary:      Effort: Pulmonary effort is normal.      Breath sounds: Normal breath sounds. No wheezing or rales.   Abdominal:      General: Bowel sounds are normal. There is no distension.      Palpations: Abdomen is soft.      Tenderness: There is no abdominal tenderness.        Laboratory:  CBC:  Recent Labs   Lab 10/20/21  1146 05/16/22  0734 09/01/22  1006   WBC 5.10 4.75 4.64   RBC 4.24 4.29 4.30   Hemoglobin 12.8 12.7 12.9   Hematocrit 40.2 40.6 40.1   Platelets 204 214 198   MCV 95 95 93   MCH 30.2 29.6 30.0   MCHC 31.8 L 31.3 L 32.2     CMP:  Recent Labs   Lab 10/20/21  1146 05/16/22  0734 09/01/22  1006   Glucose 94 92 92   Calcium 10.1 9.3 9.6   Albumin 4.6 4.2 4.7   Total Protein 7.5 7.2 7.5   Sodium 145 142 144   Potassium 4.6 4.3 4.5   CO2 31 H 30 H 28   Chloride 106 105 104   BUN 16 16 17   Alkaline Phosphatase 63 72 72   ALT 26 30 22   AST 28 32 29   Total Bilirubin 0.4 0.5 0.7     URINALYSIS:       LIPIDS:  Recent Labs   Lab 07/15/20  0611 07/21/20  1517 04/28/21  0907 10/20/21  1146 05/16/22  0734 09/01/22  1006   TSH  --    < > 0.503 1.070 0.602 0.580   HDL 59  --  60  --  61  --    Cholesterol 191  --  200 H  --  210 H  --    Triglycerides 77  --  78  --  87  --    LDL Cholesterol 116.6  --  124.4  --  131.6  --    HDL/Cholesterol Ratio 30.9  --  30.0  --  29.0  --    Non-HDL Cholesterol 132  --  140  --  149  --    Total Cholesterol/HDL Ratio 3.2  --  3.3  --  3.4  --     < > = values in this interval not displayed.     TSH:  Recent Labs   Lab 10/20/21  1146 05/16/22  0734 09/01/22  1006   TSH 1.070 0.602 0.580     A1C:  Recent Labs   Lab 07/14/20  0600 04/28/21  0907   Hemoglobin A1C 5.7 H 5.6       Assessment/Plan     Radha Rivera is a 67  y.o.female with:    1. Infiltrating ductal carcinoma of left female breast  - CBC Auto Differential; Future  Stable, continue to monitor  2. Hypothyroidism (acquired)  - TSH; Future  - T4, Free; Future  Continue current meds.    3. Hyperlipidemia, mixed  - Comprehensive Metabolic Panel; Future  - Lipid Panel; Future  Continue diet control  4. Impaired fasting glucose  Continue diet control  5. Age-related osteoporosis without current pathological fracture   DEXA due in 2024.    Chronic conditions status updated as per HPI.  Other than changes above, cont current medications and maintain follow up with specialists.  Follow up in about 6 months (around 5/14/2023) for Follow up visit.    No future appointments.    Salo Vera MD  [facility]

## 2022-11-15 ENCOUNTER — TELEPHONE (OUTPATIENT)
Dept: INTERNAL MEDICINE | Facility: CLINIC | Age: 67
End: 2022-11-15
Payer: MEDICARE

## 2022-12-20 ENCOUNTER — OFFICE VISIT (OUTPATIENT)
Dept: URGENT CARE | Facility: CLINIC | Age: 67
End: 2022-12-20
Payer: MEDICARE

## 2022-12-20 VITALS
TEMPERATURE: 99 F | HEIGHT: 65 IN | BODY MASS INDEX: 19.83 KG/M2 | RESPIRATION RATE: 20 BRPM | SYSTOLIC BLOOD PRESSURE: 131 MMHG | WEIGHT: 119 LBS | HEART RATE: 84 BPM | DIASTOLIC BLOOD PRESSURE: 60 MMHG | OXYGEN SATURATION: 97 %

## 2022-12-20 DIAGNOSIS — J11.1 FLU: ICD-10-CM

## 2022-12-20 DIAGNOSIS — R50.9 FEVER, UNSPECIFIED FEVER CAUSE: Primary | ICD-10-CM

## 2022-12-20 LAB
CTP QC/QA: YES
POC MOLECULAR INFLUENZA A AGN: NEGATIVE
POC MOLECULAR INFLUENZA B AGN: NEGATIVE

## 2022-12-20 PROCEDURE — 3078F PR MOST RECENT DIASTOLIC BLOOD PRESSURE < 80 MM HG: ICD-10-PCS | Mod: CPTII,S$GLB,, | Performed by: PHYSICIAN ASSISTANT

## 2022-12-20 PROCEDURE — 99203 OFFICE O/P NEW LOW 30 MIN: CPT | Mod: S$GLB,,, | Performed by: PHYSICIAN ASSISTANT

## 2022-12-20 PROCEDURE — 3008F BODY MASS INDEX DOCD: CPT | Mod: CPTII,S$GLB,, | Performed by: PHYSICIAN ASSISTANT

## 2022-12-20 PROCEDURE — 99203 PR OFFICE/OUTPT VISIT, NEW, LEVL III, 30-44 MIN: ICD-10-PCS | Mod: S$GLB,,, | Performed by: PHYSICIAN ASSISTANT

## 2022-12-20 PROCEDURE — 1159F PR MEDICATION LIST DOCUMENTED IN MEDICAL RECORD: ICD-10-PCS | Mod: CPTII,S$GLB,, | Performed by: PHYSICIAN ASSISTANT

## 2022-12-20 PROCEDURE — 1160F PR REVIEW ALL MEDS BY PRESCRIBER/CLIN PHARMACIST DOCUMENTED: ICD-10-PCS | Mod: CPTII,S$GLB,, | Performed by: PHYSICIAN ASSISTANT

## 2022-12-20 PROCEDURE — 1160F RVW MEDS BY RX/DR IN RCRD: CPT | Mod: CPTII,S$GLB,, | Performed by: PHYSICIAN ASSISTANT

## 2022-12-20 PROCEDURE — 1125F PR PAIN SEVERITY QUANTIFIED, PAIN PRESENT: ICD-10-PCS | Mod: CPTII,S$GLB,, | Performed by: PHYSICIAN ASSISTANT

## 2022-12-20 PROCEDURE — 87502 INFLUENZA DNA AMP PROBE: CPT | Mod: QW,S$GLB,, | Performed by: PHYSICIAN ASSISTANT

## 2022-12-20 PROCEDURE — 3075F SYST BP GE 130 - 139MM HG: CPT | Mod: CPTII,S$GLB,, | Performed by: PHYSICIAN ASSISTANT

## 2022-12-20 PROCEDURE — 1159F MED LIST DOCD IN RCRD: CPT | Mod: CPTII,S$GLB,, | Performed by: PHYSICIAN ASSISTANT

## 2022-12-20 PROCEDURE — 1125F AMNT PAIN NOTED PAIN PRSNT: CPT | Mod: CPTII,S$GLB,, | Performed by: PHYSICIAN ASSISTANT

## 2022-12-20 PROCEDURE — 87502 POCT INFLUENZA A/B MOLECULAR: ICD-10-PCS | Mod: QW,S$GLB,, | Performed by: PHYSICIAN ASSISTANT

## 2022-12-20 PROCEDURE — 3078F DIAST BP <80 MM HG: CPT | Mod: CPTII,S$GLB,, | Performed by: PHYSICIAN ASSISTANT

## 2022-12-20 PROCEDURE — 3075F PR MOST RECENT SYSTOLIC BLOOD PRESS GE 130-139MM HG: ICD-10-PCS | Mod: CPTII,S$GLB,, | Performed by: PHYSICIAN ASSISTANT

## 2022-12-20 PROCEDURE — 3008F PR BODY MASS INDEX (BMI) DOCUMENTED: ICD-10-PCS | Mod: CPTII,S$GLB,, | Performed by: PHYSICIAN ASSISTANT

## 2022-12-20 RX ORDER — OSELTAMIVIR PHOSPHATE 75 MG/1
75 CAPSULE ORAL 2 TIMES DAILY
Qty: 10 CAPSULE | Refills: 0 | Status: SHIPPED | OUTPATIENT
Start: 2022-12-20 | End: 2022-12-25

## 2022-12-20 NOTE — PROGRESS NOTES
"Subjective:       Patient ID: Radha Rivera is a 67 y.o. female.    Vitals:  height is 5' 5" (1.651 m) and weight is 54 kg (119 lb). Her oral temperature is 99.1 °F (37.3 °C). Her blood pressure is 131/60 and her pulse is 84. Her respiration is 20 and oxygen saturation is 97%.     Chief Complaint: URI    Patient complains of body aches fatigue malaise and fever with onset this morning.  She had a measured temperature 101.9° at 11:30 a.m. today    URI   This is a new problem. The current episode started today. The problem has been unchanged. The maximum temperature recorded prior to her arrival was 101 - 101.9 F (101.5). The fever has been present for Less than 1 day. Associated symptoms include headaches, nausea and a sore throat. Pertinent negatives include no abdominal pain, chest pain, congestion, coughing, diarrhea, joint swelling, rhinorrhea, sinus pain, sneezing, vomiting or wheezing. Treatments tried: Tylenol, and warm water with salt. The treatment provided mild relief.     Constitution: Positive for fever.   HENT:  Positive for sore throat. Negative for congestion and sinus pain.    Cardiovascular:  Negative for chest pain.   Respiratory:  Negative for cough and wheezing.    Gastrointestinal:  Positive for nausea. Negative for abdominal pain, vomiting and diarrhea.   Skin:  Negative for erythema.   Allergic/Immunologic: Negative for sneezing.   Neurological:  Positive for headaches.     Objective:      Physical Exam   Constitutional: She is oriented to person, place, and time. She appears well-developed. She is cooperative.  Non-toxic appearance. She does not appear ill. No distress.   HENT:   Head: Normocephalic and atraumatic.   Ears:   Right Ear: Hearing, tympanic membrane, external ear and ear canal normal.   Left Ear: Hearing, tympanic membrane, external ear and ear canal normal.   Nose: Nose normal. No mucosal edema, rhinorrhea or nasal deformity. No epistaxis. Right sinus exhibits no maxillary " sinus tenderness and no frontal sinus tenderness. Left sinus exhibits no maxillary sinus tenderness and no frontal sinus tenderness.   Mouth/Throat: Uvula is midline, oropharynx is clear and moist and mucous membranes are normal. No trismus in the jaw. Normal dentition. No uvula swelling. No oropharyngeal exudate, posterior oropharyngeal edema or posterior oropharyngeal erythema.   Eyes: Conjunctivae, EOM and lids are normal. Pupils are equal, round, and reactive to light. Right eye exhibits no discharge. Left eye exhibits no discharge. No scleral icterus.   Neck: Trachea normal and phonation normal. Neck supple. No JVD present. No tracheal deviation present. No thyromegaly present. No edema present. No erythema present. No neck rigidity present.   Cardiovascular: Normal rate, regular rhythm, normal heart sounds and normal pulses.   No murmur heard.Exam reveals no gallop and no friction rub.   Pulmonary/Chest: Effort normal and breath sounds normal. No stridor. No respiratory distress. She has no decreased breath sounds. She has no wheezes. She has no rhonchi. She has no rales. She exhibits no tenderness.   Abdominal: Normal appearance. She exhibits no distension. Soft. There is no abdominal tenderness. There is no rebound and no guarding.   Musculoskeletal: Normal range of motion.         General: No deformity. Normal range of motion.   Neurological: She is alert and oriented to person, place, and time. She exhibits normal muscle tone. Coordination normal.   Skin: Skin is warm, dry, intact, not diaphoretic, not pale and no rash. Capillary refill takes less than 2 seconds. No erythema   Psychiatric: Her speech is normal and behavior is normal. Judgment and thought content normal.   Nursing note and vitals reviewed.      Results for orders placed or performed in visit on 12/20/22   POCT Influenza A/B MOLECULAR   Result Value Ref Range    POC Molecular Influenza A Ag Negative Negative, Not Reported    POC Molecular  Influenza B Ag Negative Negative, Not Reported     Acceptable Yes     No results found.   Despite her flu swab being negative, I will elect to treat her.  Her symptoms only began 3 hours ago and flu swab may not be tested accurately at this time  Assessment:       1. Fever, unspecified fever cause    2. Flu          Plan:         Fever, unspecified fever cause  -     POCT Influenza A/B MOLECULAR    Flu  -     oseltamivir (TAMIFLU) 75 MG capsule; Take 1 capsule (75 mg total) by mouth 2 (two) times daily. for 5 days  Dispense: 10 capsule; Refill: 0    Follow up if symptoms worsen or fail to improve, for F/U with PCP or ED. There are no Patient Instructions on file for this visit.

## 2023-02-07 DIAGNOSIS — Z00.00 ENCOUNTER FOR MEDICARE ANNUAL WELLNESS EXAM: ICD-10-CM

## 2023-02-09 DIAGNOSIS — Z00.00 ENCOUNTER FOR MEDICARE ANNUAL WELLNESS EXAM: ICD-10-CM

## 2023-04-21 ENCOUNTER — PATIENT MESSAGE (OUTPATIENT)
Dept: ADMINISTRATIVE | Facility: HOSPITAL | Age: 68
End: 2023-04-21
Payer: MEDICARE

## 2023-04-26 ENCOUNTER — PATIENT MESSAGE (OUTPATIENT)
Dept: PRIMARY CARE CLINIC | Facility: CLINIC | Age: 68
End: 2023-04-26
Payer: MEDICARE

## 2023-05-03 ENCOUNTER — PATIENT MESSAGE (OUTPATIENT)
Dept: FAMILY MEDICINE | Facility: CLINIC | Age: 68
End: 2023-05-03
Payer: MEDICARE

## 2023-05-03 ENCOUNTER — TELEPHONE (OUTPATIENT)
Dept: FAMILY MEDICINE | Facility: CLINIC | Age: 68
End: 2023-05-03
Payer: MEDICARE

## 2023-05-31 ENCOUNTER — PATIENT MESSAGE (OUTPATIENT)
Dept: HEMATOLOGY/ONCOLOGY | Facility: CLINIC | Age: 68
End: 2023-05-31
Payer: MEDICARE

## 2023-05-31 ENCOUNTER — OFFICE VISIT (OUTPATIENT)
Dept: PRIMARY CARE CLINIC | Facility: CLINIC | Age: 68
End: 2023-05-31
Payer: MEDICARE

## 2023-05-31 VITALS
WEIGHT: 116.88 LBS | SYSTOLIC BLOOD PRESSURE: 130 MMHG | OXYGEN SATURATION: 99 % | HEART RATE: 76 BPM | HEIGHT: 65 IN | DIASTOLIC BLOOD PRESSURE: 70 MMHG | BODY MASS INDEX: 19.47 KG/M2

## 2023-05-31 DIAGNOSIS — Z12.11 ENCOUNTER FOR COLORECTAL CANCER SCREENING: ICD-10-CM

## 2023-05-31 DIAGNOSIS — I70.0 AORTIC ATHEROSCLEROSIS: ICD-10-CM

## 2023-05-31 DIAGNOSIS — M81.0 AGE-RELATED OSTEOPOROSIS WITHOUT CURRENT PATHOLOGICAL FRACTURE: ICD-10-CM

## 2023-05-31 DIAGNOSIS — Z12.12 ENCOUNTER FOR COLORECTAL CANCER SCREENING: ICD-10-CM

## 2023-05-31 DIAGNOSIS — G89.29 CHRONIC MIDLINE LOW BACK PAIN WITHOUT SCIATICA: ICD-10-CM

## 2023-05-31 DIAGNOSIS — M54.50 CHRONIC MIDLINE LOW BACK PAIN WITHOUT SCIATICA: ICD-10-CM

## 2023-05-31 DIAGNOSIS — E03.9 HYPOTHYROIDISM (ACQUIRED): ICD-10-CM

## 2023-05-31 DIAGNOSIS — C50.912 INFILTRATING DUCTAL CARCINOMA OF LEFT FEMALE BREAST: Primary | ICD-10-CM

## 2023-05-31 DIAGNOSIS — Z12.31 SCREENING MAMMOGRAM, ENCOUNTER FOR: ICD-10-CM

## 2023-05-31 PROCEDURE — 3078F PR MOST RECENT DIASTOLIC BLOOD PRESSURE < 80 MM HG: ICD-10-PCS | Mod: CPTII,S$GLB,, | Performed by: INTERNAL MEDICINE

## 2023-05-31 PROCEDURE — 1159F PR MEDICATION LIST DOCUMENTED IN MEDICAL RECORD: ICD-10-PCS | Mod: CPTII,S$GLB,, | Performed by: INTERNAL MEDICINE

## 2023-05-31 PROCEDURE — 3075F SYST BP GE 130 - 139MM HG: CPT | Mod: CPTII,S$GLB,, | Performed by: INTERNAL MEDICINE

## 2023-05-31 PROCEDURE — 99499 RISK ADDL DX/OHS AUDIT: ICD-10-PCS | Mod: S$GLB,,, | Performed by: INTERNAL MEDICINE

## 2023-05-31 PROCEDURE — 99999 PR PBB SHADOW E&M-EST. PATIENT-LVL III: ICD-10-PCS | Mod: PBBFAC,,, | Performed by: INTERNAL MEDICINE

## 2023-05-31 PROCEDURE — 99999 PR PBB SHADOW E&M-EST. PATIENT-LVL III: CPT | Mod: PBBFAC,,, | Performed by: INTERNAL MEDICINE

## 2023-05-31 PROCEDURE — 1126F AMNT PAIN NOTED NONE PRSNT: CPT | Mod: CPTII,S$GLB,, | Performed by: INTERNAL MEDICINE

## 2023-05-31 PROCEDURE — 99499 UNLISTED E&M SERVICE: CPT | Mod: S$GLB,,, | Performed by: INTERNAL MEDICINE

## 2023-05-31 PROCEDURE — 3075F PR MOST RECENT SYSTOLIC BLOOD PRESS GE 130-139MM HG: ICD-10-PCS | Mod: CPTII,S$GLB,, | Performed by: INTERNAL MEDICINE

## 2023-05-31 PROCEDURE — 99214 OFFICE O/P EST MOD 30 MIN: CPT | Mod: S$GLB,,, | Performed by: INTERNAL MEDICINE

## 2023-05-31 PROCEDURE — 3008F BODY MASS INDEX DOCD: CPT | Mod: CPTII,S$GLB,, | Performed by: INTERNAL MEDICINE

## 2023-05-31 PROCEDURE — 3078F DIAST BP <80 MM HG: CPT | Mod: CPTII,S$GLB,, | Performed by: INTERNAL MEDICINE

## 2023-05-31 PROCEDURE — 3008F PR BODY MASS INDEX (BMI) DOCUMENTED: ICD-10-PCS | Mod: CPTII,S$GLB,, | Performed by: INTERNAL MEDICINE

## 2023-05-31 PROCEDURE — 1126F PR PAIN SEVERITY QUANTIFIED, NO PAIN PRESENT: ICD-10-PCS | Mod: CPTII,S$GLB,, | Performed by: INTERNAL MEDICINE

## 2023-05-31 PROCEDURE — 1159F MED LIST DOCD IN RCRD: CPT | Mod: CPTII,S$GLB,, | Performed by: INTERNAL MEDICINE

## 2023-05-31 PROCEDURE — 99214 PR OFFICE/OUTPT VISIT, EST, LEVL IV, 30-39 MIN: ICD-10-PCS | Mod: S$GLB,,, | Performed by: INTERNAL MEDICINE

## 2023-05-31 NOTE — PROGRESS NOTES
[facility] Note    Chief Complaint      Chief Complaint   Patient presents with    Follow-up     6 month        History of Present Illness      Radha Rivera is a 67 y.o. female with chronic conditions of breast cancer, HLD, hypothyroidism, osteopenia, hx of takatsubo's cardiomyopathy who presents today for: follow up chronic conditions.  Complains of back pain with household chores.    Breast cancer, L invasive ductal carcinoma with axillary avni involvement T1N1, lung nodule: Sees Dr. Baker, Dr. Mock, Dr. Valdes.  S/P chemotherapy and radiation.    Hypothyroidism: Controlled on synthroid.  TSH on 10/10/2019 at goal.  No new or worsening symptoms.   Osteoporosis: DEXA 2022.  Discussed Reclast but pt declines.  Hx of takatsubo's cardiomyopathy: resolved.  No residual symptoms.  Flu shot declines.  TdAP 2016.    Mammogram due.  DEXA 2022.  Cscope with Dr. Delcid 2007.  FITKit negative 11/2021.       Past Medical History:  Past Medical History:   Diagnosis Date    Breast cancer 01/2019    left    Mitral valve prolapse     Stress-induced cardiomyopathy 7/14/2020    Thyroid disease        Past Surgical History:   has a past surgical history that includes tonsillectomy; Ear drum surgery; Tonsillectomy; Inner ear surgery (Right); Breast biopsy (Left, 01/2019); Insertion of tunneled central venous catheter (CVC) with subcutaneous port (Right, 2/8/2019); Mastectomy, partial (Left, 8/21/2019); [place] lymph node biopsy (Left, 8/21/2019); Breast lumpectomy; and Left heart catheterization (Left, 7/15/2020).    Family History:  family history includes Breast cancer in her maternal aunt, paternal aunt, and sister; Cancer in her father; Thyroid disease in her daughter.     Social History:  Social History     Tobacco Use    Smoking status: Never    Smokeless tobacco: Never   Substance Use Topics    Alcohol use: Yes     Comment: Seldom    Drug use: No       I personally reviewed all past medical,  "surgical, social and family history.    Review of Systems   Constitutional:  Negative for chills, fever and malaise/fatigue.   Respiratory:  Negative for shortness of breath.    Cardiovascular:  Negative for chest pain.   Gastrointestinal:  Negative for constipation, diarrhea, nausea and vomiting.   Skin:  Negative for rash.   Neurological:  Negative for weakness.   All other systems reviewed and are negative.     Medications:  Outpatient Encounter Medications as of 5/31/2023   Medication Sig Dispense Refill    levothyroxine (EUTHYROX) 75 MCG tablet Take 1 tablet (75 mcg total) by mouth every morning. 90 tablet 3    vit A/vit C/vit E/zinc/copper (PRESERVISION AREDS ORAL) Take by mouth.      vitamin D 1000 units Tab Take 2,000 Units by mouth once daily.        No facility-administered encounter medications on file as of 5/31/2023.       Allergies:  Review of patient's allergies indicates:   Allergen Reactions    Azithromycin     Bactrim [sulfamethoxazole-trimethoprim] Other (See Comments)     Caused reflux and severe nausea       Health Maintenance:  Immunization History   Administered Date(s) Administered    Tdap 10/17/2010, 07/22/2016      Health Maintenance   Topic Date Due    Hepatitis C Screening  Never done    High Dose Statin  Never done    Mammogram  07/20/2023    DEXA Scan  05/02/2025    TETANUS VACCINE  07/22/2026    Lipid Panel  05/16/2027        Physical Exam      Vital Signs  Pulse: 76  SpO2: 99 %  BP: 130/70  BP Location: Left arm  Patient Position: Sitting  Pain Score: 0-No pain  Height and Weight  Height: 5' 5" (165.1 cm)  Weight: 53 kg (116 lb 13.5 oz)  BSA (Calculated - sq m): 1.56 sq meters  BMI (Calculated): 19.4  Weight in (lb) to have BMI = 25: 149.9]    Physical Exam  Vitals reviewed.   Constitutional:       Appearance: She is well-developed.   HENT:      Head: Normocephalic and atraumatic.      Right Ear: External ear normal.      Left Ear: External ear normal.   Cardiovascular:      Rate and " Rhythm: Normal rate and regular rhythm.      Heart sounds: Normal heart sounds. No murmur heard.  Pulmonary:      Effort: Pulmonary effort is normal.      Breath sounds: Normal breath sounds. No wheezing or rales.   Abdominal:      General: Bowel sounds are normal. There is no distension.      Palpations: Abdomen is soft.      Tenderness: There is no abdominal tenderness.        Laboratory:  CBC:  Recent Labs   Lab 10/20/21  1146 05/16/22  0734 09/01/22  1006   WBC 5.10 4.75 4.64   RBC 4.24 4.29 4.30   Hemoglobin 12.8 12.7 12.9   Hematocrit 40.2 40.6 40.1   Platelets 204 214 198   MCV 95 95 93   MCH 30.2 29.6 30.0   MCHC 31.8 L 31.3 L 32.2     CMP:  Recent Labs   Lab 10/20/21  1146 05/16/22  0734 09/01/22  1006   Glucose 94 92 92   Calcium 10.1 9.3 9.6   Albumin 4.6 4.2 4.7   Total Protein 7.5 7.2 7.5   Sodium 145 142 144   Potassium 4.6 4.3 4.5   CO2 31 H 30 H 28   Chloride 106 105 104   BUN 16 16 17   Alkaline Phosphatase 63 72 72   ALT 26 30 22   AST 28 32 29   Total Bilirubin 0.4 0.5 0.7     URINALYSIS:       LIPIDS:  Recent Labs   Lab 07/15/20  0611 07/21/20  1517 04/28/21  0907 10/20/21  1146 05/16/22  0734 09/01/22  1006   TSH  --    < > 0.503 1.070 0.602 0.580   HDL 59  --  60  --  61  --    Cholesterol 191  --  200 H  --  210 H  --    Triglycerides 77  --  78  --  87  --    LDL Cholesterol 116.6  --  124.4  --  131.6  --    HDL/Cholesterol Ratio 30.9  --  30.0  --  29.0  --    Non-HDL Cholesterol 132  --  140  --  149  --    Total Cholesterol/HDL Ratio 3.2  --  3.3  --  3.4  --     < > = values in this interval not displayed.     TSH:  Recent Labs   Lab 10/20/21  1146 05/16/22  0734 09/01/22  1006   TSH 1.070 0.602 0.580     A1C:  Recent Labs   Lab 07/14/20  0600 04/28/21  0907   Hemoglobin A1C 5.7 H 5.6       Assessment/Plan     Radha Rivera is a 67 y.o.female with:    1. Infiltrating ductal carcinoma of left female breast  - CBC Auto Differential; Future  Sent message to Dr. Paula regarding following  up  2. Aortic atherosclerosis  - Comprehensive Metabolic Panel; Future  - Lipid Panel; Future    3. Hypothyroidism (acquired)  - TSH; Future  - T4, Free; Future  Continue current meds.    4. Age-related osteoporosis without current pathological fracture  Cont Vit D.   5. Encounter for colorectal cancer screening  - Fecal Immunochemical Test (iFOBT); Future    6. Chronic midline low back pain without sciatica    7. Screening mammogram, encounter for       Chronic conditions status updated as per HPI.  Other than changes above, cont current medications and maintain follow up with specialists.  Follow up in about 6 months (around 11/30/2023) for Follow up visit.    No future appointments.    Salo Vera MD  [facility]

## 2023-06-07 ENCOUNTER — TELEPHONE (OUTPATIENT)
Dept: HEMATOLOGY/ONCOLOGY | Facility: CLINIC | Age: 68
End: 2023-06-07
Payer: MEDICARE

## 2023-06-07 ENCOUNTER — TELEPHONE (OUTPATIENT)
Dept: SURGERY | Facility: CLINIC | Age: 68
End: 2023-06-07
Payer: MEDICARE

## 2023-06-07 NOTE — TELEPHONE ENCOUNTER
"----- Message from Aneesh Baker MD sent at 6/7/2023  9:57 AM CDT -----  Fine...  ----- Message -----  From: Elizabeth Campos RN  Sent: 6/7/2023   9:56 AM CDT  To: Aneesh Baker MD    You havent seen her in 2 years, how about a visit then you order the mammo?  s  ----- Message -----  From: Fabby Asencio  Sent: 6/7/2023   9:49 AM CDT  To: [facility] Staff    Regarding: orders  Contact: Pt     Pt requesting orders for the following mammo. Please call and adv      Confirmed contact below:   Contact Name: Radha Rivera  Phone Number: [phone]               Additional Notes:  "Thank you for all that you do for our patients"                                               "

## 2023-06-07 NOTE — TELEPHONE ENCOUNTER
Left voicemail for patient to make appt with Dr Paula prior to him ordering her mammo, since she has not had an office visit in 2 years.

## 2023-06-07 NOTE — TELEPHONE ENCOUNTER
"----- Message from Fabby Asencio sent at 6/7/2023  9:46 AM CDT -----  Scheduling Request       Patient Status: Est       Scheduling Appt: Mammo( message sent for mammo orders) ; F/U       Time/Date Preference: soonest available       Relationship to Patient?:       Contact Preference?: [phone]       Treating Provider: Chai       Do you feel you need to be seen today? No           Additional Notes:  "Thank you for all that you do for our patients"         "

## 2023-06-14 ENCOUNTER — TELEPHONE (OUTPATIENT)
Dept: HEMATOLOGY/ONCOLOGY | Facility: CLINIC | Age: 68
End: 2023-06-14
Payer: MEDICARE

## 2023-06-14 ENCOUNTER — TELEPHONE (OUTPATIENT)
Dept: SURGERY | Facility: CLINIC | Age: 68
End: 2023-06-14
Payer: MEDICARE

## 2023-06-14 NOTE — TELEPHONE ENCOUNTER
"----- Message from Fabby Asencio sent at 6/14/2023 10:34 AM CDT -----  Regarding: Pt advice  Contact: Pt     Pt requesting orders for the following mammo. Please call and adv      Confirmed contact below:   Contact Name: Radha Yeungken  Phone Number: [phone]               Additional Notes:  "Thank you for all that you do for our patients"                                           "

## 2023-06-19 ENCOUNTER — TELEPHONE (OUTPATIENT)
Dept: SURGERY | Facility: CLINIC | Age: 68
End: 2023-06-19
Payer: MEDICARE

## 2023-06-19 NOTE — TELEPHONE ENCOUNTER
----- Message from Tremontana Chevalier sent at 6/19/2023 11:46 AM CDT -----  Regarding: appt  Scheduling Request    Appt Type:  mammo and EP office visit/ annual    Date/Time Preference:    Treating Provider:  Dr. Paula    Caller Name:  pt     Contact Preference: [phone] / pt does not use portal    Comments/notes:  pt needs orders for mammo and needs to know if mammo is scheduled before or after the visit.

## 2023-06-23 ENCOUNTER — LAB VISIT (OUTPATIENT)
Dept: LAB | Facility: HOSPITAL | Age: 68
End: 2023-06-23
Attending: INTERNAL MEDICINE
Payer: MEDICARE

## 2023-06-23 DIAGNOSIS — Z12.11 ENCOUNTER FOR COLORECTAL CANCER SCREENING: ICD-10-CM

## 2023-06-23 DIAGNOSIS — Z12.12 ENCOUNTER FOR COLORECTAL CANCER SCREENING: ICD-10-CM

## 2023-06-23 PROCEDURE — 82274 ASSAY TEST FOR BLOOD FECAL: CPT | Mod: HCNC | Performed by: INTERNAL MEDICINE

## 2023-06-27 LAB — HEMOCCULT STL QL IA: NEGATIVE

## 2023-07-21 ENCOUNTER — TELEPHONE (OUTPATIENT)
Dept: HEMATOLOGY/ONCOLOGY | Facility: CLINIC | Age: 68
End: 2023-07-21
Payer: MEDICARE

## 2023-07-26 ENCOUNTER — OFFICE VISIT (OUTPATIENT)
Dept: HEMATOLOGY/ONCOLOGY | Facility: CLINIC | Age: 68
End: 2023-07-26
Payer: MEDICARE

## 2023-07-26 ENCOUNTER — HOSPITAL ENCOUNTER (OUTPATIENT)
Dept: RADIOLOGY | Facility: HOSPITAL | Age: 68
Discharge: HOME | End: 2023-07-26
Attending: INTERNAL MEDICINE
Payer: MEDICARE

## 2023-07-26 VITALS
WEIGHT: 118.06 LBS | HEIGHT: 65 IN | SYSTOLIC BLOOD PRESSURE: 145 MMHG | RESPIRATION RATE: 20 BRPM | HEART RATE: 68 BPM | BODY MASS INDEX: 19.67 KG/M2 | DIASTOLIC BLOOD PRESSURE: 64 MMHG | OXYGEN SATURATION: 99 %

## 2023-07-26 DIAGNOSIS — Z12.31 SCREENING MAMMOGRAM, ENCOUNTER FOR: ICD-10-CM

## 2023-07-26 DIAGNOSIS — C50.912 INFILTRATING DUCTAL CARCINOMA OF LEFT FEMALE BREAST: Primary | ICD-10-CM

## 2023-07-26 PROCEDURE — 77063 MAMMO DIGITAL SCREENING BILAT WITH TOMO: ICD-10-PCS | Mod: 26,HCNC,, | Performed by: RADIOLOGY

## 2023-07-26 PROCEDURE — 77063 BREAST TOMOSYNTHESIS BI: CPT | Mod: 26,HCNC,, | Performed by: RADIOLOGY

## 2023-07-26 PROCEDURE — 3008F PR BODY MASS INDEX (BMI) DOCUMENTED: ICD-10-PCS | Mod: HCNC,CPTII,S$GLB, | Performed by: STUDENT IN AN ORGANIZED HEALTH CARE EDUCATION/TRAINING PROGRAM

## 2023-07-26 PROCEDURE — 77067 MAMMO DIGITAL SCREENING BILAT WITH TOMO: ICD-10-PCS | Mod: 26,HCNC,, | Performed by: RADIOLOGY

## 2023-07-26 PROCEDURE — 3078F DIAST BP <80 MM HG: CPT | Mod: HCNC,CPTII,S$GLB, | Performed by: STUDENT IN AN ORGANIZED HEALTH CARE EDUCATION/TRAINING PROGRAM

## 2023-07-26 PROCEDURE — 1101F PT FALLS ASSESS-DOCD LE1/YR: CPT | Mod: HCNC,CPTII,S$GLB, | Performed by: STUDENT IN AN ORGANIZED HEALTH CARE EDUCATION/TRAINING PROGRAM

## 2023-07-26 PROCEDURE — 77067 SCR MAMMO BI INCL CAD: CPT | Mod: TC,HCNC

## 2023-07-26 PROCEDURE — 3288F FALL RISK ASSESSMENT DOCD: CPT | Mod: HCNC,CPTII,S$GLB, | Performed by: STUDENT IN AN ORGANIZED HEALTH CARE EDUCATION/TRAINING PROGRAM

## 2023-07-26 PROCEDURE — 1126F AMNT PAIN NOTED NONE PRSNT: CPT | Mod: HCNC,CPTII,S$GLB, | Performed by: STUDENT IN AN ORGANIZED HEALTH CARE EDUCATION/TRAINING PROGRAM

## 2023-07-26 PROCEDURE — 1159F MED LIST DOCD IN RCRD: CPT | Mod: HCNC,CPTII,S$GLB, | Performed by: STUDENT IN AN ORGANIZED HEALTH CARE EDUCATION/TRAINING PROGRAM

## 2023-07-26 PROCEDURE — 3077F PR MOST RECENT SYSTOLIC BLOOD PRESSURE >= 140 MM HG: ICD-10-PCS | Mod: HCNC,CPTII,S$GLB, | Performed by: STUDENT IN AN ORGANIZED HEALTH CARE EDUCATION/TRAINING PROGRAM

## 2023-07-26 PROCEDURE — 1159F PR MEDICATION LIST DOCUMENTED IN MEDICAL RECORD: ICD-10-PCS | Mod: HCNC,CPTII,S$GLB, | Performed by: STUDENT IN AN ORGANIZED HEALTH CARE EDUCATION/TRAINING PROGRAM

## 2023-07-26 PROCEDURE — 1126F PR PAIN SEVERITY QUANTIFIED, NO PAIN PRESENT: ICD-10-PCS | Mod: HCNC,CPTII,S$GLB, | Performed by: STUDENT IN AN ORGANIZED HEALTH CARE EDUCATION/TRAINING PROGRAM

## 2023-07-26 PROCEDURE — 99215 PR OFFICE/OUTPT VISIT, EST, LEVL V, 40-54 MIN: ICD-10-PCS | Mod: HCNC,S$GLB,, | Performed by: STUDENT IN AN ORGANIZED HEALTH CARE EDUCATION/TRAINING PROGRAM

## 2023-07-26 PROCEDURE — 99999 PR PBB SHADOW E&M-EST. PATIENT-LVL III: CPT | Mod: PBBFAC,HCNC,, | Performed by: STUDENT IN AN ORGANIZED HEALTH CARE EDUCATION/TRAINING PROGRAM

## 2023-07-26 PROCEDURE — 3078F PR MOST RECENT DIASTOLIC BLOOD PRESSURE < 80 MM HG: ICD-10-PCS | Mod: HCNC,CPTII,S$GLB, | Performed by: STUDENT IN AN ORGANIZED HEALTH CARE EDUCATION/TRAINING PROGRAM

## 2023-07-26 PROCEDURE — 77067 SCR MAMMO BI INCL CAD: CPT | Mod: 26,HCNC,, | Performed by: RADIOLOGY

## 2023-07-26 PROCEDURE — 1101F PR PT FALLS ASSESS DOC 0-1 FALLS W/OUT INJ PAST YR: ICD-10-PCS | Mod: HCNC,CPTII,S$GLB, | Performed by: STUDENT IN AN ORGANIZED HEALTH CARE EDUCATION/TRAINING PROGRAM

## 2023-07-26 PROCEDURE — 99215 OFFICE O/P EST HI 40 MIN: CPT | Mod: HCNC,S$GLB,, | Performed by: STUDENT IN AN ORGANIZED HEALTH CARE EDUCATION/TRAINING PROGRAM

## 2023-07-26 PROCEDURE — 3008F BODY MASS INDEX DOCD: CPT | Mod: HCNC,CPTII,S$GLB, | Performed by: STUDENT IN AN ORGANIZED HEALTH CARE EDUCATION/TRAINING PROGRAM

## 2023-07-26 PROCEDURE — 99999 PR PBB SHADOW E&M-EST. PATIENT-LVL III: ICD-10-PCS | Mod: PBBFAC,HCNC,, | Performed by: STUDENT IN AN ORGANIZED HEALTH CARE EDUCATION/TRAINING PROGRAM

## 2023-07-26 PROCEDURE — 3288F PR FALLS RISK ASSESSMENT DOCUMENTED: ICD-10-PCS | Mod: HCNC,CPTII,S$GLB, | Performed by: STUDENT IN AN ORGANIZED HEALTH CARE EDUCATION/TRAINING PROGRAM

## 2023-07-26 PROCEDURE — 3077F SYST BP >= 140 MM HG: CPT | Mod: HCNC,CPTII,S$GLB, | Performed by: STUDENT IN AN ORGANIZED HEALTH CARE EDUCATION/TRAINING PROGRAM

## 2023-07-27 NOTE — PROGRESS NOTES
Chief Complaint: No chief complaint on file.     HPI   Ms. Rivera presents today for follow up. Of note, on August 21, 2019 she underwent a left lumpectomy and sentinel node biopsy.  Three sentinel nodes were negative, while on the lumpectomy specimen there was only 5 mm of residual DCIS. S/p XRT.     Briefly, she is a 64-year-old  female from [place] who was diagnosed with breast cancer.  Apparently, she initially felt a mass in late 2017.  A mammogram at that time was read as BI-RADS category 1.  A repeat mammogram in December 2018 was read as BI-RADS category 4 and she underwent a biopsy that showed a carcinoma that was ER negative, UT weakly positive in 5% of the cells and HER-2 2+ by immunohistochemistry, but negative by FISH.  Ki-67 was 5%. She subsequently underwent a biopsy of a palpable lymph node on 02/01/2019, that showed evidence of lymph node metastasis.  She was referred for evaluation for chemotherapy.  Of note, she also had a PET scan on 02/01/2019 that showed an 8 mm right lower lobe pulmonary nodule that was not FDG avid.  She started AC chemotherapy, completed four cycles, and subsequently received 4 cycles of taxol.  She had surgery on 8/21/2018 with results as above.  She received post lumpectomy radiation therapy, however, she decided against adjuvant hormonal therapy.    Interval History:  Ms. Rivera returns today for follow up; last seen by Dr. Paula in October 2020. Reports that she has been doing well, although notes occasional pain in her L breast. Had MMG earlier today. Otherwise she is feeling well and denies complaints.    Review of Systems    +L breast pain  12pt ROS Negative except as noted above     Objective:   Physical Exam       ECOG 0   General: well appearing, in no apparent distress  HEENT: Normocephalic, EOMI, anicteric sclerae, MMM  Neck: supple, without cervical or supraclavicular lymphadenopathy.  Heart: regular rate and rhythm, normal S1 and S2, no  murmurs, gallops or rubs.  Lungs: Clear to auscultation bilaterally, no increased wob  Breast: s/p L lumpectomy with well-healed incision. No appreciable masses or axillary LAD  Abdomen: Soft, nontender, nondistended with normal bowel sounds. No hepatosplenomegaly.  Extremities: No LE edema or joint effusion  Skin: warm, well-perfused, no rash  Neurologic: Alert and oriented x 4, normal speech and gait   Psychiatric: Conversing appropriately with providers throughout today's encounter.       Reviewed all recent labs, imaging and pathology.  CT chest from 10/2020 with stable probably scarring at bilateral apices, stable 0.8cm nodule and stable micronodules.    Assessment:       1. Primary cancer of lower outer quadrant of left female breast, s/o neoadjuvant chemotherapy, s/p lumpectomy    2. Small lung nodule seen in the right lower lobe on her original PET scan, radiologically stable.            Plan:   Reviewed screening mammogram from today with no abnormal findings which was reassuring to her. Due for repeat MMG in 1 year. Will have her RTC in 6mo for follow up. She was in agreement with the above plan.      All questions were answered to her apparent satisfaction. Will see her back in 6mo or sooner should the need arise.    Gwen Swartz MD      Med Onc Chart Routing      Follow up with physician 6 months. Chai pt- please schedule with him in 6mo   Follow up with LINDA    Infusion scheduling note    Injection scheduling note    Labs None   Scheduling:  Preferred lab:  Lab interval:     Imaging None      Pharmacy appointment No pharmacy appointment needed      Other referrals no referral to [facility] needed -    No additional referrals needed

## 2023-08-14 ENCOUNTER — TELEPHONE (OUTPATIENT)
Dept: PRIMARY CARE CLINIC | Facility: CLINIC | Age: 68
End: 2023-08-14
Payer: MEDICARE

## 2023-08-14 DIAGNOSIS — C50.512 PRIMARY CANCER OF LOWER OUTER QUADRANT OF LEFT FEMALE BREAST: Primary | ICD-10-CM

## 2023-08-14 NOTE — TELEPHONE ENCOUNTER
----- Message from Dianne Peralta sent at 8/14/2023 11:01 AM CDT -----  Contact: Radha   1MEDICALADVICE     Patient is calling for Medical Advice regarding:feels a lump in left breast. Had breast cancer before    How long has patient had these symptoms:recently. Wants a referral to have a breast ultrasound    Pharmacy name and phone#:    Would like response via [facility]:  call back    Comments:

## 2023-08-14 NOTE — TELEPHONE ENCOUNTER
Pt calling for orders to have breast ultrasound   Feels lump in left breast and has hx of breast cancer, please advise

## 2023-08-14 NOTE — TELEPHONE ENCOUNTER
I saw that she saw recently saw Dr. Swartz and had a mammogram. I ordered an ultrasound and mammogram of left breast with her new finding since that visit.     Please help her in scheduling if needed    Thanks   Sally June NP

## 2023-08-15 ENCOUNTER — TELEPHONE (OUTPATIENT)
Dept: RADIOLOGY | Facility: HOSPITAL | Age: 68
End: 2023-08-15
Payer: MEDICARE

## 2023-08-15 NOTE — TELEPHONE ENCOUNTER
----- Message from Patricia Sullivan sent at 8/15/2023 10:00 AM CDT -----  Good Morning!   Sally June NP ordered MMG and US for pt. Please reach out to pt for scheduling. Thanks

## 2023-08-22 ENCOUNTER — HOSPITAL ENCOUNTER (OUTPATIENT)
Dept: RADIOLOGY | Facility: HOSPITAL | Age: 68
Discharge: HOME | End: 2023-08-22
Attending: NURSE PRACTITIONER
Payer: MEDICARE

## 2023-08-22 DIAGNOSIS — C50.512 PRIMARY CANCER OF LOWER OUTER QUADRANT OF LEFT FEMALE BREAST: ICD-10-CM

## 2023-08-22 PROCEDURE — 76642 ULTRASOUND BREAST LIMITED: CPT | Mod: 26,HCNC,LT, | Performed by: RADIOLOGY

## 2023-08-22 PROCEDURE — 76642 US BREAST LEFT LIMITED: ICD-10-PCS | Mod: 26,HCNC,LT, | Performed by: RADIOLOGY

## 2023-08-22 PROCEDURE — 76642 ULTRASOUND BREAST LIMITED: CPT | Mod: TC,HCNC,LT

## 2023-08-23 ENCOUNTER — TELEPHONE (OUTPATIENT)
Dept: PRIMARY CARE CLINIC | Facility: CLINIC | Age: 68
End: 2023-08-23
Payer: MEDICARE

## 2023-08-23 NOTE — TELEPHONE ENCOUNTER
----- Message from Sally June NP sent at 8/23/2023 11:59 AM CDT -----  Please let patient know no evidence of malignancy found in left breast ultrasound. Recommended to resume regular screening mammogram.     Thanks   Sally

## 2023-11-12 DIAGNOSIS — E03.9 HYPOTHYROIDISM (ACQUIRED): ICD-10-CM

## 2023-11-12 RX ORDER — LEVOTHYROXINE SODIUM 75 UG/1
75 TABLET ORAL EVERY MORNING
Qty: 90 TABLET | Refills: 0 | OUTPATIENT
Start: 2023-11-12

## 2023-11-12 NOTE — TELEPHONE ENCOUNTER
No care due was identified.  Health [facility] Embedded Care Due Messages. Reference number: [id number].   11/12/2023 10:41:38 AM CST

## 2023-11-12 NOTE — TELEPHONE ENCOUNTER
Refill Decision Note  Quick DC. Duplicate Request-  med pended in previous encounter, awaiting assessment       Radha Saxenabrenton  is requesting a refill authorization.  Brief Assessment and Rationale for Refill:  Quick Discontinue     Medication Therapy Plan:  Receipt confirmed by pharmacy (11/12/2023  9:23 AM CST) [facility]      Comments:     Note composed:11:06 AM 11/12/2023

## 2023-12-08 ENCOUNTER — OFFICE VISIT (OUTPATIENT)
Dept: PRIMARY CARE CLINIC | Facility: CLINIC | Age: 68
End: 2023-12-08
Payer: MEDICARE

## 2023-12-08 VITALS
OXYGEN SATURATION: 98 % | HEART RATE: 70 BPM | HEIGHT: 65 IN | BODY MASS INDEX: 19.54 KG/M2 | SYSTOLIC BLOOD PRESSURE: 140 MMHG | DIASTOLIC BLOOD PRESSURE: 70 MMHG | WEIGHT: 117.31 LBS

## 2023-12-08 DIAGNOSIS — C50.912 INFILTRATING DUCTAL CARCINOMA OF LEFT FEMALE BREAST: ICD-10-CM

## 2023-12-08 DIAGNOSIS — E78.2 HYPERLIPIDEMIA, MIXED: ICD-10-CM

## 2023-12-08 DIAGNOSIS — I70.0 AORTIC ATHEROSCLEROSIS: ICD-10-CM

## 2023-12-08 DIAGNOSIS — C50.512 PRIMARY CANCER OF LOWER OUTER QUADRANT OF LEFT FEMALE BREAST: Primary | ICD-10-CM

## 2023-12-08 DIAGNOSIS — E03.9 HYPOTHYROIDISM (ACQUIRED): ICD-10-CM

## 2023-12-08 DIAGNOSIS — M81.0 AGE-RELATED OSTEOPOROSIS WITHOUT CURRENT PATHOLOGICAL FRACTURE: ICD-10-CM

## 2023-12-08 PROCEDURE — 3077F PR MOST RECENT SYSTOLIC BLOOD PRESSURE >= 140 MM HG: ICD-10-PCS | Mod: CPTII,S$GLB,, | Performed by: INTERNAL MEDICINE

## 2023-12-08 PROCEDURE — 99214 PR OFFICE/OUTPT VISIT, EST, LEVL IV, 30-39 MIN: ICD-10-PCS | Mod: S$GLB,,, | Performed by: INTERNAL MEDICINE

## 2023-12-08 PROCEDURE — 99999 PR PBB SHADOW E&M-EST. PATIENT-LVL III: CPT | Mod: PBBFAC,,, | Performed by: INTERNAL MEDICINE

## 2023-12-08 PROCEDURE — 99999 PR PBB SHADOW E&M-EST. PATIENT-LVL III: ICD-10-PCS | Mod: PBBFAC,,, | Performed by: INTERNAL MEDICINE

## 2023-12-08 PROCEDURE — 3078F DIAST BP <80 MM HG: CPT | Mod: CPTII,S$GLB,, | Performed by: INTERNAL MEDICINE

## 2023-12-08 PROCEDURE — 1126F AMNT PAIN NOTED NONE PRSNT: CPT | Mod: CPTII,S$GLB,, | Performed by: INTERNAL MEDICINE

## 2023-12-08 PROCEDURE — 3008F PR BODY MASS INDEX (BMI) DOCUMENTED: ICD-10-PCS | Mod: CPTII,S$GLB,, | Performed by: INTERNAL MEDICINE

## 2023-12-08 PROCEDURE — 1159F MED LIST DOCD IN RCRD: CPT | Mod: CPTII,S$GLB,, | Performed by: INTERNAL MEDICINE

## 2023-12-08 PROCEDURE — 3008F BODY MASS INDEX DOCD: CPT | Mod: CPTII,S$GLB,, | Performed by: INTERNAL MEDICINE

## 2023-12-08 PROCEDURE — 1159F PR MEDICATION LIST DOCUMENTED IN MEDICAL RECORD: ICD-10-PCS | Mod: CPTII,S$GLB,, | Performed by: INTERNAL MEDICINE

## 2023-12-08 PROCEDURE — 1126F PR PAIN SEVERITY QUANTIFIED, NO PAIN PRESENT: ICD-10-PCS | Mod: CPTII,S$GLB,, | Performed by: INTERNAL MEDICINE

## 2023-12-08 PROCEDURE — 99214 OFFICE O/P EST MOD 30 MIN: CPT | Mod: S$GLB,,, | Performed by: INTERNAL MEDICINE

## 2023-12-08 PROCEDURE — 3077F SYST BP >= 140 MM HG: CPT | Mod: CPTII,S$GLB,, | Performed by: INTERNAL MEDICINE

## 2023-12-08 PROCEDURE — 3078F PR MOST RECENT DIASTOLIC BLOOD PRESSURE < 80 MM HG: ICD-10-PCS | Mod: CPTII,S$GLB,, | Performed by: INTERNAL MEDICINE

## 2023-12-08 NOTE — PROGRESS NOTES
[facility] Note    Chief Complaint      Chief Complaint   Patient presents with    Follow-up     6 month       History of Present Illness      Radha Rivera is a 68 y.o. female with chronic conditions of breast cancer, HLD, hypothyroidism, osteopenia, hx of takatsubo's cardiomyopathy  who presents today for: follow up chornic conditions.  Breast cancer, L invasive ductal carcinoma with axillary avni involvement T1N1, lung nodule: Sees Dr. Baker, Dr. Mock, Dr. Valdes.  S/P chemotherapy and radiation.    Hypothyroidism: Controlled on synthroid.  TSH on 10/10/2019 at goal.  No new or worsening symptoms.   Osteoporosis: DEXA 2022.  Discussed Reclast but pt declines.  Hx of takatsubo's cardiomyopathy: resolved.  No residual symptoms.  Flu shot declines.  TdAP 2016.    Mammogram 2023.  DEXA 2022.  Cscope with Dr. Delcid 2007.  FITKit negative 6/2023.       Past Medical History:  Past Medical History:   Diagnosis Date    Breast cancer 01/2019    left    Mitral valve prolapse     Stress-induced cardiomyopathy 7/14/2020    Thyroid disease        Past Surgical History:   has a past surgical history that includes tonsillectomy; Ear drum surgery; Tonsillectomy; Inner ear surgery (Right); Breast biopsy (Left, 01/2019); Insertion of tunneled central venous catheter (CVC) with subcutaneous port (Right, 02/08/2019); Mastectomy, partial (Left, 08/21/2019); [place] lymph node biopsy (Left, 08/21/2019); Breast lumpectomy; and Left heart catheterization (Left, 07/15/2020).    Family History:  family history includes Breast cancer in her maternal aunt, paternal aunt, and sister; Cancer in her father and sister; Thyroid disease in her daughter.     Social History:  Social History     Tobacco Use    Smoking status: Never    Smokeless tobacco: Never   Substance Use Topics    Alcohol use: Yes     Comment: Seldom    Drug use: No       I personally reviewed all past medical, surgical, social and family  "history.    Review of Systems   Constitutional:  Negative for chills, fever and malaise/fatigue.   Respiratory:  Negative for shortness of breath.    Cardiovascular:  Negative for chest pain.   Gastrointestinal:  Negative for constipation, diarrhea, nausea and vomiting.   Skin:  Negative for rash.   Neurological:  Negative for weakness.   All other systems reviewed and are negative.       Medications:  Outpatient Encounter Medications as of 12/8/2023   Medication Sig Dispense Refill    levothyroxine (SYNTHROID) 75 MCG tablet TAKE 1 TABLET BY MOUTH IN THE MORNING 90 tablet 1    vit A/vit C/vit E/zinc/copper (PRESERVISION AREDS ORAL) Take by mouth.      vitamin D 1000 units Tab Take 2,000 Units by mouth once daily.       [DISCONTINUED] levothyroxine (EUTHYROX) 75 MCG tablet Take 1 tablet (75 mcg total) by mouth every morning. 90 tablet 3     No facility-administered encounter medications on file as of 12/8/2023.       Allergies:  Review of patient's allergies indicates:   Allergen Reactions    Azithromycin     Bactrim [sulfamethoxazole-trimethoprim] Other (See Comments)     Caused reflux and severe nausea       Health Maintenance:  Immunization History   Administered Date(s) Administered    Tdap 10/17/2010, 07/22/2016      Health Maintenance   Topic Date Due    Hepatitis C Screening  Never done    Shingles Vaccine (1 of 2) Never done    High Dose Statin  Never done    Colorectal Cancer Screening  06/27/2024    Mammogram  07/26/2024    DEXA Scan  05/02/2025    TETANUS VACCINE  07/22/2026    Lipid Panel  06/12/2028        Physical Exam      Vital Signs  Pulse: 70  SpO2: 98 %  BP: (!) 140/70  BP Location: Right arm  Patient Position: Sitting  Pain Score: 0-No pain  Height and Weight  Height: 5' 5" (165.1 cm)  Weight: 53.2 kg (117 lb 4.6 oz)  BSA (Calculated - sq m): 1.56 sq meters  BMI (Calculated): 19.5  Weight in (lb) to have BMI = 25: 149.9]    Physical Exam  Vitals reviewed.   Constitutional:       Appearance: She is " well-developed.   HENT:      Head: Normocephalic and atraumatic.      Right Ear: External ear normal.      Left Ear: External ear normal.   Cardiovascular:      Rate and Rhythm: Normal rate and regular rhythm.      Heart sounds: Normal heart sounds. No murmur heard.  Pulmonary:      Effort: Pulmonary effort is normal.      Breath sounds: Normal breath sounds. No wheezing or rales.   Abdominal:      General: Bowel sounds are normal. There is no distension.      Palpations: Abdomen is soft.      Tenderness: There is no abdominal tenderness.          Laboratory:  CBC:  Recent Labs   Lab 05/16/22  0734 09/01/22  1006 06/12/23  0831   WBC 4.75 4.64 4.17   RBC 4.29 4.30 4.18   Hemoglobin 12.7 12.9 12.4   Hematocrit 40.6 40.1 39.5   Platelets 214 198 203   MCV 95 93 95   MCH 29.6 30.0 29.7   MCHC 31.3 L 32.2 31.4 L     CMP:  Recent Labs   Lab 05/16/22  0734 09/01/22  1006 06/12/23  0831   Glucose 92 92 101   Calcium 9.3 9.6 9.3   Albumin 4.2 4.7 4.3   Total Protein 7.2 7.5 7.2   Sodium 142 144 142   Potassium 4.3 4.5 4.2   CO2 30 H 28 26   Chloride 105 104 106   BUN 16 17 11   Alkaline Phosphatase 72 72 65   ALT 30 22 25   AST 32 29 29   Total Bilirubin 0.5 0.7 0.4     URINALYSIS:       LIPIDS:  Recent Labs   Lab 04/28/21  0907 10/20/21  1146 05/16/22  0734 09/01/22  1006 06/12/23  0831   TSH 0.503   < > 0.602 0.580 2.030   HDL 60  --  61  --  61   Cholesterol 200 H  --  210 H  --  205 H   Triglycerides 78  --  87  --  60   LDL Cholesterol 124.4  --  131.6  --  132.0   HDL/Cholesterol Ratio 30.0  --  29.0  --  29.8   Non-HDL Cholesterol 140  --  149  --  144   Total Cholesterol/HDL Ratio 3.3  --  3.4  --  3.4    < > = values in this interval not displayed.     TSH:  Recent Labs   Lab 05/16/22  0734 09/01/22  1006 06/12/23  0831   TSH 0.602 0.580 2.030     A1C:  Recent Labs   Lab 04/28/21  0907   Hemoglobin A1C 5.6       Assessment/Plan     Radha MARYSOL Hemanthbrenton is a 68 y.o.female with:    1. Primary cancer of lower outer  quadrant of left female breast  - CBC Auto Differential; Future  2. Infiltrating ductal carcinoma of left female breast  - CBC Auto Differential; Future  Continue screening mammograms  3. Aortic atherosclerosis  Cont to monitor  4. Hypothyroidism (acquired)  - TSH; Future  - T4, Free; Future  Continue current meds.    5. Age-related osteoporosis without current pathological fracture  Continue vit D   6. Hyperlipidemia, mixed  - Comprehensive Metabolic Panel; Future  - Lipid Panel; Future       Chronic conditions status updated as per HPI.  Other than changes above, cont current medications and maintain follow up with specialists.  Follow up in about 6 months (around 6/8/2024) for Follow up visit.    Future Appointments   Date Time Provider Department Center   6/12/2024  2:15 PM Salo Vera MD [facility]       Salo Vera MD  [facility]

## 2024-03-06 ENCOUNTER — PATIENT MESSAGE (OUTPATIENT)
Dept: PRIMARY CARE CLINIC | Facility: CLINIC | Age: 69
End: 2024-03-06
Payer: MEDICARE

## 2024-04-12 ENCOUNTER — OFFICE VISIT (OUTPATIENT)
Dept: PRIMARY CARE CLINIC | Facility: CLINIC | Age: 69
End: 2024-04-12
Payer: MEDICARE

## 2024-04-12 ENCOUNTER — TELEPHONE (OUTPATIENT)
Dept: PRIMARY CARE CLINIC | Facility: CLINIC | Age: 69
End: 2024-04-12
Payer: MEDICARE

## 2024-04-12 VITALS
DIASTOLIC BLOOD PRESSURE: 80 MMHG | HEIGHT: 65 IN | WEIGHT: 115.5 LBS | BODY MASS INDEX: 19.24 KG/M2 | SYSTOLIC BLOOD PRESSURE: 128 MMHG | HEART RATE: 69 BPM | OXYGEN SATURATION: 97 %

## 2024-04-12 DIAGNOSIS — L30.9 DERMATITIS: Primary | ICD-10-CM

## 2024-04-12 PROCEDURE — 99999 PR PBB SHADOW E&M-EST. PATIENT-LVL III: CPT | Mod: PBBFAC,,, | Performed by: INTERNAL MEDICINE

## 2024-04-12 PROCEDURE — 3074F SYST BP LT 130 MM HG: CPT | Mod: CPTII,S$GLB,, | Performed by: INTERNAL MEDICINE

## 2024-04-12 PROCEDURE — 99214 OFFICE O/P EST MOD 30 MIN: CPT | Mod: S$GLB,,, | Performed by: INTERNAL MEDICINE

## 2024-04-12 PROCEDURE — 1126F AMNT PAIN NOTED NONE PRSNT: CPT | Mod: CPTII,S$GLB,, | Performed by: INTERNAL MEDICINE

## 2024-04-12 PROCEDURE — 3008F BODY MASS INDEX DOCD: CPT | Mod: CPTII,S$GLB,, | Performed by: INTERNAL MEDICINE

## 2024-04-12 PROCEDURE — 3079F DIAST BP 80-89 MM HG: CPT | Mod: CPTII,S$GLB,, | Performed by: INTERNAL MEDICINE

## 2024-04-12 PROCEDURE — 1159F MED LIST DOCD IN RCRD: CPT | Mod: CPTII,S$GLB,, | Performed by: INTERNAL MEDICINE

## 2024-04-12 RX ORDER — TRIAMCINOLONE ACETONIDE 1 MG/G
CREAM TOPICAL 2 TIMES DAILY
Qty: 80 G | Refills: 3 | Status: SHIPPED | OUTPATIENT
Start: 2024-04-12

## 2024-04-12 NOTE — PATIENT INSTRUCTIONS
Try the following over the counter medications to help with your symptoms:  1. Antihistamine once daily (either Zyrtec, Allegra, Claritin or Xyzal)

## 2024-04-12 NOTE — TELEPHONE ENCOUNTER
----- Message from Loretta Persaud sent at 4/12/2024  2:28 PM CDT -----  Regarding: Late  Contact: [phone]  Type: Late     Who Called: Criss  Would the patient rather a call back or a response via [facility]? Call  Best Call Back Number: [phone]  Additional Information:

## 2024-04-24 ENCOUNTER — OFFICE VISIT (OUTPATIENT)
Dept: PODIATRY | Facility: CLINIC | Age: 69
End: 2024-04-24
Payer: MEDICARE

## 2024-04-24 VITALS — WEIGHT: 116.38 LBS | BODY MASS INDEX: 19.39 KG/M2 | HEIGHT: 65 IN

## 2024-04-24 DIAGNOSIS — T45.1X5A ANTINEOPLASTIC CHEMOTHERAPY INDUCED ANEMIA: ICD-10-CM

## 2024-04-24 DIAGNOSIS — D64.81 ANTINEOPLASTIC CHEMOTHERAPY INDUCED ANEMIA: ICD-10-CM

## 2024-04-24 DIAGNOSIS — L60.3 NAIL DYSTROPHY: ICD-10-CM

## 2024-04-24 DIAGNOSIS — B35.1 ONYCHOMYCOSIS: Primary | ICD-10-CM

## 2024-04-24 PROCEDURE — 3008F BODY MASS INDEX DOCD: CPT | Mod: CPTII,S$GLB,, | Performed by: PODIATRIST

## 2024-04-24 PROCEDURE — 99213 OFFICE O/P EST LOW 20 MIN: CPT | Mod: S$GLB,,, | Performed by: PODIATRIST

## 2024-04-24 PROCEDURE — 99999 PR PBB SHADOW E&M-EST. PATIENT-LVL III: CPT | Mod: PBBFAC,,, | Performed by: PODIATRIST

## 2024-04-24 PROCEDURE — 3288F FALL RISK ASSESSMENT DOCD: CPT | Mod: CPTII,S$GLB,, | Performed by: PODIATRIST

## 2024-04-24 PROCEDURE — 1160F RVW MEDS BY RX/DR IN RCRD: CPT | Mod: CPTII,S$GLB,, | Performed by: PODIATRIST

## 2024-04-24 PROCEDURE — 1126F AMNT PAIN NOTED NONE PRSNT: CPT | Mod: CPTII,S$GLB,, | Performed by: PODIATRIST

## 2024-04-24 PROCEDURE — 1101F PT FALLS ASSESS-DOCD LE1/YR: CPT | Mod: CPTII,S$GLB,, | Performed by: PODIATRIST

## 2024-04-24 PROCEDURE — 1159F MED LIST DOCD IN RCRD: CPT | Mod: CPTII,S$GLB,, | Performed by: PODIATRIST

## 2024-04-24 RX ORDER — CICLOPIROX 80 MG/ML
SOLUTION TOPICAL NIGHTLY
Qty: 6.6 ML | Refills: 6 | Status: SHIPPED | OUTPATIENT
Start: 2024-04-24

## 2024-04-24 RX ORDER — CEPHALEXIN 500 MG/1
500 CAPSULE ORAL EVERY 6 HOURS
Qty: 28 CAPSULE | Refills: 0 | Status: CANCELLED | OUTPATIENT
Start: 2024-04-24 | End: 2024-05-01

## 2024-04-24 NOTE — PROGRESS NOTES
[facility]  [address]  [address]  [place] [address]  Dept: [phone]  Dept Fax: [phone]    Fabricio Galvez Jr., DPM     Assessment:   MDM    Coding  1. Onychomycosis  ciclopirox (PENLAC) 8 % Soln      2. Nail dystrophy        3. Antineoplastic chemotherapy induced anemia            Plan:     Procedures  1. Onychomycosis  -     ciclopirox (PENLAC) 8 % Soln; Apply topically nightly.  Dispense: 6.6 mL; Refill: 6    2. Nail dystrophy    3. Antineoplastic chemotherapy induced anemia      Radha was seen today for fungus.    Diagnoses and all orders for this visit:    Onychomycosis  -     ciclopirox (PENLAC) 8 % Soln; Apply topically nightly.    Nail dystrophy    Antineoplastic chemotherapy induced anemia    Other orders  The following orders have not been finalized:  -     Cancel: cephALEXin (KEFLEX) 500 MG capsule        -pt seen, evaluated, and managed  -dx discussed in detail. All questions/concerns addressed  -all tx options discussed. All alternatives, risks, benefits of all txs discussed  -will try topical rx first  -discussed potential use of lamisil PO if topical treatment fails  -rxs dispensed: penlac  -referrals: none  -WB: wbat  - educated on antifungal footcare at home  - rec'd otc antifungal foot powder and spray. Use prn  - discussed nail bx in future in nonresponsive to rxd medication  -Discussed importance of keeping feet dry and changing socks and shoes on a regular basis to prevent spread of fungus      Follow up in about 6 months (around 10/24/2024).    Subjective:      Patient ID: Radha Rivera is a 68 y.o. female.    Chief Complaint:   Chief Complaint   Patient presents with    Fungus     Bilateral great toe       CC - fungal nails: Patient presents to the clinic complaining of thick and discolored toenails on both feet. Patient is inquiring about treatment options.    HPI    Last Podiatry Enc: Visit date not found  Last Enc w/  Me: Visit date not found    Outside reports reviewed: historical medical records.  Family hx: as below  Past Medical History:   Diagnosis Date    Breast cancer 2019    left    Mitral valve prolapse     Stress-induced cardiomyopathy 2020    Thyroid disease      Past Surgical History:   Procedure Laterality Date    BREAST BIOPSY Left 2019    BREAST LUMPECTOMY      2019    Ear drum surgery      INNER EAR SURGERY Right     INSERTION OF TUNNELED CENTRAL VENOUS CATHETER (CVC) WITH SUBCUTANEOUS PORT Right 2019    Procedure: INSERTION, PORT-A-CATH;  Surgeon: Twan Valdes MD;  Location: [facility] OR [address] [place] [address];  Service: General;  Laterality: Right;    LEFT HEART CATHETERIZATION Left 07/15/2020    Procedure: Left heart cath;  Surgeon: Jefferson Thomas III, MD;  Location: [facility] CATH LAB;  Service: Cardiology;  Laterality: Left;    MASTECTOMY, PARTIAL Left 2019    Procedure: MASTECTOMY, PARTIAL LEFT with SEED;  Surgeon: Twan Valdes MD;  Location: [facility] OR [address] [place] [address];  Service: General;  Laterality: Left;    SENTINEL LYMPH NODE BIOPSY Left 2019    Procedure: BIOPSY, LYMPH NODE, SENTINEL LEFT with SEED;  Surgeon: Twan Valdes MD;  Location: [address];  Service: General;  Laterality: Left;    tonsillectomy      TONSILLECTOMY       Family History   Problem Relation Name Age of Onset    Cancer Father Damián Dillon Sr.         prostate cancer    Breast cancer Sister Gay Ann          at 68    Cancer Sister Gay Ann     Thyroid disease Daughter      Breast cancer Maternal Aunt      Breast cancer Paternal Aunt      Colon cancer Neg Hx      Ovarian cancer Neg Hx      Stroke Neg Hx      Diabetes Neg Hx       Current Outpatient Medications   Medication Sig Dispense Refill    ciclopirox (PENLAC) 8 % Soln Apply topically nightly. 6.6 mL 6    levothyroxine (SYNTHROID) 75 MCG tablet TAKE 1 TABLET BY MOUTH IN THE MORNING 90 tablet 1    triamcinolone acetonide 0.1% (KENALOG)  0.1 % cream Apply topically 2 (two) times daily. (Patient not taking: Reported on 4/24/2024) 80 g 3    vit A/vit C/vit E/zinc/copper (PRESERVISION AREDS ORAL) Take by mouth.      vitamin D 1000 units Tab Take 2,000 Units by mouth once daily.        No current facility-administered medications for this visit.     Review of patient's allergies indicates:   Allergen Reactions    Azithromycin     Bactrim [sulfamethoxazole-trimethoprim] Other (See Comments)     Caused reflux and severe nausea     Social History     Socioeconomic History    Marital status:    Tobacco Use    Smoking status: Never    Smokeless tobacco: Never   Substance and Sexual Activity    Alcohol use: Yes     Comment: Seldom    Drug use: No    Sexual activity: Yes     Partners: Male     Birth control/protection: Post-menopausal       ROS    REVIEW OF SYSTEMS: Negative as documented below as well as positive findings in bold.       Constitutional  Respiratory  Gastrointestinal  Skin   - Fever - Cough - Heartburn - Rash   - Chills - Spit blood - Nausea - Itching   - Weight Loss - Shortness of breath - Vomiting - Nail pain   - Malaise/Fatigue - Wheezing - Abdominal Pain  Wound/Ulcer   - Weight Gain   - Blood in Stool  Poor wound healing       - Diarrhea          Cardiovascular  Genitourinary  Neurological  HEENT   - Chest Pain - Dysuria - Burning Sensation of feet - Headache   - Palpitations - Hematuria - Tingling / Paresthesia - Congestion   - Pain at night in legs - Flank Pain - Dizziness - Sore Throat   - Cramping   - Tremor - Blurred Vision   - Leg Swelling   - Sensory Change - Double Vision   - Dizzy when standing   - Speech Change - Eye Redness       - Focal Weakness - Dry Eyes       - Loss of Consciousness          Endocrine  Musculoskeletal  Psychiatric   - Cold intolerance - Muscle Pain - Depression   - Heat intolerance - Neck Pain - Insomnia   - Anemia - Joint Pain - Memory Loss   -  Easy bruising, bleeding - Heel pain - Anxiety      Toe  "Pain        Leg/Ankle/Foot Pain         Objective:     Ht 5' 5" (1.651 m)   Wt 52.8 kg (116 lb 6.5 oz)   BMI 19.37 kg/m²   Vitals:    04/24/24 0824   Weight: 52.8 kg (116 lb 6.5 oz)   Height: 5' 5" (1.651 m)   PainSc: 0-No pain   PainLoc: Foot       Physical Exam    General Appearance:   Patient appears well developed, well nourished  Patient appears stated age    Psychiatric:   Patient is oriented to time, place, and person.  Patient has appropriate mood and affect    Neck:  Trachea Midline  No visible masses    Respiratory/Ears:  No distress or labored breathing.  Able to differentiate between normal talking voice and whisper.  Able to follow commands    Eyes:  Visual Acuity intact  Lids and conjunctivae normal. No discoloration noted.    Foot Exam  Physical Exam  Ortho Exam  Ortho/SPM Exam  Physical Exam  Neurologic Exam    B/l LE exam con't:  V:  DP 2/4, PT 2/4   CRT< 3s to all digits tested   Tibial and popliteal lymph nodes are w/o abnormality    edema absent, varicosities present    N:  Patient displays normal ankle reflexes   SILT in SP/DP/T/[place]/Saph distributions    Ortho: +Motor EHL/FHL/TA/GA   No TTP  Compartments soft/compressible. No pain on passive stretch of big toe. No calf  Pain.    Derm:  skin intact, skin warm and dry, skin without ulcers or lesions, skin without induration, nails abnormal, nails discolored and nails dystrophic, mycotic x all nails    Imaging / Labs:      No results found.      Note: This was dictated using a computer transcription program. Although proofread, it may contain computer transcription errors and phonetic errors. Other human proofreading errors may also exist. Corrections may be performed at a later time. Please contact us for any clarification if needed.    Fabricio Galvez DPM  [facility]    "

## 2024-05-08 DIAGNOSIS — E03.9 HYPOTHYROIDISM (ACQUIRED): ICD-10-CM

## 2024-05-08 RX ORDER — LEVOTHYROXINE SODIUM 75 UG/1
75 TABLET ORAL EVERY MORNING
Qty: 90 TABLET | Refills: 2 | Status: SHIPPED | OUTPATIENT
Start: 2024-05-08

## 2024-05-08 NOTE — TELEPHONE ENCOUNTER
----- Message from Jessica Corona sent at 5/8/2024  4:19 PM CDT -----  Contact: Self/[phone]  Requesting an RX refill or new RX.  Is this a refill or new RX: New  RX name and strength ):  levothyroxine (SYNTHROID) 75 MCG tablet  Is this a 30 day or 90 day RX: 90  Pharmacy name and phone # :  [id number] - [place], [place] - [address] [address]  [address] [address]  [facility] [address]  Phone: [phone] Fax: [phone]     The doctors have asked that we provide their patients with the following 2 reminders -- prescription refills can take up to 72 hours, and a friendly reminder that in the future you can use your [facility] account to request refills: pt stated that she does not have a active portal account and that she is out of her medication . Please advise

## 2024-05-08 NOTE — TELEPHONE ENCOUNTER
No care due was identified.  Health [facility] Embedded Care Due Messages. Reference number: [id number].   5/08/2024 11:55:28 AM CDT

## 2024-06-12 ENCOUNTER — OFFICE VISIT (OUTPATIENT)
Dept: PRIMARY CARE CLINIC | Facility: CLINIC | Age: 69
End: 2024-06-12
Payer: MEDICARE

## 2024-06-12 ENCOUNTER — DOCUMENTATION ONLY (OUTPATIENT)
Dept: PRIMARY CARE CLINIC | Facility: CLINIC | Age: 69
End: 2024-06-12

## 2024-06-12 VITALS
SYSTOLIC BLOOD PRESSURE: 130 MMHG | BODY MASS INDEX: 19.21 KG/M2 | HEART RATE: 80 BPM | HEIGHT: 65 IN | DIASTOLIC BLOOD PRESSURE: 70 MMHG | WEIGHT: 115.31 LBS | OXYGEN SATURATION: 98 %

## 2024-06-12 DIAGNOSIS — M81.0 AGE-RELATED OSTEOPOROSIS WITHOUT CURRENT PATHOLOGICAL FRACTURE: ICD-10-CM

## 2024-06-12 DIAGNOSIS — Z12.11 ENCOUNTER FOR COLORECTAL CANCER SCREENING: ICD-10-CM

## 2024-06-12 DIAGNOSIS — R41.3 MEMORY LOSS: ICD-10-CM

## 2024-06-12 DIAGNOSIS — Z12.12 ENCOUNTER FOR COLORECTAL CANCER SCREENING: ICD-10-CM

## 2024-06-12 DIAGNOSIS — E03.9 HYPOTHYROIDISM (ACQUIRED): ICD-10-CM

## 2024-06-12 DIAGNOSIS — E78.2 HYPERLIPIDEMIA, MIXED: ICD-10-CM

## 2024-06-12 DIAGNOSIS — C50.912 INFILTRATING DUCTAL CARCINOMA OF LEFT FEMALE BREAST: Primary | ICD-10-CM

## 2024-06-12 DIAGNOSIS — I70.0 AORTIC ATHEROSCLEROSIS: ICD-10-CM

## 2024-06-12 PROCEDURE — 99999 PR PBB SHADOW E&M-EST. PATIENT-LVL III: CPT | Mod: PBBFAC,,, | Performed by: INTERNAL MEDICINE

## 2024-06-12 NOTE — PROGRESS NOTES
[facility] Note    Chief Complaint      Chief Complaint   Patient presents with    Follow-up     6 month        History of Present Illness      Radha Rivera is a 68 y.o. female with chronic conditions of breast cancer, HLD, hypothyroidism, osteopenia, hx of takatsubo's cardiomyopathy who presents today for: follow up chronic conditions.  Complains of occasional back aches when doing house chores.  Complains of memory loss (forgetting where to put dishes in kitchen, despite not changing cabinet orientation in a very long time).  Has family history of alzheimer's dementia in mother.   Breast cancer, L invasive ductal carcinoma with axillary avni involvement T1N1, lung nodule: Sees Dr. Baker, Dr. Mock, Dr. Valdes.  S/P chemotherapy and radiation.    Hypothyroidism: Controlled on synthroid.  TSH on 10/10/2019 at goal.  No new or worsening symptoms.   Osteoporosis: DEXA 2022.  Discussed Reclast but pt declines.  Hx of takatsubo's cardiomyopathy: resolved.  No residual symptoms.  Flu shot declines.  TdAP 2016.    Mammogram 2023.  DEXA 2022.  Cscope with Dr. Delcid 2007.  FITKit negative 6/2023.       Past Medical History:  Past Medical History:   Diagnosis Date    Breast cancer 01/2019    left    Mitral valve prolapse     Stress-induced cardiomyopathy 7/14/2020    Thyroid disease        Past Surgical History:   has a past surgical history that includes tonsillectomy; Ear drum surgery; Tonsillectomy; Inner ear surgery (Right); Breast biopsy (Left, 01/2019); Insertion of tunneled central venous catheter (CVC) with subcutaneous port (Right, 02/08/2019); Mastectomy, partial (Left, 08/21/2019); [place] lymph node biopsy (Left, 08/21/2019); Breast lumpectomy; and Left heart catheterization (Left, 07/15/2020).    Family History:  family history includes Breast cancer in her maternal aunt, paternal aunt, and sister; Cancer in her father and sister; Thyroid disease in her daughter.     Social  History:  Social History     Tobacco Use    Smoking status: Never    Smokeless tobacco: Never   Substance Use Topics    Alcohol use: Yes     Comment: Seldom    Drug use: No       I personally reviewed all past medical, surgical, social and family history.    Review of Systems   Constitutional:  Negative for chills, fever and malaise/fatigue.   Respiratory:  Negative for shortness of breath.    Cardiovascular:  Negative for chest pain.   Gastrointestinal:  Negative for constipation, diarrhea, nausea and vomiting.   Skin:  Negative for rash.   Neurological:  Negative for weakness.   All other systems reviewed and are negative.       Medications:  Outpatient Encounter Medications as of 6/12/2024   Medication Sig Dispense Refill    ciclopirox (PENLAC) 8 % Soln Apply topically nightly. 6.6 mL 6    levothyroxine (SYNTHROID) 75 MCG tablet TAKE 1 TABLET BY MOUTH IN THE MORNING 90 tablet 2    triamcinolone acetonide 0.1% (KENALOG) 0.1 % cream Apply topically 2 (two) times daily. (Patient not taking: Reported on 4/24/2024) 80 g 3    vit A/vit C/vit E/zinc/copper (PRESERVISION AREDS ORAL) Take by mouth.      vitamin D 1000 units Tab Take 2,000 Units by mouth once daily.        No facility-administered encounter medications on file as of 6/12/2024.       Allergies:  Review of patient's allergies indicates:   Allergen Reactions    Azithromycin     Bactrim [sulfamethoxazole-trimethoprim] Other (See Comments)     Caused reflux and severe nausea       Health Maintenance:  Immunization History   Administered Date(s) Administered    Tdap 10/17/2010, 07/22/2016      Health Maintenance   Topic Date Due    Hepatitis C Screening  Never done    Shingles Vaccine (1 of 2) Never done    High Dose Statin  Never done    Colorectal Cancer Screening  06/27/2024    Mammogram  07/26/2024    DEXA Scan  05/02/2025    TETANUS VACCINE  07/22/2026    Lipid Panel  03/01/2029        Physical Exam      Vital Signs  Pulse: 80  SpO2: 98 %  BP: 130/70  BP  "Location: Right arm  Patient Position: Sitting  Pain Score: 0-No pain  Height and Weight  Height: 5' 5" (165.1 cm)  Weight: 52.3 kg (115 lb 4.8 oz)  BSA (Calculated - sq m): 1.55 sq meters  BMI (Calculated): 19.2  Weight in (lb) to have BMI = 25: 149.9]    Physical Exam  Vitals reviewed.   Constitutional:       Appearance: She is well-developed.   HENT:      Head: Normocephalic and atraumatic.      Right Ear: External ear normal.      Left Ear: External ear normal.   Cardiovascular:      Rate and Rhythm: Normal rate and regular rhythm.      Heart sounds: Normal heart sounds. No murmur heard.  Pulmonary:      Effort: Pulmonary effort is normal.      Breath sounds: Normal breath sounds. No wheezing or rales.   Abdominal:      General: Bowel sounds are normal. There is no distension.      Palpations: Abdomen is soft.      Tenderness: There is no abdominal tenderness.          Laboratory:  CBC:  Recent Labs   Lab 09/01/22  1006 06/12/23  0831 03/01/24  0909   WBC 4.64 4.17 4.43   RBC 4.30 4.18 4.18   Hemoglobin 12.9 12.4 12.8   Hematocrit 40.1 39.5 39.1   Platelets 198 203 201   MCV 93 95 94   MCH 30.0 29.7 30.6   MCHC 32.2 31.4 L 32.7     CMP:  Recent Labs   Lab 09/01/22  1006 06/12/23  0831 03/01/24  0909   Glucose 92 101 92   Calcium 9.6 9.3 9.4   Albumin 4.7 4.3 4.4   Total Protein 7.5 7.2 7.5   Sodium 144 142 147 H   Potassium 4.5 4.2 4.4   CO2 28 26 30 H   Chloride 104 106 107   BUN 17 11 17   Alkaline Phosphatase 72 65 54   ALT 22 25 24   AST 29 29 30   Total Bilirubin 0.7 0.4 0.6     URINALYSIS:       LIPIDS:  Recent Labs   Lab 05/16/22  0734 09/01/22  1006 06/12/23  0831 03/01/24  0909   TSH 0.602 0.580 2.030 1.510   HDL 61  --  61 59   Cholesterol 210 H  --  205 H 208 H   Triglycerides 87  --  60 84   LDL Cholesterol 131.6  --  132.0 132.2   HDL/Cholesterol Ratio 29.0  --  29.8 28.4   Non-HDL Cholesterol 149  --  144 149   Total Cholesterol/HDL Ratio 3.4  --  3.4 3.5     TSH:  Recent Labs   Lab 09/01/22  1006 " 06/12/23  0831 03/01/24  0909   TSH 0.580 2.030 1.510     A1C:        Assessment/Plan     Radha Rivera is a 68 y.o.female with:    1. Infiltrating ductal carcinoma of left female breast  F/U with oncology team  2. Hyperlipidemia, mixed  Continue diet   3. Aortic atherosclerosis  Continue diet  4. Hypothyroidism (acquired)  Continue current meds.    5. Age-related osteoporosis without current pathological fracture  Continue discussions regarding medications.  6. Encounter for colorectal cancer screening  - Fecal Immunochemical Test (iFOBT); Future    7. Memory loss  - Ambulatory referral/consult to Adult Neuropsychology; Future       Chronic conditions status updated as per HPI.  Other than changes above, cont current medications and maintain follow up with specialists.  Follow up in about 6 months (around 12/12/2024) for Follow up visit.    Future Appointments   Date Time Provider Department Center   7/9/2024 10:30 AM Aneesh Baker MD [facility] [id number] [place]   10/28/2024  1:45 PM Fabricio Galvez Jr., DPM [facility] POD [place]   12/13/2024  1:45 PM Salo Vera MD [facility] [place] MD  [facility]

## 2024-06-20 ENCOUNTER — TELEPHONE (OUTPATIENT)
Dept: PRIMARY CARE CLINIC | Facility: CLINIC | Age: 69
End: 2024-06-20
Payer: MEDICARE

## 2024-06-20 NOTE — TELEPHONE ENCOUNTER
Spoke with patient spouse and told him to not worry because we did not send a referral to  for Cards.

## 2024-06-20 NOTE — TELEPHONE ENCOUNTER
----- Message from Rylee Ball sent at 6/20/2024  8:28 AM CDT -----  Contact: Spouse Teri [phone]  Consult    The patient said [facility] called her about scheduling an appointment with a cardiologist and she doesn't know anything about it.    Thank you

## 2024-07-08 ENCOUNTER — TELEPHONE (OUTPATIENT)
Dept: HEMATOLOGY/ONCOLOGY | Facility: CLINIC | Age: 69
End: 2024-07-08
Payer: MEDICARE

## 2024-07-18 ENCOUNTER — TELEPHONE (OUTPATIENT)
Dept: HEMATOLOGY/ONCOLOGY | Facility: CLINIC | Age: 69
End: 2024-07-18
Payer: MEDICARE

## 2024-07-18 NOTE — TELEPHONE ENCOUNTER
----- Message from Darshana Short sent at 7/18/2024  9:55 AM CDT -----  Regarding: R/S Appt  Contact: [phone]  RESCHEDULE/APPTS    Current Appt Date:  07/09/2024    Type of Appt: EP    Physician:  Aneesh Baker MD    Reason for Scheduling:  Death in the family; Pt is requesting to see if a Mammo is needed prior to appt    Caller: DONTE GRADY [[id number]]    Contact Preference: [phone] (home) [phone] (work)

## 2024-07-22 DIAGNOSIS — Z85.3 HISTORY OF BREAST CANCER: Primary | ICD-10-CM

## 2024-07-23 ENCOUNTER — TELEPHONE (OUTPATIENT)
Dept: RADIOLOGY | Facility: HOSPITAL | Age: 69
End: 2024-07-23
Payer: MEDICARE

## 2024-07-24 ENCOUNTER — TELEPHONE (OUTPATIENT)
Dept: PRIMARY CARE CLINIC | Facility: CLINIC | Age: 69
End: 2024-07-24
Payer: MEDICARE

## 2024-07-24 DIAGNOSIS — R53.83 FATIGUE, UNSPECIFIED TYPE: Primary | ICD-10-CM

## 2024-07-24 NOTE — TELEPHONE ENCOUNTER
----- Message from Charlotte Navas sent at 7/24/2024  9:20 AM CDT -----  Contact: [phone]  [email or web address]     Patient is calling for Medical Advice regarding:Patient would like to know if she can have orders for blood work for her thyroid because patient states she has been feeling very tired, please call and advise.

## 2024-07-24 NOTE — TELEPHONE ENCOUNTER
Patient would like to know if she can have orders for blood work for her thyroid because patient states she has been feeling very tired.

## 2024-08-01 ENCOUNTER — HOSPITAL ENCOUNTER (OUTPATIENT)
Dept: RADIOLOGY | Facility: HOSPITAL | Age: 69
Discharge: HOME | End: 2024-08-01
Attending: INTERNAL MEDICINE
Payer: MEDICARE

## 2024-08-01 DIAGNOSIS — Z85.3 HISTORY OF BREAST CANCER: ICD-10-CM

## 2024-08-01 PROCEDURE — 77062 BREAST TOMOSYNTHESIS BI: CPT | Mod: TC,HCNC

## 2024-08-01 PROCEDURE — 77066 DX MAMMO INCL CAD BI: CPT | Mod: 26,HCNC,, | Performed by: RADIOLOGY

## 2024-08-01 PROCEDURE — 77066 DX MAMMO INCL CAD BI: CPT | Mod: TC,HCNC

## 2024-08-01 PROCEDURE — 77062 BREAST TOMOSYNTHESIS BI: CPT | Mod: 26,HCNC,, | Performed by: RADIOLOGY

## 2024-08-02 ENCOUNTER — TELEPHONE (OUTPATIENT)
Dept: HEMATOLOGY/ONCOLOGY | Facility: CLINIC | Age: 69
End: 2024-08-02
Payer: MEDICARE

## 2024-08-07 ENCOUNTER — OFFICE VISIT (OUTPATIENT)
Dept: HEMATOLOGY/ONCOLOGY | Facility: CLINIC | Age: 69
End: 2024-08-07
Payer: MEDICARE

## 2024-08-07 VITALS
WEIGHT: 116.63 LBS | TEMPERATURE: 98 F | HEART RATE: 80 BPM | HEIGHT: 65 IN | SYSTOLIC BLOOD PRESSURE: 147 MMHG | DIASTOLIC BLOOD PRESSURE: 65 MMHG | BODY MASS INDEX: 19.43 KG/M2 | OXYGEN SATURATION: 99 %

## 2024-08-07 DIAGNOSIS — Z12.31 ENCOUNTER FOR SCREENING MAMMOGRAM FOR MALIGNANT NEOPLASM OF BREAST: ICD-10-CM

## 2024-08-07 DIAGNOSIS — Z85.3 HISTORY OF BREAST CANCER: Primary | ICD-10-CM

## 2024-08-07 PROCEDURE — 99999 PR PBB SHADOW E&M-EST. PATIENT-LVL III: CPT | Mod: PBBFAC,HCNC,, | Performed by: INTERNAL MEDICINE

## 2024-08-19 NOTE — TELEPHONE ENCOUNTER
Refill Decision Note   Radha Miguel  is requesting a refill authorization.  Brief Assessment and Rationale for Refill:  Approve     Medication Therapy Plan:         Comments:     Note composed:7:24 PM 05/08/2024            Opt out

## 2024-08-27 ENCOUNTER — LAB VISIT (OUTPATIENT)
Dept: LAB | Facility: HOSPITAL | Age: 69
End: 2024-08-27
Attending: INTERNAL MEDICINE
Payer: MEDICARE

## 2024-08-27 DIAGNOSIS — Z12.11 ENCOUNTER FOR COLORECTAL CANCER SCREENING: ICD-10-CM

## 2024-08-27 DIAGNOSIS — Z12.12 ENCOUNTER FOR COLORECTAL CANCER SCREENING: ICD-10-CM

## 2024-08-27 LAB — HEMOCCULT STL QL IA: NEGATIVE

## 2024-08-27 PROCEDURE — 82274 ASSAY TEST FOR BLOOD FECAL: CPT | Mod: HCNC | Performed by: INTERNAL MEDICINE

## 2024-10-04 ENCOUNTER — PATIENT OUTREACH (OUTPATIENT)
Dept: ADMINISTRATIVE | Facility: HOSPITAL | Age: 69
End: 2024-10-04
Payer: MEDICARE

## 2024-10-04 NOTE — PROGRESS NOTES
Health Maintenance Due   Topic Date Due    Hepatitis C Screening  Never done    COVID-19 Vaccine (1) Never done    Pneumococcal Vaccines (Age 65+) (1 of 2 - PCV) Never done    Shingles Vaccine (1 of 2) Never done    High Dose Statin  Never done    RSV Vaccine (Age 60+ and Pregnant patients) (1 - Risk 60-74 years 1-dose series) Never done    Hemoglobin A1c (Prediabetes)  04/28/2022    Influenza Vaccine (1) Never done     Chart review completed.  Updated. Triggered Links. Immunizations reviewed and updated. Care Everywhere Updated. Care Team Updated.  MA Gap List chart review.

## 2025-01-28 DIAGNOSIS — E03.9 HYPOTHYROIDISM (ACQUIRED): ICD-10-CM

## 2025-01-28 DIAGNOSIS — E78.2 HYPERLIPIDEMIA, MIXED: Primary | ICD-10-CM

## 2025-01-28 RX ORDER — LEVOTHYROXINE SODIUM 75 UG/1
75 TABLET ORAL EVERY MORNING
Qty: 90 TABLET | Refills: 2 | Status: SHIPPED | OUTPATIENT
Start: 2025-01-28

## 2025-01-28 NOTE — TELEPHONE ENCOUNTER
----- Message from Radha sent at 1/28/2025  2:51 PM CST -----  Contact: Criss @ [phone]  Requesting an RX refill or new RX.    Is this a refill or new RX:     RX name and strength levothyroxine (SYNTHROID) 75 MCG tablet    Is this a 30 day or 90 day RX:     Pharmacy name and phone #  [id number] - [place], [place] - [address]  [address]  [place] [address]  Phone: [phone] Fax: [phone]        The doctors have asked that we provide their patients with the following 2 reminders -- prescription refills can take up to 72 hours, and a friendly reminder that in the future you can use your [facility] account to request refills: yes

## 2025-01-28 NOTE — TELEPHONE ENCOUNTER
No care due was identified.  [facility] Embedded Care Due Messages. Reference number: [id number].   1/28/2025 2:54:55 PM CST

## 2025-01-28 NOTE — TELEPHONE ENCOUNTER
----- Message from Radha sent at 1/28/2025  2:56 PM CST -----  Contact: Criss @   type: Lab    Caller is requesting to schedule their Lab appointment prior to an appointment.    Order is not listed in EPIC.  Please enter order and contact patient to schedule.    Preferred Date and Time of Labs: 06/9/2025 8 am     Date of Appointment: 06/16/2025    Where would they like the lab performed?[facility]     Would the patient rather a call back or a response via [facility]? Call     Best Call Back Number:

## 2025-01-28 NOTE — TELEPHONE ENCOUNTER
----- Message from Radha sent at 1/28/2025  2:51 PM CST -----  Contact: Criss @ [phone]  Requesting an RX refill or new RX.    Is this a refill or new RX:     RX name and strength levothyroxine (SYNTHROID) 75 MCG tablet    Is this a 30 day or 90 day RX:     Pharmacy name and phone #  [facility] - [place], [place] - [address]  [address]  [facility] [address]  Phone: [phone] Fax: [phone]        The doctors have asked that we provide their patients with the following 2 reminders -- prescription refills can take up to 72 hours, and a friendly reminder that in the future you can use your [facility] account to request refills: yes

## 2025-02-04 ENCOUNTER — TELEPHONE (OUTPATIENT)
Dept: PRIMARY CARE CLINIC | Facility: CLINIC | Age: 70
End: 2025-02-04
Payer: MEDICARE

## 2025-02-04 DIAGNOSIS — R41.3 MEMORY LOSS: Primary | ICD-10-CM

## 2025-02-04 NOTE — TELEPHONE ENCOUNTER
Re-ordered neurology and neuropsych referrals to evaluate memory loss per pt's daughter's (Edda Vides) request

## 2025-06-06 ENCOUNTER — PATIENT MESSAGE (OUTPATIENT)
Dept: ADMINISTRATIVE | Facility: HOSPITAL | Age: 70
End: 2025-06-06
Payer: MEDICARE

## 2025-06-06 ENCOUNTER — RESULTS FOLLOW-UP (OUTPATIENT)
Dept: PRIMARY CARE CLINIC | Facility: CLINIC | Age: 70
End: 2025-06-06

## 2025-06-16 ENCOUNTER — OFFICE VISIT (OUTPATIENT)
Dept: PRIMARY CARE CLINIC | Facility: CLINIC | Age: 70
End: 2025-06-16
Payer: MEDICARE

## 2025-06-16 VITALS
DIASTOLIC BLOOD PRESSURE: 70 MMHG | HEIGHT: 65 IN | SYSTOLIC BLOOD PRESSURE: 128 MMHG | WEIGHT: 116.19 LBS | OXYGEN SATURATION: 99 % | HEART RATE: 71 BPM | BODY MASS INDEX: 19.36 KG/M2

## 2025-06-16 DIAGNOSIS — F02.A0 MILD LATE ONSET ALZHEIMER'S DEMENTIA WITHOUT BEHAVIORAL DISTURBANCE, PSYCHOTIC DISTURBANCE, MOOD DISTURBANCE, OR ANXIETY: ICD-10-CM

## 2025-06-16 DIAGNOSIS — G30.1 MILD LATE ONSET ALZHEIMER'S DEMENTIA WITHOUT BEHAVIORAL DISTURBANCE, PSYCHOTIC DISTURBANCE, MOOD DISTURBANCE, OR ANXIETY: ICD-10-CM

## 2025-06-16 DIAGNOSIS — C50.512 PRIMARY CANCER OF LOWER OUTER QUADRANT OF LEFT FEMALE BREAST: ICD-10-CM

## 2025-06-16 DIAGNOSIS — H61.20 IMPACTED CERUMEN, UNSPECIFIED LATERALITY: ICD-10-CM

## 2025-06-16 DIAGNOSIS — Z12.12 ENCOUNTER FOR COLORECTAL CANCER SCREENING: ICD-10-CM

## 2025-06-16 DIAGNOSIS — E03.9 HYPOTHYROIDISM (ACQUIRED): Primary | ICD-10-CM

## 2025-06-16 DIAGNOSIS — M81.0 AGE-RELATED OSTEOPOROSIS WITHOUT CURRENT PATHOLOGICAL FRACTURE: ICD-10-CM

## 2025-06-16 DIAGNOSIS — Z12.11 ENCOUNTER FOR COLORECTAL CANCER SCREENING: ICD-10-CM

## 2025-06-16 PROCEDURE — 99999 PR PBB SHADOW E&M-EST. PATIENT-LVL IV: CPT | Mod: PBBFAC,,, | Performed by: INTERNAL MEDICINE

## 2025-06-16 RX ORDER — CYANOCOBALAMIN 1000 UG/ML
INJECTION, SOLUTION INTRAMUSCULAR; SUBCUTANEOUS
COMMUNITY
Start: 2025-04-04

## 2025-06-16 RX ORDER — DONEPEZIL HYDROCHLORIDE 5 MG/1
5 TABLET, FILM COATED ORAL
COMMUNITY
Start: 2025-06-01

## 2025-06-16 NOTE — PROGRESS NOTES
[facility] Note    Chief Complaint      Chief Complaint   Patient presents with    Annual Exam       History of Present Illness      History of Present Illness    CHIEF COMPLAINT:  Ms. Rivera presents today for follow up of dementia    HISTORY OF PRESENT ILLNESS:  She was diagnosed with mild Alzheimer's dementia by Dr. Salas. Symptoms were first noticed by family members, including her  and daughters, manifesting as forgetting conversations and asking the same questions repeatedly. She has had difficulties with cooking since 2019, including forgetting grocery lists and struggling with meal preparation. Symptoms fluctuate, with periods of normalcy followed by more pronounced cognitive issues, especially when fatigued. She denies getting lost while navigating the city or significant issues with word-finding or naming.    FAMILY HISTORY:  Her mother had dementia.    MEDICATIONS:  She takes Aricept (donepezil) daily.    RECENT SCREENING:  Mammogram and stool test were completed in August.      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 Baker, Dr. Mock, Dr. Valeds.  S/P chemotherapy and radiation.    Hypothyroidism: Controlled on synthroid.  TSH on 10/10/2019 at goal.  No new or worsening symptoms.   Osteoporosis: DEXA 2022.  Discussed Reclast but pt declines.  Hx of takatsubo's cardiomyopathy: resolved.  No residual symptoms.  Dementia: Sees Dr. Grant, neurology.  On Aricept.  Memory seems to have stabilized.    Flu shot declines.  TdAP 2016.    Mammogram 8/2024.  DEXA 2022.  Cscope with Dr. Delcid 2007.  FITKit negative 8/2024.       Assessment/Plan     Radha Rivera is a 69 y.o.female with:    Assessment & Plan    Assessed cognitive status, noting mild Alzheimer's dementia diagnosis by Dr. Salas.  Considered  potential contribution of chemotherapy and radiation for breast cancer to cognitive decline.  Evaluated current medication regimen, noting room for dose increase of Aricept (donepezil) and potential addition of Namenda for enhanced efficacy.  Reviewed recent lab results, including cholesterol, thyroid, kidney, liver function, blood sugar, and blood counts, all WNL.  Discussed the potential for combining Aricept with Namenda for improved cognitive function if needed.    ALZHEIMER'S DISEASE:  - Ms. Rivera reports improvement in memory stability since starting Aricept, though still experiences memory issues such as forgetting conversations and grocery lists.  - Memory issues are sometimes inconsistent, with periods of normalcy and forgetfulness, possibly influenced by fatigue and stress.  - Assessed as mild Alzheimer's disease.  - Continue Aricept (donepezil) 1 time daily, with potential to increase dosage or add Namenda if needed.    DEMENTIA WITH BEHAVIORAL DISTURBANCES:  - Ms. Rivera exhibits behavioral disturbances including repeating questions and forgetting conversations.  - These symptoms are sometimes inconsistent and may be influenced by fatigue and stress.  - Educated patient about the importance of sleep and stress management in relation to memory function.    FAMILY HISTORY OF NERVOUS SYSTEM DISEASES:  - Documented that mother had Alzheimer's disease, indicating a family history of nervous system diseases.    OTHER MEDICAL CONCERNS:  - Ordered stool testing kit for annual colorectal cancer screening.  - Will contact the office to check on status of muscle relaxer prescription at [facility] pharmacy.    FOLLOW-UP:  - Schedule follow up in 6 months (December) with family member for back-to-back appointments on the same day.         1. Primary cancer of lower outer quadrant of left female breast    2. Hypothyroidism (acquired)    3. Age-related osteoporosis without current pathological fracture    4. Mild  late onset Alzheimer's dementia without behavioral disturbance, psychotic disturbance, mood disturbance, or anxiety    5. Encounter for colorectal cancer screening  - Fecal Immunochemical Test (iFOBT); Future    6. Impacted cerumen, unspecified laterality  - Ambulatory referral/consult to ENT; Future      Chronic conditions status updated as per HPI.  Other than changes above, cont current medications and maintain follow up with specialists.  No follow-ups on file.    Future Appointments   Date Time Provider Department Center   12/22/2025  1:30 PM Salo Vera MD [facility] [place]           Past Medical History:  Past Medical History:   Diagnosis Date    Breast cancer 01/2019    left    Mitral valve prolapse     Stress-induced cardiomyopathy 7/14/2020    Thyroid disease        Past Surgical History:   has a past surgical history that includes tonsillectomy; Ear drum surgery; Tonsillectomy; Inner ear surgery (Right); Breast biopsy (Left, 01/2019); Insertion of tunneled central venous catheter (CVC) with subcutaneous port (Right, 02/08/2019); Mastectomy, partial (Left, 08/21/2019); [place] lymph node biopsy (Left, 08/21/2019); Breast lumpectomy; and Left heart catheterization (Left, 07/15/2020).    Family History:  family history includes Breast cancer in her maternal aunt, paternal aunt, and sister; Cancer in her father and sister; Thyroid disease in her daughter.     Social History:  Social History[1]    Medications:  Encounter Medications[2]    Allergies:  Review of patient's allergies indicates:   Allergen Reactions    Azithromycin     Bactrim [sulfamethoxazole-trimethoprim] Other (See Comments)     Caused reflux and severe nausea       Health Maintenance:  Immunization History   Administered Date(s) Administered    Tdap 10/17/2010, 07/22/2016      Health Maintenance   Topic Date Due    Hepatitis C Screening  Never done    COVID-19 Vaccine (1) Never done    Shingles Vaccine (1 of 2) Never done     "Pneumococcal Vaccines (Age 50+) (1 of 2 - PCV) Never done    High Dose Statin  Never done    RSV Vaccine (Age 60+ and Pregnant patients) (1 - Risk 60-74 years 1-dose series) Never done    Hemoglobin A1c (Prediabetes)  04/28/2022    DEXA Scan  05/02/2025    Mammogram  08/01/2025    Colorectal Cancer Screening  08/27/2025    Influenza Vaccine (Season Ended) 09/01/2025    TETANUS VACCINE  07/22/2026    Lipid Panel  06/06/2030        Physical Exam      Vital Signs  Pulse: 71  SpO2: 99 %  BP: 128/70  BP Location: Left arm  Patient Position: Sitting  Pain Score: 0-No pain  Height and Weight  Height: 5' 5" (165.1 cm)  Weight: 52.7 kg (116 lb 2.9 oz)  BSA (Calculated - sq m): 1.55 sq meters  BMI (Calculated): 19.3  Weight in (lb) to have BMI = 25: 149.9]    Physical Exam    General: No acute distress. Well-developed. Well-nourished.  Eyes: EOMI. Sclerae anicteric.  HENT: Normocephalic. Atraumatic. Nares patent. Moist oral mucosa.  Ears: Bilateral TMs clear. Bilateral EACs clear.  Cardiovascular: Regular rate. Regular rhythm. No murmurs. No rubs. No gallops. Normal S1, S2.  Respiratory: Normal respiratory effort. Clear to auscultation bilaterally. No rales. No rhonchi. No wheezing.  Abdomen: Soft. Non-tender. Non-distended. Normoactive bowel sounds.  Musculoskeletal: No  obvious deformity.  Extremities: No lower extremity edema.  Neurological: Alert & oriented x3. No slurred speech. Normal gait.  Psychiatric: Normal mood. Normal affect. Good insight. Good judgment.  Skin: Warm. Dry. No rash.         Physical Exam  Vitals reviewed.   Constitutional:       Appearance: She is well-developed.   HENT:      Head: Normocephalic and atraumatic.      Right Ear: External ear normal.      Left Ear: External ear normal.   Cardiovascular:      Rate and Rhythm: Normal rate and regular rhythm.      Heart sounds: Normal heart sounds. No murmur heard.  Pulmonary:      Effort: Pulmonary effort is normal.      Breath sounds: Normal breath " sounds. No wheezing or rales.   Abdominal:      General: Bowel sounds are normal. There is no distension.      Palpations: Abdomen is soft.      Tenderness: There is no abdominal tenderness.         Laboratory:    Results              CBC:  Recent Labs   Lab 06/12/23 0831 03/01/24  0909 06/06/25  0846   WBC 4.17 4.43 4.28   RBC 4.18 4.18 4.08   Hemoglobin 12.4 12.8  --    HGB  --   --  12.3   Hematocrit 39.5 39.1  --    HCT  --   --  39.3   Platelet Count  --   --  204   Platelets 203 201  --    MCV 95 94 96   MCH 29.7 30.6 30.1   MCHC 31.4 L 32.7 31.3 L     CMP:  Recent Labs   Lab 06/12/23 0831 03/01/24  0909 06/06/25  0846   Glucose 101 92 96   Calcium 9.3 9.4 9.4   Albumin 4.3 4.4 3.9   Protein Total  --   --  7.1   Total Protein 7.2 7.5  --    Sodium 142 147 H 142   Potassium 4.2 4.4 4.0   CO2 26 30 H 29   Chloride 106 107 109   BUN 11 17 14   Alkaline Phosphatase 65 54  --    ALP  --   --  56   ALT 25 24 21   AST 29 30 22   Total Bilirubin 0.4 0.6  --    Bilirubin Total  --   --  0.4     URINALYSIS:       LIPIDS:  Recent Labs   Lab 06/12/23 0831 03/01/24 0909 07/24/24  0954 06/06/25  0846   TSH 2.030 1.510 1.741 3.852   HDL 61 59  --   --    HDL Cholesterol  --   --   --  65   Cholesterol Total  --   --   --  187   Cholesterol 205 H 208 H  --   --    Triglycerides 60 84  --   --    Triglyceride  --   --   --  58   LDL Cholesterol 132.0 132.2  --  110.4   HDL/Cholesterol Ratio 29.8 28.4  --  34.8   Non-HDL Cholesterol 144 149  --   --    Non HDL Cholesterol  --   --   --  122   Total Cholesterol/HDL Ratio 3.4 3.5  --   --    Cholesterol/HDL Ratio  --   --   --  2.9     TSH:  Recent Labs   Lab 03/01/24  0909 07/24/24  0954 06/06/25  0846   TSH 1.510 1.741 3.852     A1C:            This note was generated with the assistance of ambient listening technology. Verbal consent was obtained by the patient and accompanying visitor(s) for the recording of patient appointment to facilitate this note. I attest to having  reviewed and edited the generated note for accuracy, though some syntax or spelling errors may persist. Please contact the author of this note for any clarification.      Salo Vera MD  [facility]                       [1]   Social History  Tobacco Use    Smoking status: Never    Smokeless tobacco: Never   Substance Use Topics    Alcohol use: Yes     Comment: Seldom    Drug use: No   [2]   Outpatient Encounter Medications as of 6/16/2025   Medication Sig Dispense Refill    cyanocobalamin 1,000 mcg/mL injection INJECT 1 ML (CC) INTRAMUSCULARLY ONCE A WEEK FOR 12 WEEKS, THEN ONCE A MONTH      donepeziL (ARICEPT) 5 MG tablet Take 5 mg by mouth.      ciclopirox (PENLAC) 8 % Soln Apply topically nightly. 6.6 mL 6    levothyroxine (SYNTHROID) 75 MCG tablet Take 1 tablet (75 mcg total) by mouth every morning. 90 tablet 2    triamcinolone acetonide 0.1% (KENALOG) 0.1 % cream Apply topically 2 (two) times daily. (Patient not taking: Reported on 4/24/2024) 80 g 3    vit A/vit C/vit E/zinc/copper (PRESERVISION AREDS ORAL) Take by mouth.      vitamin D 1000 units Tab Take 2,000 Units by mouth once daily.        No facility-administered encounter medications on file as of 6/16/2025.

## 2025-06-18 DIAGNOSIS — Z78.0 MENOPAUSE: ICD-10-CM

## 2025-07-25 ENCOUNTER — OFFICE VISIT (OUTPATIENT)
Dept: OTOLARYNGOLOGY | Facility: CLINIC | Age: 70
End: 2025-07-25
Payer: MEDICARE

## 2025-07-25 DIAGNOSIS — H61.21 IMPACTED CERUMEN OF RIGHT EAR: Primary | ICD-10-CM

## 2025-07-25 PROCEDURE — 99999 PR PBB SHADOW E&M-EST. PATIENT-LVL II: CPT | Mod: PBBFAC,,, | Performed by: PHYSICIAN ASSISTANT

## 2025-07-25 NOTE — PROGRESS NOTES
Ear, Nose, & Throat  Otolaryngology - Head & Neck Surgery    Summary of Visit:  Radha Rivera was self-referred for Cerumen Impaction      Subjective:     Chief Complaint:   Chief Complaint   Patient presents with    Cerumen Impaction       Radah Rivera is a 70 y.o. female who presents with her  for cerumen impaction. She states every 2 years she requires a right ear cleaning. Her right ear felt clogged a few months ago. She does not put drops in her ear. The patient does not have a history of using cotton swabs to clean the ear.  There is not a prior history of ear surgery.  There is not a prior history of ear infections as an adult.  She does not wear hearing aids.  She has not had a hearing test recently. There  a family history of hearing loss at a young age. She  a history of significant noise exposure. She is not experiencing any otalgia, drainage, tinnitus, vertigo.       Past Medical History  Past Medical History:   Diagnosis Date    Breast cancer 01/2019    left    Mitral valve prolapse     Stress-induced cardiomyopathy 7/14/2020    Thyroid disease        Past Surgical History    Past Surgical History:   Procedure Laterality Date    BREAST BIOPSY Left 01/2019    BREAST LUMPECTOMY      08/21/2019    Ear drum surgery      INNER EAR SURGERY Right     INSERTION OF TUNNELED CENTRAL VENOUS CATHETER (CVC) WITH SUBCUTANEOUS PORT Right 02/08/2019    Procedure: INSERTION, PORT-A-CATH;  Surgeon: Twan Valdes MD;  Location: [facility] OR [address] [place] [address];  Service: General;  Laterality: Right;    LEFT HEART CATHETERIZATION Left 07/15/2020    Procedure: Left heart cath;  Surgeon: Jefferson Thomas III, MD;  Location: [facility] CATH LAB;  Service: Cardiology;  Laterality: Left;    MASTECTOMY, PARTIAL Left 08/21/2019    Procedure: MASTECTOMY, PARTIAL LEFT with SEED;  Surgeon: Twan Valdes MD;  Location: [facility] OR [address] [place] [address];  Service: General;  Laterality: Left;    SENTINEL LYMPH NODE BIOPSY Left 08/21/2019     Procedure: BIOPSY, LYMPH NODE, SENTINEL LEFT with SEED;  Surgeon: Twan Valdes MD;  Location: [facility] OR [address] [place] [address];  Service: General;  Laterality: Left;    tonsillectomy      TONSILLECTOMY          Family History  Family History   Problem Relation Name Age of Onset    Cancer Father Damián Dillon Sr.         prostate cancer    Breast cancer Sister Gay Ann          at 68    Cancer Sister Gay Ann     Thyroid disease Daughter      Breast cancer Maternal Aunt      Breast cancer Paternal Aunt      Colon cancer Neg Hx      Ovarian cancer Neg Hx      Stroke Neg Hx      Diabetes Neg Hx         Social History  Social History     Socioeconomic History    Marital status:    Tobacco Use    Smoking status: Never    Smokeless tobacco: Never   Substance and Sexual Activity    Alcohol use: Yes     Comment: Seldom    Drug use: No    Sexual activity: Yes     Partners: Male     Birth control/protection: Post-menopausal       Allergies  Azithromycin and Bactrim [sulfamethoxazole-trimethoprim]    Medications  Current Medications[1]    ROS:  Pertinent positive and negative review of systems as noted in HPI.     Objective:     There were no vitals taken for this visit.     Physical Exam    General Appearance:   Awake, Alert and Oriented. NAD. Appropriate affect and appearance      Head and Face:   skin is intact with no lesions noted.  Parotid and submandibular glands are symmetric and non-tender.      Ears:  Periauricular regions non-erythematous, non-fluctuanct non-tender  Pinna normal bilaterally, no skin lesions  Left EAC patent and without drainage bilaterally   Left Tympanic membrane is translucent and intact.  Right EAC is with cerumen impaction.    Nose:   External nose is symmetric, no skin lesions  Inferior turbinate hypertrophy, No polyps or rhinorrhea     OC/OP:  Tongue midline on extension, non-edematous, soft  No labial, buccal, oral tongue or floor of mouth lesions  Soft palate symmetric,  midline and without lesions or masses, tonsils symmetric,  No masses or lesions of the visualized oropharynx     Neck:  Neck is symmetric, non-edematous, non-erythematous  Trachea is midline  No thyromegaly, thyroid nodules or masses, non-tender   No palpable adenopathy or masses in levels I-VI     Respiratory:  Normal work of breathing, no accessory muscle use, no stridor      Data Review:   LABS        IMAGING        AUDIO      Procedures:   Procedure Note:  The patient was brought to the minor procedure room and placed under the operating microscope of the right ear canal which was cleaned of ceruminous debris. Using a combination of suction, curettes and cup forceps the patient's cerumen was removed. The patient tolerated the procedure well. There were no complications. The TM is translucent and intact, no effusion. Hearing improved and returned to baseline.       Assessment and Plan:       1. Impacted cerumen of right ear      Patient gets her right ear cleaned every 2 years which has worked well for her. She reports no hearing changes to either ears. She will continue with this regimen. Questions were encouraged and answered. The patient understands to return sooner for any new symptoms/concerns/worsening prior to any scheduled visits.        Problem List Items Addressed This Visit    None          [1]   Current Outpatient Medications   Medication Sig Dispense Refill    ciclopirox (PENLAC) 8 % Soln Apply topically nightly. 6.6 mL 6    cyanocobalamin 1,000 mcg/mL injection INJECT 1 ML (CC) INTRAMUSCULARLY ONCE A WEEK FOR 12 WEEKS, THEN ONCE A MONTH      donepeziL (ARICEPT) 5 MG tablet Take 5 mg by mouth.      levothyroxine (SYNTHROID) 75 MCG tablet Take 1 tablet (75 mcg total) by mouth every morning. 90 tablet 2    triamcinolone acetonide 0.1% (KENALOG) 0.1 % cream Apply topically 2 (two) times daily. (Patient not taking: Reported on 4/24/2024) 80 g 3    vit A/vit C/vit E/zinc/copper (PRESERVISION AREDS ORAL)  Take by mouth.      vitamin D 1000 units Tab Take 2,000 Units by mouth once daily.        No current facility-administered medications for this visit.

## (undated) DEVICE — SPONGE LAP 18X18 PREWASHED

## (undated) DEVICE — SUT 2/0 30IN SILK BLK BRAI

## (undated) DEVICE — STOCKINET 4INX48

## (undated) DEVICE — COVER PROBE NL STRL 3.6X96IN

## (undated) DEVICE — SEE MEDLINE ITEM 146417

## (undated) DEVICE — SOL NACL 0.9% INJ PF/50151

## (undated) DEVICE — BRA SURGICAL MED 36-38

## (undated) DEVICE — CONTAINER SPECIMEN STRL 4OZ

## (undated) DEVICE — NDL 18GA X1 1/2 REG BEVEL

## (undated) DEVICE — SEE MEDLINE ITEM 152622

## (undated) DEVICE — SEE MEDLINE ITEM 157128

## (undated) DEVICE — TRAY MINOR GEN SURG

## (undated) DEVICE — SUT MONOCRYL 4-0 PS-2

## (undated) DEVICE — BRA MAMMARY SUPPORT MED

## (undated) DEVICE — DRAPE C ARM 42 X 120 10/BX

## (undated) DEVICE — BANDAGE GAUZE 6PLY FLUFF 2X3

## (undated) DEVICE — ELECTRODE BLADE INSULATED 1 IN

## (undated) DEVICE — ELECTRODE REM PLYHSV RETURN 9

## (undated) DEVICE — SUT MCRYL PLUS 4-0 PS2 27IN

## (undated) DEVICE — DRAPE THYROID WITH ARMBOARD

## (undated) DEVICE — NEOGUARD COVER 4X30CM STERILE

## (undated) DEVICE — SOL 0.9% NACL IRRI.IN STERIL

## (undated) DEVICE — SEE MEDLINE ITEM 157131

## (undated) DEVICE — GAUZE FLUFF XXLG 36X36 2 PLY

## (undated) DEVICE — SUT VICRYL 3-0 27 SH

## (undated) DEVICE — GAUZE SPONGE 4X4 12PLY

## (undated) DEVICE — SYR DISP LL 5CC

## (undated) DEVICE — DRAPE STERI INSTRUMENT 1018

## (undated) DEVICE — PACK UNIVERSAL SPLIT II

## (undated) DEVICE — SUT 2-0 VICRYL / SH (J417)